# Patient Record
Sex: FEMALE | Race: WHITE
[De-identification: names, ages, dates, MRNs, and addresses within clinical notes are randomized per-mention and may not be internally consistent; named-entity substitution may affect disease eponyms.]

---

## 2017-03-07 ENCOUNTER — HOSPITAL ENCOUNTER (OUTPATIENT)
Dept: HOSPITAL 49 - FAS | Age: 62
Discharge: HOME | End: 2017-03-07
Payer: COMMERCIAL

## 2017-03-07 DIAGNOSIS — Z12.11: Primary | ICD-10-CM

## 2017-03-07 DIAGNOSIS — Z90.49: ICD-10-CM

## 2017-03-07 DIAGNOSIS — Z88.0: ICD-10-CM

## 2017-03-07 DIAGNOSIS — Z98.51: ICD-10-CM

## 2017-03-07 DIAGNOSIS — Z98.890: ICD-10-CM

## 2017-03-07 DIAGNOSIS — Z87.891: ICD-10-CM

## 2017-03-07 DIAGNOSIS — M19.90: ICD-10-CM

## 2017-03-07 DIAGNOSIS — Z86.010: ICD-10-CM

## 2017-03-07 DIAGNOSIS — Z79.899: ICD-10-CM

## 2017-03-07 DIAGNOSIS — K63.5: ICD-10-CM

## 2017-03-07 DIAGNOSIS — E78.00: ICD-10-CM

## 2017-03-07 LAB
BUN SERPL-MCNC: 10 MG/DL (ref 6–25)
BUN/CREAT RATIO (CALC): 14 (ref 12.1–20.1)
CHLORIDE: 101 MMOL/L (ref 98–107)
CO2 (BICARBONATE): 27 MMOL/L (ref 23–33)
CREATININE: 0.7 MG/DL (ref 0.5–1)
GLUCOSE SERPL-MCNC: 107 MG/DL (ref 70–105)
POTASSIUM: 3.8 MMOL/L (ref 3.5–5.1)

## 2018-02-22 ENCOUNTER — OFFICE VISIT (OUTPATIENT)
Dept: NEUROSURGERY | Facility: CLINIC | Age: 63
End: 2018-02-22

## 2018-02-22 ENCOUNTER — HOSPITAL ENCOUNTER (OUTPATIENT)
Dept: GENERAL RADIOLOGY | Facility: HOSPITAL | Age: 63
Discharge: HOME | End: 2018-02-22
Attending: NEUROLOGICAL SURGERY | Admitting: NEUROLOGICAL SURGERY

## 2018-02-22 VITALS
SYSTOLIC BLOOD PRESSURE: 138 MMHG | HEIGHT: 63 IN | BODY MASS INDEX: 33.13 KG/M2 | WEIGHT: 187 LBS | DIASTOLIC BLOOD PRESSURE: 90 MMHG

## 2018-02-22 DIAGNOSIS — G89.29 CHRONIC BILATERAL LOW BACK PAIN WITH BILATERAL SCIATICA: Primary | ICD-10-CM

## 2018-02-22 DIAGNOSIS — G89.29 CHRONIC BILATERAL LOW BACK PAIN WITH BILATERAL SCIATICA: ICD-10-CM

## 2018-02-22 DIAGNOSIS — M54.41 CHRONIC BILATERAL LOW BACK PAIN WITH BILATERAL SCIATICA: ICD-10-CM

## 2018-02-22 DIAGNOSIS — M54.42 CHRONIC BILATERAL LOW BACK PAIN WITH BILATERAL SCIATICA: ICD-10-CM

## 2018-02-22 DIAGNOSIS — M43.10 ACQUIRED SPONDYLOLISTHESIS: ICD-10-CM

## 2018-02-22 DIAGNOSIS — M54.41 CHRONIC BILATERAL LOW BACK PAIN WITH BILATERAL SCIATICA: Primary | ICD-10-CM

## 2018-02-22 DIAGNOSIS — M54.42 CHRONIC BILATERAL LOW BACK PAIN WITH BILATERAL SCIATICA: Primary | ICD-10-CM

## 2018-02-22 PROBLEM — M54.40 CHRONIC BILATERAL LOW BACK PAIN WITH SCIATICA: Status: ACTIVE | Noted: 2018-02-22

## 2018-02-22 PROCEDURE — 99243 OFF/OP CNSLTJ NEW/EST LOW 30: CPT | Performed by: NEUROLOGICAL SURGERY

## 2018-02-22 PROCEDURE — 72114 X-RAY EXAM L-S SPINE BENDING: CPT

## 2018-02-22 RX ORDER — ESTRADIOL 1 MG/1
1 TABLET ORAL NIGHTLY
COMMUNITY
Start: 2017-12-09 | End: 2022-01-11 | Stop reason: SDUPTHER

## 2018-02-22 RX ORDER — MELOXICAM 7.5 MG/1
7.5 TABLET ORAL DAILY
COMMUNITY
Start: 2018-02-01 | End: 2018-08-13 | Stop reason: HOSPADM

## 2018-02-22 RX ORDER — TOLTERODINE 4 MG/1
4 CAPSULE, EXTENDED RELEASE ORAL NIGHTLY
COMMUNITY
Start: 2018-01-22 | End: 2019-02-20 | Stop reason: ALTCHOICE

## 2018-02-22 RX ORDER — SIMVASTATIN 20 MG
20 TABLET ORAL NIGHTLY
COMMUNITY
Start: 2017-12-09 | End: 2021-09-28 | Stop reason: SDUPTHER

## 2018-02-22 RX ORDER — FUROSEMIDE 80 MG
40 TABLET ORAL DAILY
COMMUNITY
Start: 2018-01-31 | End: 2018-08-13 | Stop reason: HOSPADM

## 2018-02-22 RX ORDER — PANTOPRAZOLE SODIUM 40 MG/1
TABLET, DELAYED RELEASE ORAL
COMMUNITY
Start: 2018-02-16 | End: 2018-07-18

## 2018-02-22 RX ORDER — CYCLOBENZAPRINE HCL 10 MG
TABLET ORAL
COMMUNITY
Start: 2018-02-01 | End: 2018-03-29 | Stop reason: SDUPTHER

## 2018-02-22 NOTE — PROGRESS NOTES
Subjective   Patient ID: Carolina Ayers is a 63 y.o. female who is being seen for consultation today at the request of Teodoro Burks, * for low back pain. She had an MRI of the lumbar spine at [facility] on 2/16/18. She presents accompanied by her mother.    History of Present Illness Patient presnrts for evaluation of LBP and leg complaints for several years.  Legs started 6 months ago.  No loss of b/b.  She has chronic urinary urgency which started before her back started to bother her.  SHe noted no inciting event.  She does not have known osteoporosis.  Her pain today ias in her low back and is rated 7/10.  Walking and standing exacerbate.  Walking with a shopping cart does relieve her legs to some degree.  She denies GABBY's.  She denies PT.  CHiropractics have helped.  SHe is non smoker.  SHe has trialed ibuprofen but had some GI issues. This helped her until she had gastritis.  She feels the ibuprofen helped her quite a bit.  I operated on her back.      The following portions of the patient's history were reviewed and updated as appropriate: allergies, current medications, past family history, past medical history, past social history, past surgical history and problem list.    Review of Systems   Constitutional: Negative for chills and fever.   Respiratory: Negative for cough and shortness of breath.    Cardiovascular: Negative for chest pain, palpitations and leg swelling.   Gastrointestinal: Negative for abdominal pain and constipation.   Genitourinary: Negative for difficulty urinating and enuresis.   Musculoskeletal: Positive for arthralgias (BLE) and back pain. Negative for gait problem and neck pain.   Skin: Negative for rash.   Neurological: Positive for numbness (or tingling BLE). Negative for weakness.   Hematological: Does not bruise/bleed easily.   Psychiatric/Behavioral: Positive for sleep disturbance.       Objective   Physical Exam   Constitutional: She is oriented to person, place, and  time.   Neurological: She is oriented to person, place, and time. Gait normal.   Reflex Scores:       Tricep reflexes are 1+ on the right side and 1+ on the left side.       Bicep reflexes are 1+ on the right side and 1+ on the left side.       Brachioradialis reflexes are 2+ on the right side and 2+ on the left side.       Patellar reflexes are 2+ on the right side and 2+ on the left side.       Achilles reflexes are 1+ on the right side and 1+ on the left side.    Neurologic Exam     Mental Status   Oriented to person, place, and time.     Motor Exam   Muscle bulk: normal  Overall muscle tone: normal    Strength   Right deltoid: 5/5  Left deltoid: 5/5  Right biceps: 5/5  Left biceps: 5/5  Right triceps: 5/5  Left triceps: 5/5  Right wrist flexion: 5/5  Left wrist flexion: 5/5  Right wrist extension: 5/5  Left wrist extension: 5/5  Right interossei: 5/5  Left interossei: 5/5  Right iliopsoas: 5/5  Left iliopsoas: 5/5  Right quadriceps: 5/5  Left quadriceps: 5/5  Right hamstrin/5  Left hamstrin/5  Right anterior tibial: 5/5  Left anterior tibial: 5/5  Right gastroc: 5/5  Left gastroc: 5/5    Sensory Exam   Right arm light touch: normal  Left arm light touch: normal  Right leg light touch: normal  Left leg light touch: normal  Right arm pinprick: normal  Left arm pinprick: normal  Right leg pinprick: normal  Left leg pinprick: normal    Gait, Coordination, and Reflexes     Gait  Gait: normal    Reflexes   Right brachioradialis: 2+  Left brachioradialis: 2+  Right biceps: 1+  Left biceps: 1+  Right triceps: 1+  Left triceps: 1+  Right patellar: 2+  Left patellar: 2+  Right achilles: 1+  Left achilles: 1+  Right Medrano: absent  Left Medrano: absent  Right ankle clonus: absent  Left ankle clonus: absent     Thin back with palpable spinous prciess  No pain at trochanters or with internal or external hip rotation    Assessment/Plan   Independent Review of Radiographic Studies:  SHe has a grade 1 spondylolisthesis  at L45.  There is acquired stenosis at this level as a result.      Medical Decision Making:  She has a grade 1 slip at L45.  We will investigate her back with flexion and extension radiographs.  We will start some diclofenac gel give her recent GI issues.  We discussed PT or GABBY's should she fail.  If she requires surgery we will likely need to perform a fusion at L45.  SHe desires to have her dynamic x-rays performed today if possible.  I spent 30 minutes with her and her mother and answered all questions.        Carolina was seen today for back pain.    Diagnoses and all orders for this visit:    Chronic bilateral low back pain with bilateral sciatica  -     Cancel: XR Spine Lumbar Complete With Flex & Ext; Future  -     XR Spine Lumbar Complete With Flex & Ext; Future    Acquired spondylolisthesis  -     Cancel: XR Spine Lumbar Complete With Flex & Ext; Future  -     XR Spine Lumbar Complete With Flex & Ext; Future    Other orders  -     diclofenac (VOLTAREN) 1 % gel gel; Apply 4 g topically 3 (Three) Times a Day.      No Follow-up on file.

## 2018-03-29 ENCOUNTER — OFFICE VISIT (OUTPATIENT)
Dept: NEUROSURGERY | Facility: CLINIC | Age: 63
End: 2018-03-29

## 2018-03-29 VITALS
WEIGHT: 187 LBS | SYSTOLIC BLOOD PRESSURE: 130 MMHG | BODY MASS INDEX: 33.13 KG/M2 | HEIGHT: 63 IN | DIASTOLIC BLOOD PRESSURE: 70 MMHG

## 2018-03-29 DIAGNOSIS — G89.29 CHRONIC BILATERAL LOW BACK PAIN WITH BILATERAL SCIATICA: ICD-10-CM

## 2018-03-29 DIAGNOSIS — M43.10 ACQUIRED SPONDYLOLISTHESIS: Primary | ICD-10-CM

## 2018-03-29 DIAGNOSIS — M54.42 CHRONIC BILATERAL LOW BACK PAIN WITH BILATERAL SCIATICA: ICD-10-CM

## 2018-03-29 DIAGNOSIS — M54.41 CHRONIC BILATERAL LOW BACK PAIN WITH BILATERAL SCIATICA: ICD-10-CM

## 2018-03-29 PROCEDURE — 99213 OFFICE O/P EST LOW 20 MIN: CPT | Performed by: NEUROLOGICAL SURGERY

## 2018-03-29 RX ORDER — CYCLOBENZAPRINE HCL 10 MG
10 TABLET ORAL 2 TIMES DAILY PRN
Qty: 45 TABLET | Refills: 1 | Status: SHIPPED | OUTPATIENT
Start: 2018-03-29 | End: 2018-08-13 | Stop reason: HOSPADM

## 2018-03-29 RX ORDER — GABAPENTIN 300 MG/1
CAPSULE ORAL
Qty: 120 CAPSULE | Refills: 3 | Status: SHIPPED | OUTPATIENT
Start: 2018-03-29 | End: 2018-07-18

## 2018-03-29 RX ORDER — METOLAZONE 2.5 MG/1
2.5 TABLET ORAL DAILY
COMMUNITY
End: 2018-07-18

## 2018-03-29 NOTE — PROGRESS NOTES
Subjective   Patient ID: Carolina Ayers is a 63 y.o. female who is here today for follow-up for low back pain. He had lumbar xrays at [facility] on 2/22/18. She presents accompanied by her mother.    History of Present Illness  Patient had dynamic slip at L45.  She is not interested in GABBY's.  She continues with biateral sciatica and back pain.  No new complaints.  Diclofenac has marginal efficacy.      The following portions of the patient's history were reviewed and updated as appropriate: allergies, current medications, past family history, past medical history, past social history, past surgical history and problem list.    Review of Systems   Musculoskeletal: Positive for arthralgias (BLE) and back pain. Negative for gait problem.   Neurological: Positive for numbness (BLE). Negative for weakness.       Objective   Physical Exam   Constitutional: She is oriented to person, place, and time.   Neurological: She is oriented to person, place, and time. Gait normal.   Reflex Scores:       Patellar reflexes are 2+ on the right side and 2+ on the left side.       Achilles reflexes are 1+ on the right side and 1+ on the left side.    Neurologic Exam     Mental Status   Oriented to person, place, and time.     Sensory Exam   Right leg light touch: normal  Left leg light touch: normal  Right leg pinprick: normal  Left leg pinprick: normal    Gait, Coordination, and Reflexes     Gait  Gait: normal    Reflexes   Right patellar: 2+  Left patellar: 2+  Right achilles: 1+  Left achilles: 1+      Assessment/Plan   Independent Review of Radiographic Studies:  I reviewed her dynamic xrays and she has a dynamic slip at L$%    Medical Decision Making:  We will start some gabapentin and PT to see if we can make some headway into her discomfort.  We discussed red flags.      Carolina was seen today for back pain.    Diagnoses and all orders for this visit:    Acquired spondylolisthesis  -     Ambulatory Referral to Physical  Therapy    Chronic bilateral low back pain with bilateral sciatica  -     Ambulatory Referral to Physical Therapy    Other orders  -     gabapentin (NEURONTIN) 300 MG capsule; Take 1 qhs for 3-5 days, then bid for 3-5 days, then tid and stay on this dose  -     cyclobenzaprine (FLEXERIL) 10 MG tablet; Take 1 tablet by mouth 2 (Two) Times a Day As Needed for Muscle Spasms.      Return in about 6 weeks (around 5/10/2018).

## 2018-05-10 ENCOUNTER — OFFICE VISIT (OUTPATIENT)
Dept: NEUROSURGERY | Facility: CLINIC | Age: 63
End: 2018-05-10

## 2018-05-10 VITALS
SYSTOLIC BLOOD PRESSURE: 132 MMHG | HEIGHT: 63 IN | WEIGHT: 189 LBS | BODY MASS INDEX: 33.49 KG/M2 | DIASTOLIC BLOOD PRESSURE: 80 MMHG

## 2018-05-10 DIAGNOSIS — M54.41 CHRONIC BILATERAL LOW BACK PAIN WITH BILATERAL SCIATICA: ICD-10-CM

## 2018-05-10 DIAGNOSIS — M43.10 ACQUIRED SPONDYLOLISTHESIS: Primary | ICD-10-CM

## 2018-05-10 DIAGNOSIS — M54.42 CHRONIC BILATERAL LOW BACK PAIN WITH BILATERAL SCIATICA: ICD-10-CM

## 2018-05-10 DIAGNOSIS — G89.29 CHRONIC BILATERAL LOW BACK PAIN WITH BILATERAL SCIATICA: ICD-10-CM

## 2018-05-10 PROCEDURE — 99214 OFFICE O/P EST MOD 30 MIN: CPT | Performed by: NEUROLOGICAL SURGERY

## 2018-05-10 NOTE — PROGRESS NOTES
Subjective   Patient ID: Carolina Ayers is a 63 y.o. female who is here today for follow-up for low back pain. She presents accompanied by her mother.    History of Present Illness  Patient is 62 yo female with history of LBP and bilateral buttock pain.  She is poorly tolerant of the gabapentin at tid.  PT was ineffective.  She reports no new complaints.      The following portions of the patient's history were reviewed and updated as appropriate: allergies, current medications, past family history, past medical history, past social history, past surgical history and problem list.    Review of Systems   Musculoskeletal: Positive for arthralgias (BLE) and back pain.   Neurological: Negative for weakness and numbness.       Objective   Physical Exam  Neurologic Exam     Motor Exam     Strength   Right iliopsoas: 5/5  Left iliopsoas: 5/5  Right quadriceps: 5/5  Left quadriceps: 5/5  Right hamstrin/5  Left hamstrin/5  Right anterior tibial: 5/5  Left anterior tibial: 5/5  Right gastroc: 5/5  Left gastroc: 5/55/5 EHL      Sensory Exam   Right leg light touch: normal  Left leg light touch: normal  Right leg pinprick: normal  Left leg pinprick: normal     Gait, Coordination, and Reflexes      Gait  Gait: normal     Reflexes   Right patellar: 2+  Left patellar: 2+  Right achilles: 1+  Left achilles: 1+    Assessment/Plan   Independent Review of Radiographic Studies:  She has L45 slip.    Medical Decision Making:  We discussed GABBY's.  I suggested she get a single GABBY to see if we can mitigate her complaints.  If this fails she will require an L45 fusion.  I will reach out to [facility] in [place] w/r/t her bone density.  She is vacationing in  and wants to wait for surgery. We will try to arrange the GABBY before that.  [facility].      Carolina was seen today for back pain.    Diagnoses and all orders for this visit:    Acquired spondylolisthesis  -     Ambulatory Referral to Pain  Management    Chronic bilateral low back pain with bilateral sciatica  -     Ambulatory Referral to Pain Management      No Follow-up on file.

## 2018-06-18 ENCOUNTER — TELEPHONE (OUTPATIENT)
Dept: NEUROSURGERY | Facility: CLINIC | Age: 63
End: 2018-06-18

## 2018-06-18 ENCOUNTER — OFFICE VISIT (OUTPATIENT)
Dept: NEUROSURGERY | Facility: CLINIC | Age: 63
End: 2018-06-18

## 2018-06-18 VITALS
DIASTOLIC BLOOD PRESSURE: 64 MMHG | BODY MASS INDEX: 32.78 KG/M2 | WEIGHT: 185 LBS | HEIGHT: 63 IN | SYSTOLIC BLOOD PRESSURE: 122 MMHG

## 2018-06-18 DIAGNOSIS — G89.29 CHRONIC BILATERAL LOW BACK PAIN WITH BILATERAL SCIATICA: ICD-10-CM

## 2018-06-18 DIAGNOSIS — M54.41 CHRONIC BILATERAL LOW BACK PAIN WITH BILATERAL SCIATICA: ICD-10-CM

## 2018-06-18 DIAGNOSIS — M43.10 ACQUIRED SPONDYLOLISTHESIS: Primary | ICD-10-CM

## 2018-06-18 DIAGNOSIS — M54.42 CHRONIC BILATERAL LOW BACK PAIN WITH BILATERAL SCIATICA: ICD-10-CM

## 2018-06-18 PROCEDURE — 99213 OFFICE O/P EST LOW 20 MIN: CPT | Performed by: NEUROLOGICAL SURGERY

## 2018-06-18 RX ORDER — CLINDAMYCIN PHOSPHATE 900 MG/50ML
900 INJECTION INTRAVENOUS ONCE
Status: CANCELLED | OUTPATIENT
Start: 2018-08-07 | End: 2018-08-07

## 2018-06-18 NOTE — PROGRESS NOTES
Subjective   Patient ID: Carolina Ayers is a 63 y.o. female who is here today for follow-up for back pain. She presents accompanied by her mother.    History of Present Illness  Patient reports her vacation is over and the GABBY was ineffective.  She continues with bilateral leg pain and complaints.  We discussed that we do not have the bone density evaluation as yet. No loss of b/b.  Did not tolerate Gabapentin.  She failed PT.      The following portions of the patient's history were reviewed and updated as appropriate: allergies, current medications, past family history, past medical history, past social history, past surgical history and problem list.    Review of Systems   Musculoskeletal: Positive for arthralgias (BLE) and back pain.   Neurological: Positive for numbness ( BLE). Negative for weakness.       Objective   Physical Exam  Neurologic Exam     Strength   Right iliopsoas: 5/5  Left iliopsoas: 5/5  Right quadriceps: 5/5  Left quadriceps: 5/5  Right hamstrin/5  Left hamstrin/5  Right anterior tibial: 5/5  Left anterior tibial: 5/5  Right gastroc: 5/5  Left gastroc: 5/55/5 EHL      Sensory Exam   Right leg light touch: normal  Left leg light touch: normal  Right leg pinprick: normal  Left leg pinprick: normal    Reflexes   Right patellar: 2+  Left patellar: 2+  Right achilles: 1+  Left achilles: 1+       Assessment/Plan   Independent Review of Radiographic Studies:  Grade 1 slip at L45 with acquired stenosis.      Medical Decision Making:  Bothe legs are bothersome and the Left is > right in terms of symptoms.  She has failed all non operative measures including PT for several weeks, 1 GABBY, gabapentin and NSAIDS.  SHe has a grade 1 spondylolisthesis at L45.  I suggested an open decompression and fusion with possible interbody based on her bone density.  We discussed the procedure in great detail.  We discussed r/b/a.  We discussed pain with ambulation as the easiest thing to predict with 90%  reduction after some time.  Back painis hardest to predict with about 65% of people reporting reduction after months of recovery.  Tingling is hard to predict and may or may not improve. Common and uncommon risks explored and  risks include infection nerve damage, csf leaks,  weakness, numbness, b/b issues, need for other procedures, medical complications.  We discussed pulmonary embolism in detail.  I reviewed her imaging and the case with a model and she wishes to proceed.      Carolina was seen today for back pain.    Diagnoses and all orders for this visit:    Acquired spondylolisthesis  -     Case Request; Standing  -     CBC and Differential; Future  -     Comprehensive metabolic panel; Future  -     APTT; Future  -     TSH; Future  -     Protime-INR; Future  -     clindamycin (CLEOCIN) 900 mg in dextrose (D5W) 5 % 100 mL IVPB; Infuse 900 mg into a venous catheter 1 (One) Time.  -     Case Request    Chronic bilateral low back pain with bilateral sciatica  -     Case Request; Standing  -     CBC and Differential; Future  -     Comprehensive metabolic panel; Future  -     APTT; Future  -     TSH; Future  -     Protime-INR; Future  -     clindamycin (CLEOCIN) 900 mg in dextrose (D5W) 5 % 100 mL IVPB; Infuse 900 mg into a venous catheter 1 (One) Time.  -     Case Request    Other orders  -     Follow anesthesia standing orders.  -     Obtain informed consent  -     Follow Anesthesia Guidelines / Standing Orders; Standing  -     Verify NPO Status; Standing  -     SHANTANU hose- To be placed on patient in pre-op; Standing  -     SCD (sequential compression device)- to be placed on patient in Pre-op; Standing      No Follow-up on file.

## 2018-06-18 NOTE — TELEPHONE ENCOUNTER
Could you please enter an order for a POST OP lumbar xray AP/lat standing in brace for diagnosis of acquired spondylolisthesis. Please make sure to flor it post op in comments. Thanks!

## 2018-06-23 ENCOUNTER — RESULTS ENCOUNTER (OUTPATIENT)
Dept: NEUROSURGERY | Facility: CLINIC | Age: 63
End: 2018-06-23

## 2018-06-23 DIAGNOSIS — G89.29 CHRONIC BILATERAL LOW BACK PAIN WITH BILATERAL SCIATICA: ICD-10-CM

## 2018-06-23 DIAGNOSIS — M54.41 CHRONIC BILATERAL LOW BACK PAIN WITH BILATERAL SCIATICA: ICD-10-CM

## 2018-06-23 DIAGNOSIS — M54.42 CHRONIC BILATERAL LOW BACK PAIN WITH BILATERAL SCIATICA: ICD-10-CM

## 2018-06-23 DIAGNOSIS — M43.10 ACQUIRED SPONDYLOLISTHESIS: ICD-10-CM

## 2018-07-18 ENCOUNTER — OFFICE VISIT (OUTPATIENT)
Dept: NEUROSURGERY | Facility: CLINIC | Age: 63
End: 2018-07-18

## 2018-07-18 ENCOUNTER — TELEPHONE (OUTPATIENT)
Dept: NEUROSURGERY | Facility: CLINIC | Age: 63
End: 2018-07-18

## 2018-07-18 ENCOUNTER — APPOINTMENT (OUTPATIENT)
Dept: PREADMISSION TESTING | Facility: HOSPITAL | Age: 63
End: 2018-07-18

## 2018-07-18 VITALS
TEMPERATURE: 98.1 F | RESPIRATION RATE: 18 BRPM | HEART RATE: 77 BPM | OXYGEN SATURATION: 97 % | DIASTOLIC BLOOD PRESSURE: 85 MMHG | HEIGHT: 62 IN | SYSTOLIC BLOOD PRESSURE: 133 MMHG | WEIGHT: 187 LBS | BODY MASS INDEX: 34.41 KG/M2

## 2018-07-18 VITALS
RESPIRATION RATE: 16 BRPM | SYSTOLIC BLOOD PRESSURE: 142 MMHG | HEART RATE: 76 BPM | WEIGHT: 185 LBS | BODY MASS INDEX: 33.84 KG/M2 | DIASTOLIC BLOOD PRESSURE: 72 MMHG

## 2018-07-18 DIAGNOSIS — M54.41 CHRONIC BILATERAL LOW BACK PAIN WITH BILATERAL SCIATICA: Primary | ICD-10-CM

## 2018-07-18 DIAGNOSIS — M43.10 ACQUIRED SPONDYLOLISTHESIS: ICD-10-CM

## 2018-07-18 DIAGNOSIS — R94.31 ABNORMAL EKG: ICD-10-CM

## 2018-07-18 DIAGNOSIS — G89.29 CHRONIC BILATERAL LOW BACK PAIN WITH BILATERAL SCIATICA: Primary | ICD-10-CM

## 2018-07-18 DIAGNOSIS — E87.6 HYPOKALEMIA: ICD-10-CM

## 2018-07-18 DIAGNOSIS — M54.42 CHRONIC BILATERAL LOW BACK PAIN WITH BILATERAL SCIATICA: Primary | ICD-10-CM

## 2018-07-18 LAB
ALBUMIN SERPL-MCNC: 4.4 G/DL (ref 3.5–5.2)
ALBUMIN/GLOB SERPL: 1.3 G/DL
ALP SERPL-CCNC: 80 U/L (ref 39–117)
ALT SERPL W P-5'-P-CCNC: 18 U/L (ref 1–33)
ANION GAP SERPL CALCULATED.3IONS-SCNC: 14.3 MMOL/L
APTT PPP: 29.3 SECONDS (ref 22.7–35.4)
AST SERPL-CCNC: 22 U/L (ref 1–32)
BASOPHILS # BLD AUTO: 0.05 10*3/MM3 (ref 0–0.2)
BASOPHILS NFR BLD AUTO: 0.8 % (ref 0–1.5)
BILIRUB SERPL-MCNC: 0.4 MG/DL (ref 0.1–1.2)
BUN BLD-MCNC: 12 MG/DL (ref 8–23)
BUN/CREAT SERPL: 14 (ref 7–25)
CALCIUM SPEC-SCNC: 9.9 MG/DL (ref 8.6–10.5)
CHLORIDE SERPL-SCNC: 93 MMOL/L (ref 98–107)
CO2 SERPL-SCNC: 30.7 MMOL/L (ref 22–29)
CREAT BLD-MCNC: 0.86 MG/DL (ref 0.57–1)
DEPRECATED RDW RBC AUTO: 43.6 FL (ref 37–54)
EOSINOPHIL # BLD AUTO: 0.18 10*3/MM3 (ref 0–0.7)
EOSINOPHIL NFR BLD AUTO: 2.7 % (ref 0.3–6.2)
ERYTHROCYTE [DISTWIDTH] IN BLOOD BY AUTOMATED COUNT: 13.2 % (ref 11.7–13)
GFR SERPL CREATININE-BSD FRML MDRD: 67 ML/MIN/1.73
GLOBULIN UR ELPH-MCNC: 3.3 GM/DL
GLUCOSE BLD-MCNC: 102 MG/DL (ref 65–99)
HCT VFR BLD AUTO: 41.8 % (ref 35.6–45.5)
HGB BLD-MCNC: 14.3 G/DL (ref 11.9–15.5)
IMM GRANULOCYTES # BLD: 0.04 10*3/MM3 (ref 0–0.03)
IMM GRANULOCYTES NFR BLD: 0.6 % (ref 0–0.5)
INR PPP: 1.01 (ref 0.9–1.1)
LYMPHOCYTES # BLD AUTO: 1.55 10*3/MM3 (ref 0.9–4.8)
LYMPHOCYTES NFR BLD AUTO: 23.5 % (ref 19.6–45.3)
MCH RBC QN AUTO: 31.4 PG (ref 26.9–32)
MCHC RBC AUTO-ENTMCNC: 34.2 G/DL (ref 32.4–36.3)
MCV RBC AUTO: 91.7 FL (ref 80.5–98.2)
MONOCYTES # BLD AUTO: 0.55 10*3/MM3 (ref 0.2–1.2)
MONOCYTES NFR BLD AUTO: 8.3 % (ref 5–12)
NEUTROPHILS # BLD AUTO: 4.26 10*3/MM3 (ref 1.9–8.1)
NEUTROPHILS NFR BLD AUTO: 64.7 % (ref 42.7–76)
NRBC BLD MANUAL-RTO: 0 /100 WBC (ref 0–0)
PLATELET # BLD AUTO: 317 10*3/MM3 (ref 140–500)
PMV BLD AUTO: 10.2 FL (ref 6–12)
POTASSIUM BLD-SCNC: 2.9 MMOL/L (ref 3.5–5.2)
PROT SERPL-MCNC: 7.7 G/DL (ref 6–8.5)
PROTHROMBIN TIME: 13.1 SECONDS (ref 11.7–14.2)
RBC # BLD AUTO: 4.56 10*6/MM3 (ref 3.9–5.2)
SODIUM BLD-SCNC: 138 MMOL/L (ref 136–145)
TSH SERPL DL<=0.05 MIU/L-ACNC: 2.51 MIU/ML (ref 0.27–4.2)
WBC NRBC COR # BLD: 6.59 10*3/MM3 (ref 4.5–10.7)

## 2018-07-18 PROCEDURE — 93005 ELECTROCARDIOGRAM TRACING: CPT

## 2018-07-18 PROCEDURE — 85025 COMPLETE CBC W/AUTO DIFF WBC: CPT | Performed by: NEUROLOGICAL SURGERY

## 2018-07-18 PROCEDURE — 93010 ELECTROCARDIOGRAM REPORT: CPT | Performed by: INTERNAL MEDICINE

## 2018-07-18 PROCEDURE — 85610 PROTHROMBIN TIME: CPT | Performed by: NEUROLOGICAL SURGERY

## 2018-07-18 PROCEDURE — 36415 COLL VENOUS BLD VENIPUNCTURE: CPT | Performed by: NEUROLOGICAL SURGERY

## 2018-07-18 PROCEDURE — 84443 ASSAY THYROID STIM HORMONE: CPT | Performed by: NEUROLOGICAL SURGERY

## 2018-07-18 PROCEDURE — 80053 COMPREHEN METABOLIC PANEL: CPT | Performed by: NEUROLOGICAL SURGERY

## 2018-07-18 PROCEDURE — 99213 OFFICE O/P EST LOW 20 MIN: CPT | Performed by: NURSE PRACTITIONER

## 2018-07-18 PROCEDURE — 85730 THROMBOPLASTIN TIME PARTIAL: CPT | Performed by: NEUROLOGICAL SURGERY

## 2018-07-18 RX ORDER — POTASSIUM CHLORIDE 1500 MG/1
20 TABLET, FILM COATED, EXTENDED RELEASE ORAL DAILY
Qty: 30 TABLET | Refills: 0 | Status: SHIPPED | OUTPATIENT
Start: 2018-07-18 | End: 2018-09-14

## 2018-07-18 RX ORDER — POLYETHYLENE GLYCOL 3350 17 G/17G
17 POWDER, FOR SOLUTION ORAL AS NEEDED
COMMUNITY
End: 2019-08-20

## 2018-07-18 RX ORDER — ASPIRIN 81 MG/1
81 TABLET, CHEWABLE ORAL DAILY
Status: ON HOLD | COMMUNITY
End: 2018-08-10

## 2018-07-18 NOTE — PROGRESS NOTES
Subjective   Patient ID: Carolina Ayers is a 63 y.o. female is here today for follow-up of lumbar spondylosis prior to scheduled L4/5 decompression with possible fusion on 8-7-18.    History of Present Illness  Patient presents for up of back pain with bilateral radiculopathy and spondylolisthesis.  She continues to have bilateral leg pain left greater than right.  No progressive symptoms with regard to weakness.  She is wearing a brace when necessary for comfort.  No incontinence issues.  She is anxious to proceed with surgery.    The following portions of the patient's history were reviewed and updated as appropriate: allergies, current medications, past family history, past medical history, past social history, past surgical history and problem list.    Review of Systems   Constitutional: Negative for fever.   HENT: Negative for trouble swallowing.    Respiratory: Negative for cough and wheezing.    Gastrointestinal: Positive for constipation.   Musculoskeletal: Positive for back pain. Negative for gait problem.   Skin: Negative for rash.   Neurological: Positive for numbness (LLE and buttocks). Negative for weakness.   Psychiatric/Behavioral: Negative for sleep disturbance.       Objective      Results from last 7 days  Lab Units 07/18/18  0910   WBC 10*3/mm3 6.59   HEMOGLOBIN g/dL 14.3   HEMATOCRIT % 41.8   PLATELETS 10*3/mm3 317       Results from last 7 days  Lab Units 07/18/18  0909   SODIUM mmol/L 138   POTASSIUM mmol/L 2.9*   CHLORIDE mmol/L 93*   CO2 mmol/L 30.7*   BUN mg/dL 12   CREATININE mg/dL 0.86   CALCIUM mg/dL 9.9   BILIRUBIN mg/dL 0.4   ALK PHOS U/L 80   ALT (SGPT) U/L 18   AST (SGOT) U/L 22   GLUCOSE mg/dL 102*       Results from last 7 days  Lab Units 07/18/18  0909   INR  1.01     EKG- SR, borderline T abnormalities inferior leads    Physical Exam   Constitutional: She is oriented to person, place, and time. She appears well-developed and well-nourished.   HENT:   Body mass index is 33.84  kg/m².     Neck: Normal carotid pulses present. Carotid bruit is not present.   Cardiovascular: Normal rate, regular rhythm and normal heart sounds.    No murmur heard.  Pulmonary/Chest: Effort normal and breath sounds normal.   Musculoskeletal:   Wearing TLSO brace   Neurological: She is alert and oriented to person, place, and time. She has normal strength. Gait normal.   Reflex Scores:       Patellar reflexes are 2+ on the right side and 2+ on the left side.       Achilles reflexes are 2+ on the right side and 2+ on the left side.  Skin: Skin is warm and dry. No rash noted.   Psychiatric: She has a normal mood and affect. Her behavior is normal. Judgment normal.   Vitals reviewed.    Neurologic Exam     Mental Status   Oriented to person, place, and time.   Level of consciousness: alert  Knowledge: good.     Motor Exam   Muscle bulk: normal    Strength   Strength 5/5 throughout.     Gait, Coordination, and Reflexes     Gait  Gait: normal    Reflexes   Right patellar: 2+  Left patellar: 2+  Right achilles: 2+  Left achilles: 2+Able to heel and toe walk bilaterally       Assessment/Plan   Independent Review of Radiographic Studies:    No new imaging    DEXA scan from March 2018 shows normal bone density    Medical Decision Making:    Patient presents for operative visit prior to undergoing L4/5 decompression and fusion.  She denies any new problems since her last visit.  She completed preadmission testing today.  Her exam is as noted above with no neurologic red flags.  She has received her TLSO brace and is actually wearing it today.  She states that it does help her discomfort when her back begins to really hurt.  Her labs and EKG were reviewed.  Her potassium is very low at 2.9.  She has no ectopy on her EKG, but she has some borderline T-wave abnormalities on inferior leads.  She is on Lasix.  She states that she has not been on potassium supplementation but in the past as have low potassium prior to surgery.   I gave her a one month supply of potassium to begin today and encourage potassium rich foods.  I've asked her surgery scheduler contact her PCP with regard to her EKG findings as well as her potassium.  She may need cardiac clearance prior to surgery, but we will await the primary care review of EKG.  She is aware to stop her aspirin and mobile 1 week prior to surgery, but she has artery done so.  She will not take her Lasix on the morning of surgery.  Rbas of surgery discussed in detail the last office visit.  Answer questions with regard to postoperative expectations and restrictions today.    Plan: L4/5 fusion and return to office following.  Begin potassium supplementation.  PCP clearance for surgery regarding EKG findings.    Carolina was seen today for back pain and pre-op exam.    Diagnoses and all orders for this visit:    Chronic bilateral low back pain with bilateral sciatica    Acquired spondylolisthesis    Hypokalemia    Abnormal EKG    Other orders  -     potassium chloride ER (K-TAB) 20 MEQ tablet controlled-release ER tablet; Take 1 tablet by mouth Daily.      Return for Postop vist as scheduled.

## 2018-07-18 NOTE — TELEPHONE ENCOUNTER
----- Message from MARI Boyer sent at 7/18/2018  3:23 PM EDT -----  Regarding: bone density  Please contact patient with her know that her bone density was done in March 2018 and shows normal  Bone density.  No osteoporosis or osteopenia

## 2018-07-18 NOTE — TELEPHONE ENCOUNTER
I would contact her PCP to see if there are any specific recommendations prior to surgery with regard to potassium replacement. This is not something we manage. Thanks!

## 2018-07-18 NOTE — DISCHARGE INSTRUCTIONS
Take the following medications the morning of surgery with a small sip of water: NONE    ARRIVE AT 7AM    General Instructions:  • Do not eat or drink anything after midnight the night before surgery.  • Infants may have breast milk up to four hours before surgery.  • Infants drinking formula may drink formula up to six hours before surgery.   • Patients who avoid smoking, chewing tobacco and alcohol for 4 weeks prior to surgery have a reduced risk of post-operative complications.  Quit smoking as many days before surgery as you can.  • Do not smoke, use chewing tobacco or drink alcohol the day of surgery.   • If applicable bring your C-PAP/ BI-PAP machine.  • Bring any papers given to you in the doctor’s office.  • Wear clean comfortable clothes and socks.  • Do not wear contact lenses or make-up.  Bring a case for your glasses.   • Bring crutches or walker if applicable.  • Remove all piercings.  Leave jewelry and any other valuables at home.  • Hair extensions with metal clips must be removed prior to surgery.  • The Pre-Admission Testing nurse will instruct you to bring medications if unable to obtain an accurate list in Pre-Admission Testing.        If you were given a blood bank ID arm band remember to bring it with you the day of surgery.    Preventing a Surgical Site Infection:  • For 2 to 3 days before surgery, avoid shaving with a razor because the razor can irritate skin and make it easier to develop an infection.    • Any areas of open skin can increase the risk of a post-operative wound infection by allowing bacteria to enter and travel throughout the body.  Notify your surgeon if you have any skin wounds / rashes even if it is not near the expected surgical site.  The area will need assessed to determine if surgery should be delayed until it is healed.  • The night prior to surgery sleep in a clean bed with clean clothing.  Do not allow pets to sleep with you.  • Shower on the morning of surgery using  a fresh bar of anti-bacterial soap (such as Dial) and clean washcloth.  Dry with a clean towel and dress in clean clothing.  • Ask your surgeon if you will be receiving antibiotics prior to surgery.  • Make sure you, your family, and all healthcare providers clean their hands with soap and water or an alcohol based hand  before caring for you or your wound.    Day of surgery:  Upon arrival, a Pre-op nurse and Anesthesiologist will review your health history, obtain vital signs, and answer questions you may have.  The only belongings needed at this time will be your home medications and if applicable your C-PAP/BI-PAP machine.  If you are staying overnight your family can leave the rest of your belongings in the car and bring them to your room later.  A Pre-op nurse will start an IV and you may receive medication in preparation for surgery, including something to help you relax.  Your family will be able to see you in the Pre-op area.  While you are in surgery your family should notify the waiting room  if they leave the waiting room area and provide a contact phone number.    Please be aware that surgery does come with discomfort.  We want to make every effort to control your discomfort so please discuss any uncontrolled symptoms with your nurse.   Your doctor will most likely have prescribed pain medications.      If you are going home after surgery you will receive individualized written care instructions before being discharged.  A responsible adult must drive you to and from the hospital on the day of your surgery and stay with you for 24 hours.    If you are staying overnight following surgery, you will be transported to your hospital room following the recovery period.  [facility] has all private rooms.    You have received a list of surgical assistants for your reference.  If you have any questions please call Pre-Admission Testing at [phone].  Deductibles and co-payments  are collected on the day of service. Please be prepared to pay the required co-pay, deductible or deposit on the day of service as defined by your plan.

## 2018-07-31 ENCOUNTER — TELEPHONE (OUTPATIENT)
Dept: NEUROSURGERY | Facility: CLINIC | Age: 63
End: 2018-07-31

## 2018-07-31 ENCOUNTER — OFFICE VISIT (OUTPATIENT)
Dept: CARDIOLOGY | Facility: CLINIC | Age: 63
End: 2018-07-31

## 2018-07-31 ENCOUNTER — LAB (OUTPATIENT)
Dept: LAB | Facility: HOSPITAL | Age: 63
End: 2018-07-31

## 2018-07-31 VITALS
DIASTOLIC BLOOD PRESSURE: 80 MMHG | HEART RATE: 69 BPM | BODY MASS INDEX: 33.86 KG/M2 | HEIGHT: 62 IN | WEIGHT: 184 LBS | SYSTOLIC BLOOD PRESSURE: 122 MMHG

## 2018-07-31 DIAGNOSIS — E87.6 HYPOKALEMIA: ICD-10-CM

## 2018-07-31 DIAGNOSIS — R94.31 ABNORMAL EKG: ICD-10-CM

## 2018-07-31 DIAGNOSIS — R60.0 LOCALIZED EDEMA: ICD-10-CM

## 2018-07-31 DIAGNOSIS — I10 ESSENTIAL HYPERTENSION: ICD-10-CM

## 2018-07-31 DIAGNOSIS — Z01.810 PREOP CARDIOVASCULAR EXAM: Primary | ICD-10-CM

## 2018-07-31 LAB
ANION GAP SERPL CALCULATED.3IONS-SCNC: 15 MMOL/L
BILIRUB UR QL STRIP: NEGATIVE
BUN BLD-MCNC: 18 MG/DL (ref 8–23)
BUN/CREAT SERPL: 18.6 (ref 7–25)
CALCIUM SPEC-SCNC: 10.8 MG/DL (ref 8.6–10.5)
CHLORIDE SERPL-SCNC: 96 MMOL/L (ref 98–107)
CLARITY UR: CLEAR
CO2 SERPL-SCNC: 30 MMOL/L (ref 22–29)
COLOR UR: YELLOW
CREAT BLD-MCNC: 0.97 MG/DL (ref 0.57–1)
GFR SERPL CREATININE-BSD FRML MDRD: 58 ML/MIN/1.73
GLUCOSE BLD-MCNC: 114 MG/DL (ref 65–99)
GLUCOSE UR STRIP-MCNC: NEGATIVE MG/DL
HGB UR QL STRIP.AUTO: NEGATIVE
KETONES UR QL STRIP: NEGATIVE
LEUKOCYTE ESTERASE UR QL STRIP.AUTO: NEGATIVE
MAGNESIUM SERPL-MCNC: 2.1 MG/DL (ref 1.6–2.4)
NITRITE UR QL STRIP: NEGATIVE
PH UR STRIP.AUTO: 6.5 [PH] (ref 5–8)
POTASSIUM BLD-SCNC: 3.5 MMOL/L (ref 3.5–5.2)
PROT UR QL STRIP: NEGATIVE
SODIUM BLD-SCNC: 141 MMOL/L (ref 136–145)
SP GR UR STRIP: 1.01 (ref 1–1.03)
UROBILINOGEN UR QL STRIP: NORMAL

## 2018-07-31 PROCEDURE — 81003 URINALYSIS AUTO W/O SCOPE: CPT

## 2018-07-31 PROCEDURE — 80048 BASIC METABOLIC PNL TOTAL CA: CPT

## 2018-07-31 PROCEDURE — 99204 OFFICE O/P NEW MOD 45 MIN: CPT | Performed by: INTERNAL MEDICINE

## 2018-07-31 PROCEDURE — 36415 COLL VENOUS BLD VENIPUNCTURE: CPT

## 2018-07-31 PROCEDURE — 83735 ASSAY OF MAGNESIUM: CPT

## 2018-07-31 PROCEDURE — 93000 ELECTROCARDIOGRAM COMPLETE: CPT | Performed by: INTERNAL MEDICINE

## 2018-07-31 RX ORDER — METOLAZONE 2.5 MG/1
2.5 TABLET ORAL EVERY OTHER DAY
COMMUNITY
End: 2018-09-14

## 2018-07-31 NOTE — TELEPHONE ENCOUNTER
Patient is calling because she says her EKG was abnormal and they are doing an Echo on Friday. Pt saw Dr Nathan. Pt is scheduled for surgery on Tuesday and has questions about some of her medications. She wants to know about melatonin, over the counter acid pills   Carolina [phone]

## 2018-08-02 ENCOUNTER — TELEPHONE (OUTPATIENT)
Dept: CARDIOLOGY | Facility: CLINIC | Age: 63
End: 2018-08-02

## 2018-08-02 DIAGNOSIS — R60.0 LOCALIZED EDEMA: Primary | ICD-10-CM

## 2018-08-03 ENCOUNTER — HOSPITAL ENCOUNTER (OUTPATIENT)
Dept: CARDIOLOGY | Facility: HOSPITAL | Age: 63
Discharge: HOME | End: 2018-08-03
Attending: INTERNAL MEDICINE | Admitting: INTERNAL MEDICINE

## 2018-08-03 VITALS
BODY MASS INDEX: 33.49 KG/M2 | HEIGHT: 62 IN | HEART RATE: 81 BPM | OXYGEN SATURATION: 97 % | SYSTOLIC BLOOD PRESSURE: 130 MMHG | WEIGHT: 182 LBS | DIASTOLIC BLOOD PRESSURE: 70 MMHG

## 2018-08-03 DIAGNOSIS — R94.31 ABNORMAL EKG: ICD-10-CM

## 2018-08-03 DIAGNOSIS — I10 ESSENTIAL HYPERTENSION: ICD-10-CM

## 2018-08-03 DIAGNOSIS — Z01.810 PREOP CARDIOVASCULAR EXAM: ICD-10-CM

## 2018-08-03 DIAGNOSIS — R60.0 LOCALIZED EDEMA: ICD-10-CM

## 2018-08-03 LAB
ASCENDING AORTA: 2.9 CM
BH CV ECHO MEAS - ACS: 1.8 CM
BH CV ECHO MEAS - AO MAX PG (FULL): 3.6 MMHG
BH CV ECHO MEAS - AO MAX PG: 10.2 MMHG
BH CV ECHO MEAS - AO MEAN PG (FULL): 1.8 MMHG
BH CV ECHO MEAS - AO MEAN PG: 4.7 MMHG
BH CV ECHO MEAS - AO ROOT AREA (BSA CORRECTED): 1.8
BH CV ECHO MEAS - AO ROOT AREA: 8.1 CM^2
BH CV ECHO MEAS - AO ROOT DIAM: 3.2 CM
BH CV ECHO MEAS - AO V2 MAX: 160 CM/SEC
BH CV ECHO MEAS - AO V2 MEAN: 97.6 CM/SEC
BH CV ECHO MEAS - AO V2 VTI: 34 CM
BH CV ECHO MEAS - AVA(I,A): 2.1 CM^2
BH CV ECHO MEAS - AVA(I,D): 2.1 CM^2
BH CV ECHO MEAS - AVA(V,A): 2.2 CM^2
BH CV ECHO MEAS - AVA(V,D): 2.2 CM^2
BH CV ECHO MEAS - BSA(HAYCOCK): 1.9 M^2
BH CV ECHO MEAS - BSA: 1.8 M^2
BH CV ECHO MEAS - BZI_BMI: 33.3 KILOGRAMS/M^2
BH CV ECHO MEAS - BZI_METRIC_HEIGHT: 157.5 CM
BH CV ECHO MEAS - BZI_METRIC_WEIGHT: 82.6 KG
BH CV ECHO MEAS - CONTRAST EF (2CH): 60.9 ML/M^2
BH CV ECHO MEAS - CONTRAST EF 4CH: 61 ML/M^2
BH CV ECHO MEAS - EDV(MOD-SP2): 69 ML
BH CV ECHO MEAS - EDV(MOD-SP4): 59 ML
BH CV ECHO MEAS - EDV(TEICH): 78.2 ML
BH CV ECHO MEAS - EF(CUBED): 81.2 %
BH CV ECHO MEAS - EF(MOD-BP): 61 %
BH CV ECHO MEAS - EF(MOD-SP2): 60.9 %
BH CV ECHO MEAS - EF(MOD-SP4): 61 %
BH CV ECHO MEAS - EF(TEICH): 74.2 %
BH CV ECHO MEAS - ESV(MOD-SP2): 27 ML
BH CV ECHO MEAS - ESV(MOD-SP4): 23 ML
BH CV ECHO MEAS - ESV(TEICH): 20.2 ML
BH CV ECHO MEAS - FS: 42.7 %
BH CV ECHO MEAS - IVS/LVPW: 1.1
BH CV ECHO MEAS - IVSD: 1.1 CM
BH CV ECHO MEAS - LAT PEAK E' VEL: 10 CM/SEC
BH CV ECHO MEAS - LV DIASTOLIC VOL/BSA (35-75): 32.1 ML/M^2
BH CV ECHO MEAS - LV MASS(C)D: 144 GRAMS
BH CV ECHO MEAS - LV MASS(C)DI: 78.4 GRAMS/M^2
BH CV ECHO MEAS - LV MAX PG: 6.6 MMHG
BH CV ECHO MEAS - LV MEAN PG: 3 MMHG
BH CV ECHO MEAS - LV SYSTOLIC VOL/BSA (12-30): 12.5 ML/M^2
BH CV ECHO MEAS - LV V1 MAX: 128.5 CM/SEC
BH CV ECHO MEAS - LV V1 MEAN: 80 CM/SEC
BH CV ECHO MEAS - LV V1 VTI: 25.9 CM
BH CV ECHO MEAS - LVIDD: 4.2 CM
BH CV ECHO MEAS - LVIDS: 2.4 CM
BH CV ECHO MEAS - LVLD AP2: 7 CM
BH CV ECHO MEAS - LVLD AP4: 6.8 CM
BH CV ECHO MEAS - LVLS AP2: 6.1 CM
BH CV ECHO MEAS - LVLS AP4: 6.5 CM
BH CV ECHO MEAS - LVOT AREA (M): 2.8 CM^2
BH CV ECHO MEAS - LVOT AREA: 2.8 CM^2
BH CV ECHO MEAS - LVOT DIAM: 1.9 CM
BH CV ECHO MEAS - LVPWD: 0.98 CM
BH CV ECHO MEAS - MED PEAK E' VEL: 9 CM/SEC
BH CV ECHO MEAS - MR MAX PG: 12.1 MMHG
BH CV ECHO MEAS - MR MAX VEL: 173.7 CM/SEC
BH CV ECHO MEAS - MV A DUR: 0.12 SEC
BH CV ECHO MEAS - MV A MAX VEL: 121.2 CM/SEC
BH CV ECHO MEAS - MV DEC SLOPE: 497.7 CM/SEC^2
BH CV ECHO MEAS - MV DEC TIME: 0.19 SEC
BH CV ECHO MEAS - MV E MAX VEL: 90 CM/SEC
BH CV ECHO MEAS - MV E/A: 0.74
BH CV ECHO MEAS - MV MAX PG: 6.1 MMHG
BH CV ECHO MEAS - MV MEAN PG: 2.9 MMHG
BH CV ECHO MEAS - MV P1/2T MAX VEL: 90 CM/SEC
BH CV ECHO MEAS - MV P1/2T: 53 MSEC
BH CV ECHO MEAS - MV V2 MAX: 123.6 CM/SEC
BH CV ECHO MEAS - MV V2 MEAN: 76.8 CM/SEC
BH CV ECHO MEAS - MV V2 VTI: 27 CM
BH CV ECHO MEAS - MVA P1/2T LCG: 2.4 CM^2
BH CV ECHO MEAS - MVA(P1/2T): 4.2 CM^2
BH CV ECHO MEAS - MVA(VTI): 2.7 CM^2
BH CV ECHO MEAS - PA ACC TIME: 0.11 SEC
BH CV ECHO MEAS - PA MAX PG (FULL): 2.1 MMHG
BH CV ECHO MEAS - PA MAX PG: 6 MMHG
BH CV ECHO MEAS - PA PR(ACCEL): 29.9 MMHG
BH CV ECHO MEAS - PA V2 MAX: 122.2 CM/SEC
BH CV ECHO MEAS - PULM A REVS DUR: 0.12 SEC
BH CV ECHO MEAS - PULM A REVS VEL: 32.3 CM/SEC
BH CV ECHO MEAS - PULM DIAS VEL: 41.3 CM/SEC
BH CV ECHO MEAS - PULM S/D: 1.8
BH CV ECHO MEAS - PULM SYS VEL: 74.1 CM/SEC
BH CV ECHO MEAS - RAP SYSTOLE: 3 MMHG
BH CV ECHO MEAS - RV MAX PG: 3.8 MMHG
BH CV ECHO MEAS - RV MEAN PG: 1.6 MMHG
BH CV ECHO MEAS - RV V1 MAX: 97.9 CM/SEC
BH CV ECHO MEAS - RV V1 MEAN: 56.1 CM/SEC
BH CV ECHO MEAS - RV V1 VTI: 17.8 CM
BH CV ECHO MEAS - RVSP: 26 MMHG
BH CV ECHO MEAS - SI(AO): 150.3 ML/M^2
BH CV ECHO MEAS - SI(CUBED): 32.5 ML/M^2
BH CV ECHO MEAS - SI(LVOT): 39.4 ML/M^2
BH CV ECHO MEAS - SI(MOD-SP2): 22.9 ML/M^2
BH CV ECHO MEAS - SI(MOD-SP4): 19.6 ML/M^2
BH CV ECHO MEAS - SI(TEICH): 31.6 ML/M^2
BH CV ECHO MEAS - SUP REN AO DIAM: 1.6 CM
BH CV ECHO MEAS - SV(AO): 276.1 ML
BH CV ECHO MEAS - SV(CUBED): 59.8 ML
BH CV ECHO MEAS - SV(LVOT): 72.4 ML
BH CV ECHO MEAS - SV(MOD-SP2): 42 ML
BH CV ECHO MEAS - SV(MOD-SP4): 36 ML
BH CV ECHO MEAS - SV(TEICH): 58 ML
BH CV ECHO MEAS - TAPSE (>1.6): 2.8 CM2
BH CV ECHO MEAS - TR MAX VEL: 238.1 CM/SEC
BH CV ECHO MEASUREMENTS AVERAGE E/E' RATIO: 9.47
BH CV XLRA - RV BASE: 2.7 CM
BH CV XLRA - TDI S': 14 CM/SEC
LEFT ATRIUM VOLUME INDEX: 26 ML/M2
LV EF 2D ECHO EST: 61 %
MAXIMAL PREDICTED HEART RATE: 157 BPM
SINUS: 2.9 CM
STJ: 2.1 CM
STRESS TARGET HR: 133 BPM

## 2018-08-03 PROCEDURE — 93306 TTE W/DOPPLER COMPLETE: CPT | Performed by: INTERNAL MEDICINE

## 2018-08-03 PROCEDURE — 25010000002 PERFLUTREN (DEFINITY) 8.476 MG IN SODIUM CHLORIDE 0.9 % 10 ML INJECTION: Performed by: INTERNAL MEDICINE

## 2018-08-03 PROCEDURE — 93306 TTE W/DOPPLER COMPLETE: CPT

## 2018-08-03 RX ADMIN — PERFLUTREN 1.5 ML: 6.52 INJECTION, SUSPENSION INTRAVENOUS at 13:31

## 2018-08-06 ENCOUNTER — TELEPHONE (OUTPATIENT)
Dept: CARDIOLOGY | Facility: CLINIC | Age: 63
End: 2018-08-06

## 2018-08-06 PROBLEM — I10 ESSENTIAL HYPERTENSION: Status: ACTIVE | Noted: 2018-08-06

## 2018-08-06 PROBLEM — R60.0 LOCALIZED EDEMA: Status: ACTIVE | Noted: 2018-08-06

## 2018-08-06 PROBLEM — Z01.810 PREOP CARDIOVASCULAR EXAM: Status: ACTIVE | Noted: 2018-08-06

## 2018-08-06 NOTE — TELEPHONE ENCOUNTER
I called and informed Andrew (surgery scheduler at Dr. Vasqeus's office) and sent a copy of the surgery clearance note to scanning.  Lyubov

## 2018-08-06 NOTE — TELEPHONE ENCOUNTER
Calling to confirm if pt is cleared for sx tomorrow, 8/7/18.  Please advise.  [facility]

## 2018-08-07 ENCOUNTER — HOSPITAL ENCOUNTER (INPATIENT)
Facility: HOSPITAL | Age: 63
LOS: 6 days | Discharge: HOME | End: 2018-08-13
Attending: NEUROLOGICAL SURGERY | Admitting: NEUROLOGICAL SURGERY

## 2018-08-07 ENCOUNTER — APPOINTMENT (OUTPATIENT)
Dept: GENERAL RADIOLOGY | Facility: HOSPITAL | Age: 63
End: 2018-08-07

## 2018-08-07 ENCOUNTER — ANESTHESIA EVENT (OUTPATIENT)
Dept: PERIOP | Facility: HOSPITAL | Age: 63
End: 2018-08-07

## 2018-08-07 ENCOUNTER — ANESTHESIA (OUTPATIENT)
Dept: PERIOP | Facility: HOSPITAL | Age: 63
End: 2018-08-07

## 2018-08-07 DIAGNOSIS — M54.42 CHRONIC BILATERAL LOW BACK PAIN WITH BILATERAL SCIATICA: ICD-10-CM

## 2018-08-07 DIAGNOSIS — Z74.09 IMPAIRED FUNCTIONAL MOBILITY, BALANCE, GAIT, AND ENDURANCE: Primary | ICD-10-CM

## 2018-08-07 DIAGNOSIS — E87.6 HYPOKALEMIA: ICD-10-CM

## 2018-08-07 DIAGNOSIS — G89.29 CHRONIC BILATERAL LOW BACK PAIN WITH BILATERAL SCIATICA: ICD-10-CM

## 2018-08-07 DIAGNOSIS — I95.1 ORTHOSTATIC HYPOTENSION: ICD-10-CM

## 2018-08-07 DIAGNOSIS — M43.10 ACQUIRED SPONDYLOLISTHESIS: ICD-10-CM

## 2018-08-07 DIAGNOSIS — M54.41 CHRONIC BILATERAL LOW BACK PAIN WITH BILATERAL SCIATICA: ICD-10-CM

## 2018-08-07 PROCEDURE — C1713 ANCHOR/SCREW BN/BN,TIS/BN: HCPCS | Performed by: NEUROLOGICAL SURGERY

## 2018-08-07 PROCEDURE — 25010000002 PHENYLEPHRINE PER 1 ML: Performed by: NURSE ANESTHETIST, CERTIFIED REGISTERED

## 2018-08-07 PROCEDURE — 25010000002 MIDAZOLAM PER 1 MG: Performed by: ANESTHESIOLOGY

## 2018-08-07 PROCEDURE — 22630 ARTHRD PST TQ 1NTRSPC LUM: CPT | Performed by: SPECIALIST/TECHNOLOGIST, OTHER

## 2018-08-07 PROCEDURE — 72100 X-RAY EXAM L-S SPINE 2/3 VWS: CPT

## 2018-08-07 PROCEDURE — G0378 HOSPITAL OBSERVATION PER HR: HCPCS

## 2018-08-07 PROCEDURE — 25010000002 PROPOFOL 10 MG/ML EMULSION: Performed by: NURSE ANESTHETIST, CERTIFIED REGISTERED

## 2018-08-07 PROCEDURE — 22630 ARTHRD PST TQ 1NTRSPC LUM: CPT | Performed by: NEUROLOGICAL SURGERY

## 2018-08-07 PROCEDURE — 22840 INSERT SPINE FIXATION DEVICE: CPT | Performed by: NEUROLOGICAL SURGERY

## 2018-08-07 PROCEDURE — 76000 FLUOROSCOPY <1 HR PHYS/QHP: CPT

## 2018-08-07 PROCEDURE — 25010000002 DEXAMETHASONE PER 1 MG: Performed by: NURSE ANESTHETIST, CERTIFIED REGISTERED

## 2018-08-07 PROCEDURE — 22840 INSERT SPINE FIXATION DEVICE: CPT | Performed by: SPECIALIST/TECHNOLOGIST, OTHER

## 2018-08-07 PROCEDURE — 25010000002 FENTANYL CITRATE (PF) 100 MCG/2ML SOLUTION: Performed by: ANESTHESIOLOGY

## 2018-08-07 PROCEDURE — 00QT0ZZ REPAIR SPINAL MENINGES, OPEN APPROACH: ICD-10-PCS | Performed by: NEUROLOGICAL SURGERY

## 2018-08-07 PROCEDURE — 25010000002 SUCCINYLCHOLINE PER 20 MG: Performed by: NURSE ANESTHETIST, CERTIFIED REGISTERED

## 2018-08-07 PROCEDURE — 25010000002 HYDROMORPHONE PER 4 MG: Performed by: NURSE ANESTHETIST, CERTIFIED REGISTERED

## 2018-08-07 PROCEDURE — 25010000002 HEPARIN (PORCINE) PER 1000 UNITS: Performed by: NEUROLOGICAL SURGERY

## 2018-08-07 PROCEDURE — 0SG0071 FUSION OF LUMBAR VERTEBRAL JOINT WITH AUTOLOGOUS TISSUE SUBSTITUTE, POSTERIOR APPROACH, POSTERIOR COLUMN, OPEN APPROACH: ICD-10-PCS | Performed by: NEUROLOGICAL SURGERY

## 2018-08-07 PROCEDURE — 25010000002 VANCOMYCIN PER 500 MG: Performed by: NEUROLOGICAL SURGERY

## 2018-08-07 PROCEDURE — 0SB20ZZ EXCISION OF LUMBAR VERTEBRAL DISC, OPEN APPROACH: ICD-10-PCS | Performed by: NEUROLOGICAL SURGERY

## 2018-08-07 PROCEDURE — 25010000002 ONDANSETRON PER 1 MG: Performed by: NURSE ANESTHETIST, CERTIFIED REGISTERED

## 2018-08-07 PROCEDURE — 0SB00ZZ EXCISION OF LUMBAR VERTEBRAL JOINT, OPEN APPROACH: ICD-10-PCS | Performed by: NEUROLOGICAL SURGERY

## 2018-08-07 DEVICE — SCREW 54840045550 4.75 ATS MAS 5.5X50
Type: IMPLANTABLE DEVICE | Site: SPINE LUMBAR | Status: FUNCTIONAL
Brand: CD HORIZON® SPINAL SYSTEM

## 2018-08-07 DEVICE — GRFT BONE MAGNIFUSE PC 1.75X5CM: Type: IMPLANTABLE DEVICE | Site: SPINE LUMBAR | Status: FUNCTIONAL

## 2018-08-07 DEVICE — WAX BONE HEMO NAT 2.5G: Type: IMPLANTABLE DEVICE | Site: SPINE LUMBAR | Status: FUNCTIONAL

## 2018-08-07 DEVICE — SEALANT FIBRIN TISSEEL FZ 4ML: Type: IMPLANTABLE DEVICE | Status: FUNCTIONAL

## 2018-08-07 RX ORDER — POLYETHYLENE GLYCOL 3350 17 G/17G
17 POWDER, FOR SOLUTION ORAL DAILY
Status: DISCONTINUED | OUTPATIENT
Start: 2018-08-07 | End: 2018-08-13 | Stop reason: HOSPADM

## 2018-08-07 RX ORDER — ONDANSETRON 2 MG/ML
INJECTION INTRAMUSCULAR; INTRAVENOUS AS NEEDED
Status: DISCONTINUED | OUTPATIENT
Start: 2018-08-07 | End: 2018-08-07 | Stop reason: SURG

## 2018-08-07 RX ORDER — FENTANYL CITRATE 50 UG/ML
50 INJECTION, SOLUTION INTRAMUSCULAR; INTRAVENOUS
Status: DISCONTINUED | OUTPATIENT
Start: 2018-08-07 | End: 2018-08-07 | Stop reason: HOSPADM

## 2018-08-07 RX ORDER — DEXAMETHASONE SODIUM PHOSPHATE 10 MG/ML
INJECTION INTRAMUSCULAR; INTRAVENOUS AS NEEDED
Status: DISCONTINUED | OUTPATIENT
Start: 2018-08-07 | End: 2018-08-07 | Stop reason: SURG

## 2018-08-07 RX ORDER — ONDANSETRON 4 MG/1
4 TABLET, FILM COATED ORAL EVERY 6 HOURS PRN
Status: DISCONTINUED | OUTPATIENT
Start: 2018-08-07 | End: 2018-08-13 | Stop reason: HOSPADM

## 2018-08-07 RX ORDER — FAMOTIDINE 10 MG/ML
20 INJECTION, SOLUTION INTRAVENOUS ONCE
Status: COMPLETED | OUTPATIENT
Start: 2018-08-07 | End: 2018-08-07

## 2018-08-07 RX ORDER — MORPHINE SULFATE 2 MG/ML
2 INJECTION, SOLUTION INTRAMUSCULAR; INTRAVENOUS EVERY 4 HOURS PRN
Status: DISCONTINUED | OUTPATIENT
Start: 2018-08-07 | End: 2018-08-13 | Stop reason: HOSPADM

## 2018-08-07 RX ORDER — LABETALOL HYDROCHLORIDE 5 MG/ML
5 INJECTION, SOLUTION INTRAVENOUS
Status: DISCONTINUED | OUTPATIENT
Start: 2018-08-07 | End: 2018-08-07 | Stop reason: HOSPADM

## 2018-08-07 RX ORDER — SODIUM CHLORIDE, SODIUM LACTATE, POTASSIUM CHLORIDE, CALCIUM CHLORIDE 600; 310; 30; 20 MG/100ML; MG/100ML; MG/100ML; MG/100ML
INJECTION, SOLUTION INTRAVENOUS CONTINUOUS PRN
Status: DISCONTINUED | OUTPATIENT
Start: 2018-08-07 | End: 2018-08-07 | Stop reason: SURG

## 2018-08-07 RX ORDER — LIDOCAINE HYDROCHLORIDE 20 MG/ML
INJECTION, SOLUTION INFILTRATION; PERINEURAL AS NEEDED
Status: DISCONTINUED | OUTPATIENT
Start: 2018-08-07 | End: 2018-08-07 | Stop reason: SURG

## 2018-08-07 RX ORDER — SODIUM CHLORIDE, SODIUM LACTATE, POTASSIUM CHLORIDE, CALCIUM CHLORIDE 600; 310; 30; 20 MG/100ML; MG/100ML; MG/100ML; MG/100ML
9 INJECTION, SOLUTION INTRAVENOUS CONTINUOUS
Status: DISCONTINUED | OUTPATIENT
Start: 2018-08-07 | End: 2018-08-07

## 2018-08-07 RX ORDER — POTASSIUM CHLORIDE 1.5 G/1.77G
20 POWDER, FOR SOLUTION ORAL DAILY
Status: DISCONTINUED | OUTPATIENT
Start: 2018-08-07 | End: 2018-08-11

## 2018-08-07 RX ORDER — CLINDAMYCIN PHOSPHATE 900 MG/50ML
900 INJECTION INTRAVENOUS ONCE
Status: COMPLETED | OUTPATIENT
Start: 2018-08-07 | End: 2018-08-07

## 2018-08-07 RX ORDER — NALOXONE HCL 0.4 MG/ML
0.2 VIAL (ML) INJECTION AS NEEDED
Status: DISCONTINUED | OUTPATIENT
Start: 2018-08-07 | End: 2018-08-07 | Stop reason: HOSPADM

## 2018-08-07 RX ORDER — PROMETHAZINE HYDROCHLORIDE 25 MG/1
25 TABLET ORAL ONCE AS NEEDED
Status: DISCONTINUED | OUTPATIENT
Start: 2018-08-07 | End: 2018-08-07 | Stop reason: HOSPADM

## 2018-08-07 RX ORDER — OXYCODONE AND ACETAMINOPHEN 7.5; 325 MG/1; MG/1
1 TABLET ORAL ONCE AS NEEDED
Status: DISCONTINUED | OUTPATIENT
Start: 2018-08-07 | End: 2018-08-07 | Stop reason: HOSPADM

## 2018-08-07 RX ORDER — METOLAZONE 2.5 MG/1
2.5 TABLET ORAL EVERY OTHER DAY
Status: DISCONTINUED | OUTPATIENT
Start: 2018-08-07 | End: 2018-08-10

## 2018-08-07 RX ORDER — MIDAZOLAM HYDROCHLORIDE 1 MG/ML
1 INJECTION INTRAMUSCULAR; INTRAVENOUS
Status: DISCONTINUED | OUTPATIENT
Start: 2018-08-07 | End: 2018-08-07 | Stop reason: HOSPADM

## 2018-08-07 RX ORDER — PROMETHAZINE HYDROCHLORIDE 25 MG/1
12.5 TABLET ORAL ONCE AS NEEDED
Status: DISCONTINUED | OUTPATIENT
Start: 2018-08-07 | End: 2018-08-07 | Stop reason: HOSPADM

## 2018-08-07 RX ORDER — PROMETHAZINE HYDROCHLORIDE 25 MG/1
25 SUPPOSITORY RECTAL ONCE AS NEEDED
Status: DISCONTINUED | OUTPATIENT
Start: 2018-08-07 | End: 2018-08-07 | Stop reason: HOSPADM

## 2018-08-07 RX ORDER — ATORVASTATIN CALCIUM 10 MG/1
10 TABLET, FILM COATED ORAL DAILY
Status: DISCONTINUED | OUTPATIENT
Start: 2018-08-07 | End: 2018-08-13 | Stop reason: HOSPADM

## 2018-08-07 RX ORDER — CLINDAMYCIN PHOSPHATE 900 MG/50ML
900 INJECTION INTRAVENOUS EVERY 8 HOURS
Status: DISCONTINUED | OUTPATIENT
Start: 2018-08-07 | End: 2018-08-08

## 2018-08-07 RX ORDER — SODIUM CHLORIDE 0.9 % (FLUSH) 0.9 %
1-10 SYRINGE (ML) INJECTION AS NEEDED
Status: DISCONTINUED | OUTPATIENT
Start: 2018-08-07 | End: 2018-08-13 | Stop reason: HOSPADM

## 2018-08-07 RX ORDER — FUROSEMIDE 40 MG/1
40 TABLET ORAL DAILY
Status: DISCONTINUED | OUTPATIENT
Start: 2018-08-07 | End: 2018-08-10

## 2018-08-07 RX ORDER — CYCLOBENZAPRINE HCL 10 MG
10 TABLET ORAL 2 TIMES DAILY PRN
Status: DISCONTINUED | OUTPATIENT
Start: 2018-08-07 | End: 2018-08-08

## 2018-08-07 RX ORDER — HYDROCODONE BITARTRATE AND ACETAMINOPHEN 7.5; 325 MG/1; MG/1
1 TABLET ORAL ONCE AS NEEDED
Status: DISCONTINUED | OUTPATIENT
Start: 2018-08-07 | End: 2018-08-07 | Stop reason: HOSPADM

## 2018-08-07 RX ORDER — HYDROMORPHONE HCL 110MG/55ML
PATIENT CONTROLLED ANALGESIA SYRINGE INTRAVENOUS AS NEEDED
Status: DISCONTINUED | OUTPATIENT
Start: 2018-08-07 | End: 2018-08-07 | Stop reason: SURG

## 2018-08-07 RX ORDER — MAGNESIUM HYDROXIDE 1200 MG/15ML
LIQUID ORAL AS NEEDED
Status: DISCONTINUED | OUTPATIENT
Start: 2018-08-07 | End: 2018-08-07 | Stop reason: HOSPADM

## 2018-08-07 RX ORDER — NALOXONE HCL 0.4 MG/ML
0.4 VIAL (ML) INJECTION
Status: DISCONTINUED | OUTPATIENT
Start: 2018-08-07 | End: 2018-08-13 | Stop reason: HOSPADM

## 2018-08-07 RX ORDER — PROPOFOL 10 MG/ML
VIAL (ML) INTRAVENOUS AS NEEDED
Status: DISCONTINUED | OUTPATIENT
Start: 2018-08-07 | End: 2018-08-07 | Stop reason: SURG

## 2018-08-07 RX ORDER — SCOLOPAMINE TRANSDERMAL SYSTEM 1 MG/1
1 PATCH, EXTENDED RELEASE TRANSDERMAL
Status: DISCONTINUED | OUTPATIENT
Start: 2018-08-07 | End: 2018-08-09

## 2018-08-07 RX ORDER — BISACODYL 10 MG
10 SUPPOSITORY, RECTAL RECTAL DAILY PRN
Status: DISCONTINUED | OUTPATIENT
Start: 2018-08-07 | End: 2018-08-13 | Stop reason: HOSPADM

## 2018-08-07 RX ORDER — BISACODYL 5 MG/1
5 TABLET, DELAYED RELEASE ORAL DAILY PRN
Status: DISCONTINUED | OUTPATIENT
Start: 2018-08-07 | End: 2018-08-13 | Stop reason: HOSPADM

## 2018-08-07 RX ORDER — LIDOCAINE HYDROCHLORIDE 40 MG/ML
SOLUTION TOPICAL AS NEEDED
Status: DISCONTINUED | OUTPATIENT
Start: 2018-08-07 | End: 2018-08-07 | Stop reason: SURG

## 2018-08-07 RX ORDER — ONDANSETRON 2 MG/ML
4 INJECTION INTRAMUSCULAR; INTRAVENOUS EVERY 6 HOURS PRN
Status: DISCONTINUED | OUTPATIENT
Start: 2018-08-07 | End: 2018-08-13 | Stop reason: HOSPADM

## 2018-08-07 RX ORDER — MEPERIDINE HYDROCHLORIDE 25 MG/ML
12.5 INJECTION INTRAMUSCULAR; INTRAVENOUS; SUBCUTANEOUS
Status: DISCONTINUED | OUTPATIENT
Start: 2018-08-07 | End: 2018-08-07 | Stop reason: HOSPADM

## 2018-08-07 RX ORDER — DOCUSATE SODIUM 100 MG/1
100 CAPSULE, LIQUID FILLED ORAL 2 TIMES DAILY PRN
Status: DISCONTINUED | OUTPATIENT
Start: 2018-08-07 | End: 2018-08-09

## 2018-08-07 RX ORDER — ONDANSETRON 4 MG/1
4 TABLET, ORALLY DISINTEGRATING ORAL EVERY 6 HOURS PRN
Status: DISCONTINUED | OUTPATIENT
Start: 2018-08-07 | End: 2018-08-13 | Stop reason: HOSPADM

## 2018-08-07 RX ORDER — OXYBUTYNIN CHLORIDE 5 MG/1
5 TABLET, EXTENDED RELEASE ORAL DAILY
Status: DISCONTINUED | OUTPATIENT
Start: 2018-08-07 | End: 2018-08-13 | Stop reason: HOSPADM

## 2018-08-07 RX ORDER — PROMETHAZINE HYDROCHLORIDE 25 MG/ML
12.5 INJECTION, SOLUTION INTRAMUSCULAR; INTRAVENOUS ONCE AS NEEDED
Status: DISCONTINUED | OUTPATIENT
Start: 2018-08-07 | End: 2018-08-07 | Stop reason: HOSPADM

## 2018-08-07 RX ORDER — ACETAMINOPHEN 500 MG
1000 TABLET ORAL EVERY 6 HOURS PRN
COMMUNITY
End: 2019-02-20

## 2018-08-07 RX ORDER — ACETAMINOPHEN 500 MG
1000 TABLET ORAL EVERY 6 HOURS PRN
Status: DISCONTINUED | OUTPATIENT
Start: 2018-08-07 | End: 2018-08-13 | Stop reason: HOSPADM

## 2018-08-07 RX ORDER — LIDOCAINE HYDROCHLORIDE 10 MG/ML
0.5 INJECTION, SOLUTION EPIDURAL; INFILTRATION; INTRACAUDAL; PERINEURAL ONCE AS NEEDED
Status: DISCONTINUED | OUTPATIENT
Start: 2018-08-07 | End: 2018-08-07 | Stop reason: HOSPADM

## 2018-08-07 RX ORDER — ROCURONIUM BROMIDE 10 MG/ML
INJECTION, SOLUTION INTRAVENOUS AS NEEDED
Status: DISCONTINUED | OUTPATIENT
Start: 2018-08-07 | End: 2018-08-07 | Stop reason: SURG

## 2018-08-07 RX ORDER — CYCLOBENZAPRINE HCL 10 MG
10 TABLET ORAL 3 TIMES DAILY PRN
Status: DISCONTINUED | OUTPATIENT
Start: 2018-08-07 | End: 2018-08-13 | Stop reason: HOSPADM

## 2018-08-07 RX ORDER — SUCCINYLCHOLINE CHLORIDE 20 MG/ML
INJECTION INTRAMUSCULAR; INTRAVENOUS AS NEEDED
Status: DISCONTINUED | OUTPATIENT
Start: 2018-08-07 | End: 2018-08-07 | Stop reason: SURG

## 2018-08-07 RX ORDER — SODIUM CHLORIDE 0.9 % (FLUSH) 0.9 %
1-10 SYRINGE (ML) INJECTION AS NEEDED
Status: DISCONTINUED | OUTPATIENT
Start: 2018-08-07 | End: 2018-08-07 | Stop reason: HOSPADM

## 2018-08-07 RX ORDER — EPHEDRINE SULFATE 50 MG/ML
5 INJECTION, SOLUTION INTRAVENOUS ONCE AS NEEDED
Status: DISCONTINUED | OUTPATIENT
Start: 2018-08-07 | End: 2018-08-07 | Stop reason: HOSPADM

## 2018-08-07 RX ORDER — FLUMAZENIL 0.1 MG/ML
0.2 INJECTION INTRAVENOUS AS NEEDED
Status: DISCONTINUED | OUTPATIENT
Start: 2018-08-07 | End: 2018-08-07 | Stop reason: HOSPADM

## 2018-08-07 RX ORDER — DIPHENHYDRAMINE HYDROCHLORIDE 50 MG/ML
12.5 INJECTION INTRAMUSCULAR; INTRAVENOUS
Status: DISCONTINUED | OUTPATIENT
Start: 2018-08-07 | End: 2018-08-07 | Stop reason: HOSPADM

## 2018-08-07 RX ORDER — MIDAZOLAM HYDROCHLORIDE 1 MG/ML
2 INJECTION INTRAMUSCULAR; INTRAVENOUS
Status: DISCONTINUED | OUTPATIENT
Start: 2018-08-07 | End: 2018-08-07 | Stop reason: HOSPADM

## 2018-08-07 RX ORDER — HYDROMORPHONE HYDROCHLORIDE 1 MG/ML
0.5 INJECTION, SOLUTION INTRAMUSCULAR; INTRAVENOUS; SUBCUTANEOUS
Status: DISCONTINUED | OUTPATIENT
Start: 2018-08-07 | End: 2018-08-07 | Stop reason: HOSPADM

## 2018-08-07 RX ORDER — SENNA AND DOCUSATE SODIUM 50; 8.6 MG/1; MG/1
1 TABLET, FILM COATED ORAL NIGHTLY PRN
Status: DISCONTINUED | OUTPATIENT
Start: 2018-08-07 | End: 2018-08-13 | Stop reason: HOSPADM

## 2018-08-07 RX ORDER — OXYCODONE HYDROCHLORIDE AND ACETAMINOPHEN 5; 325 MG/1; MG/1
1 TABLET ORAL EVERY 4 HOURS PRN
Status: DISCONTINUED | OUTPATIENT
Start: 2018-08-07 | End: 2018-08-13 | Stop reason: HOSPADM

## 2018-08-07 RX ORDER — ONDANSETRON 2 MG/ML
4 INJECTION INTRAMUSCULAR; INTRAVENOUS ONCE AS NEEDED
Status: DISCONTINUED | OUTPATIENT
Start: 2018-08-07 | End: 2018-08-07 | Stop reason: HOSPADM

## 2018-08-07 RX ADMIN — LIDOCAINE HYDROCHLORIDE 80 MG: 20 INJECTION, SOLUTION INFILTRATION; PERINEURAL at 10:13

## 2018-08-07 RX ADMIN — ONDANSETRON 4 MG: 2 INJECTION INTRAMUSCULAR; INTRAVENOUS at 15:11

## 2018-08-07 RX ADMIN — PHENYLEPHRINE HYDROCHLORIDE 100 MCG: 10 INJECTION INTRAVENOUS at 11:52

## 2018-08-07 RX ADMIN — CLINDAMYCIN PHOSPHATE 900 MG: 900 INJECTION INTRAVENOUS at 10:10

## 2018-08-07 RX ADMIN — CLINDAMYCIN PHOSPHATE 900 MG: 900 INJECTION, SOLUTION INTRAVENOUS at 21:47

## 2018-08-07 RX ADMIN — SODIUM CHLORIDE, POTASSIUM CHLORIDE, SODIUM LACTATE AND CALCIUM CHLORIDE: 600; 310; 30; 20 INJECTION, SOLUTION INTRAVENOUS at 11:15

## 2018-08-07 RX ADMIN — FUROSEMIDE 40 MG: 40 TABLET ORAL at 21:49

## 2018-08-07 RX ADMIN — METOLAZONE 2.5 MG: 2.5 TABLET ORAL at 21:49

## 2018-08-07 RX ADMIN — PROPOFOL 150 MG: 10 INJECTION, EMULSION INTRAVENOUS at 10:13

## 2018-08-07 RX ADMIN — PHENYLEPHRINE HYDROCHLORIDE 100 MCG: 10 INJECTION INTRAVENOUS at 10:44

## 2018-08-07 RX ADMIN — FENTANYL CITRATE 50 MCG: 50 INJECTION INTRAMUSCULAR; INTRAVENOUS at 10:53

## 2018-08-07 RX ADMIN — HYDROMORPHONE HYDROCHLORIDE 0.25 MG: 1 INJECTION, SOLUTION INTRAMUSCULAR; INTRAVENOUS; SUBCUTANEOUS at 16:33

## 2018-08-07 RX ADMIN — OXYBUTYNIN CHLORIDE 5 MG: 5 TABLET, EXTENDED RELEASE ORAL at 21:49

## 2018-08-07 RX ADMIN — Medication 2 MG: at 10:10

## 2018-08-07 RX ADMIN — FENTANYL CITRATE 50 MCG: 50 INJECTION INTRAMUSCULAR; INTRAVENOUS at 10:55

## 2018-08-07 RX ADMIN — ATORVASTATIN CALCIUM 10 MG: 10 TABLET, FILM COATED ORAL at 21:49

## 2018-08-07 RX ADMIN — SODIUM CHLORIDE, POTASSIUM CHLORIDE, SODIUM LACTATE AND CALCIUM CHLORIDE 9 ML/HR: 600; 310; 30; 20 INJECTION, SOLUTION INTRAVENOUS at 08:57

## 2018-08-07 RX ADMIN — ROCURONIUM BROMIDE 10 MG: 10 INJECTION INTRAVENOUS at 10:13

## 2018-08-07 RX ADMIN — DEXAMETHASONE SODIUM PHOSPHATE 8 MG: 10 INJECTION INTRAMUSCULAR; INTRAVENOUS at 10:23

## 2018-08-07 RX ADMIN — SUCCINYLCHOLINE CHLORIDE 100 MG: 20 INJECTION, SOLUTION INTRAMUSCULAR; INTRAVENOUS; PARENTERAL at 10:13

## 2018-08-07 RX ADMIN — SCOPOLAMINE 1 PATCH: 1 PATCH, EXTENDED RELEASE TRANSDERMAL at 08:54

## 2018-08-07 RX ADMIN — LIDOCAINE HYDROCHLORIDE 1 EACH: 40 SOLUTION TOPICAL at 10:16

## 2018-08-07 RX ADMIN — HYDROMORPHONE HYDROCHLORIDE 0.5 MG: 2 INJECTION INTRAMUSCULAR; INTRAVENOUS; SUBCUTANEOUS at 15:11

## 2018-08-07 RX ADMIN — SODIUM CHLORIDE, POTASSIUM CHLORIDE, SODIUM LACTATE AND CALCIUM CHLORIDE: 600; 310; 30; 20 INJECTION, SOLUTION INTRAVENOUS at 12:31

## 2018-08-07 RX ADMIN — PROPOFOL 50 MG: 10 INJECTION, EMULSION INTRAVENOUS at 10:53

## 2018-08-07 RX ADMIN — SODIUM CHLORIDE, POTASSIUM CHLORIDE, SODIUM LACTATE AND CALCIUM CHLORIDE: 600; 310; 30; 20 INJECTION, SOLUTION INTRAVENOUS at 09:09

## 2018-08-07 RX ADMIN — MIDAZOLAM 1 MG: 1 INJECTION INTRAMUSCULAR; INTRAVENOUS at 08:54

## 2018-08-07 RX ADMIN — FAMOTIDINE 20 MG: 10 INJECTION, SOLUTION INTRAVENOUS at 08:54

## 2018-08-07 RX ADMIN — HYDROMORPHONE HYDROCHLORIDE 0.25 MG: 1 INJECTION, SOLUTION INTRAMUSCULAR; INTRAVENOUS; SUBCUTANEOUS at 16:44

## 2018-08-07 RX ADMIN — CLINDAMYCIN PHOSPHATE 900 MG: 900 INJECTION INTRAVENOUS at 14:45

## 2018-08-07 NOTE — ANESTHESIA POSTPROCEDURE EVALUATION
Patient: Carolina Ayers    Procedure Summary     Date:  08/07/18 Room / Location:  [facility] OR  / [facility] MAIN OR    Anesthesia Start:  1008 Anesthesia Stop:  1540    Procedure:  LUMBAR FUSION DECOMPRESSON WITH PEDICLE SCREWS Lumbar 4-5,  posterolateral fusion (Bilateral Spine Lumbar) Diagnosis:       Acquired spondylolisthesis      Chronic bilateral low back pain with bilateral sciatica      (Acquired spondylolisthesis [M43.10])      (Chronic bilateral low back pain with bilateral sciatica [M54.42, M54.41, G89.29])    Surgeon:  Jarrett Vasques IV, MD Provider:  Ina Moser MD    Anesthesia Type:  general ASA Status:  3          Anesthesia Type: general  Last vitals  BP   121/76 (08/07/18 1750)   Temp   36.9 °C (98.4 °F) (08/07/18 1750)   Pulse   83 (08/07/18 1750)   Resp   16 (08/07/18 1750)     SpO2   96 % (08/07/18 1750)     Post Anesthesia Care and Evaluation    Patient location during evaluation: PACU  Patient participation: complete - patient participated  Level of consciousness: awake  Pain score: 2  Pain management: adequate  Airway patency: patent  Anesthetic complications: No anesthetic complications  PONV Status: none  Cardiovascular status: acceptable  Respiratory status: acceptable  Hydration status: acceptable

## 2018-08-07 NOTE — NURSING NOTE
Preop symptoms    Pain in lower back. Left buttocks numbness that goes down to the left foot. Bilateral leg pain worse on the left side.

## 2018-08-07 NOTE — PERIOPERATIVE NURSING NOTE
Pt. Arrived to pacu at 1535, w/ ett and t-piece. Awoke at 1540, had strong neck muscles, head off bed for at least 10 seconds. Extubated w/o diff.. At 1545, pt. Went apneic, sats dropped to low 80s. Unable to place oral airway due to clenched jaw. Bagged w/ ambu for one minute. AA called to b/s. Dr. Weaver and Kenneth Dunaway, crna came . kenneth placed oral aiway. sats immed. Up to 100%

## 2018-08-07 NOTE — ANESTHESIA PREPROCEDURE EVALUATION
Anesthesia Evaluation     Patient summary reviewed and Nursing notes reviewed   history of anesthetic complications: PONV               Airway   Mallampati: II  Dental      Pulmonary    (+) a smoker Former,   Cardiovascular     ECG reviewed  Rhythm: regular  Rate: normal    (+) hypertension, valvular problems/murmurs TI, hyperlipidemia,       Neuro/Psych- negative ROS  GI/Hepatic/Renal/Endo - negative ROS     Musculoskeletal (-) negative ROS    Abdominal    Substance History - negative use     OB/GYN negative ob/gyn ROS         Other                        Anesthesia Plan    ASA 3     general   (Cleared by cardiology: Dr. Schaffer)  intravenous induction   Anesthetic plan and risks discussed with patient.

## 2018-08-07 NOTE — ANESTHESIA PROCEDURE NOTES
Airway  Urgency: elective    Date/Time: 8/7/2018 10:16 AM  Airway not difficult    General Information and Staff    Patient location during procedure: OR  Anesthesiologist: GUERO REID  CRNA: KEVIN MUIR    Indications and Patient Condition  Indications for airway management: airway protection    Preoxygenated: yes  MILS not maintained throughout  Mask difficulty assessment: 1 - vent by mask    Final Airway Details  Final airway type: endotracheal airway      Successful airway: ETT and reinforced tube  Cuffed: yes   Successful intubation technique: direct laryngoscopy  Facilitating devices/methods: anterior pressure/BURP and Bougie  Endotracheal tube insertion site: oral  Blade: Eyal  Blade size: #3  ETT size: 7.0 mm  Cormack-Lehane Classification: grade IIb - view of arytenoids or posterior of glottis only  Placement verified by: chest auscultation   Cuff volume (mL): 6  Measured from: lips  ETT to lips (cm): 20  Number of attempts at approach: 1    Additional Comments  Pre O2, SIAI

## 2018-08-07 NOTE — OP NOTE
LUMBAR DISCECTOMY POSTERIOR WITH FUSION INSTRUMENTATION  Procedure Note    Carolina Ayers  8/7/2018  [id number]    SURGEON  Jarrett Vasques IV, MD    ASSISTANT:  Annika Jordan CSA  INDICATION:  Back pain and bilateral leg pain    Pre-op Diagnosis:   Acquired spondylolisthesis [M43.10]  Chronic bilateral low back pain with bilateral sciatica [M54.42, M54.41, G89.29]    Post-op Diagnosis:     Post-Op Diagnosis Codes:     * Acquired spondylolisthesis [M43.10]     * Chronic bilateral low back pain with bilateral sciatica [M54.42, M54.41, G89.29]    PROCEDURE PERFORMED:  Procedure(s):  LUMBAR FUSION DECOMPRESSON WITH PEDICLE SCREWS Lumbar 4-5,  posterolateral fusion    Anesthesia: General    Staff:   Cell Saver : Andrés Adames  Circulator: Jenn Lopez RN; Davida Cha RN; Cihka Alcaraz RN  Radiology Technologist: Gaby Kathleen RRT; Marlyn Schaeffer  Scrub Person: Raz Prater Ellery; Sushma Ko RN; Harriett Tellez, RN  Vendor Representative: Lanre Figueroa Tony  Assistant: Aninka Jordan CSA    Estimated Blood Loss: 200ml    Specimens:                * No orders in the log *      Drains:   Urethral Catheter Silicone 16 Fr. (Active)       Findings: bilateral synovial cysts    Complications: incidental durotomy    Details: 63-year-old female with a history of bilateral leg pain and back pain noted to have L4 5 spondylolisthesis.  The patient failed a lengthy course of nonoperative measures.  Patient was apprised of risks benefits and alternatives and elected a possible lumbar interbody fusion or posterolateral fusion at L4 5.  Patient was taken to the operative suite intubated by anesthesia staff and had neuro monitoring needles placed in lower extremity.  Alas catheter was placed.  She was rotated into a Juni table in the prone position.  Her breasts were tucked and padded her extremities well-padded and there was no ocular pressure.  Her back was  cleansed with alcohol she was prepped and draped in usual sterile manner.  Lateral fluoroscopic unit was draped in the field sterilely.  Fluoroscopic guidance of the L4 5 interspace was obtained utilizing a spinal needle placed well off the midline.  Planned skin incision was marked.  Timeout was performed and planned skin incision was after local anesthetic.  Patient received preoperative and box.  Skin was incised with the scalpel dissection was carried through the dermis utilizing monopolar cautery.  Dissection was carried to the lumbosacral fascia and a subperiosteal dissection was carried down to the level of the L3 through 5 lamina.  Fluoroscopic confirmation of the appropriate levels was performed.  Superior portion the L5 lamina as well as the entirety of the L4 lamina were then removed utilizing a double-action Leksell.  The operative microscope strip brought in the field remaining portion the operative decompression was performed utilizing operative microscope.  Lamina at L4 5 was then tool an eggshell thickness and resected ligament flavum was extremely hypertrophied and a plane was ultimately developed and resected.  A shouldn't had bilateral synovial cysts at the L4 5 level with densely adherent ligamentum flavum at these levels bilaterally.  Inferior to this the neural foramina were bilaterally decompressed well attention was then turned to the synovial cyst on resection of the right synovial cyst and incidental durotomy occurred.  The dura was extremely adherent and thinned secondary to the cyst and this was unavoidable.  Additional bone was removed so that circumferential decompression of the thecal sac was noted.  6-0 Prolene suture was then utilized to close incidental neurotomy.  The arachnoid was largely intact but's a small amount of spinal fluid egress was noted.  Valsalva after the durotomy was closed was performed and no additional leakage of spinal fluid was noted.  Dissection was carried  laterally over the transverse processes to utilize as a guide point for the L4 and L5 pedicle screw placement.  Please note that a Dover ball probe passed easily into all neural foramina despite the small amount of residual synovial cyst noted.  5.5 x 50 mm screws were placed bilaterally at L4 and the following manner.  A drill was utilized to place a  hole and a Steffee probe was placed down the pedicle and a medialized fashion.  A spring probe was then utilized to palpate the pedicle walls and these were intact in all cardinal directions.  There was also an end point of the whole the pedicle.  Related screw was then placed bilaterally at L4 level V.5 x 50 Nickerson screw was selected.  The same procedure was performed at the L5 level but 6.5 screws were performed placed.  Stimulation was normal throughout placement of the screws.  35 millimeter rods were then selected.  Transverse processes were then drilled until punctate bleeding was noted.  Autograft was tucked into the lateral recesses adjacent to the transverse processes.  Humphreys magnafuse was also tucked into this region.  Wounds were copiously irrigated prior to placement of the graft.  Tisseel fibrin glue was sprayed over the durotomy to ensure good closure.  Locking screws were engaged on the rods after their placement and torque tightened.  The wound was inspected for hemostasis and once meticulous hemostasis was noted to be present closure commenced utilizing absorbable Vicryl stitch approximate the fascia and dermis and a running Monocryl suture was utilized approximate the skin edges Dermabond was places a final skin closure.  Needle sponge counts correct and as present the entire case dissection final skin closure.  The surgical first assistant greatly reduced operative time and increase patient's safety by providing micro-suctioning during closure of the durotomy as well as during the decompression.  She also assisted with closure as well as  placement of hardware.  Conclusion the case neuro monitoring was intact and normal and the patient was transferred the postanesthesia care unit for monitoring.      Jarrett Vasques IV, MD     Date: 8/7/2018  Time: 3:24 PM

## 2018-08-08 ENCOUNTER — APPOINTMENT (OUTPATIENT)
Dept: GENERAL RADIOLOGY | Facility: HOSPITAL | Age: 63
End: 2018-08-08

## 2018-08-08 PROCEDURE — 99024 POSTOP FOLLOW-UP VISIT: CPT | Performed by: NEUROLOGICAL SURGERY

## 2018-08-08 PROCEDURE — 97162 PT EVAL MOD COMPLEX 30 MIN: CPT

## 2018-08-08 PROCEDURE — 97110 THERAPEUTIC EXERCISES: CPT

## 2018-08-08 PROCEDURE — 25010000002 MORPHINE PER 10 MG: Performed by: NEUROLOGICAL SURGERY

## 2018-08-08 PROCEDURE — G0378 HOSPITAL OBSERVATION PER HR: HCPCS

## 2018-08-08 PROCEDURE — 72100 X-RAY EXAM L-S SPINE 2/3 VWS: CPT

## 2018-08-08 RX ADMIN — OXYCODONE HYDROCHLORIDE AND ACETAMINOPHEN 1 TABLET: 5; 325 TABLET ORAL at 20:59

## 2018-08-08 RX ADMIN — OXYCODONE HYDROCHLORIDE AND ACETAMINOPHEN 1 TABLET: 5; 325 TABLET ORAL at 02:00

## 2018-08-08 RX ADMIN — POLYETHYLENE GLYCOL 3350 17 G: 17 POWDER, FOR SOLUTION ORAL at 08:27

## 2018-08-08 RX ADMIN — ATORVASTATIN CALCIUM 10 MG: 10 TABLET, FILM COATED ORAL at 08:28

## 2018-08-08 RX ADMIN — OXYBUTYNIN CHLORIDE 5 MG: 5 TABLET, EXTENDED RELEASE ORAL at 08:28

## 2018-08-08 RX ADMIN — CLINDAMYCIN PHOSPHATE 900 MG: 900 INJECTION, SOLUTION INTRAVENOUS at 05:56

## 2018-08-08 RX ADMIN — CYCLOBENZAPRINE 10 MG: 10 TABLET, FILM COATED ORAL at 14:30

## 2018-08-08 RX ADMIN — OXYCODONE HYDROCHLORIDE AND ACETAMINOPHEN 1 TABLET: 5; 325 TABLET ORAL at 14:31

## 2018-08-08 RX ADMIN — MORPHINE SULFATE 2 MG: 2 INJECTION, SOLUTION INTRAMUSCULAR; INTRAVENOUS at 04:19

## 2018-08-08 RX ADMIN — OXYCODONE HYDROCHLORIDE AND ACETAMINOPHEN 1 TABLET: 5; 325 TABLET ORAL at 08:32

## 2018-08-08 RX ADMIN — POTASSIUM CHLORIDE 20 MEQ: 1.5 POWDER, FOR SOLUTION ORAL at 08:27

## 2018-08-08 NOTE — PLAN OF CARE
Problem: Patient Care Overview  Goal: Plan of Care Review  Outcome: Ongoing (interventions implemented as appropriate)   08/08/18 1618 08/08/18 1619   Coping/Psychosocial   Plan of Care Reviewed With patient --    OTHER   Outcome Summary --  Bed rest ordered discontinued. Pt worked with PT. C/o pain controlled by PO meds. VSS. will monitor   Plan of Care Review   Progress --  improving     Goal: Individualization and Mutuality  Outcome: Ongoing (interventions implemented as appropriate)    Goal: Discharge Needs Assessment  Outcome: Ongoing (interventions implemented as appropriate)    Goal: Interprofessional Rounds/Family Conf  Outcome: Ongoing (interventions implemented as appropriate)      Problem: Fall Risk (Adult)  Goal: Identify Related Risk Factors and Signs and Symptoms  Outcome: Ongoing (interventions implemented as appropriate)    Goal: Absence of Fall  Outcome: Ongoing (interventions implemented as appropriate)      Problem: Skin Injury Risk (Adult)  Goal: Identify Related Risk Factors and Signs and Symptoms  Outcome: Ongoing (interventions implemented as appropriate)    Goal: Skin Health and Integrity  Outcome: Ongoing (interventions implemented as appropriate)

## 2018-08-08 NOTE — PLAN OF CARE
Problem: Patient Care Overview  Goal: Plan of Care Review   08/08/18 0348   OTHER   Outcome Summary VSS. By 3:50am patient medicated x1 with Percocet 5/325. Tolerating clear liquids and saltines. Maintained flat in bed entire shift. Patient's KCl not given due to not delivered by pharmacy. Requested pharm schedule for AM and deliver. Oxygen 4 LPM NC to maintain sats when sleeping >=92%.

## 2018-08-08 NOTE — PLAN OF CARE
Problem: Patient Care Overview  Goal: Plan of Care Review   08/08/18 5692   Coping/Psychosocial   Plan of Care Reviewed With patient   OTHER   Outcome Summary Patient is a pleasant 63 y.o. female POD1 LUMBAR FUSION DECOMPRESSON WITH PEDICLE SCREWS Lumbar 4-5, posterolateral fusion with expected post op weakness and impaired functional mobility. Patient is independent with all ADLs at baseline and no prior use of AD. All mobility today required assist x 1. Ambulated 25' CGA with RW. May benefit from skilled PT services acutely to address deficits as able and improve level of independence prior to returning home.

## 2018-08-08 NOTE — PROGRESS NOTES
Discharge Planning Assessment  [facility]     Patient Name: Carolina Ayers  MRN: [id number]  Today's Date: 8/8/2018    Admit Date: 8/7/2018          Discharge Needs Assessment     Row Name 08/08/18 1410       Living Environment    Lives With spouse;child(ravi), adult    Current Living Arrangements home/apartment/condo    Primary Care Provided by self    Provides Primary Care For no one    Family Caregiver if Needed child(ravi), adult;spouse    Quality of Family Relationships supportive    Able to Return to Prior Arrangements yes       Resource/Environmental Concerns    Resource/Environmental Concerns none    Transportation Concerns car, none       Transition Planning    Patient/Family Anticipates Transition to home with family    Patient/Family Anticipated Services at Transition none    Transportation Anticipated family or friend will provide       Discharge Needs Assessment    Readmission Within the Last 30 Days no previous admission in last 30 days    Concerns to be Addressed no discharge needs identified    Equipment Currently Used at Home none    Equipment Needed After Discharge --   will follow for PT notes;  patient on bedrest    Offered/Gave Vendor List no            Discharge Plan     Row Name 08/08/18 1412       Plan    Plan Home with spouse and adult daughter    Patient/Family in Agreement with Plan yes    Plan Comments Met with patient, dgt Britney, and mother.  Patient gave permission to discuss with them present.  Patient plans to return home at discharge with assist of spouse who plans to be off 9 days to assist patient at home.  Will follow for any DME needs.  Verified facesheet info.        Destination     No service coordination in this encounter.      Durable Medical Equipment     No service coordination in this encounter.      Dialysis/Infusion     No service coordination in this encounter.      Home Medical Care     No service coordination in this encounter.      Social Care     No service  coordination in this encounter.                Demographic Summary     Row Name 08/08/18 1404       General Information    Admission Type observation    Arrived From operating room    Referral Source admission list    Reason for Consult discharge planning    Preferred Language English            Functional Status     Row Name 08/08/18 1405       Functional Status    Usual Activity Tolerance good    Current Activity Tolerance fair       Functional Status, IADL    Medications independent    Meal Preparation independent    Housekeeping independent    Laundry independent    Shopping independent       Mental Status    General Appearance WDL WDL       Mental Status Summary    Recent Changes in Mental Status/Cognitive Functioning no changes            Psychosocial    No documentation.           Abuse/Neglect    No documentation.           Legal    No documentation.           Substance Abuse    No documentation.           Patient Forms    No documentation.         Val Holman RN

## 2018-08-08 NOTE — PROGRESS NOTES
[facility] PROGRESS NOTE    PATIENT IDENTIFICATION:   Name:  Carolina Ayers      MRN:  [id number]     63 y.o.  female                   Active Problems:    Acquired spondylolisthesis    Chronic bilateral low back pain with bilateral sciatica        Subjective   CC: back pain as expected  Interval History: flat until 1500.  No headache.  Legs feeling good without numbness.     Objective     Vital signs in last 24 hours:  Temp:  [97.6 °F (36.4 °C)-98.6 °F (37 °C)] 98.6 °F (37 °C)  Heart Rate:  [71-91] 71  Resp:  [0-18] 16  BP: (107-144)/(52-81) 109/52      Intake/Output last 3 shifts:  I/O last 3 completed shifts:  In: 3150 [P.O.:400; I.V.:2750]  Out: 1085 [Urine:785; Blood:300]  Intake/Output this shift:  No intake/output data recorded.    LABS        Invalid input(s): CBC  Lab Results   Component Value Date    CALCIUM 10.8 (H) 07/31/2018       Physical Exam:  5/ distal str  Sen intact to lt    4 - Opens eyes on own  6 - Follows simple motor commands  5 - Alert and oriented        ASSESSMENT  Assessment/Plan     Up later today  Pt  D/c arelis later  Monitor wound given durotomy    t LOS: 0 days         Jarrett Vasques IV, MD

## 2018-08-08 NOTE — PLAN OF CARE
Problem: Patient Care Overview  Goal: Plan of Care Review  Outcome: Ongoing (interventions implemented as appropriate)      Problem: Fall Risk (Adult)  Goal: Identify Related Risk Factors and Signs and Symptoms  Outcome: Ongoing (interventions implemented as appropriate)    Goal: Absence of Fall  Outcome: Ongoing (interventions implemented as appropriate)      Problem: Skin Injury Risk (Adult)  Goal: Identify Related Risk Factors and Signs and Symptoms  Outcome: Ongoing (interventions implemented as appropriate)    Goal: Skin Health and Integrity  Outcome: Ongoing (interventions implemented as appropriate)

## 2018-08-08 NOTE — THERAPY EVALUATION
Acute Care - Physical Therapy Initial Evaluation  [facility]     Patient Name: Carolina Ayers  : 1955  MRN: [id number]  Today's Date: 2018   Onset of Illness/Injury or Date of Surgery: 18  Date of Referral to PT: 18  Referring Physician: Dr. Vasques      Admit Date: 2018    Visit Dx:     ICD-10-CM ICD-9-CM   1. Impaired functional mobility, balance, gait, and endurance Z74.09 V49.89   2. Acquired spondylolisthesis M43.10 738.4   3. Chronic bilateral low back pain with bilateral sciatica M54.42 724.2    M54.41 724.3    G89.29 338.29     Patient Active Problem List   Diagnosis   • Chronic bilateral low back pain with sciatica   • Acquired spondylolisthesis   • Chronic bilateral low back pain with bilateral sciatica   • Hypokalemia   • Abnormal EKG   • Preop cardiovascular exam   • Essential hypertension   • Localized edema     Past Medical History:   Diagnosis Date   • History of chest pain     SEEN DR SIM   • History of skin cancer    • Hyperlipidemia    • Lumbar herniated disc    • PONV (postoperative nausea and vomiting)      Past Surgical History:   Procedure Laterality Date   • BREAST LUMPECTOMY Left    • CARPAL TUNNEL RELEASE     • CHOLECYSTECTOMY     • EXTERNAL EAR SURGERY      cyst removed from ear   • HERNIA REPAIR     • HYSTERECTOMY      partial   • KNEE SURGERY     • LUMBAR DISCECTOMY FUSION INSTRUMENTATION Bilateral 2018    Procedure: LUMBAR FUSION DECOMPRESSON WITH PEDICLE SCREWS Lumbar 4-5,  posterolateral fusion;  Surgeon: Jarrett Vasques IV, MD;  Location: [facility];  Service: Neurosurgery   • SHOULDER SURGERY     • TONSILLECTOMY     • WRIST SURGERY Bilateral     Cyst removed        PT ASSESSMENT (last 12 hours)      Physical Therapy Evaluation     Row Name 18 1613          PT Evaluation Time/Intention    Subjective Information complains of;pain;fatigue   Bedrest order lifted at 1500  -MA     Document Type evaluation  -MA     Mode of Treatment  physical therapy  -MA     Patient Effort adequate  -MA     Symptoms Noted During/After Treatment increased pain;fatigue  -MA     Row Name 08/08/18 1613          General Information    Patient Profile Reviewed? yes  -MA     Onset of Illness/Injury or Date of Surgery 08/07/18  -MA     Referring Physician Dr. Vasques  -MA     Patient Observations alert;cooperative;agree to therapy  -MA     General Observations of Patient Supine in bed with HOB elevated, no acute distress noted at rest, SCD applied, O2 donned  -MA     Prior Level of Function independent:;all household mobility  -MA     Equipment Currently Used at Home none  -MA     Pertinent History of Current Functional Problem POD1 LUMBAR FUSION DECOMPRESSON WITH PEDICLE SCREWS Lumbar 4-5,  posterolateral fusion  -MA     Existing Precautions/Restrictions brace worn when out of bed;fall;spinal  -MA     Limitations/Impairments safety/cognitive  -MA     Risks Reviewed patient and family:  -MA     Benefits Reviewed patient and family:  -MA     Barriers to Rehab none identified  -MA     Row Name 08/08/18 1613          Cognitive Assessment/Intervention- PT/OT    Orientation Status (Cognition) oriented x 3  -MA     Follows Commands (Cognition) WFL  -MA     Safety Deficit (Cognitive) mild deficit  -MA     Personal Safety Interventions fall prevention program maintained;gait belt;nonskid shoes/slippers when out of bed  -MA     Row Name 08/08/18 1613          Safety Issues, Functional Mobility    Impairments Affecting Function (Mobility) pain;strength;endurance/activity tolerance  -MA     Row Name 08/08/18 1613          Bed Mobility Assessment/Treatment    Bed Mobility Assessment/Treatment supine-sit;sit-supine  -MA     Supine-Sit [place] (Bed Mobility) minimum assist (75% patient effort);verbal cues  -MA     Sit-Supine [place] (Bed Mobility) moderate assist (50% patient effort);verbal cues  -MA     Bed Mobility, Safety Issues decreased use of legs for  bridging/pushing;impaired trunk control for bed mobility  -MA     Assistive Device (Bed Mobility) bed rails;head of bed elevated  -MA     Comment (Bed Mobility) Cues for log roll technique  -MA     Row Name 08/08/18 1613          Transfer Assessment/Treatment    Transfer Assessment/Treatment sit-stand transfer;stand-sit transfer  -MA     Comment (Transfers) Hand placement cues  -MA     Sit-Stand [place] (Transfers) contact guard;verbal cues  -MA     Stand-Sit [place] (Transfers) contact guard;verbal cues  -MA     Row Name 08/08/18 1613          Sit-Stand Transfer    Assistive Device (Sit-Stand Transfers) walker, front-wheeled  -MA     Row Name 08/08/18 1613          Stand-Sit Transfer    Assistive Device (Stand-Sit Transfers) walker, front-wheeled  -MA     Row Name 08/08/18 1613          Gait/Stairs Assessment/Training    Gait/Stairs Assessment/Training gait/ambulation independence  -MA     [place] Level (Gait) contact guard;verbal cues  -MA     Assistive Device (Gait) walker, front-wheeled  -MA     Distance in Feet (Gait) 25  -MA     Pattern (Gait) step-to  -MA     Deviations/Abnormal Patterns (Gait) antalgic;base of support, narrow;jacquelyn decreased  -MA     Bilateral Gait Deviations forward flexed posture  -MA     Comment (Gait/Stairs) Limited 2' to pain and fatigue  -MA     Row Name 08/08/18 1613          General ROM    GENERAL ROM COMMENTS B LE WFL  -MA     Row Name 08/08/18 1613          General Assessment (Manual Muscle Testing)    Comment, General Manual Muscle Testing (MMT) Assessment B LEs grossly 4/5  -MA     Row Name 08/08/18 1613          Motor Assessment/Intervention    Additional Documentation Balance (Group)  -MA     Row Name 08/08/18 1613          Balance    Balance static sitting balance;static standing balance  -MA     Row Name 08/08/18 1613          Static Sitting Balance    Level of [place] (Unsupported Sitting, Static Balance) supervision  -MA     Sitting Position  (Unsupported Sitting, Static Balance) sitting on edge of bed  -MA     Level of [place] (Supported Sitting, Static Balance) supervision  -MA     Sitting Position (Supported Sitting, Static Balance) sitting on edge of bed  -MA     Row Name 08/08/18 1613          Static Standing Balance    Level of [place] (Supported Standing, Static Balance) contact guard assist  -MA     Time Able to Maintain Position (Supported Standing, Static Balance) 2 to 3 minutes  -MA     Assistive Device Utilized (Supported Standing, Static Balance) rolling walker  -MA     Row Name 08/08/18 1613          Sensory Assessment/Intervention    Sensory General Assessment no sensation deficits identified  -MA     Row Name 08/08/18 1613          Vision Assessment/Intervention    Visual Impairment/Limitations WFL  -MA     Row Name 08/08/18 1613          Pain Assessment    Additional Documentation Pain Scale: FACES Pre/Post-Treatment (Group)  -MA     Row Name 08/08/18 1613          Pain Scale: Numbers Pre/Post-Treatment    Pain Location - Side Bilateral  -MA     Pain Location - Orientation lower  -MA     Pain Location back  -MA     Pain Intervention(s) Repositioned;Ambulation/increased activity;Rest  -MA     Row Name 08/08/18 1613          Pain Scale: FACES Pre/Post-Treatment    Pain: FACES Scale, Pretreatment 6-->hurts even more  -MA     Pain: FACES Scale, Post-Treatment 6-->hurts even more  -MA     Row Name             Wound 08/07/18 1112 back incision    Wound - Properties Group Date first assessed: 08/07/18  - Time first assessed: 1112  - Location: back  - Type: incision  -AH    Row Name 08/08/18 1613          Plan of Care Review    Plan of Care Reviewed With patient  -MA     Row Name 08/08/18 1613          Physical Therapy Clinical Impression    Date of Referral to PT 08/08/18  -MA     PT Diagnosis (PT Clinical Impression) impaired functional mobility 2' to post op  -MA     Patient/Family Goals Statement (PT Clinical Impression)  Return home with family  -MA     Criteria for Skilled Interventions Met (PT Clinical Impression) yes;treatment indicated  -MA     Pathology/Pathophysiology Noted (Describe Specifically for Each System) musculoskeletal;neuromuscular  -MA     Impairments Found (describe specific impairments) aerobic capacity/endurance;ergonomics and body mechanics;gait, locomotion, and balance  -MA     Rehab Potential (PT Clinical Summary) good, to achieve stated therapy goals  -MA     Care Plan Review (PT) patient/other agree to care plan  -MA     Row Name 08/08/18 1613          Vital Signs    Pre SpO2 (%) 98  -MA     O2 Delivery Pre Treatment supplemental O2  -MA     Intra SpO2 (%) 89  -MA     O2 Delivery Intra Treatment room air  -MA     Post SpO2 (%) 95  -MA     O2 Delivery Post Treatment supplemental O2  -MA     Row Name 08/08/18 1613          Physical Therapy Goals    Bed Mobility Goal Selection (PT) bed mobility, PT goal 1  -MA     Transfer Goal Selection (PT) transfer, PT goal 1  -MA     Gait Training Goal Selection (PT) gait training, PT goal 1  -MA     Row Name 08/08/18 1613          Bed Mobility Goal 1 (PT)    Activity/Assistive Device (Bed Mobility Goal 1, PT) bed mobility activities, all  -MA     [place] Level/Cues Needed (Bed Mobility Goal 1, PT) supervision required  -MA     Time Frame (Bed Mobility Goal 1, PT) 1 week  -MA     Progress/Outcomes (Bed Mobility Goal 1, PT) goal ongoing  -MA     Row Name 08/08/18 1613          Transfer Goal 1 (PT)    Activity/Assistive Device (Transfer Goal 1, PT) transfers, all  -MA     [place] Level/Cues Needed (Transfer Goal 1, PT) supervision required  -MA     Time Frame (Transfer Goal 1, PT) 1 week  -MA     Progress/Outcome (Transfer Goal 1, PT) goal ongoing  -MA     Row Name 08/08/18 1613          Gait Training Goal 1 (PT)    Activity/Assistive Device (Gait Training Goal 1, PT) gait (walking locomotion)  -MA     [place] Level (Gait Training Goal 1, PT) supervision  required  -MA     Distance (Gait Goal 1, PT) 150  -MA     Time Frame (Gait Training Goal 1, PT) 1 week  -MA     Progress/Outcome (Gait Training Goal 1, PT) goal ongoing  -MA     Row Name 08/08/18 1613          Positioning and Restraints    Pre-Treatment Position in bed  -MA     Post Treatment Position bed  -MA     In Bed fowlers;with other staff   w transport  -MA       User Key  (r) = Recorded By, (t) = Taken By, (c) = Cosigned By    Initials Name Provider Type     Jenn Lopez, RN Registered Nurse    Shantel Gentile, PT Physical Therapist          Physical Therapy Education     Title: PT OT SLP Therapies (Active)     Topic: Physical Therapy (Active)     Point: Mobility training (Active)    Learning Progress Summary     Learner Status Readiness Method Response Comment Documented by    Patient Active Acceptance E NR  MA 08/08/18 1620          Point: Home exercise program (Active)    Learning Progress Summary     Learner Status Readiness Method Response Comment Documented by    Patient Active Acceptance E NR  MA 08/08/18 1620          Point: Body mechanics (Active)    Learning Progress Summary     Learner Status Readiness Method Response Comment Documented by    Patient Active Acceptance E NR  MA 08/08/18 1620          Point: Precautions (Active)    Learning Progress Summary     Learner Status Readiness Method Response Comment Documented by    Patient Active Acceptance E NR  MA 08/08/18 1620                      User Key     Initials Effective Dates Name Provider Type Discipline    MA 04/03/18 -  Shantel Blair, PT Physical Therapist PT                PT Recommendation and Plan  Anticipated Discharge Disposition (PT): home with assist  Planned Therapy Interventions (PT Eval): balance training, bed mobility training, gait training, home exercise program, patient/family education, postural re-education, stair training, strengthening, transfer training  Therapy Frequency (PT Clinical Impression):  daily  Outcome Summary/Treatment Plan (PT)  Anticipated Discharge Disposition (PT): home with assist  Plan of Care Reviewed With: patient  Outcome Summary: Patient is a pleasant 63 y.o. female POD1 LUMBAR FUSION DECOMPRESSON WITH PEDICLE SCREWS Lumbar 4-5,  posterolateral fusion with expected post op weakness and impaired functional mobility. Patient is independent with all ADLs at baseline and no prior use of AD. All mobility today required assist x 1. Ambulated 25' CGA with RW. May benefit from skilled PT services acutely to address deficits as able and improve level of independence prior to returning home.          Outcome Measures     Row Name 08/08/18 1600             How much help from another person do you currently need...    Turning from your back to your side while in flat bed without using bedrails? 3  -MA      Moving from lying on back to sitting on the side of a flat bed without bedrails? 3  -MA      Moving to and from a bed to a chair (including a wheelchair)? 3  -MA      Standing up from a chair using your arms (e.g., wheelchair, bedside chair)? 3  -MA      Climbing 3-5 steps with a railing? 2  -MA      To walk in hospital room? 2  -MA      AM-PAC 6 Clicks Score 16  -MA         Functional Assessment    Outcome Measure Options AM-PAC 6 Clicks Basic Mobility (PT)  -MA        User Key  (r) = Recorded By, (t) = Taken By, (c) = Cosigned By    Initials Name Provider Type    Shantel Gentile, PT Physical Therapist           Time Calculation:         PT Charges     Row Name 08/08/18 1613 08/08/18 1304          Time Calculation    Start Time 1600  -MA  --     Stop Time 1613  -MA  --     Time Calculation (min) 13 min  -MA  --     PT Received On 08/08/18  -MA  --     PT - Next Appointment 08/09/18  -MA 08/09/18  -MA     PT Goal Re-Cert Due Date 08/15/18  -MA  --        Time Calculation- PT    Total Timed Code Minutes- PT 10 minute(s)  -MA  --       User Key  (r) = Recorded By, (t) = Taken By, (c) =  Cosigned By    Initials Name Provider Type    Shantel Gentile, PT Physical Therapist        Therapy Suggested Charges     Code   Minutes Charges    None           Therapy Charges for Today     Code Description Service Date Service Provider Modifiers Qty    62327121319 HC PT EVAL MOD COMPLEXITY 2 8/8/2018 Shantel Blair, PT GP 1    45224471160 HC PT THER PROC EA 15 MIN 8/8/2018 Shantel Blair, PT GP 1          PT G-Codes  Outcome Measure Options: AM-PAC 6 Clicks Basic Mobility (PT)      Shantel Blair PT  8/8/2018

## 2018-08-09 ENCOUNTER — APPOINTMENT (OUTPATIENT)
Dept: GENERAL RADIOLOGY | Facility: HOSPITAL | Age: 63
End: 2018-08-09

## 2018-08-09 LAB
BASOPHILS # BLD AUTO: 0.01 10*3/MM3 (ref 0–0.2)
BASOPHILS NFR BLD AUTO: 0.1 % (ref 0–1.5)
DEPRECATED RDW RBC AUTO: 46.7 FL (ref 37–54)
EOSINOPHIL # BLD AUTO: 0.01 10*3/MM3 (ref 0–0.7)
EOSINOPHIL NFR BLD AUTO: 0.1 % (ref 0.3–6.2)
ERYTHROCYTE [DISTWIDTH] IN BLOOD BY AUTOMATED COUNT: 13.5 % (ref 11.7–13)
GLUCOSE BLDC GLUCOMTR-MCNC: 127 MG/DL (ref 70–130)
HCT VFR BLD AUTO: 38.3 % (ref 35.6–45.5)
HGB BLD-MCNC: 12.7 G/DL (ref 11.9–15.5)
IMM GRANULOCYTES # BLD: 0.03 10*3/MM3 (ref 0–0.03)
IMM GRANULOCYTES NFR BLD: 0.2 % (ref 0–0.5)
LYMPHOCYTES # BLD AUTO: 1.65 10*3/MM3 (ref 0.9–4.8)
LYMPHOCYTES NFR BLD AUTO: 12.3 % (ref 19.6–45.3)
MCH RBC QN AUTO: 31.4 PG (ref 26.9–32)
MCHC RBC AUTO-ENTMCNC: 33.2 G/DL (ref 32.4–36.3)
MCV RBC AUTO: 94.6 FL (ref 80.5–98.2)
MONOCYTES # BLD AUTO: 0.8 10*3/MM3 (ref 0.2–1.2)
MONOCYTES NFR BLD AUTO: 6 % (ref 5–12)
NEUTROPHILS # BLD AUTO: 10.95 10*3/MM3 (ref 1.9–8.1)
NEUTROPHILS NFR BLD AUTO: 81.5 % (ref 42.7–76)
PLATELET # BLD AUTO: 245 10*3/MM3 (ref 140–500)
PMV BLD AUTO: 10.1 FL (ref 6–12)
RBC # BLD AUTO: 4.05 10*6/MM3 (ref 3.9–5.2)
WBC NRBC COR # BLD: 13.42 10*3/MM3 (ref 4.5–10.7)

## 2018-08-09 PROCEDURE — G0378 HOSPITAL OBSERVATION PER HR: HCPCS

## 2018-08-09 PROCEDURE — 93010 ELECTROCARDIOGRAM REPORT: CPT | Performed by: INTERNAL MEDICINE

## 2018-08-09 PROCEDURE — 85025 COMPLETE CBC W/AUTO DIFF WBC: CPT | Performed by: NURSE PRACTITIONER

## 2018-08-09 PROCEDURE — 93005 ELECTROCARDIOGRAM TRACING: CPT | Performed by: NURSE PRACTITIONER

## 2018-08-09 PROCEDURE — 82962 GLUCOSE BLOOD TEST: CPT

## 2018-08-09 PROCEDURE — 72100 X-RAY EXAM L-S SPINE 2/3 VWS: CPT

## 2018-08-09 PROCEDURE — 99024 POSTOP FOLLOW-UP VISIT: CPT | Performed by: NEUROLOGICAL SURGERY

## 2018-08-09 PROCEDURE — 70150 X-RAY EXAM OF FACIAL BONES: CPT

## 2018-08-09 RX ORDER — SODIUM CHLORIDE, SODIUM LACTATE, POTASSIUM CHLORIDE, CALCIUM CHLORIDE 600; 310; 30; 20 MG/100ML; MG/100ML; MG/100ML; MG/100ML
100 INJECTION, SOLUTION INTRAVENOUS CONTINUOUS
Status: DISCONTINUED | OUTPATIENT
Start: 2018-08-09 | End: 2018-08-10

## 2018-08-09 RX ORDER — DOCUSATE SODIUM 100 MG/1
100 CAPSULE, LIQUID FILLED ORAL DAILY
Status: DISCONTINUED | OUTPATIENT
Start: 2018-08-09 | End: 2018-08-13 | Stop reason: HOSPADM

## 2018-08-09 RX ADMIN — OXYBUTYNIN CHLORIDE 5 MG: 5 TABLET, EXTENDED RELEASE ORAL at 08:17

## 2018-08-09 RX ADMIN — POLYETHYLENE GLYCOL 3350 17 G: 17 POWDER, FOR SOLUTION ORAL at 08:17

## 2018-08-09 RX ADMIN — OXYCODONE HYDROCHLORIDE AND ACETAMINOPHEN 1 TABLET: 5; 325 TABLET ORAL at 07:38

## 2018-08-09 RX ADMIN — ATORVASTATIN CALCIUM 10 MG: 10 TABLET, FILM COATED ORAL at 08:17

## 2018-08-09 RX ADMIN — POTASSIUM CHLORIDE 20 MEQ: 1.5 POWDER, FOR SOLUTION ORAL at 08:17

## 2018-08-09 RX ADMIN — ACETAMINOPHEN 1000 MG: 500 TABLET, FILM COATED ORAL at 12:44

## 2018-08-09 RX ADMIN — OXYCODONE HYDROCHLORIDE AND ACETAMINOPHEN 1 TABLET: 5; 325 TABLET ORAL at 23:07

## 2018-08-09 RX ADMIN — OXYCODONE HYDROCHLORIDE AND ACETAMINOPHEN 1 TABLET: 5; 325 TABLET ORAL at 18:04

## 2018-08-09 RX ADMIN — SODIUM CHLORIDE, POTASSIUM CHLORIDE, SODIUM LACTATE AND CALCIUM CHLORIDE 100 ML/HR: 600; 310; 30; 20 INJECTION, SOLUTION INTRAVENOUS at 17:59

## 2018-08-09 NOTE — SIGNIFICANT NOTE
08/09/18 1320   Rehab Treatment   Discipline physical therapy assistant   Reason Treatment Not Performed patient/family declined treatment, not feeling well  (pt fearful about getting OOB at the moment d/t recent fall today in bathroom where she injured her face on L side; having pain where injured, although states her back feels ok; wants to rest, and agrees to get up around room to and from bathroom w/ nsg)   Recommendation   PT - Next Appointment 08/10/18

## 2018-08-09 NOTE — SIGNIFICANT NOTE
Neurosurgery:  By nursing around noon today secondary to patient fall.  She was to the bathroom by staff and while they were changing her bed, they heard a fall.  The patient was found on the floor by the toilet.  She had left facial swelling but no complaints of headache or back pain.  He was diaphoretic per nursing staff. She was assisted to the chair.  Blood pressure was low with systolic of 109 per nursing staff. She was given ice and assisted to bed.    Currently, patient states that she recalls sitting on the toilet and then being on the floor and everyone being around her.  She denies any prodrome of dizziness, chest pain.  She currently denies any headache, change in postoperative back pain, leg pain numbness tingling or weakness.  Does complain of left facial pain.  She denies any discomfort with chewing.  No vision changes.  Utilizing icepack  And Tylenol with benefit.    Temp:  [98.1 °F (36.7 °C)-99.4 °F (37.4 °C)] 98.1 °F (36.7 °C)  Heart Rate:  [66-83] 72  Resp:  [16-18] 18  BP: ()/(51-69) 113/60    I/O last 3 completed shifts:  In: 850 [P.O.:800; I.V.:50]  Out: 3250 [Urine:3250]  No intake/output data recorded.      EKG- SR with borderline inferior Twave abn- no change from preop     AA and O x 3.  HEENT: left maxillary swelling and bruising to left temple; tender to touch  Eyes: no ophthalmoparesis    Neurologic Exam     Mental Status   Oriented to person, place, and time.   Level of consciousness: alert  Normal comprehension.     Cranial Nerves   Cranial nerves II through XII intact.     Motor Exam     Strength   Strength 5/5 throughout.     Sensory Exam   Right leg light touch: normal  Left leg light touch: normal    Gait, Coordination, and Reflexes     Gait  Gait: (not tested due to recent syncope)    Reflexes   Right [place]: absent  Left [place]: absent    Back: midline incision well approximated with no R/E/d    Problem List Items Addressed This Visit        Unprioritized    Acquired  spondylolisthesis    Relevant Medications    clindamycin (CLEOCIN) 900 mg in dextrose 5% 50 mL IVPB (premix) (Completed)    docusate sodium (COLACE) capsule 100 mg    Chronic bilateral low back pain with bilateral sciatica    Relevant Medications    clindamycin (CLEOCIN) 900 mg in dextrose 5% 50 mL IVPB (premix) (Completed)      Other Visit Diagnoses     Impaired functional mobility, balance, gait, and endurance    -  Primary        Syncope- Appears vasovagal  EKG OK  Check orthostatics    Check CBC    Facial injury- check xrays    Will discuss with Dr. Vasques.

## 2018-08-09 NOTE — PLAN OF CARE
Problem: Patient Care Overview  Goal: Plan of Care Review  Outcome: Ongoing (interventions implemented as appropriate)   08/09/18 0434   Coping/Psychosocial   Plan of Care Reviewed With patient   OTHER   Outcome Summary Vitals as charted, c/o pain that was controlled w/ PRN pain meds, will continue to monitor.    Plan of Care Review   Progress improving     Goal: Individualization and Mutuality  Outcome: Ongoing (interventions implemented as appropriate)      Problem: Fall Risk (Adult)  Goal: Identify Related Risk Factors and Signs and Symptoms  Outcome: Ongoing (interventions implemented as appropriate)    Goal: Absence of Fall  Outcome: Ongoing (interventions implemented as appropriate)      Problem: Skin Injury Risk (Adult)  Goal: Identify Related Risk Factors and Signs and Symptoms  Outcome: Ongoing (interventions implemented as appropriate)    Goal: Skin Health and Integrity  Outcome: Ongoing (interventions implemented as appropriate)

## 2018-08-09 NOTE — PROGRESS NOTES
[facility] PROGRESS NOTE    PATIENT IDENTIFICATION:   Name:  Carolina Ayers      MRN:  [id number]     63 y.o.  female                   Active Problems:    Acquired spondylolisthesis    Chronic bilateral low back pain with bilateral sciatica        Subjective   CC: LBP  Interval History: Up yesterday without headache.  No drainage on sheets.+ flatus.    Objective     Vital signs in last 24 hours:  Temp:  [98.3 °F (36.8 °C)-99.4 °F (37.4 °C)] 98.9 °F (37.2 °C)  Heart Rate:  [66-76] 76  Resp:  [16-18] 16  BP: ()/(51-66) 126/66      Intake/Output last 3 shifts:  I/O last 3 completed shifts:  In: 850 [P.O.:800; I.V.:50]  Out: 3250 [Urine:3250]  Intake/Output this shift:  No intake/output data recorded.    LABS        Invalid input(s): CBC  Lab Results   Component Value Date    CALCIUM 10.8 (H) 07/31/2018       Physical Exam:  5/5 str  sens intact to lt/pp  +SLR  4 - Opens eyes on own  6 - Follows simple motor commands  5 - Alert and oriented        ASSESSMENT  Assessment/Plan     Will mobilize today  Alas out today  check dressing later     LOS: 0 days         Jarrett Vasques IV, MD

## 2018-08-10 PROBLEM — I95.1 ORTHOSTATIC HYPOTENSION: Status: ACTIVE | Noted: 2018-08-10

## 2018-08-10 PROBLEM — Z74.09 IMPAIRED FUNCTIONAL MOBILITY, BALANCE, GAIT, AND ENDURANCE: Status: ACTIVE | Noted: 2018-08-10

## 2018-08-10 LAB
ALBUMIN SERPL-MCNC: 3.2 G/DL (ref 3.5–5.2)
ALBUMIN/GLOB SERPL: 1 G/DL
ALP SERPL-CCNC: 84 U/L (ref 39–117)
ALT SERPL W P-5'-P-CCNC: 26 U/L (ref 1–33)
ANION GAP SERPL CALCULATED.3IONS-SCNC: 12.8 MMOL/L
AST SERPL-CCNC: 43 U/L (ref 1–32)
BILIRUB SERPL-MCNC: 0.9 MG/DL (ref 0.1–1.2)
BUN BLD-MCNC: 12 MG/DL (ref 8–23)
BUN/CREAT SERPL: 15 (ref 7–25)
CALCIUM SPEC-SCNC: 8.9 MG/DL (ref 8.6–10.5)
CHLORIDE SERPL-SCNC: 88 MMOL/L (ref 98–107)
CO2 SERPL-SCNC: 34.2 MMOL/L (ref 22–29)
CORTIS SERPL-MCNC: 19.89 MCG/DL
CREAT BLD-MCNC: 0.8 MG/DL (ref 0.57–1)
GFR SERPL CREATININE-BSD FRML MDRD: 72 ML/MIN/1.73
GLOBULIN UR ELPH-MCNC: 3.3 GM/DL
GLUCOSE BLD-MCNC: 126 MG/DL (ref 65–99)
MAGNESIUM SERPL-MCNC: 2 MG/DL (ref 1.6–2.4)
POTASSIUM BLD-SCNC: 2.9 MMOL/L (ref 3.5–5.2)
PROT SERPL-MCNC: 6.5 G/DL (ref 6–8.5)
SODIUM BLD-SCNC: 135 MMOL/L (ref 136–145)

## 2018-08-10 PROCEDURE — 80053 COMPREHEN METABOLIC PANEL: CPT | Performed by: NEUROLOGICAL SURGERY

## 2018-08-10 PROCEDURE — 82533 TOTAL CORTISOL: CPT | Performed by: NEUROLOGICAL SURGERY

## 2018-08-10 PROCEDURE — 97110 THERAPEUTIC EXERCISES: CPT

## 2018-08-10 PROCEDURE — 83735 ASSAY OF MAGNESIUM: CPT | Performed by: NEUROLOGICAL SURGERY

## 2018-08-10 PROCEDURE — 99253 IP/OBS CNSLTJ NEW/EST LOW 45: CPT | Performed by: INTERNAL MEDICINE

## 2018-08-10 PROCEDURE — 99024 POSTOP FOLLOW-UP VISIT: CPT | Performed by: NEUROLOGICAL SURGERY

## 2018-08-10 PROCEDURE — 25810000003 SODIUM CHLORIDE 0.9 % WITH KCL 20 MEQ 20-0.9 MEQ/L-% SOLUTION: Performed by: NURSE PRACTITIONER

## 2018-08-10 RX ORDER — POTASSIUM CHLORIDE 1.5 G/1.77G
40 POWDER, FOR SOLUTION ORAL AS NEEDED
Status: DISCONTINUED | OUTPATIENT
Start: 2018-08-10 | End: 2018-08-13 | Stop reason: HOSPADM

## 2018-08-10 RX ORDER — POTASSIUM CHLORIDE 7.45 MG/ML
10 INJECTION INTRAVENOUS
Status: DISCONTINUED | OUTPATIENT
Start: 2018-08-10 | End: 2018-08-10

## 2018-08-10 RX ORDER — OXYCODONE HYDROCHLORIDE AND ACETAMINOPHEN 5; 325 MG/1; MG/1
1 TABLET ORAL EVERY 4 HOURS PRN
Qty: 50 TABLET | Refills: 0
Start: 2018-08-10 | End: 2018-08-13

## 2018-08-10 RX ORDER — SENNA AND DOCUSATE SODIUM 50; 8.6 MG/1; MG/1
1 TABLET, FILM COATED ORAL NIGHTLY PRN
Qty: 30 TABLET | Refills: 0 | Status: SHIPPED | OUTPATIENT
Start: 2018-08-10 | End: 2019-02-20

## 2018-08-10 RX ORDER — SODIUM CHLORIDE AND POTASSIUM CHLORIDE 150; 900 MG/100ML; MG/100ML
100 INJECTION, SOLUTION INTRAVENOUS CONTINUOUS
Status: DISCONTINUED | OUTPATIENT
Start: 2018-08-10 | End: 2018-08-13 | Stop reason: HOSPADM

## 2018-08-10 RX ORDER — CYCLOBENZAPRINE HCL 10 MG
10 TABLET ORAL 3 TIMES DAILY PRN
Qty: 30 TABLET | Refills: 0 | Status: SHIPPED | OUTPATIENT
Start: 2018-08-10 | End: 2018-09-14 | Stop reason: ALTCHOICE

## 2018-08-10 RX ORDER — PSEUDOEPHEDRINE HCL 30 MG
100 TABLET ORAL DAILY
COMMUNITY
Start: 2018-08-11 | End: 2018-09-14

## 2018-08-10 RX ORDER — PANTOPRAZOLE SODIUM 40 MG/1
40 TABLET, DELAYED RELEASE ORAL
Status: DISCONTINUED | OUTPATIENT
Start: 2018-08-11 | End: 2018-08-13 | Stop reason: HOSPADM

## 2018-08-10 RX ORDER — SODIUM CHLORIDE 9 MG/ML
75 INJECTION, SOLUTION INTRAVENOUS CONTINUOUS
Status: DISCONTINUED | OUTPATIENT
Start: 2018-08-10 | End: 2018-08-10

## 2018-08-10 RX ORDER — ASPIRIN 81 MG/1
81 TABLET, CHEWABLE ORAL DAILY
Start: 2018-08-10 | End: 2019-08-20

## 2018-08-10 RX ORDER — POTASSIUM CHLORIDE 750 MG/1
40 CAPSULE, EXTENDED RELEASE ORAL AS NEEDED
Status: DISCONTINUED | OUTPATIENT
Start: 2018-08-10 | End: 2018-08-13 | Stop reason: HOSPADM

## 2018-08-10 RX ORDER — POTASSIUM CHLORIDE 7.45 MG/ML
10 INJECTION INTRAVENOUS
Status: DISCONTINUED | OUTPATIENT
Start: 2018-08-10 | End: 2018-08-13 | Stop reason: HOSPADM

## 2018-08-10 RX ORDER — FAMOTIDINE 10 MG/ML
20 INJECTION, SOLUTION INTRAVENOUS ONCE
Status: COMPLETED | OUTPATIENT
Start: 2018-08-10 | End: 2018-08-10

## 2018-08-10 RX ADMIN — OXYCODONE HYDROCHLORIDE AND ACETAMINOPHEN 1 TABLET: 5; 325 TABLET ORAL at 19:54

## 2018-08-10 RX ADMIN — CYCLOBENZAPRINE 10 MG: 10 TABLET, FILM COATED ORAL at 19:54

## 2018-08-10 RX ADMIN — ATORVASTATIN CALCIUM 10 MG: 10 TABLET, FILM COATED ORAL at 10:25

## 2018-08-10 RX ADMIN — FAMOTIDINE 20 MG: 10 INJECTION INTRAVENOUS at 19:54

## 2018-08-10 RX ADMIN — OXYCODONE HYDROCHLORIDE AND ACETAMINOPHEN 1 TABLET: 5; 325 TABLET ORAL at 10:25

## 2018-08-10 RX ADMIN — DOCUSATE SODIUM 100 MG: 100 CAPSULE, LIQUID FILLED ORAL at 10:26

## 2018-08-10 RX ADMIN — OXYCODONE HYDROCHLORIDE AND ACETAMINOPHEN 1 TABLET: 5; 325 TABLET ORAL at 23:56

## 2018-08-10 RX ADMIN — POTASSIUM CHLORIDE AND SODIUM CHLORIDE 75 ML/HR: 900; 150 INJECTION, SOLUTION INTRAVENOUS at 13:29

## 2018-08-10 RX ADMIN — POTASSIUM CHLORIDE 40 MEQ: 750 CAPSULE, EXTENDED RELEASE ORAL at 23:56

## 2018-08-10 RX ADMIN — OXYBUTYNIN CHLORIDE 5 MG: 5 TABLET, EXTENDED RELEASE ORAL at 10:25

## 2018-08-10 RX ADMIN — POTASSIUM CHLORIDE 40 MEQ: 750 CAPSULE, EXTENDED RELEASE ORAL at 11:01

## 2018-08-10 RX ADMIN — POLYETHYLENE GLYCOL 3350 17 G: 17 POWDER, FOR SOLUTION ORAL at 10:25

## 2018-08-10 RX ADMIN — POTASSIUM CHLORIDE 40 MEQ: 750 CAPSULE, EXTENDED RELEASE ORAL at 16:06

## 2018-08-10 NOTE — PROGRESS NOTES
Continued Stay Note  [facility]     Patient Name: Carolina Ayers  MRN: [id number]  Today's Date: 8/10/2018    Admit Date: 8/7/2018          Discharge Plan     Row Name 08/10/18 1155       Plan    Plan [facility] referral pending- will require insurance precert.      Patient/Family in Agreement with Plan yes    Plan Comments Met w/ patient in room.  Introduced self and explained reason for visit.  Discussed rehab options.  Patient was agreeable to STR in [place].  Referral made to [facility] and will follow-up.                 Expected Discharge Date and Time     Expected Discharge Date Expected Discharge Time    Aug 13, 2018             Prashant Whitaker RN

## 2018-08-10 NOTE — DISCHARGE SUMMARY
Carolina Ayers  1955    Patient Care Team:  Teodoro Burks MD as PCP - General (Family Medicine)  Yasmany Day MD as Consulting Physician (Obstetrics and Gynecology)    Date of Admit: 8/7/2018    Date of Discharge:  Tentative 8/11/2018    Discharge Diagnosis:Active Problems:    Acquired spondylolisthesis    Chronic bilateral low back pain with bilateral sciatica    Orthostatic hypotension    Impaired functional mobility, balance, gait, and endurance      Procedures Performed  Procedure(s):  LUMBAR FUSION DECOMPRESSON WITH PEDICLE SCREWS Lumbar 4-5,  posterolateral fusion       Complications: none    Consultants:   Consults     Date and Time Order Name Status Description    8/9/2018 1723 Inpatient Cardiology Consult Completed           Condition on Discharge: stable    Discharge disposition: subacute rehab at [facility] or home depending on precert    Pertinent Test Results: radiology: X-Ray: Operative lumbar x-rays as well as repeat lumbar x-ray secondary to fall showed stable postoperative changes with no complicating features.  Patient also had facial x-rays secondary to fall.  These show no evidence of fracture    Brief HPI: Patient evaluated in office for complaints of back and bilateral leg pain left greater than right. Imaging revealed Grade 1 slip at L45 with acquired stenosis. RBAs of treatment were discussed including the above procedure. Patient consented to above procedure.    Hospital Course: Patient admitted for above procedure. The procedure itself was without complication. The patient was transferred to [facility] following recovery. She was doing well on POD 1 with resolution of leg pain, but she had a syncopal episode in bathroom on POD 2. Repeat lumbar xrays as well as facial xrays due to swelling showed no concerning findings. She has positive orthostatic hypotension. She has not taken her Lasix this admission. EKG and H/H stable. IVF started but her BP remained low on repeat  orthostatic checks morning of POD 3 although she was asymptomatic. She also received K+ supplement due to low level. Cardiology eval recommended continued fluid hydration, support stocking, and holding diuretics. She has been ambulatory with walker and PT. She has voided, tolerated diet, and pain is controlled with oral meds. Once her BP issues are resolved, she will be ready for DC. She is interested in subacute rehab if needed, but if stable, may go home. 3 in 1 commode and walker ordered.    Discharge Physical Exam:    Temp:  [98.3 °F (36.8 °C)-99.2 °F (37.3 °C)] 98.3 °F (36.8 °C)  Heart Rate:  [78-96] 82  Resp:  [16] 16  BP: ()/(40-75) 114/67    Current labs:  Lab Results (last 24 hours)     Procedure Component Value Units Date/Time    Comprehensive Metabolic Panel [[id number]]  (Abnormal) Collected:  08/10/18 0913    Specimen:  Blood Updated:  08/10/18 1011     Glucose 126 (H) mg/dL      BUN 12 mg/dL      Creatinine 0.80 mg/dL      Sodium 135 (L) mmol/L      Potassium 2.9 (L) mmol/L      Chloride 88 (L) mmol/L      CO2 34.2 (H) mmol/L      Calcium 8.9 mg/dL      Total Protein 6.5 g/dL      Albumin 3.20 (L) g/dL      ALT (SGPT) 26 U/L      AST (SGOT) 43 (H) U/L      Alkaline Phosphatase 84 U/L      Total Bilirubin 0.9 mg/dL      eGFR Non African Amer 72 mL/min/1.73      Globulin 3.3 gm/dL      A/G Ratio 1.0 g/dL      BUN/Creatinine Ratio 15.0     Anion Gap 12.8 mmol/L     Magnesium [[id number]]  (Normal) Collected:  08/10/18 0913    Specimen:  Blood Updated:  08/10/18 1006     Magnesium 2.0 mg/dL     Cortisol [[id number]]  (Normal) Collected:  08/10/18 0913    Specimen:  Blood Updated:  08/10/18 1014     Cortisol 19.89 mcg/dL     Narrative:       Cortisol Reference Ranges:    Cortisol 6AM - 10AM Range: 6.02-18.40 mcg/dl  Cortisol 4PM - 8PM Range: 2.68-10.50 mcg/dl  Critical AM/PM:    Less than 2 mcg/dl        Physical Exam 8/10/18 per Dr. Vasques:        4 - Opens eyes on own  6 - Follows simple motor  commands  5 - Alert and oriented  5/5 str  Sen intact to lt  Wound is flat without fluc      Discharge Medications  Munir has been reviewed and narcotic consent is on file in the patient's chart.     Your medication list      START taking these medications      Instructions Last Dose Given Next Dose Due   docusate sodium 100 MG capsule      Take 100 mg by mouth Daily.       oxyCODONE-acetaminophen 5-325 MG per tablet  Commonly known as:  PERCOCET      Take 1 tablet by mouth Every 4 (Four) Hours As Needed for Moderate Pain  for up to 7 days.       sennosides-docusate sodium 8.6-50 MG tablet  Commonly known as:  SENOKOT-S      Take 1 tablet by mouth At Night As Needed for Constipation.          CHANGE how you take these medications      Instructions Last Dose Given Next Dose Due   aspirin 81 MG chewable tablet  What changed:  additional instructions      Chew 1 tablet Daily. May resume on 8/14       cyclobenzaprine 10 MG tablet  Commonly known as:  FLEXERIL  What changed:  when to take this      Take 1 tablet by mouth 3 (Three) Times a Day As Needed for Muscle Spasms.          CONTINUE taking these medications      Instructions Last Dose Given Next Dose Due   acetaminophen 500 MG tablet  Commonly known as:  TYLENOL      Take 1,000 mg by mouth Every 6 (Six) Hours As Needed for Mild Pain .       estradiol 1 MG tablet  Commonly known as:  ESTRACE      Take 1 mg by mouth Every Night.       metOLazone 2.5 MG tablet  Commonly known as:  ZAROXOLYN      Take 2.5 mg by mouth Every Other Day.       polyethylene glycol packet  Commonly known as:  MIRALAX      Take 17 g by mouth Daily.       potassium chloride ER 20 MEQ tablet controlled-release ER tablet  Commonly known as:  K-TAB      Take 1 tablet by mouth Daily.       simvastatin 20 MG tablet  Commonly known as:  ZOCOR      Take 20 mg by mouth Every Night.       tolterodine LA 4 MG 24 hr capsule  Commonly known as:  DETROL LA      Take 4 mg by mouth Every Night.           STOP taking these medications    furosemide 80 MG tablet  Commonly known as:  LASIX        meloxicam 7.5 MG tablet  Commonly known as:  MOBIC              Where to Get Your Medications      These medications were sent to [facility] [address] - [phone]  - [phone]  [address], [place] [address]    Phone:  [phone]   · cyclobenzaprine 10 MG tablet  · sennosides-docusate sodium 8.6-50 MG tablet     You can get these medications from any pharmacy    You don't need a prescription for these medications  · docusate sodium 100 MG capsule     Information about where to get these medications is not yet available    Ask your nurse or doctor about these medications  · aspirin 81 MG chewable tablet  · oxyCODONE-acetaminophen 5-325 MG per tablet         Discharge Diet:   Diet Instructions     Diet:       Diet Texture / Consistency:  Regular    Advance as tolerated        Diet Order   Procedures   • Diet Regular       Activity at Discharge:   Activity Instructions     Discharge Activity       1) No driving until cleared by us and no longer taking narcotics.   2) Off work/school until cleared by us.  3) May shower but no submerging wound in tub, pool, etc.  4) Do not lift / push / pull more then 5 lbs.  5.) Wear brace when sitting, standing, walking  6.) No bending or twisting    Discharge Activity       Wear support hose for at least one week    Pelvic Rest             Call for: questions or concerns    Follow-up Appointments  Future Appointments  Date Time Provider Department Center   8/22/2018 11:30 AM Keara Almanzar APRN [facility] None   9/14/2018 3:00 PM Asuncion Grant DNP, MARI POOLE CD [facility] None   11/1/2018 11:00 AM Asuncion Grant DNP, APRN [facility] None     Follow-up Information     Teodoro Burks MD .    Specialty:  Family Medicine  Why:  for BP check after DC  Contact information:  [address] [facility] [address] [phone]                  Additional Instructions for the Follow-ups that You Need to Schedule     Discharge Follow-up with PCP    As directed      Currently Documented PCP:  Teodoro Burks MD  PCP Phone Number:  [phone]    Follow Up Details:  for BP check after DC         Notify Physician or Go To The ED For the Following Conditions    As directed      Including but not limited to fever >100.5, chills, wound concerns (redness, swelling, drainage), new symptoms of numbness, tingling, weakness; new or uncontrolled pain despite using prescribed medications    Order Comments:  Including but not limited to fever >100.5, chills, wound concerns (redness, swelling, drainage), new symptoms of numbness, tingling, weakness; new or uncontrolled pain despite using prescribed medications              Test Results Pending at Discharge     None    I discussed the discharge instructions with patient    MARI Wyatt  08/10/18  4:16 PM    40 min spent in reviewing records, discussion and examination of the patient and discussion with other members of the patient's medical team.

## 2018-08-10 NOTE — CONSULTS
[facility]  Consult Note                                                                              8/10/2018  Jarrett Vasques IV, MD    Patient Identification:  Carolina Ayers:   63 y.o.  female  1955     Date of Admission:8/7/2018    CC: Elective surgery    Consulted for: Orthostatic hypotension    History of Present Illness: Ms. Ayers is a 62 y/o with a history of hyperlipidemia, acquired spondylolisthesis, chronic bilateral low back pain with bilateral sciatica. In April 2016 she was seen by Dr. Vaughan for shortness of breath and edema. She had edema for a long time. She had a stress perfusion study at that time that was negative for ischemia. She had an echocardiogram that showed normal systolic function. There was no mention of diastolic function. There is no obvious valvular dysfunction except for minimal mitral regurgitation. There was a small posterior pericardial effusion. She was placed on daily dose of Lasix and metolazone on an every other day basis. She was cleared from a cardiac standpoint for surgery. She was admitted 8/7/18 for an elective lumbar fusion decompression with pedicle screws in lumbar 4-5, posterolateral fusion. Yesterday afternoon she had a syncopal episode was sitting on the toilet and remembers being on the floor with staff around her.     We have been consulted for orthostatic hypotension. Orthostatic blood pressures done this morning showed lying 127/66, sitting 134/69 and standing 68/44. An echo done for cardiac clearance for surgery showed an EF 61%, trace TR, normal RVSP. She denies any chest pain, pressure, tightness, palpitations or fluttering. She does report some lightheadedness with this admission. Her edema in both legs has been much improved since being in the hospital. Her lasix is currently being held due to low blood pressures.     Cardiac Testing:  Echo 8/3/18  · Left ventricular systolic function is normal. Estimated EF = 61%.  · Trace tricuspid  valve regurgitation is present.  · Calculated right ventricular systolic pressure from tricuspid regurgitation is 26 mmHg.    Past Medical History:  Past Medical History:   Diagnosis Date   • History of chest pain 2014    SEEN DR SIM   • History of skin cancer    • Hyperlipidemia    • Lumbar herniated disc    • PONV (postoperative nausea and vomiting)        Past Surgical History:  Past Surgical History:   Procedure Laterality Date   • BREAST LUMPECTOMY Left    • CARPAL TUNNEL RELEASE     • CHOLECYSTECTOMY     • EXTERNAL EAR SURGERY      cyst removed from ear   • HERNIA REPAIR     • HYSTERECTOMY      partial   • KNEE SURGERY     • LUMBAR DISCECTOMY FUSION INSTRUMENTATION Bilateral 8/7/2018    Procedure: LUMBAR FUSION DECOMPRESSON WITH PEDICLE SCREWS Lumbar 4-5,  posterolateral fusion;  Surgeon: Jarrett Vasques IV, MD;  Location: [facility];  Service: Neurosurgery   • SHOULDER SURGERY     • TONSILLECTOMY     • WRIST SURGERY Bilateral     Cyst removed       Allergies:  Allergies   Allergen Reactions   • Penicillins Hives       Home Meds:  Prescriptions Prior to Admission   Medication Sig Dispense Refill Last Dose   • acetaminophen (TYLENOL) 500 MG tablet Take 1,000 mg by mouth Every 6 (Six) Hours As Needed for Mild Pain .   8/6/2018 at 1000   • aspirin 81 MG chewable tablet Chew 81 mg Daily. HELD   7/17/2018   • cyclobenzaprine (FLEXERIL) 10 MG tablet Take 1 tablet by mouth 2 (Two) Times a Day As Needed for Muscle Spasms. 45 tablet 1 Past Month at Unknown time   • estradiol (ESTRACE) 1 MG tablet Take 1 mg by mouth Every Night.   8/6/2018 at 1900   • furosemide (LASIX) 80 MG tablet Take 40 mg by mouth Daily.   8/6/2018 at 0800   • meloxicam (MOBIC) 7.5 MG tablet Take 7.5 mg by mouth Daily. HELD   7/17/2018   • metOLazone (ZAROXOLYN) 2.5 MG tablet Take 2.5 mg by mouth Every Other Day.   8/5/2018   • polyethylene glycol (MIRALAX) packet Take 17 g by mouth Daily.   8/6/2018 at 0800   • potassium chloride ER  (K-TAB) 20 MEQ tablet controlled-release ER tablet Take 1 tablet by mouth Daily. 30 tablet 0 8/6/2018 at 0800   • simvastatin (ZOCOR) 20 MG tablet Take 20 mg by mouth Every Night.   8/6/2018 at 1900   • tolterodine LA (DETROL LA) 4 MG 24 hr capsule Take 4 mg by mouth Every Night.   8/6/2018 at 1900       Current Meds  Scheduled Meds:  atorvastatin 10 mg Oral Daily   docusate sodium 100 mg Oral Daily   furosemide 40 mg Oral Daily   metOLazone 2.5 mg Oral Every Other Day   oxybutynin XL 5 mg Oral Daily   polyethylene glycol 17 g Oral Daily   potassium chloride 20 mEq Oral Daily     Continuous Infusions:  lactated ringers 100 mL/hr Last Rate: Stopped (08/10/18 0320)     Social History:   Social History     Social History   • Marital status:      Spouse name: N/A   • Number of children: N/A   • Years of education: N/A     Occupational History   • Not on file.     Social History Main Topics   • Smoking status: Former Smoker     Packs/day: 1.00     Years: 30.00     Types: Cigarettes     Quit date: 2010   • Smokeless tobacco: Never Used   • Alcohol use Yes      Comment: rarely- 2-3 times a year   • Drug use: No   • Sexual activity: Defer     Other Topics Concern   • Not on file     Social History Narrative   • No narrative on file     Family History:  Family History   Problem Relation Age of Onset   • Malig Hyperthermia Neg Hx        REVIEW OF SYSTEMS:   CONSTITUTIONAL: No weight loss, fever, chills, weakness or fatigue.   HEENT: Eyes: No visual loss, blurred vision, double vision or yellow sclerae. Ears, Nose, Throat: No hearing loss, sneezing, congestion, runny nose or sore throat.   SKIN: No rash or itching.     RESPIRATORY: No shortness of breath, hemoptysis, cough or sputum.   GASTROINTESTINAL: No anorexia, nausea, vomiting or diarrhea. No abdominal pain, bright red blood per rectum or melena.  GENITOURINARY: No burning on urination, hematuria or increased frequency.  NEUROLOGICAL: No headache, paralysis,  "ataxia, numbness or tingling in the extremities. No change in bowel or bladder control.   MUSCULOSKELETAL: No muscle, back pain, joint pain or stiffness.   HEMATOLOGIC: No anemia, bleeding or bruising.   LYMPHATICS: No enlarged nodes. No history of splenectomy.   PSYCHIATRIC: No history of depression, anxiety, hallucinations.   ENDOCRINOLOGIC: No reports of sweating, cold or heat intolerance. No polyuria or polydipsia.     Physical Exam    BP (!) 68/44 (BP Location: Right arm, Patient Position: Standing)   Pulse 96   Temp 98.9 °F (37.2 °C) (Oral)   Resp 16   Ht 157.5 cm (62\")   Wt 82.5 kg (181 lb 12.8 oz)   SpO2 97%   BMI 33.25 kg/m²     General Appearance Well developed, cooperative and well nourished and no acute distress   Head Normocephalic, without abnormality, atraumatic   Ears Ears appear intact with no abnormalities noted   Throat No oral lesions, no thrush, oral mucosa moist   Neck No adenopathy, supple, trachea midline, no thyromegaly, no carotid bruit, no JVD   Back No skin lesions, erythema or scars, no tenderness to percussion or palptaion,range of motion is normal   Lungs Clear to auscultation,respirations regular, even and unlabored   Heart Regular rhythm and normal rate, normal S1 and S2, no murmur, no gallop, no rub, no click   Chest wall No abnormalities observed   Abdomen Normal bowel sounds, no masses, no hepatomegaly,    Extremities Moves all extremities well, no edema, no cyanosis, no redness   Pulses Pulses palpable and equal bilaterally. Normal radial, carotid, femoral, dorsalis pedis and posterior tibial pulses bilaterally. Normal abdominal aorta   Skin No bleeding, bruising or rash   Psyhiatric Alert and oriented x 3, normal mood and affect      )  Results from last 7 days  Lab Units 08/09/18  1448   WBC 10*3/mm3 13.42*   HEMOGLOBIN g/dL 12.7   HEMATOCRIT % 38.3   PLATELETS 10*3/mm3 245     EKG      Baseline      I personally viewed and interpreted the patient's EKG/Telemetry " data    Assessment and Plan  1.  Orthostatic hypotension likely due to volume depletion  and may have a component of autonomic dysfunction, all exacerbated by anesthesia.  Continue to hold diuretics and would give saline and add support stockings.  Would keep on telemetry.  Echocardiogram prior to admission was normal.  She does not have any anginal symptoms.  Would continue with IV fluid hydration.  She still appears volume depleted.  2.  Hypokalemia, as above  3.  Status post back surgery.    Josephine Nathan  8/10/2018  9:07 AM    60min spent in reviewing records, discussion and examination of the patient and discussion with other members of the patient's medical team.     Dictated utilizing Dragon dictation

## 2018-08-10 NOTE — THERAPY TREATMENT NOTE
Acute Care - Physical Therapy Treatment Note  [facility]     Patient Name: Carolina Ayers  : 1955  MRN: [id number]  Today's Date: 8/10/2018  Onset of Illness/Injury or Date of Surgery: 18  Date of Referral to PT: 18  Referring Physician: Dr. Vasques    Admit Date: 2018    Visit Dx:    ICD-10-CM ICD-9-CM   1. Impaired functional mobility, balance, gait, and endurance Z74.09 V49.89   2. Acquired spondylolisthesis M43.10 738.4   3. Chronic bilateral low back pain with bilateral sciatica M54.42 724.2    M54.41 724.3    G89.29 338.29   4. Orthostatic hypotension I95.1 458.0     Patient Active Problem List   Diagnosis   • Chronic bilateral low back pain with sciatica   • Acquired spondylolisthesis   • Chronic bilateral low back pain with bilateral sciatica   • Hypokalemia   • Abnormal EKG   • Preop cardiovascular exam   • Essential hypertension   • Localized edema       Therapy Treatment          Rehabilitation Treatment Summary     Row Name 08/10/18 0953             Treatment Time/Intention    Discipline physical therapy assistant  -SM      Document Type therapy note (daily note)  -SM      Subjective Information complains of;weakness;fatigue;pain  -SM      Mode of Treatment physical therapy  -SM      Existing Precautions/Restrictions brace worn when out of bed;fall;spinal  -SM      Recorded by [SM] Desiree Stafford PTA 08/10/18 1019      Row Name 08/10/18 0953             Vital Signs    Pre Systolic BP Rehab 125   sitting EOB  -SM      Pre Treatment Diastolic   -SM      Intra Systolic BP Rehab 102   standing  -SM      Intra Treatment Diastolic BP 64  -SM      Recorded by [SM] Desiree Stafford PTA 08/10/18 1019      Row Name 08/10/18 0953             Cognitive Assessment/Intervention- PT/OT    Orientation Status (Cognition) oriented x 3  -SM      Follows Commands (Cognition) WFL  -SM      Safety Deficit (Cognitive) mild deficit  -SM      Personal Safety Interventions fall prevention  program maintained;gait belt;nonskid shoes/slippers when out of bed  -      Recorded by [SM] Desiree Stafford, [facility] 08/10/18 1019      Row Name 08/10/18 0953             Safety Issues, Functional Mobility    Impairments Affecting Function (Mobility) endurance/activity tolerance;pain;strength  -      Recorded by [SM] Desiree Stafford, [facility] 08/10/18 1019      Row Name 08/10/18 0953             Bed Mobility Assessment/Treatment    Bed Mobility Assessment/Treatment rolling right;sidelying-sit;sit-sidelying  -SM      Rolling Right [place] (Bed Mobility) supervision  -SM      Sidelying-Sit [place] (Bed Mobility) contact guard  -      Sit-Sidelying [place] (Bed Mobility) contact guard  -      Assistive Device (Bed Mobility) bed rails;head of bed elevated  -      Comment (Bed Mobility) log roll  -      Recorded by [] Desiree Stafford, [facility] 08/10/18 1019      Row Name 08/10/18 0953             Transfer Assessment/Treatment    Transfer Assessment/Treatment sit-stand transfer;stand-sit transfer  -      Recorded by [] Desiree Stafford, [facility] 08/10/18 1019      Row Name 08/10/18 0953             Sit-Stand Transfer    Sit-Stand [place] (Transfers) contact guard;2 person assist  -      Assistive Device (Sit-Stand Transfers) walker, front-wheeled  -      Recorded by [SM] Desiree Stafford, [facility] 08/10/18 1019      Row Name 08/10/18 0953             Stand-Sit Transfer    Stand-Sit [place] (Transfers) contact guard  -      Assistive Device (Stand-Sit Transfers) walker, front-wheeled  -      Recorded by [] Desiree Stafford, [facility] 08/10/18 1019      Row Name 08/10/18 0953             Gait/Stairs Assessment/Training    Gait/Stairs Assessment/Training gait/ambulation independence;gait/ambulation assistive device;distance ambulated;gait pattern;gait deviations  -      [place] Level (Gait) contact guard;2 person assist  -      Assistive Device (Gait) walker,  front-wheeled  -SM      Distance in Feet (Gait) 25  -SM      Pattern (Gait) step-through  -SM      Deviations/Abnormal Patterns (Gait) base of support, narrow;jacquelyn decreased;stride length decreased  -SM      Bilateral Gait Deviations forward flexed posture  -SM      Comment (Gait/Stairs) limited d/t fatigue  -SM      Recorded by [] Desiree Stafford PTA 08/10/18 1019      Row Name 08/10/18 0953             Positioning and Restraints    Pre-Treatment Position in bed  -SM      Post Treatment Position bed  -SM      In Bed supine;call light within reach;encouraged to call for assist;exit alarm on  -SM      Recorded by [] Desiree Stafford PTA 08/10/18 1019      Row Name 08/10/18 0953             Pain Assessment    Additional Documentation Pain Scale: Numbers Pre/Post-Treatment (Group)  -SM      Recorded by [] Desiree Stafford PTA 08/10/18 1019      Row Name 08/10/18 0953             Pain Scale: Numbers Pre/Post-Treatment    Pain Scale: Numbers, Pretreatment 3/10  -SM      Pain Scale: Numbers, Post-Treatment 4/10  -SM      Pain Location - Side Bilateral  -SM      Pain Location - Orientation lower  -SM      Pain Location back  -SM      Pain Intervention(s) Repositioned;Ambulation/increased activity;Rest  -SM      Recorded by [] Desiree Stafford PTA 08/10/18 1019      Row Name                Wound 08/07/18 1112 back incision    Wound - Properties Group Date first assessed: 08/07/18 [AH] Time first assessed: 1112 [] Location: back [] Type: incision [] Recorded by:  [] Jenn Lopez RN 08/07/18 1112      User Key  (r) = Recorded By, (t) = Taken By, (c) = Cosigned By    Initials Name Effective Dates Discipline     Jenn Lopez RN 12/07/17 -  Nurse     Desiree Stafford PTA 03/07/18 -  PT          Wound 08/07/18 1112 back incision (Active)   Dressing Appearance open to air 8/10/2018  4:42 AM   Closure Liquid skin adhesive;Open to air 8/10/2018  4:42 AM   Base clean;dry 8/10/2018  4:42 AM    Drainage Amount none 8/10/2018  4:42 AM             Physical Therapy Education     Title: PT OT SLP Therapies (Done)     Topic: Physical Therapy (Done)     Point: Mobility training (Done)    Learning Progress Summary     Learner Status Readiness Method Response Comment Documented by    Patient Done Acceptance E,TB VU,NR   08/10/18 1019     Active Acceptance E NR  MA 08/08/18 1620          Point: Home exercise program (Done)    Learning Progress Summary     Learner Status Readiness Method Response Comment Documented by    Patient Done Acceptance E,TB VU,NR   08/10/18 1019     Active Acceptance E NR  MA 08/08/18 1620          Point: Body mechanics (Done)    Learning Progress Summary     Learner Status Readiness Method Response Comment Documented by    Patient Done Acceptance E,TB VU,NR   08/10/18 1019     Active Acceptance E NR  MA 08/08/18 1620          Point: Precautions (Done)    Learning Progress Summary     Learner Status Readiness Method Response Comment Documented by    Patient Done Acceptance E,TB VU,NR   08/10/18 1019     Active Acceptance E NR  MA 08/08/18 1620                      User Key     Initials Effective Dates Name Provider Type Discipline     03/07/18 -  Desiree Stafford, PTA Physical Therapy Assistant PT    MA 04/03/18 -  Shantel Blair PT Physical Therapist PT                    PT Recommendation and Plan     Plan of Care Reviewed With: patient  Progress: no change  Outcome Summary: Pt tolerated treatment fair this date. States she feels weak, and BP still dropping when standing. Ambulated around in room 25ft w/ Rw and CGA x2 for safety. PT will continue to address functional mobility deficits as tolerated.          Outcome Measures     Row Name 08/10/18 1000 08/08/18 1600          How much help from another person do you currently need...    Turning from your back to your side while in flat bed without using bedrails? 3  -SM 3  -MA     Moving from lying on back to sitting on  the side of a flat bed without bedrails? 3  -SM 3  -MA     Moving to and from a bed to a chair (including a wheelchair)? 3  -SM 3  -MA     Standing up from a chair using your arms (e.g., wheelchair, bedside chair)? 3  -SM 3  -MA     Climbing 3-5 steps with a railing? 2  -SM 2  -MA     To walk in hospital room? 2  -SM 2  -MA     AM-PAC 6 Clicks Score 16  -SM 16  -MA        Functional Assessment    Outcome Measure Options AM-PAC 6 Clicks Basic Mobility (PT)  -SM AM-PAC 6 Clicks Basic Mobility (PT)  -MA       User Key  (r) = Recorded By, (t) = Taken By, (c) = Cosigned By    Initials Name Provider Type    Desiree Gates PTA Physical Therapy Assistant    Shantel Gentile, PT Physical Therapist           Time Calculation:         PT Charges     Row Name 08/10/18 1022             Time Calculation    Start Time 0945  -      Stop Time 1003  -      Time Calculation (min) 18 min  -      PT Received On 08/10/18  -      PT - Next Appointment 08/11/18  -        User Key  (r) = Recorded By, (t) = Taken By, (c) = Cosigned By    Initials Name Provider Type    Desiree Gates PTA Physical Therapy Assistant        Therapy Suggested Charges     Code   Minutes Charges    None           Therapy Charges for Today     Code Description Service Date Service Provider Modifiers Qty    70397123545 HC PT THER PROC EA 15 MIN 8/10/2018 Desiree Stafford PTA GP 1    36307700654 HC PT THER SUPP EA 15 MIN 8/10/2018 Desiree Stafford PTA GP 1          PT G-Codes  Outcome Measure Options: AM-PAC 6 Clicks Basic Mobility (PT)    Desiree Stafford PTA  8/10/2018

## 2018-08-10 NOTE — PROGRESS NOTES
[facility] PROGRESS NOTE    PATIENT IDENTIFICATION:   Name:  Carolina Ayers      MRN:  [id number]     63 y.o.  female                   Active Problems:    Acquired spondylolisthesis    Chronic bilateral low back pain with bilateral sciatica        Subjective   CC: back pain, facial soreness  Interval History: events from yesterday and imaging reviewed.  Continues with intermittent hypotension.  No SOB. No HA.    Objective     Vital signs in last 24 hours:  Temp:  [98.1 °F (36.7 °C)-98.9 °F (37.2 °C)] 98.9 °F (37.2 °C)  Heart Rate:  [72-96] 96  Resp:  [16-18] 16  BP: ()/(40-73) 68/44      Intake/Output last 3 shifts:  I/O last 3 completed shifts:  In: 1835 [P.O.:900; I.V.:935]  Out: 600 [Urine:600]  Intake/Output this shift:  No intake/output data recorded.    LABS        Invalid input(s): CBC  Lab Results   Component Value Date    CALCIUM 10.8 (H) 07/31/2018     Results from last 7 days  Lab Units 08/09/18  1448   WBC 10*3/mm3 13.42*   HEMOGLOBIN g/dL 12.7   PLATELETS 10*3/mm3 245     Physical Exam:        4 - Opens eyes on own  6 - Follows simple motor commands  5 - Alert and oriented  5/5 str  Sen intact to lt  Wound is flat without fluc      ASSESSMENT  Assessment/Plan     Cards eval today for orthostatic hypotension  Labs OK  CMP pending  Urged PO  Cannot go home until hypotension resolved.     LOS: 0 days         Jarrett Vasques IV, MD

## 2018-08-10 NOTE — PLAN OF CARE
Problem: Patient Care Overview  Goal: Plan of Care Review  Outcome: Ongoing (interventions implemented as appropriate)   08/10/18 5544   OTHER   Outcome Summary pt had percocet x 1 today, VSS, no c/o dizziness when up. NS with 20 KCL started and K protocol started d/t potassium of 2.9. Dc when pt meets dc criteria and hypotensive episodes resolve.      Goal: Individualization and Mutuality  Outcome: Ongoing (interventions implemented as appropriate)    Goal: Discharge Needs Assessment  Outcome: Ongoing (interventions implemented as appropriate)    Goal: Interprofessional Rounds/Family Conf  Outcome: Ongoing (interventions implemented as appropriate)      Problem: Fall Risk (Adult)  Goal: Absence of Fall  Outcome: Ongoing (interventions implemented as appropriate)      Problem: Skin Injury Risk (Adult)  Goal: Skin Health and Integrity  Outcome: Ongoing (interventions implemented as appropriate)

## 2018-08-10 NOTE — PROGRESS NOTES
Continued Stay Note  [facility]     Patient Name: Carolina Ayers  MRN: [id number]  Today's Date: 8/10/2018    Admit Date: 8/7/2018          Discharge Plan     Row Name 08/10/18 1608       Plan    Plan Comments Confirmed with Sloan that precert was started.  CCP to follow-up monday.                    Expected Discharge Date and Time     Expected Discharge Date Expected Discharge Time    Aug 13, 2018             Prashant Whitaker RN

## 2018-08-10 NOTE — NURSING NOTE
Spoke with Dr. Nathan's RN, Fariba, re: not having any SHANTANU hose in hospital/hospital policy to not use SHANTANU hose anymore. Told her patient had SCD's ordered and was wearing.  No further orders at this time.

## 2018-08-10 NOTE — DISCHARGE PLACEMENT REQUEST
"Carolina Ayers  (63 y.o. Female)     Date of Birth Social Security Number Address Home Phone MRN    1955  [address] [facility] [address] [phone] [id number]    Rastafari Marital Status          Unknown        Admission Date Admission Type Admitting Provider Attending Provider Department, Room/Bed    8/7/18 Elective Jarrett Vasques IV, MD Cole, John Sherman IV, MD [address], P583/1    Discharge Date Discharge Disposition Discharge Destination                       Attending Provider:  Jarrett Vasques IV, MD    Allergies:  Penicillins    Isolation:  None   Infection:  None   Code Status:  CPR    Ht:  157.5 cm (62\")   Wt:  82.5 kg (181 lb 12.8 oz)    Admission Cmt:  None   Principal Problem:  None                Active Insurance as of 8/7/2018     Primary Coverage     Payor Plan Insurance Group Employer/Plan Group    ANTHEM BLUE CROSS [facility]THEM BLUE CR[facility]UE [facility] [id number]     Payor Plan Address Payor Plan Phone Number Effective From Effective To    [address] [phone] 1/1/2011     [facility] [id number]       Subscriber Name Subscriber Birth Date Member ID       ISMAEL AYERS 1/11/1953 [id number]                 Emergency Contacts      (Rel.) Home Phone Work Phone Mobile Phone    Britney Ayers (Daughter) -- -- [phone]    PhongIsmael rogers (Spouse) [phone] -- --              "

## 2018-08-10 NOTE — SIGNIFICANT NOTE
Neurosurgery update:  Awaiting cardiology evaluation.  Orthostatics still significant today.  Cortisol level normal.  Potassium low but magnesium normal.  Potassium supplementation given with repeat potassium level requested.  Patient will likely benefit from inpatient rehabilitation.  CCP to follow.  I have left a prescription for her Percocet written by Dr. Vasques on the chart in the event that she discharges over the weekend.

## 2018-08-10 NOTE — PLAN OF CARE
Problem: Patient Care Overview  Goal: Plan of Care Review  Outcome: Ongoing (interventions implemented as appropriate)   08/10/18 1020   Coping/Psychosocial   Plan of Care Reviewed With patient   OTHER   Outcome Summary Pt tolerated treatment fair this date. States she feels weak, and BP still dropping when standing. Ambulated around in room 25ft w/ Rw and CGA x2 for safety. PT will continue to address functional mobility deficits as tolerated.   Plan of Care Review   Progress no change

## 2018-08-11 LAB
ANION GAP SERPL CALCULATED.3IONS-SCNC: 8.9 MMOL/L
BUN BLD-MCNC: 13 MG/DL (ref 8–23)
BUN/CREAT SERPL: 17.3 (ref 7–25)
CALCIUM SPEC-SCNC: 8.6 MG/DL (ref 8.6–10.5)
CHLORIDE SERPL-SCNC: 98 MMOL/L (ref 98–107)
CO2 SERPL-SCNC: 30.1 MMOL/L (ref 22–29)
CREAT BLD-MCNC: 0.75 MG/DL (ref 0.57–1)
GFR SERPL CREATININE-BSD FRML MDRD: 78 ML/MIN/1.73
GLUCOSE BLD-MCNC: 109 MG/DL (ref 65–99)
POTASSIUM BLD-SCNC: 4.2 MMOL/L (ref 3.5–5.2)
SODIUM BLD-SCNC: 137 MMOL/L (ref 136–145)

## 2018-08-11 PROCEDURE — 99232 SBSQ HOSP IP/OBS MODERATE 35: CPT | Performed by: INTERNAL MEDICINE

## 2018-08-11 PROCEDURE — 97110 THERAPEUTIC EXERCISES: CPT

## 2018-08-11 PROCEDURE — 80048 BASIC METABOLIC PNL TOTAL CA: CPT | Performed by: INTERNAL MEDICINE

## 2018-08-11 RX ADMIN — DOCUSATE SODIUM 100 MG: 100 CAPSULE, LIQUID FILLED ORAL at 08:18

## 2018-08-11 RX ADMIN — CYCLOBENZAPRINE 10 MG: 10 TABLET, FILM COATED ORAL at 22:40

## 2018-08-11 RX ADMIN — OXYCODONE HYDROCHLORIDE AND ACETAMINOPHEN 1 TABLET: 5; 325 TABLET ORAL at 22:40

## 2018-08-11 RX ADMIN — PANTOPRAZOLE SODIUM 40 MG: 40 TABLET, DELAYED RELEASE ORAL at 06:19

## 2018-08-11 RX ADMIN — OXYCODONE HYDROCHLORIDE AND ACETAMINOPHEN 1 TABLET: 5; 325 TABLET ORAL at 09:28

## 2018-08-11 RX ADMIN — POLYETHYLENE GLYCOL 3350 17 G: 17 POWDER, FOR SOLUTION ORAL at 08:19

## 2018-08-11 RX ADMIN — OXYCODONE HYDROCHLORIDE AND ACETAMINOPHEN 1 TABLET: 5; 325 TABLET ORAL at 13:38

## 2018-08-11 RX ADMIN — OXYCODONE HYDROCHLORIDE AND ACETAMINOPHEN 1 TABLET: 5; 325 TABLET ORAL at 04:40

## 2018-08-11 RX ADMIN — POTASSIUM CHLORIDE 20 MEQ: 1.5 POWDER, FOR SOLUTION ORAL at 08:18

## 2018-08-11 RX ADMIN — OXYCODONE HYDROCHLORIDE AND ACETAMINOPHEN 1 TABLET: 5; 325 TABLET ORAL at 18:40

## 2018-08-11 RX ADMIN — OXYBUTYNIN CHLORIDE 5 MG: 5 TABLET, EXTENDED RELEASE ORAL at 08:18

## 2018-08-11 RX ADMIN — ATORVASTATIN CALCIUM 10 MG: 10 TABLET, FILM COATED ORAL at 08:19

## 2018-08-11 NOTE — PLAN OF CARE
Problem: Patient Care Overview  Goal: Plan of Care Review   08/11/18 0453   Coping/Psychosocial   Plan of Care Reviewed With patient   OTHER   Outcome Summary VSS, percocet and flexeril for pain control, recheck potassium this am, will continue to monitor.   Plan of Care Review   Progress improving       Problem: Pain, Acute (Adult)  Goal: Identify Related Risk Factors and Signs and Symptoms  Outcome: Ongoing (interventions implemented as appropriate)    Goal: Acceptable Pain Control/Comfort Level  Outcome: Ongoing (interventions implemented as appropriate)

## 2018-08-11 NOTE — PROGRESS NOTES
Daily Progress Note    Assessment/Plan     Active Problems:    Acquired spondylolisthesis    Chronic bilateral low back pain with bilateral sciatica    Orthostatic hypotension    Impaired functional mobility, balance, gait, and endurance       LOS: 1 day     Subjective     Interval History: has complaints  Incisional pain but better.  Preoperative leg pain has improved.  Stated that side with no significant dizziness.     Objective     Vital signs in last 24 hours:  Temp:  [98.1 °F (36.7 °C)-99 °F (37.2 °C)] 98.1 °F (36.7 °C)  Heart Rate:  [80-88] 80  Resp:  [16] 16  BP: (111-141)/(62-69) 128/69    Intake/Output last 3 shifts:  I/O last 3 completed shifts:  In: 1435 [P.O.:500; I.V.:935]  Out: 1350 [Urine:1350]  Intake/Output this shift:  I/O this shift:  In: 1000 [I.V.:1000]  Out: -     Physical Exam:   alert and oriented by 3   no focal motor or sensory deficits   dressing clean dry and intact      A/P  Post op hypotension  Cardiology following   Volume depletion appears improved and ambulating without issues  Continue to mobilize and plan for possible dc Monday

## 2018-08-11 NOTE — PLAN OF CARE
Problem: Patient Care Overview  Goal: Plan of Care Review  Outcome: Ongoing (interventions implemented as appropriate)   08/11/18 6092   Coping/Psychosocial   Plan of Care Reviewed With patient   OTHER   Outcome Summary Patient A+Ox4. Pain well controlled w/ PO pain meds. VSS. Orthostatics stable today. Spoke w/ Dr. Smith and Dr. Vasques would like to keep patient until Monday. Note requesting order for rolling walker, 3-in-1 and home health. Continue to monitor.    Plan of Care Review   Progress improving

## 2018-08-11 NOTE — THERAPY TREATMENT NOTE
Acute Care - Physical Therapy Treatment Note  [facility]     Patient Name: Carolina Ayers  : 1955  MRN: [id number]  Today's Date: 2018  Onset of Illness/Injury or Date of Surgery: 18  Date of Referral to PT: 18  Referring Physician: Dr. Vasques    Admit Date: 2018    Visit Dx:    ICD-10-CM ICD-9-CM   1. Impaired functional mobility, balance, gait, and endurance Z74.09 V49.89   2. Acquired spondylolisthesis M43.10 738.4   3. Chronic bilateral low back pain with bilateral sciatica M54.42 724.2    M54.41 724.3    G89.29 338.29   4. Orthostatic hypotension I95.1 458.0   5. Hypokalemia E87.6 276.8     Patient Active Problem List   Diagnosis   • Chronic bilateral low back pain with sciatica   • Acquired spondylolisthesis   • Chronic bilateral low back pain with bilateral sciatica   • Hypokalemia   • Abnormal EKG   • Preop cardiovascular exam   • Essential hypertension   • Localized edema   • Orthostatic hypotension   • Impaired functional mobility, balance, gait, and endurance       Therapy Treatment          Rehabilitation Treatment Summary     Row Name 18 1000             Treatment Time/Intention    Discipline physical therapy assistant  -      Document Type therapy note (daily note)  -      Subjective Information complains of;pain  -RH      Patient Effort good  -RH      Existing Precautions/Restrictions brace worn when out of bed  -RH      Recorded by [RH] Vasu Sanford, PTA 18 1047      Row Name 18 1000             Vital Signs    O2 Delivery Pre Treatment room air  -RH      Recorded by [RH] Vasu Sanford, PTA 18 1047      Row Name 18 1000             Cognitive Assessment/Intervention- PT/OT    Orientation Status (Cognition) oriented x 4  -RH      Recorded by [RH] Vasu Sanford, PTA 18 1047      Row Name 18 1000             Bed Mobility Assessment/Treatment    Supine-Sit [place] (Bed Mobility) --   in restroom with nursing   -RH      Sit-Supine [place] (Bed Mobility) supervision   deonstrated good technique  -RH      Recorded by [RH] Vasu Sanford, PTA 08/11/18 1047      Row Name 08/11/18 1000             Sit-Stand Transfer    Sit-Stand [place] (Transfers) supervision  -RH      Recorded by [RH] Vasu Sanford, PTA 08/11/18 1047      Row Name 08/11/18 1000             Stand-Sit Transfer    Stand-Sit [place] (Transfers) supervision  -RH      Recorded by [RH] Vasu Sanford, PTA 08/11/18 1047      Row Name 08/11/18 1000             Gait/Stairs Assessment/Training    [place] Level (Gait) contact guard  -RH      Assistive Device (Gait) walker, front-wheeled  -RH      Distance in Feet (Gait) 250  -RH      Recorded by [RH] Vasu Sanford, PTA 08/11/18 1047      Row Name 08/11/18 1000             Static Sitting Balance    Level of [place] (Unsupported Sitting, Static Balance) conditional independence  -RH      Recorded by [RH] Vasu Sanford, PTA 08/11/18 1047      Row Name 08/11/18 1000             Static Standing Balance    Level of [place] (Supported Standing, Static Balance) supervision  -RH      Time Able to Maintain Position (Supported Standing, Static Balance) 3 to 4 minutes  -RH      Recorded by [RH] Vasu Sanford, PTA 08/11/18 1047      Row Name 08/11/18 1000             Positioning and Restraints    Pre-Treatment Position bathroom  -RH      Post Treatment Position bed  -RH      In Bed supine;call light within reach  -RH      Recorded by [RH] Vasu Sanford, PTA 08/11/18 1047      Row Name 08/11/18 1000             Pain Scale: Numbers Pre/Post-Treatment    Pain Scale: Numbers, Pretreatment 5/10  -RH      Pain Location - Side --   lower back  -RH      Recorded by [RH] Vasu Sanford, PTA 08/11/18 1047      Row Name                Wound 08/07/18 1112 back incision    Wound - Properties Group Date first assessed: 08/07/18 [] Time first assessed: 1112 [] Location: back  [AH] Type: incision [AH] Recorded by:  [] Jenn Lopez RN 08/07/18 1112    Row Name 08/11/18 1000             Coping    Observed Emotional State accepting  -RH      Verbalized Emotional State acceptance  -RH      Recorded by [] Vasu Sanford, PTA 08/11/18 1047      Row Name 08/11/18 1000             Outcome Summary/Treatment Plan (PT)    Daily Summary of Progress (PT) progress toward functional goals is good  -RH      Recorded by [] Vasu Sanford, PTA 08/11/18 1047        User Key  (r) = Recorded By, (t) = Taken By, (c) = Cosigned By    Initials Name Effective Dates Discipline     Jenn Lopez RN 12/07/17 -  Nurse     Vasu Sanford, PTA 03/07/18 -  PT          Wound 08/07/18 1112 back incision (Active)   Dressing Appearance open to air 8/11/2018  8:19 AM   Closure Liquid skin adhesive 8/11/2018  8:19 AM   Base clean;dry 8/11/2018  8:19 AM   Drainage Amount none 8/11/2018  8:19 AM   Dressing Care, Wound open to air 8/11/2018  8:19 AM             Physical Therapy Education     Title: PT OT SLP Therapies (Done)     Topic: Physical Therapy (Done)     Point: Mobility training (Done)    Learning Progress Summary     Learner Status Readiness Method Response Comment Documented by    Patient Done Acceptance ENINGNR   08/10/18 1019     Active Acceptance E NR  MA 08/08/18 1620          Point: Home exercise program (Done)    Learning Progress Summary     Learner Status Readiness Method Response Comment Documented by    Patient Done Acceptance NING DE LA TORRENR   08/10/18 1019     Active Acceptance E NR  MA 08/08/18 1620          Point: Body mechanics (Done)    Learning Progress Summary     Learner Status Readiness Method Response Comment Documented by    Patient Done Acceptance NING DE LA TORRENR   08/10/18 1019     Active Acceptance E NR  MA 08/08/18 1620          Point: Precautions (Done)    Learning Progress Summary     Learner Status Readiness Method Response Comment Documented by    Patient Done Acceptance  E,TB VU,NR   08/10/18 1019     Active Acceptance E NR  MA 08/08/18 1620                      User Key     Initials Effective Dates Name Provider Type Discipline     03/07/18 -  Desiree Stafford PTA Physical Therapy Assistant PT    MA 04/03/18 -  Shantel Blair, PT Physical Therapist PT                    PT Recommendation and Plan     Outcome Summary/Treatment Plan (PT)  Daily Summary of Progress (PT): progress toward functional goals is good  Plan of Care Reviewed With: patient  Progress: no change          Outcome Measures     Row Name 08/11/18 1000 08/10/18 1000 08/08/18 1600       How much help from another person do you currently need...    Turning from your back to your side while in flat bed without using bedrails? 3  -RH 3  -SM 3  -MA    Moving from lying on back to sitting on the side of a flat bed without bedrails? 3  - 3  -SM 3  -MA    Moving to and from a bed to a chair (including a wheelchair)? 3  - 3  -SM 3  -MA    Standing up from a chair using your arms (e.g., wheelchair, bedside chair)? 3  -RH 3  -SM 3  -MA    Climbing 3-5 steps with a railing? 3  -RH 2  -SM 2  -MA    To walk in hospital room? 3  - 2  -SM 2  -MA    AM-PAC 6 Clicks Score 18  -RH 16  -SM 16  -MA       Functional Assessment    Outcome Measure Options  -- AM-PAC 6 Clicks Basic Mobility (PT)  - AM-PAC 6 Clicks Basic Mobility (PT)  -MA      User Key  (r) = Recorded By, (t) = Taken By, (c) = Cosigned By    Initials Name Provider Type    RH Vasu Sanford, TAYLOR Physical Therapy Assistant     Desiree Stafford PTA Physical Therapy Assistant    Shantel Gentile, PT Physical Therapist           Time Calculation:         PT Charges     Row Name 08/11/18 1048             Time Calculation    Start Time 1030  -RH      Stop Time 1050  -RH      Time Calculation (min) 20 min  -RH      PT Received On 08/11/18  -      PT - Next Appointment 08/12/18  -        User Key  (r) = Recorded By, (t) = Taken By, (c) =  Cosigned By    Initials Name Provider Type     Vasu Sanford, PTA Physical Therapy Assistant        Therapy Suggested Charges     Code   Minutes Charges    None           Therapy Charges for Today     Code Description Service Date Service Provider Modifiers Qty    27683188169 HC PT THER PROC EA 15 MIN 8/11/2018 Vasu Sanford PTA GP 1          PT G-Codes  Outcome Measure Options: AM-PAC 6 Clicks Basic Mobility (PT)    Vasu Sanford PTA  8/11/2018

## 2018-08-11 NOTE — PROGRESS NOTES
Hospital Follow Up    Chief Complaint: Follow up orthostatic hypotension, syncope    Interval History:  Feels better today.  Repeat orthostatics this morning were normal.     Objective:     Objective:  Temp:  [98.3 °F (36.8 °C)-99 °F (37.2 °C)] 98.7 °F (37.1 °C)  Heart Rate:  [82-88] 87  Resp:  [16] 16  BP: (111-141)/(62-68) 111/62     Intake/Output Summary (Last 24 hours) at 08/11/18 0943  Last data filed at 08/11/18 0518   Gross per 24 hour   Intake                0 ml   Output             1350 ml   Net            -1350 ml     Body mass index is 33.25 kg/m².  1    08/07/18  0716   Weight: 82.5 kg (181 lb 12.8 oz)     Weight change:       Physical Exam:   General : Alert, cooperative, in no acute distress.  Neuro: alert,cooperative and oriented  Lungs: CTAB. Normal respiratory effort and rate.  CV:: Regular rate and rhythm, normal S1 and S2, no murmurs, gallops or rubs.  ABD: Soft, nontender, non-distended. positive bowel sounds  Extr: No edema or cyanosis, moves all extremities    Lab Review:     Results from last 7 days  Lab Units 08/11/18  0437 08/10/18  0913   SODIUM mmol/L 137 135*   POTASSIUM mmol/L 4.2 2.9*   CHLORIDE mmol/L 98 88*   CO2 mmol/L 30.1* 34.2*   BUN mg/dL 13 12   CREATININE mg/dL 0.75 0.80   GLUCOSE mg/dL 109* 126*   CALCIUM mg/dL 8.6 8.9   AST (SGOT) U/L  --  43*   ALT (SGPT) U/L  --  26           Results from last 7 days  Lab Units 08/09/18  1448   WBC 10*3/mm3 13.42*   HEMOGLOBIN g/dL 12.7   HEMATOCRIT % 38.3   PLATELETS 10*3/mm3 245           Results from last 7 days  Lab Units 08/10/18  0913   MAGNESIUM mg/dL 2.0           Invalid input(s): LDLCALC          I reviewed the patient's new clinical results.  I personally viewed and interpreted the patient's EKG  Current Medications:   Scheduled Meds:  atorvastatin 10 mg Oral Daily   docusate sodium 100 mg Oral Daily   oxybutynin XL 5 mg Oral Daily   pantoprazole 40 mg Oral Q AM   polyethylene glycol 17 g Oral Daily   potassium chloride 20  mEq Oral Daily     Continuous Infusions:  sodium chloride 0.9 % with KCl 20 mEq 100 mL/hr Last Rate: 100 mL/hr (08/10/18 9583)       Allergies:  Allergies   Allergen Reactions   • Penicillins Hives       Assessment/Plan:     1. Orthostatic hypotension.  Due to volume depletion and possible a component of autonomic dysfunction exacerbated by anesthesia.  Started on IVF's.    2. Status post lumbar fusion for spondyloisthesis/bilateral sciatica  3. Hyperlipidemia  4. History of chronic lower extremity edema.  Pre-op echo with normal left ventricular systolic and diastolic function.  Diuretics on hold.     -  Discontinue IVF's since orthostatics have improved.    -  Continue to hold diuretics since no evidence of lower extremity edema at this time.  Discontinue potassium since off diuretics.   -  Have patient ambulate and if BP remains stable today she should be ok for discharge tomorrow.        Wendy Thurston MD  08/11/18  9:43 AM

## 2018-08-12 LAB
ANION GAP SERPL CALCULATED.3IONS-SCNC: 9.6 MMOL/L
BUN BLD-MCNC: 11 MG/DL (ref 8–23)
BUN/CREAT SERPL: 13.6 (ref 7–25)
CALCIUM SPEC-SCNC: 8.4 MG/DL (ref 8.6–10.5)
CHLORIDE SERPL-SCNC: 95 MMOL/L (ref 98–107)
CO2 SERPL-SCNC: 29.4 MMOL/L (ref 22–29)
CREAT BLD-MCNC: 0.81 MG/DL (ref 0.57–1)
GFR SERPL CREATININE-BSD FRML MDRD: 71 ML/MIN/1.73
GLUCOSE BLD-MCNC: 101 MG/DL (ref 65–99)
POTASSIUM BLD-SCNC: 4.2 MMOL/L (ref 3.5–5.2)
SODIUM BLD-SCNC: 134 MMOL/L (ref 136–145)

## 2018-08-12 PROCEDURE — 99232 SBSQ HOSP IP/OBS MODERATE 35: CPT | Performed by: INTERNAL MEDICINE

## 2018-08-12 PROCEDURE — 97110 THERAPEUTIC EXERCISES: CPT

## 2018-08-12 PROCEDURE — 80048 BASIC METABOLIC PNL TOTAL CA: CPT | Performed by: INTERNAL MEDICINE

## 2018-08-12 RX ADMIN — DOCUSATE SODIUM 100 MG: 100 CAPSULE, LIQUID FILLED ORAL at 10:55

## 2018-08-12 RX ADMIN — CYCLOBENZAPRINE 10 MG: 10 TABLET, FILM COATED ORAL at 22:23

## 2018-08-12 RX ADMIN — ATORVASTATIN CALCIUM 10 MG: 10 TABLET, FILM COATED ORAL at 10:56

## 2018-08-12 RX ADMIN — OXYCODONE HYDROCHLORIDE AND ACETAMINOPHEN 1 TABLET: 5; 325 TABLET ORAL at 10:56

## 2018-08-12 RX ADMIN — OXYCODONE HYDROCHLORIDE AND ACETAMINOPHEN 1 TABLET: 5; 325 TABLET ORAL at 22:23

## 2018-08-12 RX ADMIN — PANTOPRAZOLE SODIUM 40 MG: 40 TABLET, DELAYED RELEASE ORAL at 06:04

## 2018-08-12 RX ADMIN — OXYCODONE HYDROCHLORIDE AND ACETAMINOPHEN 1 TABLET: 5; 325 TABLET ORAL at 03:07

## 2018-08-12 RX ADMIN — OXYBUTYNIN CHLORIDE 5 MG: 5 TABLET, EXTENDED RELEASE ORAL at 10:56

## 2018-08-12 RX ADMIN — OXYCODONE HYDROCHLORIDE AND ACETAMINOPHEN 1 TABLET: 5; 325 TABLET ORAL at 16:35

## 2018-08-12 NOTE — PLAN OF CARE
Problem: Patient Care Overview  Goal: Plan of Care Review  Outcome: Ongoing (interventions implemented as appropriate)   08/12/18 1652   Coping/Psychosocial   Plan of Care Reviewed With patient   OTHER   Outcome Summary VSS, pain controlled with PO pain medication. ambulating well to bathroom. Will continue to monitor.    Plan of Care Review   Progress improving       Problem: Fall Risk (Adult)  Goal: Absence of Fall  Outcome: Ongoing (interventions implemented as appropriate)      Problem: Pain, Acute (Adult)  Goal: Identify Related Risk Factors and Signs and Symptoms  Outcome: Ongoing (interventions implemented as appropriate)    Goal: Acceptable Pain Control/Comfort Level  Outcome: Ongoing (interventions implemented as appropriate)

## 2018-08-12 NOTE — PROGRESS NOTES
Hospital Follow Up    Chief Complaint: Follow up orthostatic hypotension, syncope    Interval History:  No further syncope.  No lightheaded with activity yesterday.  BP stable.     Objective:     Objective:  Temp:  [98.1 °F (36.7 °C)-98.5 °F (36.9 °C)] 98.4 °F (36.9 °C)  Heart Rate:  [69-83] 69  Resp:  [16-18] 18  BP: (123-144)/(64-89) 141/71     Intake/Output Summary (Last 24 hours) at 08/12/18 0911  Last data filed at 08/12/18 0300   Gross per 24 hour   Intake             1480 ml   Output                0 ml   Net             1480 ml     Body mass index is 33.25 kg/m².  1    08/07/18  0716   Weight: 82.5 kg (181 lb 12.8 oz)     Weight change:       Physical Exam:   General : Alert, cooperative, in no acute distress.  Neuro: alert,cooperative and oriented  Lungs: CTAB. Normal respiratory effort and rate.  CV:: Regular rate and rhythm, normal S1 and S2, no murmurs, gallops or rubs.  ABD: Soft, nontender, non-distended. positive bowel sounds  Extr: No edema or cyanosis, moves all extremities    Lab Review:     Results from last 7 days  Lab Units 08/12/18  0609 08/11/18  0437 08/10/18  0913   SODIUM mmol/L 134* 137 135*   POTASSIUM mmol/L 4.2 4.2 2.9*   CHLORIDE mmol/L 95* 98 88*   CO2 mmol/L 29.4* 30.1* 34.2*   BUN mg/dL 11 13 12   CREATININE mg/dL 0.81 0.75 0.80   GLUCOSE mg/dL 101* 109* 126*   CALCIUM mg/dL 8.4* 8.6 8.9   AST (SGOT) U/L  --   --  43*   ALT (SGPT) U/L  --   --  26           Results from last 7 days  Lab Units 08/09/18  1448   WBC 10*3/mm3 13.42*   HEMOGLOBIN g/dL 12.7   HEMATOCRIT % 38.3   PLATELETS 10*3/mm3 245           Results from last 7 days  Lab Units 08/10/18  0913   MAGNESIUM mg/dL 2.0           Invalid input(s): LDLCALC          I reviewed the patient's new clinical results.  I personally viewed and interpreted the patient's EKG  Current Medications:   Scheduled Meds:  atorvastatin 10 mg Oral Daily   docusate sodium 100 mg Oral Daily   oxybutynin XL 5 mg Oral Daily   pantoprazole 40 mg  Oral Q AM   polyethylene glycol 17 g Oral Daily     Continuous Infusions:  sodium chloride 0.9 % with KCl 20 mEq 100 mL/hr Last Rate: Stopped (08/11/18 1017)       Allergies:  Allergies   Allergen Reactions   • Penicillins Hives       Assessment/Plan:     1. Orthostatic hypotension.  Due to volume depletion and possible a component of autonomic dysfunction exacerbated by anesthesia.  Started on IVF's.    2. Status post lumbar fusion for spondyloisthesis/bilateral sciatica  3. Hyperlipidemia  4. History of chronic lower extremity edema.  Pre-op echo with normal left ventricular systolic and diastolic function.  Diuretics on hold.       -  Keep off of diuretics as long as no evidence of lower extremity edema.   -  Ok for home tomorrow if BP remains stable and she remains asymptomatic today.    Wendy Thurston MD  08/12/18  9:11 AM

## 2018-08-13 VITALS
RESPIRATION RATE: 18 BRPM | HEART RATE: 80 BPM | WEIGHT: 181.8 LBS | OXYGEN SATURATION: 98 % | BODY MASS INDEX: 33.45 KG/M2 | SYSTOLIC BLOOD PRESSURE: 132 MMHG | DIASTOLIC BLOOD PRESSURE: 69 MMHG | TEMPERATURE: 98.4 F | HEIGHT: 62 IN

## 2018-08-13 PROCEDURE — 99231 SBSQ HOSP IP/OBS SF/LOW 25: CPT | Performed by: NURSE PRACTITIONER

## 2018-08-13 PROCEDURE — 99024 POSTOP FOLLOW-UP VISIT: CPT | Performed by: NEUROLOGICAL SURGERY

## 2018-08-13 PROCEDURE — 97165 OT EVAL LOW COMPLEX 30 MIN: CPT

## 2018-08-13 PROCEDURE — 97535 SELF CARE MNGMENT TRAINING: CPT

## 2018-08-13 RX ORDER — OXYCODONE HYDROCHLORIDE AND ACETAMINOPHEN 5; 325 MG/1; MG/1
1 TABLET ORAL EVERY 4 HOURS PRN
Qty: 50 TABLET | Refills: 0
Start: 2018-08-13 | End: 2018-08-20

## 2018-08-13 RX ADMIN — OXYCODONE HYDROCHLORIDE AND ACETAMINOPHEN 1 TABLET: 5; 325 TABLET ORAL at 10:42

## 2018-08-13 RX ADMIN — ATORVASTATIN CALCIUM 10 MG: 10 TABLET, FILM COATED ORAL at 10:42

## 2018-08-13 RX ADMIN — OXYBUTYNIN CHLORIDE 5 MG: 5 TABLET, EXTENDED RELEASE ORAL at 10:42

## 2018-08-13 RX ADMIN — PANTOPRAZOLE SODIUM 40 MG: 40 TABLET, DELAYED RELEASE ORAL at 06:41

## 2018-08-13 NOTE — PROGRESS NOTES
Continued Stay Note  [facility]     Patient Name: Carolina Ayers  MRN: [id number]  Today's Date: 8/13/2018    Admit Date: 8/7/2018          Discharge Plan     Row Name 08/13/18 1151       Plan    Plan Home with assist of family    Patient/Family in Agreement with Plan yes    Plan Comments Met with patient, spouse and dgt.  Patient plans to go home now.  Does not feel she needs skilled care.  Notified Viky with [facility].  Ordered 3;1 commode and rolling walker from [employer] to be delivered to room prior to discharge.              Discharge Codes    No documentation.       Expected Discharge Date and Time     Expected Discharge Date Expected Discharge Time    Aug 13, 2018             Val Holman RN

## 2018-08-13 NOTE — PAYOR COMM NOTE
"Carolina Ayers  (63 y.o. Female)     Date of Birth Social Security Number Address Home Phone MRN    1955  [address] [facility] [address] [phone] [id number]    [employer] Marital Status          Unknown        Admission Date Admission Type Admitting Provider Attending Provider Department, Room/Bed    8/7/18 Elective Jarrett Vasques IV, MD  [address], P583/1    Discharge Date Discharge Disposition Discharge Destination        8/13/2018 Home or Self Care              Attending Provider:  (none)   Allergies:  Penicillins    Isolation:  None   Infection:  None   Code Status:  CPR    Ht:  157.5 cm (62\")   Wt:  82.5 kg (181 lb 12.8 oz)    Admission Cmt:  None   Principal Problem:  None                Active Insurance as of 8/7/2018     Primary Coverage     Payor Plan Insurance Group Employer/Plan Group    [employer] PPO [id number]     Payor Plan Address Payor Plan Phone Number Effective From Effective To    [address] [phone] 1/1/2011     [facility] [address]       Subscriber Name Subscriber Birth Date Member ID       ISMAEL AYERS 1/11/1953 [id number]                 Emergency Contacts      (Rel.) Home Phone Work Phone Mobile Phone    PhongBritney rogers (Daughter) -- -- [phone]    BrittanyIsmael camarena (Spouse) [phone] -- --          "

## 2018-08-13 NOTE — PROGRESS NOTES
[facility] PROGRESS NOTE    PATIENT IDENTIFICATION:   Name:  Carolina Aeyrs      MRN:  [id number]     63 y.o.  female                   Active Problems:    Acquired spondylolisthesis    Chronic bilateral low back pain with bilateral sciatica    Orthostatic hypotension    Impaired functional mobility, balance, gait, and endurance        Subjective   CC: back soreness  Interval History:no more dizziness.  No headaches.  Legs feel good.  Back getting better.    Objective     Vital signs in last 24 hours:  Temp:  [97.8 °F (36.6 °C)-99 °F (37.2 °C)] 98.5 °F (36.9 °C)  Heart Rate:  [76-83] 83  Resp:  [16-18] 16  BP: (121-136)/(67-83) 126/67      Intake/Output last 3 shifts:  I/O last 3 completed shifts:  In: 720 [P.O.:720]  Out: -   Intake/Output this shift:  No intake/output data recorded.    LABS        Invalid input(s): CBC  Lab Results   Component Value Date    CALCIUM 8.4 (L) 08/12/2018     Results from last 7 days  Lab Units 08/12/18  0609 08/11/18  0437 08/10/18  0913  08/09/18  1448   MAGNESIUM mg/dL  --   --  2.0  --   --    SODIUM mmol/L 134* 137 135*  --   --    POTASSIUM mmol/L 4.2 4.2 2.9*  --   --    CHLORIDE mmol/L 95* 98 88*  --   --    CO2 mmol/L 29.4* 30.1* 34.2*  --   --    BUN mg/dL 11 13 12  --   --    CREATININE mg/dL 0.81 0.75 0.80  --   --    GLUCOSE mg/dL 101* 109* 126*  < >  --    CALCIUM mg/dL 8.4* 8.6 8.9  --   --    WBC 10*3/mm3  --   --   --   --  13.42*   HEMOGLOBIN g/dL  --   --   --   --  12.7   PLATELETS 10*3/mm3  --   --   --   --  245   ALT (SGPT) U/L  --   --  26  --   --    AST (SGOT) U/L  --   --  43*  --   --    < > = values in this interval not displayed.  Physical Exam:  5/5 str in LE bilat  Stable sens exam  4 - Opens eyes on own  6 - Follows simple motor commands  5 - Alert and oriented        ASSESSMENT  Assessment/Plan     Likely d/c today     LOS: 3 days     Will arrange for assistive devices    Jarrett Vasques IV, MD

## 2018-08-13 NOTE — PROGRESS NOTES
"    Patient Name: Carolina Ayers  :1955  63 y.o.      Patient Care Team:  Teodoro Burks MD as PCP - General (Family Medicine)  Yasmany Day MD as Consulting Physician (Obstetrics and Gynecology)    Chief Complaint: follow up orthostasis    Interval History: BP stable. She feels good. Plans to go home today.        Objective   Vital Signs  Temp:  [98.2 °F (36.8 °C)-99 °F (37.2 °C)] 98.4 °F (36.9 °C)  Heart Rate:  [76-83] 80  Resp:  [16-18] 18  BP: (126-136)/(67-83) 132/69  No intake or output data in the 24 hours ending 18 1523  Flowsheet Rows      First Filed Value   Admission Height  157.5 cm (62\") Documented at 2018   Admission Weight  82.5 kg (181 lb 12.8 oz) Documented at 2018 0716          Physical Exam:   General Appearance:    Alert, cooperative, in no acute distress   Lungs:     Clear to auscultation.  Normal respiratory effort and rate.      Heart:    Regular rhythm and normal rate, normal S1 and S2, no murmurs, gallops or rubs.     Chest Wall:    No abnormalities observed   Abdomen:     Soft, nontender, positive bowel sounds.     Extremities:   no cyanosis, clubbing or edema.  No marked joint deformities.  Adequate musculoskeletal strength. Left facial bruise.      Results Review:      Results from last 7 days  Lab Units 18  0609   SODIUM mmol/L 134*   POTASSIUM mmol/L 4.2   CHLORIDE mmol/L 95*   CO2 mmol/L 29.4*   BUN mg/dL 11   CREATININE mg/dL 0.81   GLUCOSE mg/dL 101*   CALCIUM mg/dL 8.4*           Results from last 7 days  Lab Units 18  1448   WBC 10*3/mm3 13.42*   HEMOGLOBIN g/dL 12.7   HEMATOCRIT % 38.3   PLATELETS 10*3/mm3 245           Results from last 7 days  Lab Units 08/10/18  0913   MAGNESIUM mg/dL 2.0                   Medication Review:     atorvastatin 10 mg Oral Daily   docusate sodium 100 mg Oral Daily   oxybutynin XL 5 mg Oral Daily   pantoprazole 40 mg Oral Q AM   polyethylene glycol 17 g Oral Daily          sodium chloride " 0.9 % with KCl 20 mEq 100 mL/hr Last Rate: Stopped (08/11/18 1017)       Assessment/Plan   1. Orthostatic hypotension.  Due to volume depletion and possible a component of autonomic dysfunction exacerbated by anesthesia. Better now.   2. Status post lumbar fusion for spondyloisthesis/bilateral sciatica  3. Hyperlipidemia  4. History of chronic lower extremity edema.  Pre-op echo with normal left ventricular systolic and diastolic function.  Diuretics on hold - continue to hold. She has a follow up appointment in our office in September which she will keep. I have instructed her to call with any swelling or shortness of breath.     Ok to discharge from a cardiac standpoint.           MARI Alvarado  [facility]  08/13/18  3:23 PM

## 2018-08-13 NOTE — PLAN OF CARE
Problem: Patient Care Overview  Goal: Plan of Care Review   08/13/18 1207   Coping/Psychosocial   Plan of Care Reviewed With patient   OTHER   Outcome Summary pt and family educated with back safety and AE to assist with adls.

## 2018-08-13 NOTE — THERAPY DISCHARGE NOTE
Acute Care - Occupational Therapy Initial Eval/Discharge  [facility]     Patient Name: Carolina Ayers  : 1955  MRN: [id number]  Today's Date: 2018  Onset of Illness/Injury or Date of Surgery: 18     Referring Physician: Dr. Vasques      Admit Date: 2018       ICD-10-CM ICD-9-CM   1. Impaired functional mobility, balance, gait, and endurance Z74.09 V49.89   2. Acquired spondylolisthesis M43.10 738.4   3. Chronic bilateral low back pain with bilateral sciatica M54.42 724.2    M54.41 724.3    G89.29 338.29   4. Orthostatic hypotension I95.1 458.0   5. Hypokalemia E87.6 276.8     Patient Active Problem List   Diagnosis   • Chronic bilateral low back pain with sciatica   • Acquired spondylolisthesis   • Chronic bilateral low back pain with bilateral sciatica   • Hypokalemia   • Abnormal EKG   • Preop cardiovascular exam   • Essential hypertension   • Localized edema   • Orthostatic hypotension   • Impaired functional mobility, balance, gait, and endurance     Past Medical History:   Diagnosis Date   • History of chest pain     SEEN DR SIM   • History of skin cancer    • Hyperlipidemia    • Lumbar herniated disc    • PONV (postoperative nausea and vomiting)      Past Surgical History:   Procedure Laterality Date   • BREAST LUMPECTOMY Left    • CARPAL TUNNEL RELEASE     • CHOLECYSTECTOMY     • EXTERNAL EAR SURGERY      cyst removed from ear   • HERNIA REPAIR     • HYSTERECTOMY      partial   • KNEE SURGERY     • LUMBAR DISCECTOMY FUSION INSTRUMENTATION Bilateral 2018    Procedure: LUMBAR FUSION DECOMPRESSON WITH PEDICLE SCREWS Lumbar 4-5,  posterolateral fusion;  Surgeon: Jarrett Vasques IV, MD;  Location: [facility] OR;  Service: Neurosurgery   • SHOULDER SURGERY     • TONSILLECTOMY     • WRIST SURGERY Bilateral     Cyst removed          OT ASSESSMENT FLOWSHEET (last 72 hours)      Occupational Therapy Evaluation     Row Name 18 1153                   OT Evaluation Time/Intention     Subjective Information no complaints  -SG        Document Type evaluation;discharge evaluation/summary  -SG        Mode of Treatment occupational therapy  -SG        Patient Effort excellent  -SG           General Information    Patient Profile Reviewed? yes  -SG        Patient Observations alert;cooperative;agree to therapy  -SG        General Observations of Patient supine in bed  -SG        Prior Level of Function independent:;ADL's  -SG        Existing Precautions/Restrictions fall;spinal;brace worn when out of bed  -SG           Cognitive Assessment/Intervention- PT/OT    Orientation Status (Cognition) oriented x 4  -SG        Follows Commands (Cognition) WFL  -SG           ADL Assessment/Intervention    BADL Assessment/Intervention lower body dressing  -SG           Lower Body Dressing Assessment/Training    Lower Body Dressing [place] Level doff;don  -        Assistive Devices (Lower Body Dressing) reacher  -        Lower Body Dressing Position supine  -        Comment (Lower Body Dressing) educated with back safety and adls.   -           BADL Safety/Performance    Skilled BADL Treatment/Intervention adaptive equipment training;BADL process/adaptation training  -           General ROM    GENERAL ROM COMMENTS WFL AROM  -SG           Sensory Assessment/Intervention    Sensory General Assessment no sensation deficits identified   BUE's  -SG           Positioning and Restraints    Pre-Treatment Position in bed  -SG        Post Treatment Position bed  -SG        In Bed call light within reach;encouraged to call for assist;exit alarm on  -SG           Wound 08/07/18 1112 back incision    Wound - [facility] Date first assessed: 08/07/18  - Time first assessed: 1112  - Location: back  - Type: incision  -       Clinical Impression (OT)    OT Diagnosis need for assist with personal care  -        Therapy Frequency (OT Eval) 5 times/wk  -        Care Plan Review (OT)  evaluation/treatment results reviewed  -SG           Patient Education Goal (OT)    Activity (Patient Education Goal, OT) pt and family to state good knowledge for back safety and adls  -SG        [place]/Cues/Accuracy (Memory Goal 2, OT) verbalizes understanding  -SG        Time Frame (Patient Education Goal, OT) 1 day  -SG        Progress/Outcome (Patient Education Goal, OT) goal met  -SG          User Key  (r) = Recorded By, (t) = Taken By, (c) = Cosigned By    Initials Name Effective Dates     Carolina Potter OTR 06/08/18 -     Jenn Bliss, RN 12/07/17 -           Occupational Therapy Education     Title: PT OT SLP Therapies (Done)     Topic: Occupational Therapy (Resolved)     Point: ADL training (Resolved)     Description: Instruct learner(s) on proper safety adaptation and remediation techniques during self care or transfers.   Instruct in proper use of assistive devices.   Learning Progress Summary     Learner Status Readiness Method Response Comment Documented by    Patient Done Acceptance E,TB,D VU Pt and family states good knowledge for back safety and adls. Pt states will have assist as needed.  08/13/18 1206          Point: Precautions (Resolved)     Description: Instruct learner(s) on prescribed precautions during self-care and functional transfers.   Learning Progress Summary     Learner Status Readiness Method Response Comment Documented by    Patient Done Acceptance E,TB,D VU Pt and family states good knowledge for back safety and adls. Pt states will have assist as needed.  08/13/18 1206                      User Key     Initials Effective Dates Name Provider Type Discipline     06/08/18 -  Carolina Potter OTR Occupational Therapist OT                OT Recommendation and Plan  Therapy Frequency (OT Eval): 5 times/wk  Plan of Care Review  Plan of Care Reviewed With: patient  Plan of Care Reviewed With: patient  Outcome Summary: pt and family educated with back safety and AE to  assist with adls.            OT Rehab Goals     Row Name 08/13/18 1153             Patient Education Goal (OT)    Activity (Patient Education Goal, OT) pt and family to state good knowledge for back safety and adls  -SG      [place]/Cues/Accuracy (Memory Goal 2, OT) verbalizes understanding  -SG      Time Frame (Patient Education Goal, OT) 1 day  -SG      Progress/Outcome (Patient Education Goal, OT) goal met  -SG        User Key  (r) = Recorded By, (t) = Taken By, (c) = Cosigned By    Initials Name Provider Type Discipline    SG Carolina Potter, OTR Occupational Therapist OT                Outcome Measures     Row Name 08/13/18 1210 08/12/18 1000 08/11/18 1000       How much help from another person do you currently need...    Turning from your back to your side while in flat bed without using bedrails?  -- 4  -RH 3  -RH    Moving from lying on back to sitting on the side of a flat bed without bedrails?  -- 4  -RH 3  -RH    Moving to and from a bed to a chair (including a wheelchair)?  -- 4  -RH 3  -RH    Standing up from a chair using your arms (e.g., wheelchair, bedside chair)?  -- 4  -RH 3  -RH    Climbing 3-5 steps with a railing?  -- 3  -RH 3  -RH    To walk in hospital room?  -- 4  -RH 3  -RH    AM-PAC 6 Clicks Score  -- 23  -RH 18  -RH       How much help from another is currently needed...    Putting on and taking off regular lower body clothing? 3  -SG  --  --    Bathing (including washing, rinsing, and drying) 3  -SG  --  --    Toileting (which includes using toilet bed pan or urinal) 3  -SG  --  --    Putting on and taking off regular upper body clothing 3  -SG  --  --    Taking care of personal grooming (such as brushing teeth) 3  -SG  --  --    Eating meals 4  -SG  --  --    Score 19  -SG  --  --       Functional Assessment    Outcome Measure Options AM-PAC 6 Clicks Daily Activity (OT)  -SG  --  --      User Key  (r) = Recorded By, (t) = Taken By, (c) = Cosigned By    Initials Name Provider Type     Carolina Mancilla OTR Occupational Therapist    RH Vasu Sanford, PTA Physical Therapy Assistant          Time Calculation:         Time Calculation- OT     Row Name 08/13/18 1211             Time Calculation- OT    OT Start Time 1107  -SG      OT Stop Time 1124  -SG      OT Time Calculation (min) 17 min  -      Total Timed Code Minutes- OT 9 minute(s)  -      OT Received On 08/13/18  -        User Key  (r) = Recorded By, (t) = Taken By, (c) = Cosigned By    Initials Name Provider Type    Carolina Mancilla OTR Occupational Therapist        Therapy Suggested Charges     Code   Minutes Charges    None           Therapy Charges for Today     Code Description Service Date Service Provider Modifiers Qty    12049457706 HC OT EVAL LOW COMPLEXITY 2 8/13/2018 Carolina Potter OTR GO 1    [id number]  OT SELF CARE/MGMT/TRAIN EA 15 MIN 8/13/2018 Carolina Potter OTR GO 1               OT Discharge Summary  Reason for Discharge: other (comment) (Pt and family denies need for further OT at this time)    CARLOZ Treviño  8/13/2018

## 2018-08-13 NOTE — DISCHARGE SUMMARY
Addendum to previously dictated discharge summary on August 10, 2018:    The patient has progressed over the weekend with physical therapy and has now decided that she would like to go home rather than to subacute rehabilitation.  She has been provided with a walker and elevated commode that she will use for her convenience once home.  She will be given a prescription for Percocet 5/325 mg tablets to be taken every 4 hours as needed #50, Flexeril, and Colace from our service.  All other medications to be continued per discharge med reconciliation.    Many questions were answered at length from the patient, her  and daughter at bedside.  Postoperative instructions and restrictions were discussed at length.  Upon discharge, they are to call our office at anytime with any questions or concerns.    From a neurosurgical standpoint, she is okay for discharge home today.  Vital signs and lab work remained stable.  Midline posterior lumbar incision site is healing well and intact with normal surrounding skin.  She continues to deny any leg pain.  I recommended wearing SHANTANU hose until follow-up in our office.

## 2018-08-14 ENCOUNTER — READMISSION MANAGEMENT (OUTPATIENT)
Dept: CALL CENTER | Facility: HOSPITAL | Age: 63
End: 2018-08-14

## 2018-08-14 NOTE — PROGRESS NOTES
Discharge Planning Assessment  [facility]     Patient Name: Carolina Ayers  MRN: [id number]  Today's Date: 8/14/2018    Admit Date: 8/7/2018          Discharge Needs Assessment    No documentation.             Discharge Plan     Row Name 08/14/18 1133       Plan    Final Discharge Disposition Code 01 - home or self-care    Final Note Home with spouse        Destination     Service Request Status Selected Specialties Address Phone Number Fax Number    [facility] Accepted N/A [address], [facility] [id number] [phone]        Prashant Whitaker RN 8/10/2018 1202    Called referral to [facility].  Bed confirmed and she will start precert.  Prashant Whitaker RN                   Durable Medical Equipment     No service coordination in this encounter.      Dialysis/Infusion     No service coordination in this encounter.      Home Medical Care     No service coordination in this encounter.      Social Care     No service coordination in this encounter.        Expected Discharge Date and Time     Expected Discharge Date Expected Discharge Time    Aug 13, 2018               Demographic Summary    No documentation.           Functional Status    No documentation.           Psychosocial    No documentation.           Abuse/Neglect    No documentation.           Legal    No documentation.           Substance Abuse    No documentation.           Patient Forms    No documentation.         Val Holman RN

## 2018-08-15 NOTE — OUTREACH NOTE
Prep Survey      Responses   Facility patient discharged from?  [facility]   Is patient eligible?  Yes   Discharge diagnosis  Acquired spondylolisthesisLUMBAR FUSION DECOMPRESSON WITH PEDICLE SCREWS Lumbar 4-5,  posterolateral fusion   Does the patient have one of the following disease processes/diagnoses(primary or secondary)?  General Surgery   Does the patient have Home health ordered?  No   Is there a DME ordered?  Yes   What DME was ordered?  [employer], rolling walker    Prep survey completed?  Yes          Yessi Crawley RN

## 2018-08-16 ENCOUNTER — READMISSION MANAGEMENT (OUTPATIENT)
Dept: CALL CENTER | Facility: HOSPITAL | Age: 63
End: 2018-08-16

## 2018-08-16 NOTE — OUTREACH NOTE
General Surgery Week 1 Survey      Responses   Facility patient discharged from?  [facility]   Does the patient have one of the following disease processes/diagnoses(primary or secondary)?  General Surgery   Is there a successful TCM telephone encounter documented?  No   Week 1 attempt successful?  Yes   Call start time  1319   Call end time  1325   Discharge diagnosis  Acquired spondylolisthesisLUMBAR FUSION DECOMPRESSON WITH PEDICLE SCREWS Lumbar 4-5,  posterolateral fusion   Is patient permission given to speak with other caregiver?  Yes   Person spoke with today (if not patient) and relationship  Pt , Ismael Tompkins reviewed with patient/caregiver?  Yes   Is the patient having any side effects they believe may be caused by any medication additions or changes?  No   Does the patient have all medications related to this admission filled (includes all antibiotics, pain medications, etc.)  Yes   Is the patient taking all medications as directed (includes completed medication regime)?  Yes   Medication comments  Dr. Burks added antibiotic for UTI.    Does the patient have a follow up appointment scheduled with their surgeon?  Yes   Has the patient kept scheduled appointments due by today?  N/A   What DME was ordered?  [employer], rolling walker    Has all DME been delivered?  Yes   Psychosocial issues?  No   Did the patient receive a copy of their discharge instructions?  Yes   Nursing interventions  Reviewed instructions with patient   What is the patient's perception of their health status since discharge?  Improving   Is the patient /caregiver able to teach back basic post-op care?  Drive as instructed by MD in discharge instructions, Take showers only when approved by MD-sponge bathe until then, No tub bath, swimming, or hot tub until instructed by MD, Keep incision areas clean,dry and protected, Lifting as instructed by MD in discharge instructions   Is the patient/caregiver able to teach back signs and  symptoms of incisional infection?  Increased redness, swelling or pain at the incisonal site, Increased drainage or bleeding, Pus or odor from incision, Fever   Is the patient/caregiver able to teach back steps to recovery at home?  Rest and rebuild strength, gradually increase activity, Set small, achievable goals for return to baseline health   Is the patient/caregiver able to teach back the hierarchy of who to call/visit for symptoms/problems? PCP, Specialist, Home health nurse, Urgent Care, ED, 911  Yes   Week 1 call completed?  Yes          Hernesto Brennan RN

## 2018-08-22 ENCOUNTER — OFFICE VISIT (OUTPATIENT)
Dept: NEUROSURGERY | Facility: CLINIC | Age: 63
End: 2018-08-22

## 2018-08-22 ENCOUNTER — HOSPITAL ENCOUNTER (OUTPATIENT)
Dept: GENERAL RADIOLOGY | Facility: HOSPITAL | Age: 63
Discharge: HOME | End: 2018-08-22
Admitting: NURSE PRACTITIONER

## 2018-08-22 VITALS
HEART RATE: 80 BPM | SYSTOLIC BLOOD PRESSURE: 132 MMHG | RESPIRATION RATE: 18 BRPM | BODY MASS INDEX: 33.49 KG/M2 | WEIGHT: 182 LBS | HEIGHT: 62 IN | DIASTOLIC BLOOD PRESSURE: 74 MMHG

## 2018-08-22 DIAGNOSIS — Z98.890 POST-OPERATIVE STATE: ICD-10-CM

## 2018-08-22 DIAGNOSIS — M43.10 ACQUIRED SPONDYLOLISTHESIS: ICD-10-CM

## 2018-08-22 DIAGNOSIS — M54.41 CHRONIC BILATERAL LOW BACK PAIN WITH BILATERAL SCIATICA: Primary | ICD-10-CM

## 2018-08-22 DIAGNOSIS — G89.29 CHRONIC BILATERAL LOW BACK PAIN WITH BILATERAL SCIATICA: Primary | ICD-10-CM

## 2018-08-22 DIAGNOSIS — M54.42 CHRONIC BILATERAL LOW BACK PAIN WITH BILATERAL SCIATICA: Primary | ICD-10-CM

## 2018-08-22 PROCEDURE — 72100 X-RAY EXAM L-S SPINE 2/3 VWS: CPT

## 2018-08-22 PROCEDURE — 99024 POSTOP FOLLOW-UP VISIT: CPT | Performed by: NURSE PRACTITIONER

## 2018-08-22 RX ORDER — CIPROFLOXACIN 500 MG/1
TABLET, FILM COATED ORAL
Refills: 0 | COMMUNITY
Start: 2018-08-14 | End: 2018-09-14

## 2018-08-22 RX ORDER — HYDROCODONE BITARTRATE AND ACETAMINOPHEN 7.5; 325 MG/1; MG/1
1 TABLET ORAL EVERY 6 HOURS PRN
Qty: 25 TABLET | Refills: 0 | Status: SHIPPED | OUTPATIENT
Start: 2018-08-22 | End: 2018-09-14

## 2018-08-22 NOTE — PROGRESS NOTES
" HPI:   Carolina Ayers is a 63 y.o. female for follow-up status post L4 5 decompression and fusion on August 7 with Dr. Vasques.  She has undergone postop lumbar x-rays.    Today, she reports complete resolution of leg pain.  She also denies any numbness, tingling or weakness in her legs.  She does have expected postoperative discomfort in the low back area surrounding the incision site.  The incision site is healing well.  She has been compliant wearing the LSO brace at all times as directed.  She continues to take the oxycodone 2-3 tablets daily as needed.  She is weaning off as tolerated.  She is also taking muscle relaxers at night only.    She is increasing her activity level as tolerated.  She is walking daily without difficulty.  She is using a walker for assistance but feels comfortable walking without it when in her home.  She denies any new problems.  She is very happy with her postop status.  She is not interested in physical therapy.     She presents accompanied by her spouse.       /74 (BP Location: Left arm, Patient Position: Sitting)   Pulse 80   Resp 18   Ht 157.5 cm (62\")   Wt 82.6 kg (182 lb)   BMI 33.29 kg/m²       Review of Systems   Constitutional: Negative for chills and fever.   Gastrointestinal: Negative for constipation.   Genitourinary: Negative for difficulty urinating and enuresis.   Musculoskeletal: Positive for back pain.        Occ'l RLE pain   Skin: Negative for wound (wound redness, swelling, or drainage).   Neurological: Negative for weakness, numbness and headaches.        Patient's blood pressure dropped when in the bathroom and has bruise on left cheek   Psychiatric/Behavioral: Negative for sleep disturbance.        /74 (BP Location: Left arm, Patient Position: Sitting)   Pulse 80   Resp 18   Ht 157.5 cm (62\")   Wt 82.6 kg (182 lb)   BMI 33.29 kg/m²     Physical Exam   Constitutional: She is oriented to person, place, and time. Vital signs are normal. She " appears well-developed and well-nourished. She is cooperative.   Very pleasant, well-appearing older female   HENT:   Head: Normocephalic and atraumatic.   Neck: Neck supple. No tracheal deviation present.   Pulmonary/Chest: Effort normal.   Abdominal: Soft.   Musculoskeletal: She exhibits no tenderness or deformity.   Wearing a well fitting LSO brace  Strength equal bilateral lower extremities  Deferred lumbar range of motion exam   Neurological: She is alert and oriented to person, place, and time. She has normal strength. She displays no tremor. No cranial nerve deficit or sensory deficit. She exhibits normal muscle tone. Coordination and gait normal. GCS eye subscore is 4. GCS verbal subscore is 5. GCS motor subscore is 6.   Gait is stable and upright, able to heel and toe walk with assistance     Skin: Skin is warm and dry.   Well-healing midline lumbar incision site, flat, well approximated and intact, nontender   Psychiatric: She has a normal mood and affect. Her behavior is normal.   Vitals reviewed.    Neurologic Exam     Mental Status   Oriented to person, place, and time.     Motor Exam     Strength   Strength 5/5 throughout.       Findings/Results:  Postop x-rays dated August 22 reveals stable postoperative findings with intact surgical hardware.  No new abnormalities noted.  No lucency of screws.      Assessment/Plan:  Carolina was seen today for post-op.    Diagnoses and all orders for this visit:    Chronic bilateral low back pain with bilateral sciatica    Acquired spondylolisthesis    Post-operative state    Other orders  -     HYDROcodone-acetaminophen (NORCO) 7.5-325 MG per tablet; Take 1 tablet by mouth Every 6 (Six) Hours As Needed for Moderate Pain .        Discussion/Summary  She returns to the office today for first postop visit status post L4 5 lumbar decompression and fusion with Dr. Vasques.  Exam as noted above, no red flags.  She is an extremely well and has very happy to report complete  resolution of leg pain postop.  She does have expected postoperative discomfort around the incision site.  The incision is healing well and remains flat and well approximated.  She has been wearing the LSO brace as instructed.  She is taking the oxycodone and I have given her a refill for hydrocodone and she only has a few tablets left.  She is to wean off the narcotics as tolerated.  She can supplement with Tylenol as needed.  She is to refrain from bending and twisting at the waist for 2 months postop.  She will continue wearing the brace until instructed otherwise by our office.  Postoperative x-rays are stable and reveal intact surgical hardware.  She is not to drive until off the narcotics for at least 24 hours.  No lifting more than 5-10 pounds while wearing the brace.  I encouraged her to walk daily.  She is okay to bend her knees.    Plan: Return to office in one month for clinical follow-up    Plan: Return in about 4 weeks (around 9/19/2018).         Patient Care Team    Patient Care Team:  Teodoro Burks MD as PCP - General (Family Medicine)  Yasmany Day MD as Consulting Physician (Obstetrics and Gynecology)    Keara Almanzar, APRN  8/22/2018    Dragon disclaimer:   Much of this encounter note is an electronic transcription/translation of spoken language to printed text. The electronic translation of spoken language may permit erroneous, or at times, nonsensical words or phrases to be inadvertently transcribed; Although I have reviewed the note for such errors, some may still exist.

## 2018-08-27 ENCOUNTER — READMISSION MANAGEMENT (OUTPATIENT)
Dept: CALL CENTER | Facility: HOSPITAL | Age: 63
End: 2018-08-27

## 2018-08-27 NOTE — OUTREACH NOTE
General Surgery Week 2 Survey      Responses   Facility patient discharged from?  [place]   Does the patient have one of the following disease processes/diagnoses(primary or secondary)?  General Surgery   Week 2 attempt successful?  Yes   Call start time  0938   Call end time  0945   Discharge diagnosis  Acquired spondylolisthesisLUMBAR FUSION DECOMPRESSON WITH PEDICLE SCREWS Lumbar 4-5,  posterolateral fusion   Meds reviewed with patient/caregiver?  Yes   Is the patient having any side effects they believe may be caused by any medication additions or changes?  No   Does the patient have all medications related to this admission filled (includes all antibiotics, pain medications, etc.)  N/A   Is the patient taking all medications as directed (includes completed medication regime)?  N/A   Does the patient have a follow up appointment scheduled with their surgeon?  Yes   Has the patient kept scheduled appointments due by today?  Yes   Has home health visited the patient within 72 hours of discharge?  N/A   What DME was ordered?  [employer] [employer], aishwarya walker    Comments  No longer needs walker. Still using brace per Dr order until next visit   What is the patient's perception of their health status since discharge?  Improving   Is the patient /caregiver able to teach back basic post-op care?  Keep incision areas clean,dry and protected   Is the patient/caregiver able to teach back signs and symptoms of incisional infection?  Increased redness, swelling or pain at the incisonal site   Week 2 call completed?  Yes          Alva Harry RN

## 2018-09-06 ENCOUNTER — READMISSION MANAGEMENT (OUTPATIENT)
Dept: CALL CENTER | Facility: HOSPITAL | Age: 63
End: 2018-09-06

## 2018-09-06 NOTE — OUTREACH NOTE
Medical Week 3 Survey      Responses   Facility patient discharged from?  [facility]   Does the patient have one of the following disease processes/diagnoses(primary or secondary)?  General Surgery   Call start time  0747   Call end time  0753   Discharge diagnosis  Acquired spondylolisthesisLUMBAR FUSION DECOMPRESSON WITH PEDICLE SCREWS Lumbar 4-5,  posterolateral fusion   Meds reviewed with patient/caregiver?  Yes   Is the patient having any side effects they believe may be caused by any medication additions or changes?  No   Prescription comments  No need for pain medication now   Is the patient taking all medications as directed (includes completed medication regime)?  N/A   Has the patient kept scheduled appointments due by today?  Yes   Has home health visited the patient within 72 hours of discharge?  N/A   Psychosocial issues?  No   Comments  Walking about 3 miles a day   Did the patient receive a copy of their discharge instructions?  Yes   What is the patient's perception of their health status since discharge?  Improving   Is the patient/caregiver able to teach back the hierarchy of who to call/visit for symptoms/problems? PCP, Specialist, Home health nurse, Urgent Care, ED, 911  Yes          Grisel Gaxiola RN

## 2018-09-14 ENCOUNTER — APPOINTMENT (OUTPATIENT)
Dept: LAB | Facility: HOSPITAL | Age: 63
End: 2018-09-14

## 2018-09-14 ENCOUNTER — OFFICE VISIT (OUTPATIENT)
Dept: CARDIOLOGY | Facility: CLINIC | Age: 63
End: 2018-09-14

## 2018-09-14 VITALS
BODY MASS INDEX: 34.6 KG/M2 | WEIGHT: 188 LBS | HEART RATE: 83 BPM | DIASTOLIC BLOOD PRESSURE: 96 MMHG | SYSTOLIC BLOOD PRESSURE: 160 MMHG | HEIGHT: 62 IN

## 2018-09-14 DIAGNOSIS — E87.6 HYPOKALEMIA: Primary | ICD-10-CM

## 2018-09-14 DIAGNOSIS — I10 ESSENTIAL HYPERTENSION: Primary | ICD-10-CM

## 2018-09-14 DIAGNOSIS — I95.1 ORTHOSTATIC HYPOTENSION: ICD-10-CM

## 2018-09-14 DIAGNOSIS — E87.6 HYPOKALEMIA: ICD-10-CM

## 2018-09-14 LAB
ANION GAP SERPL CALCULATED.3IONS-SCNC: 10.4 MMOL/L
BUN BLD-MCNC: 13 MG/DL (ref 8–23)
BUN/CREAT SERPL: 15.9 (ref 7–25)
CALCIUM SPEC-SCNC: 9.6 MG/DL (ref 8.6–10.5)
CHLORIDE SERPL-SCNC: 99 MMOL/L (ref 98–107)
CO2 SERPL-SCNC: 29.6 MMOL/L (ref 22–29)
CREAT BLD-MCNC: 0.82 MG/DL (ref 0.57–1)
GFR SERPL CREATININE-BSD FRML MDRD: 70 ML/MIN/1.73
GLUCOSE BLD-MCNC: 85 MG/DL (ref 65–99)
POTASSIUM BLD-SCNC: 4.2 MMOL/L (ref 3.5–5.2)
SODIUM BLD-SCNC: 139 MMOL/L (ref 136–145)

## 2018-09-14 PROCEDURE — 36415 COLL VENOUS BLD VENIPUNCTURE: CPT | Performed by: NURSE PRACTITIONER

## 2018-09-14 PROCEDURE — 80048 BASIC METABOLIC PNL TOTAL CA: CPT | Performed by: NURSE PRACTITIONER

## 2018-09-14 PROCEDURE — 99214 OFFICE O/P EST MOD 30 MIN: CPT | Performed by: NURSE PRACTITIONER

## 2018-09-14 PROCEDURE — 93000 ELECTROCARDIOGRAM COMPLETE: CPT | Performed by: NURSE PRACTITIONER

## 2018-09-14 NOTE — PROGRESS NOTES
Date of Office Visit: 2018  Encounter Provider: Asuncion Grant, JOSELO, APRN  Place of Service: [facility] CARDIOLOGY  Patient Name: Carolina Ayers  :1955      Subjective:     Chief Complaint:  Follow-up history of orthostatic hypotension, hyperlipidemia.    History of Present Illness:  Carolina Ayers is a 63 y.o. female patient of Dr. Nathan.  This is my first time seeing this patient in the office and I have reviewed her records.    Patient has a history of hyperlipidemia.  Patient was seen in office 18 by Dr. Nathan for surgery clearance, at the request of Dr. Vasques.  She was scheduled for back surgery .  She had a preoperative EKG that showed nonspecific anterolateral inferior ST-T wave changes.  Per last note, on further questioning patient reported that she was seen in 2016 by Dr. Vaughan for shortness of breath and edema.  Edema was noted to be gone for a long time.  She had records with her that showed similar EKG changes.  She had a stress perfusion study at that time that was negative for ischemia.  She had an echocardiogram that showed normal systolic function.  There is no mention of diastolic dysfunction.  There was no obvious valvular dysfunction except for minimal mitral regurgitation.  There was small posterior pericardial effusion.  Patient was placed on daily Lasix and metolazone on an every day basis to help with edema and she felt the edema had been fairly well controlled.  Patient had reported being moderately active around her home and taking care of 2 grandchildren.  She denied any chest pain, palpitations, syncope, or near-syncope.  She had a history of hypokalemia on lab work.    Cardiology was consulted 8/10/18 due to orthostatic hypotension. Patient's BP readings were 127/66 lying, 134/69 sitting, and 68/44 standing.  Patient denies chest pain, pressure, tightness, palpitations, or fluttering. She did report lightheadedness with admission.  Edema had improved.  Lasix held due to low BP. Diuretics were not continued at discharge.    Patient saw neurosurgery for follow-up 8/22/18.  She has another follow-up 9/26/18.     Patient presents to office today for 6 week follow-up appointment.  Patient has been doing well overall since hospital discharge.  She denies any chest pain, palpitations, racing heartbeat sensation, shortness of breath, lower extremity edema, dizziness, lightheadedness, syncope, near-syncope.  She has had some chronic fatigue since hospital discharge.  She continues to wear a back brace after having her back surgery done.  She has a blood pressure log with her showing blood pressures primarily running 120s-140s over 60s-80.        Past Medical History:   Diagnosis Date   • Chronic bilateral low back pain with bilateral sciatica    • History of chest pain 2014    SEEN DR SIM   • History of skin cancer    • Hyperlipidemia    • Hypokalemia    • Impaired functional mobility, balance, gait, and endurance    • Localized edema    • Lumbar herniated disc    • Orthostatic hypotension    • PONV (postoperative nausea and vomiting)      Past Surgical History:   Procedure Laterality Date   • BREAST LUMPECTOMY Left    • CARPAL TUNNEL RELEASE     • CHOLECYSTECTOMY     • EXTERNAL EAR SURGERY      cyst removed from ear   • HERNIA REPAIR     • HYSTERECTOMY      partial   • KNEE SURGERY     • LUMBAR DISCECTOMY FUSION INSTRUMENTATION Bilateral 8/7/2018    Procedure: LUMBAR FUSION DECOMPRESSON WITH PEDICLE SCREWS Lumbar 4-5,  posterolateral fusion;  Surgeon: Jarrett Vasques IV, MD;  Location: [facility];  Service: Neurosurgery   • SHOULDER SURGERY     • TONSILLECTOMY     • WRIST SURGERY Bilateral     Cyst removed     Outpatient Medications Prior to Visit   Medication Sig Dispense Refill   • acetaminophen (TYLENOL) 500 MG tablet Take 1,000 mg by mouth Every 6 (Six) Hours As Needed for Mild Pain .     • aspirin 81 MG chewable tablet Chew 1 tablet  Daily. May resume on 8/14     • estradiol (ESTRACE) 1 MG tablet Take 1 mg by mouth Every Night.     • polyethylene glycol (MIRALAX) packet Take 17 g by mouth As Needed.     • sennosides-docusate sodium (SENOKOT-S) 8.6-50 MG tablet Take 1 tablet by mouth At Night As Needed for Constipation. 30 tablet 0   • simvastatin (ZOCOR) 20 MG tablet Take 20 mg by mouth Every Night.     • tolterodine LA (DETROL LA) 4 MG 24 hr capsule Take 4 mg by mouth Every Night.     • ciprofloxacin (CIPRO) 500 MG tablet TAKE 1 TABLET BY MOUTH TWICE DAILY UNTIL ALL TAKEN  0   • cyclobenzaprine (FLEXERIL) 10 MG tablet Take 1 tablet by mouth 3 (Three) Times a Day As Needed for Muscle Spasms. 30 tablet 0   • docusate sodium 100 MG capsule Take 100 mg by mouth Daily.     • HYDROcodone-acetaminophen (NORCO) 7.5-325 MG per tablet Take 1 tablet by mouth Every 6 (Six) Hours As Needed for Moderate Pain . 25 tablet 0   • metOLazone (ZAROXOLYN) 2.5 MG tablet Take 2.5 mg by mouth Every Other Day.     • potassium chloride ER (K-TAB) 20 MEQ tablet controlled-release ER tablet Take 1 tablet by mouth Daily. 30 tablet 0     No facility-administered medications prior to visit.        Allergies as of 09/14/2018 - Reviewed 09/14/2018   Allergen Reaction Noted   • Penicillins Hives 02/22/2018     Social History     Social History   • Marital status:      Spouse name: N/A   • Number of children: N/A   • Years of education: N/A     Occupational History   • retired      Social History Main Topics   • Smoking status: Former Smoker     Packs/day: 1.00     Years: 30.00     Types: Cigarettes     Quit date: 2010   • Smokeless tobacco: Never Used   • Alcohol use Yes      Comment: rarely- 2-3 times a year   • Drug use: No   • Sexual activity: Defer     Other Topics Concern   • Not on file     Social History Narrative   • No narrative on file     Family History   Problem Relation Age of Onset   • Malig Hyperthermia Neg Hx        Review of Systems   Constitution:  "Negative for chills, fever, malaise/fatigue, night sweats, weight gain and weight loss.   HENT: Negative for ear pain, hearing loss, nosebleeds and sore throat.    Eyes: Positive for visual disturbance (\"Vision Loss\"). Negative for redness.   Respiratory: Negative for cough, hemoptysis, shortness of breath, snoring and wheezing.    Endocrine: Negative for cold intolerance and heat intolerance.   Skin: Negative for itching, rash and suspicious lesions.   Musculoskeletal: Negative for joint pain, joint swelling and myalgias.   Gastrointestinal: Negative for abdominal pain, diarrhea, hematemesis, melena, nausea and vomiting.   Genitourinary: Negative for dysuria, frequency and hematuria.   Neurological: Negative for dizziness, headaches, numbness, paresthesias and seizures.   Psychiatric/Behavioral: Negative for altered mental status and depression. The patient is not nervous/anxious.           Objective:     Vitals:    09/14/18 1459   BP: 160/96   BP Location: Right arm   Pulse: 83   Weight: 85.3 kg (188 lb)   Height: 157.5 cm (62\")     Body mass index is 34.39 kg/m².    PHYSICAL EXAM:  Physical Exam   Constitutional: She is oriented to person, place, and time. She appears well-developed and well-nourished. No distress.   HENT:   Head: Normocephalic and atraumatic.   Eyes: Pupils are equal, round, and reactive to light. No scleral icterus.   Neck: Neck supple. No JVD present. Carotid bruit is not present. No tracheal deviation present.   Cardiovascular: Normal rate, regular rhythm, normal heart sounds and intact distal pulses.  Exam reveals no gallop and no friction rub.    No murmur heard.  Pulses:       Radial pulses are 2+ on the right side, and 2+ on the left side.        Posterior tibial pulses are 2+ on the right side, and 2+ on the left side.   Pulmonary/Chest: Effort normal and breath sounds normal. No respiratory distress. She has no wheezes. She has no rales.   Abdominal: Soft. Bowel sounds are normal. She " exhibits no distension. There is no tenderness. There is no rebound and no guarding.   Musculoskeletal: She exhibits no edema, tenderness or deformity.   Neurological: She is alert and oriented to person, place, and time.   Skin: Skin is warm and dry. No rash noted. She is not diaphoretic. No erythema.   Psychiatric: She has a normal mood and affect. Her behavior is normal. Judgment normal.         ECG 12 Lead  Date/Time: 9/14/2018 7:14 PM  Performed by: JOSEFINA DELATORRE  Authorized by: JOSEFINA DELATORRE   Rhythm: sinus rhythm  Rate: normal  BPM: 83  Other findings comments: nonspecific st-t wave changes  Clinical impression: abnormal ECG            Assessment:       Diagnosis Plan   1. Essential hypertension     2. Orthostatic hypotension     3. Hypokalemia         Plan:     1. Hypertension: patient had post-op hypotension and diuretic were stopped.  Patient has BP log in office today showing BP readings staying 120-140/60-80.  Patient denies any lower extremity edema or SOA.  Patient to continue to monitor BP outside office and notify office of any high or low readings.   2. Hx orthostatic hypotension: patient denies any low BP readings since hospital discharge.  We will continue to hold diuretics at this time.  Patient to notify office right away if she develops any lower extremity edema, increased weight, SOA, or other problems/ concerns.   3. Hx hypokalemia: we will recheck a BMP.      Patient to keep follow-up appointment with Dr. Nathan as scheduled or return to clinic sooner if needed for any new or worsening symptoms or other problems/ concerns.          Your medication list          Accurate as of 9/14/18 11:59 PM. If you have any questions, ask your nurse or doctor.               CONTINUE taking these medications      Instructions Last Dose Given Next Dose Due   acetaminophen 500 MG tablet  Commonly known as:  TYLENOL      Take 1,000 mg by mouth Every 6 (Six) Hours As Needed for Mild Pain .       aspirin 81  MG chewable tablet      Chew 1 tablet Daily. May resume on 8/14       estradiol 1 MG tablet  Commonly known as:  ESTRACE      Take 1 mg by mouth Every Night.       polyethylene glycol packet  Commonly known as:  MIRALAX      Take 17 g by mouth As Needed.       sennosides-docusate sodium 8.6-50 MG tablet  Commonly known as:  SENOKOT-S      Take 1 tablet by mouth At Night As Needed for Constipation.       simvastatin 20 MG tablet  Commonly known as:  ZOCOR      Take 20 mg by mouth Every Night.       tolterodine LA 4 MG 24 hr capsule  Commonly known as:  DETROL LA      Take 4 mg by mouth Every Night.          STOP taking these medications    ciprofloxacin 500 MG tablet  Commonly known as:  CIPRO  Stopped by:  Asuncion Grant DNP, APRN        cyclobenzaprine 10 MG tablet  Commonly known as:  FLEXERIL  Stopped by:  Asuncion Grant DNP, APRN        docusate sodium 100 MG capsule  Stopped by:  Asuncion Grant DNP, APRN        HYDROcodone-acetaminophen 7.5-325 MG per tablet  Commonly known as:  NORCO  Stopped by:  Asuncion Grant DNP, APRN        metOLazone 2.5 MG tablet  Commonly known as:  ZAROXOLYN  Stopped by:  Asuncion Grant DNP, APRN        potassium chloride ER 20 MEQ tablet controlled-release ER tablet  Commonly known as:  K-TAB  Stopped by:  Asuncion Grant DNP, APRN           I did not stop or change the above medications.  Patient's medication list was updated to reflect medications she is currently taking, including medication changes made by other providers.          Asuncion Grant DNP, APRN  09/14/2018       Dictated utilizing Dragon dictation

## 2018-09-17 ENCOUNTER — TELEPHONE (OUTPATIENT)
Dept: CARDIOLOGY | Facility: CLINIC | Age: 63
End: 2018-09-17

## 2018-09-17 NOTE — TELEPHONE ENCOUNTER
9/17/18  Patient left vmsg - states her ankles began swelling Saturday and she wore the support hose after noticing it.  It went down over night.  She is asking if she should restart the water pills.  She did not weigh herself.  I also reviewed her lab results with her.  Her ph [phone]/josé

## 2018-09-17 NOTE — TELEPHONE ENCOUNTER
I spoke with patient - she is not shoa - she willl ck her weight  And wear the hose and call if any weight gain/soa/josé

## 2018-09-17 NOTE — TELEPHONE ENCOUNTER
As she had anymore swelling since that one incident?  That she have any associated shortness of breath?  If her swelling has resolved/ is controlled with compression stockings that she is not having any shortness of breath or other symptoms then we can hold off on restarting diuretic at this time.  However if she starts to get any shortness of breath then we probably need to restart her at least a low-dose.  Please do encouraged her to watch her weight daily and let us know if she sees a 3 pound increase in a day or 5 pound increase in a week.  She also needs to continue to watch her salt intake.      Dr. Nathan, anything to add?

## 2018-09-26 ENCOUNTER — OFFICE VISIT (OUTPATIENT)
Dept: NEUROSURGERY | Facility: CLINIC | Age: 63
End: 2018-09-26

## 2018-09-26 VITALS
DIASTOLIC BLOOD PRESSURE: 78 MMHG | BODY MASS INDEX: 34.41 KG/M2 | WEIGHT: 187 LBS | SYSTOLIC BLOOD PRESSURE: 146 MMHG | HEIGHT: 62 IN | HEART RATE: 78 BPM | RESPIRATION RATE: 18 BRPM

## 2018-09-26 DIAGNOSIS — Z98.890 POST-OPERATIVE STATE: Primary | ICD-10-CM

## 2018-09-26 PROCEDURE — 99024 POSTOP FOLLOW-UP VISIT: CPT | Performed by: NURSE PRACTITIONER

## 2018-09-26 NOTE — PROGRESS NOTES
Subjective   Patient ID: Carolina Ayers is a 63 y.o. female is here today for follow-up back pain s/p L4/5 decomp 8/7. Patient presents accompanied by her mother.    History of Present Illness  She presents for second postoperative visit status post L4/5 lumbar decompression and fusion on August 7, 2018.  Overall she is doing well.  She has minimal right leg pain.  Preoperative back pain has improved, but she has some intermittent sharp pain in the low back just right of midline.  She reports compliance with her brace.  She is walking regularly.  She still has some tenderness and swelling in the left cheek from when she had a syncopal episode in the hospital.  She struck her cheek on the floor.  She states it has improved a lot since it initially happened.  No pain with chewing.  No eye pain.    The following portions of the patient's history were reviewed and updated as appropriate: allergies, current medications and problem list.    Review of Systems   Genitourinary: Negative for difficulty urinating and enuresis.   Musculoskeletal: Negative for back pain.        Occ'l RLE pain   Neurological: Positive for numbness (right buttock). Negative for weakness.   Psychiatric/Behavioral: Negative for sleep disturbance.       Objective   Physical Exam   Constitutional: She appears well-developed and well-nourished.   Body mass index is 34.2 kg/m².     HENT:   Head: Normocephalic.   Soft tissue swelling and some mild tenderness in the left maxillary region.  There is some slight brown discoloration at this area as well-she is wearing makeup over this area which changes the looks some.   Pulmonary/Chest: Effort normal.   Musculoskeletal:        Lumbar back: She exhibits tenderness (right SI joint). She exhibits no pain (negative straight leg raise).   Wearing LSO brace   Neurological: She is alert. She has normal strength. Gait normal.   Skin: Skin is warm and dry.   Vitals reviewed.    Neurologic Exam     Mental Status    Level of consciousness: alert  Knowledge: good.   Normal comprehension.     Motor Exam   Muscle bulk: normal    Strength   Strength 5/5 throughout.     Gait, Coordination, and Reflexes     Gait  Gait: normal  Able to heel and toe walk bilaterally       Assessment/Plan   Independent Review of Radiographic Studies:    No new imaging    Medical Decision Making:    Patient overall doing well now 6 weeks from lumbar fusion.  Her presenting symptoms have significantly improved.  She does have some discomfort in the right SI joint which may be related to posture changes forced by the brace.  She continues to have some facial swelling after her syncopal episode in the hospital, but she states it is much improved.    Her exam is as noted above with no neurologic red flags.  She does still have a little bit of tenderness and swelling the left cheek area.  Hospital x-rays did not show any fracture.  She has some tenderness in the right SI joint.    I recommended heat and topical pain cream for her SI joint pain at this time.  She should not use NSAIDs secondary to effusion.  She can wean out of her brace over the next 7-10 days.  She can walk as much as possible.  She should avoid repetitive bending and twisting.  I have given her some stretches to do to help with SI joint discomfort.  We will monitor this.  We will plan to see her back in 6 weeks with lumbar x-rays including flexion-extension.  She should contact us if she has any increasing problems.  If her facial discomfort continues at the time of her next visit, we will consider referral to appropriate specialist.    Plan: Return to office 6 weeks with lumbar x-rays including flexion-extension.    Carolina was seen today for back pain.    Diagnoses and all orders for this visit:    Post-operative state      Return in about 6 weeks (around 11/7/2018) for with imaging, Follow-up with Dr. Vasques.

## 2018-11-05 ENCOUNTER — OFFICE VISIT (OUTPATIENT)
Dept: NEUROSURGERY | Facility: CLINIC | Age: 63
End: 2018-11-05

## 2018-11-05 ENCOUNTER — HOSPITAL ENCOUNTER (OUTPATIENT)
Dept: GENERAL RADIOLOGY | Facility: HOSPITAL | Age: 63
Discharge: HOME | End: 2018-11-05
Attending: NEUROLOGICAL SURGERY | Admitting: NEUROLOGICAL SURGERY

## 2018-11-05 VITALS
DIASTOLIC BLOOD PRESSURE: 78 MMHG | WEIGHT: 186 LBS | BODY MASS INDEX: 34.23 KG/M2 | HEIGHT: 62 IN | SYSTOLIC BLOOD PRESSURE: 122 MMHG

## 2018-11-05 DIAGNOSIS — Z98.890 POST-OPERATIVE STATE: Primary | ICD-10-CM

## 2018-11-05 DIAGNOSIS — Z98.890 POST-OPERATIVE STATE: ICD-10-CM

## 2018-11-05 DIAGNOSIS — M43.10 ACQUIRED SPONDYLOLISTHESIS: Primary | ICD-10-CM

## 2018-11-05 PROCEDURE — 72100 X-RAY EXAM L-S SPINE 2/3 VWS: CPT

## 2018-11-05 PROCEDURE — 99024 POSTOP FOLLOW-UP VISIT: CPT | Performed by: NEUROLOGICAL SURGERY

## 2018-11-05 RX ORDER — FUROSEMIDE 80 MG
TABLET ORAL
COMMUNITY
Start: 2018-10-24 | End: 2019-02-20

## 2018-11-05 NOTE — PROGRESS NOTES
"Subjective   Patient ID: Carolina Ayers is a 63 y.o. female who is here today for follow-up status post an L4/5 fusion/decompression on 18. She had lumbar xrays today at [facility]. She presents accompanied by her mother.    History of Present Illness  She reports feeling good.  She can stand up straight.  SHe is able to walk longer excepting being OOB.  No wound issues.  No fevers.  Back feels \"pretty good\".   She is off pain medications.      The following portions of the patient's history were reviewed and updated as appropriate: allergies, current medications, past family history, past medical history, past social history, past surgical history and problem list.    Review of Systems   Musculoskeletal: Positive for back pain (minimal). Negative for arthralgias.   Neurological: Negative for weakness and numbness.       Objective   Physical Exam  Neurologic Exam     Strength   Right iliopsoas: 5/5  Left iliopsoas: 5/5  Right quadriceps: 5/5  Left quadriceps: 5/5  Right hamstrin/5  Left hamstrin/5  Right anterior tibial: 5/5  Left anterior tibial: 5/5  Right gastroc: 5/5  Left gastroc: 5/55/5 EHL      Sensory Exam   Right leg light touch: normal  Left leg light touch: normal  Right leg pinprick: normal  Left leg pinprick: normal     Reflexes   Right patellar: 2+  Left patellar: 2+  Right achilles: 1+  Left achilles: 1+    Some pain at greater trochanter's  C/d/i without fluctuance    Assessment/Plan   Independent Review of Radiographic Studies:  Given differences in technique images are stable.    Medical Decision Making:  We will see her in 3 months to check on her.  She is pleased with her progress.  Overall she will continue to improve.      There are no diagnoses linked to this encounter.  No Follow-up on file.                 "

## 2019-01-31 ENCOUNTER — OFFICE VISIT CONVERTED (OUTPATIENT)
Dept: FAMILY MEDICINE CLINIC | Age: 64
End: 2019-01-31
Attending: FAMILY MEDICINE

## 2019-01-31 ENCOUNTER — HOSPITAL ENCOUNTER (OUTPATIENT)
Dept: OTHER | Facility: HOSPITAL | Age: 64
Discharge: HOME | End: 2019-01-31
Attending: FAMILY MEDICINE

## 2019-02-20 ENCOUNTER — HOSPITAL ENCOUNTER (OUTPATIENT)
Dept: OTHER | Facility: HOSPITAL | Age: 64
Discharge: HOME | End: 2019-02-20
Attending: FAMILY MEDICINE

## 2019-02-20 ENCOUNTER — OFFICE VISIT (OUTPATIENT)
Dept: NEUROSURGERY | Facility: CLINIC | Age: 64
End: 2019-02-20

## 2019-02-20 VITALS
DIASTOLIC BLOOD PRESSURE: 88 MMHG | HEART RATE: 80 BPM | SYSTOLIC BLOOD PRESSURE: 150 MMHG | BODY MASS INDEX: 35.7 KG/M2 | WEIGHT: 194 LBS | HEIGHT: 62 IN | RESPIRATION RATE: 18 BRPM

## 2019-02-20 DIAGNOSIS — M43.10 ACQUIRED SPONDYLOLISTHESIS: Primary | ICD-10-CM

## 2019-02-20 PROCEDURE — 99213 OFFICE O/P EST LOW 20 MIN: CPT | Performed by: NEUROLOGICAL SURGERY

## 2019-02-20 RX ORDER — OXYBUTYNIN CHLORIDE 5 MG/1
5 TABLET ORAL DAILY
COMMUNITY
Start: 2019-02-19 | End: 2021-09-28

## 2019-02-20 RX ORDER — ALBUTEROL SULFATE 90 UG/1
AEROSOL, METERED RESPIRATORY (INHALATION)
COMMUNITY
Start: 2019-01-31 | End: 2019-04-17

## 2019-02-20 RX ORDER — MELOXICAM 7.5 MG/1
7.5 TABLET ORAL DAILY
COMMUNITY
Start: 2019-02-19 | End: 2021-09-28

## 2019-02-20 RX ORDER — IBUPROFEN 200 MG
200 TABLET ORAL EVERY 6 HOURS PRN
COMMUNITY
End: 2020-05-08

## 2019-02-20 NOTE — PROGRESS NOTES
Subjective   Patient ID: Carolina Ayers is a 64 y.o. female is here today for follow-up back pain. Patient has had no new imaging and presents accompanied by her mother.    History of Present Illness 63 yo lady s/p lumbar fusion.  She is doing well.  Back pain is minimal.  SHe take ibuprofen.  She denies leg pain.        The following portions of the patient's history were reviewed and updated as appropriate: allergies, current medications, past family history, past medical history, past social history, past surgical history and problem list.    Review of Systems   Genitourinary: Negative for difficulty urinating and enuresis.   Musculoskeletal: Positive for back pain. Negative for arthralgias (denies leg pain).   Neurological: Negative for weakness and numbness.   Psychiatric/Behavioral: Negative for sleep disturbance.       Objective   Physical Exam  Neurologic Exam     C/d/i    Strength   Right iliopsoas: 5/5  Left iliopsoas: 5/5  Right quadriceps: 5/5  Left quadriceps: 5/5  Right hamstrin/5  Left hamstrin/5  Right anterior tibial: 5/5  Left anterior tibial: 5/5  Right gastroc: 5/5  Left gastroc: 5/55/5 EHL      Sensory Exam   Right leg light touch: normal  Left leg light touch: normal  Right leg pinprick: normal  Left leg pinprick: normal    Assessment/Plan   Independent Review of Radiographic Studies:  n/a    Medical Decision Making:  She is doing well overall.  Back pain is mild and tolerable with nsaids.  Leg pain is gone.  We will see her as needed.      There are no diagnoses linked to this encounter.  No Follow-up on file.

## 2019-04-15 ENCOUNTER — TELEPHONE (OUTPATIENT)
Dept: NEUROSURGERY | Facility: CLINIC | Age: 64
End: 2019-04-15

## 2019-04-15 DIAGNOSIS — Z98.890 POST-OPERATIVE STATE: Primary | ICD-10-CM

## 2019-04-15 NOTE — TELEPHONE ENCOUNTER
Patient was RTO PRN at last OV with [facility] 2/20/19 s/p L4-5 fusion 8/7/18.  Patient denies new b/b issues or numbness or weakness of legs. Admits occ'l back pain which is 8 out of 10 at its worst.  States that pain worsens with movement and is better when wearing brace, rest, ibuprofen, and ice.  Informed that I will send to APRN to review whether she needs new imaging/OV and that we will call her tomorrow.  If sudden loss of b/b or progressive numbness/weakness in legs go to ER. Patient voiced understanding.

## 2019-04-15 NOTE — TELEPHONE ENCOUNTER
Patient had surger y on 8/7/18 by Dr Vasques for lumbar fusion decompression with pedicle screws.  Patient was last seen on 2/20 by DR Vasques    Patient wants to know if Dr Vasques will order her an xray because when she went to get off the toilet 2 weeks ago she had a sharp pain in the area where she had surgery. She said it is still hurting and she is getting a burning sensation to the right of the surgery site.  She said the first few after it hit she was in a lot of pain. Then last Thursday it was real bad again.    Carolina [phone]

## 2019-04-16 NOTE — TELEPHONE ENCOUNTER
Patient on schedule with [facility] tomorrow 4/17/19 @ 9:30, xrays one hour prior. Patient and LB informed.

## 2019-04-17 ENCOUNTER — HOSPITAL ENCOUNTER (OUTPATIENT)
Dept: GENERAL RADIOLOGY | Facility: HOSPITAL | Age: 64
Discharge: HOME | End: 2019-04-17
Admitting: NURSE PRACTITIONER

## 2019-04-17 ENCOUNTER — OFFICE VISIT (OUTPATIENT)
Dept: NEUROSURGERY | Facility: CLINIC | Age: 64
End: 2019-04-17

## 2019-04-17 VITALS
RESPIRATION RATE: 20 BRPM | HEIGHT: 62 IN | BODY MASS INDEX: 35.88 KG/M2 | WEIGHT: 195 LBS | SYSTOLIC BLOOD PRESSURE: 160 MMHG | HEART RATE: 80 BPM | DIASTOLIC BLOOD PRESSURE: 86 MMHG

## 2019-04-17 DIAGNOSIS — M54.42 CHRONIC BILATERAL LOW BACK PAIN WITH BILATERAL SCIATICA: Primary | ICD-10-CM

## 2019-04-17 DIAGNOSIS — G89.29 CHRONIC BILATERAL LOW BACK PAIN WITH BILATERAL SCIATICA: Primary | ICD-10-CM

## 2019-04-17 DIAGNOSIS — Z98.890 POST-OPERATIVE STATE: ICD-10-CM

## 2019-04-17 DIAGNOSIS — M54.41 CHRONIC BILATERAL LOW BACK PAIN WITH BILATERAL SCIATICA: Primary | ICD-10-CM

## 2019-04-17 PROCEDURE — 99213 OFFICE O/P EST LOW 20 MIN: CPT | Performed by: NURSE PRACTITIONER

## 2019-04-17 PROCEDURE — 72100 X-RAY EXAM L-S SPINE 2/3 VWS: CPT

## 2019-04-17 RX ORDER — UREA 10 %
1 LOTION (ML) TOPICAL NIGHTLY PRN
COMMUNITY

## 2019-04-17 NOTE — PROGRESS NOTES
"Subjective   Patient ID: Carolina Ayers is a 64 y.o. female is here today for follow-up for back pain s/p fusion 8/7. Pt is accompanied by her mother.    History of Present Illness     She presents to the office today for follow-up with low back pain status post lumbar decompression and fusion with Dr. Vasques at L4-5 on August 7, 2018.  She has done very well postop.  She called the office a couple days ago and reported sharp pain in her low back after using the bathroom.  She reports that the pain is still present and is concerned that there was a hardware issue from her surgery.  She now reports a burning sensation to the right of the surgery site.  She has had new lumbar x-rays.     She reports the pain has been present since it started but it is improving with ibuprofen and heat.  She denies any new problems.  She continues to report complete resolution of leg pain.  She denies any balance or gait instability.  She is careful not to do excessive bending and twisting at her waist.    She presents with her mother.      /86 (BP Location: Left arm, Patient Position: Sitting, Cuff Size: Adult)   Pulse 80   Resp 20   Ht 157.5 cm (62\")   Wt 88.5 kg (195 lb)   BMI 35.67 kg/m²       The following portions of the patient's history were reviewed and updated as appropriate: allergies, current medications, past family history, past medical history, past social history, past surgical history and problem list.    Review of Systems   Gastrointestinal: Negative for constipation.   Genitourinary: Negative for difficulty urinating and enuresis.   Musculoskeletal: Positive for back pain and gait problem.   Neurological: Negative for weakness and numbness.        Burning in buttock/back    Psychiatric/Behavioral: Negative for sleep disturbance.       Objective   Physical Exam   Constitutional: She is oriented to person, place, and time. She appears well-developed and well-nourished. She is cooperative.  Non-toxic appearance. " She does not have a sickly appearance. She does not appear ill.   Very pleasant well-appearing older female   HENT:   Head: Normocephalic and atraumatic.   Neck: Neck supple. No tracheal deviation present.   Pulmonary/Chest: Effort normal.   Musculoskeletal: Normal range of motion. She exhibits tenderness (Minimal tenderness to firm palpation to the right of the incision site in the paraspinal musculature). She exhibits no deformity.   Moving all extremities well   Neurological: She is alert and oriented to person, place, and time. She has normal strength. She displays no tremor and normal reflexes. She exhibits normal muscle tone. Coordination and gait normal. GCS eye subscore is 4. GCS verbal subscore is 5. GCS motor subscore is 6.   Gait is stable and upright  Normal motor and sensory exam   Skin: Skin is warm and dry.   Well-healed midline lumbar incision site   Psychiatric: She has a normal mood and affect. Her behavior is normal. Thought content normal.   Vitals reviewed.    Neurologic Exam     Mental Status   Oriented to person, place, and time.     Motor Exam     Strength   Strength 5/5 throughout.       Assessment/Plan   Independent Review of Radiographic Studies:    Lumbar x-rays from [facility] dated April 17 reveal stable postop findings with no evidence of hardware malfunction or lucency of screws.  No new abnormalities identified.    Medical Decision Making:    She returns the office today for ongoing follow-up status post 1 level lumbar decompression and fusion last August.  Exam as noted above, no red flags.  The patient had sudden onset low back pain after getting off the toilet a couple weeks ago.  The low back pain has persisted, though has improved.  Lumbar x-ray shows stable postoperative findings with intact surgical hardware.  There is no evidence of any loosening of the screws or hardware.  Pain is in the musculature to the right of the incision site.  I suggested passive range  of motion exercises with the low back and I gave her some exercises to do.  I told her continue using heat and taking over-the-counter anti-inflammatories as needed.  I also encouraged daily walking.  At this time, she has no ongoing restrictions from our standpoint.  We will see her back on an as-needed basis.    Plan: Return to office as needed  Carolina was seen today for back pain s/p fusion 8/7.    Diagnoses and all orders for this visit:    Chronic bilateral low back pain with bilateral sciatica      Return if symptoms worsen or fail to improve.

## 2019-04-30 ENCOUNTER — TELEPHONE (OUTPATIENT)
Dept: CARDIOLOGY | Facility: CLINIC | Age: 64
End: 2019-04-30

## 2019-05-02 NOTE — TELEPHONE ENCOUNTER
This point to cancel proBNP.  I see no appointments have been changed and missed and canceled.  These see if she wishes to have follow-up.[email or web address]

## 2019-05-07 NOTE — TELEPHONE ENCOUNTER
I cancelled the order.  Can you please call patient and see if she is going to follow up with Dr. Nathan.  Lyubov

## 2019-05-12 PROCEDURE — 93000 ELECTROCARDIOGRAM COMPLETE: CPT | Performed by: INTERNAL MEDICINE

## 2019-05-13 ENCOUNTER — LAB (OUTPATIENT)
Dept: LAB | Facility: HOSPITAL | Age: 64
End: 2019-05-13

## 2019-05-13 ENCOUNTER — OFFICE VISIT (OUTPATIENT)
Dept: CARDIOLOGY | Facility: CLINIC | Age: 64
End: 2019-05-13

## 2019-05-13 VITALS
SYSTOLIC BLOOD PRESSURE: 152 MMHG | OXYGEN SATURATION: 97 % | BODY MASS INDEX: 35.88 KG/M2 | HEIGHT: 62 IN | HEART RATE: 63 BPM | DIASTOLIC BLOOD PRESSURE: 86 MMHG | WEIGHT: 195 LBS

## 2019-05-13 DIAGNOSIS — R60.0 LOCALIZED EDEMA: ICD-10-CM

## 2019-05-13 DIAGNOSIS — R60.0 LOCALIZED EDEMA: Primary | ICD-10-CM

## 2019-05-13 DIAGNOSIS — I10 ESSENTIAL HYPERTENSION: ICD-10-CM

## 2019-05-13 LAB
ANION GAP SERPL CALCULATED.3IONS-SCNC: 10.7 MMOL/L
BUN BLD-MCNC: 12 MG/DL (ref 8–23)
BUN/CREAT SERPL: 14 (ref 7–25)
CALCIUM SPEC-SCNC: 9.9 MG/DL (ref 8.6–10.5)
CHLORIDE SERPL-SCNC: 103 MMOL/L (ref 98–107)
CO2 SERPL-SCNC: 25.3 MMOL/L (ref 22–29)
CREAT BLD-MCNC: 0.86 MG/DL (ref 0.57–1)
GFR SERPL CREATININE-BSD FRML MDRD: 66 ML/MIN/1.73
GLUCOSE BLD-MCNC: 102 MG/DL (ref 65–99)
NT-PROBNP SERPL-MCNC: 106.4 PG/ML (ref 5–900)
POTASSIUM BLD-SCNC: 4.9 MMOL/L (ref 3.5–5.2)
SODIUM BLD-SCNC: 139 MMOL/L (ref 136–145)

## 2019-05-13 PROCEDURE — 83880 ASSAY OF NATRIURETIC PEPTIDE: CPT | Performed by: INTERNAL MEDICINE

## 2019-05-13 PROCEDURE — 99214 OFFICE O/P EST MOD 30 MIN: CPT | Performed by: INTERNAL MEDICINE

## 2019-05-13 PROCEDURE — 80048 BASIC METABOLIC PNL TOTAL CA: CPT

## 2019-05-13 PROCEDURE — 36415 COLL VENOUS BLD VENIPUNCTURE: CPT

## 2019-05-13 NOTE — PROGRESS NOTES
Date of Office Visit: 2019  Encounter Provider: Josephine Nathan MD  Place of Service: [facility] CARDIOLOGY  Patient Name: Carolina Ayers  :1955    Chief complaint  Hypertension, shortness of breath    History of Present Illness  Patient is a 64-year-old female with history of hyperlipidemia back pain and orthostatic hypotension.  She was seen in 2018 for preoperative evaluation.  Her EKG was abnormal at that time.  She had an echocardiogram that showed normal systolic function with no significant valvular dysfunction except for trivial mitral regurgitation and a very small pericardial effusion.  A stress perfusion study was negative for ischemia.  She had edema for which she was treated with diuretics.  She was seen in 2018 however with orthostatic hypotension and diuretics were discontinued in hospital.  She was seen in follow-up on September and was doing relatively well without dyspnea or edema.    Since last visit blood pressures been elevated still in the 130s to 140s with diastolics in the 70s to 80s.  She has had no chest pain palpitations syncope near syncope.  However over the past 1 month she has noted worsening edema.    Past Medical History:   Diagnosis Date   • Cataracts, bilateral    • Chronic bilateral low back pain with bilateral sciatica    • Essential hypertension    • H/O: pneumonia    • History of chest pain 2014    SEEN DR SIM   • History of skin cancer    • Hyperlipidemia    • Hypokalemia    • Impaired functional mobility, balance, gait, and endurance    • Localized edema    • Lumbar herniated disc    • Orthostatic hypotension    • PONV (postoperative nausea and vomiting)      Past Surgical History:   Procedure Laterality Date   • BREAST LUMPECTOMY Left    • CARPAL TUNNEL RELEASE     • CHOLECYSTECTOMY     • EXTERNAL EAR SURGERY      cyst removed from ear   • HERNIA REPAIR     • HYSTERECTOMY      partial   • KNEE SURGERY     • LUMBAR  DISCECTOMY FUSION INSTRUMENTATION Bilateral 2018    Procedure: LUMBAR FUSION DECOMPRESSON WITH PEDICLE SCREWS Lumbar 4-5,  posterolateral fusion;  Surgeon: Jarrett Vasques IV, MD;  Location: [facility];  Service: Neurosurgery   • SHOULDER SURGERY     • TONSILLECTOMY     • WRIST SURGERY Bilateral     Cyst removed     Outpatient Medications Prior to Visit   Medication Sig Dispense Refill   • aspirin 81 MG chewable tablet Chew 1 tablet Daily. May resume on      • estradiol (ESTRACE) 1 MG tablet Take 1 mg by mouth Every Night.     • Glucosamine-Chondroitin (OSTEO BI-FLEX REGULAR STRENGTH PO) Take  by mouth.     • ibuprofen (ADVIL,MOTRIN) 200 MG tablet Take 200 mg by mouth Every 6 (Six) Hours As Needed for Mild Pain .     • LUTEIN PO Take  by mouth.     • melatonin 1 MG tablet Take  by mouth.     • meloxicam (MOBIC) 7.5 MG tablet      • oxybutynin (DITROPAN) 5 MG tablet      • polyethylene glycol (MIRALAX) packet Take 17 g by mouth As Needed.     • simvastatin (ZOCOR) 20 MG tablet Take 20 mg by mouth Every Night.       No facility-administered medications prior to visit.        Allergies as of 2019 - Reviewed 2019   Allergen Reaction Noted   • Penicillins Hives 2018     Social History     Socioeconomic History   • Marital status:      Spouse name: Not on file   • Number of children: Not on file   • Years of education: Not on file   • Highest education level: Not on file   Occupational History   • Occupation: retired   Tobacco Use   • Smoking status: Former Smoker     Packs/day: 1.00     Years: 30.00     Pack years: 30.00     Types: Cigarettes     Last attempt to quit:      Years since quittin.3   • Smokeless tobacco: Never Used   Substance and Sexual Activity   • Alcohol use: Yes     Comment: rarely- 2-3 times a year   • Drug use: No     Comment: 2-3 q yr   • Sexual activity: Defer     Family History   Problem Relation Age of Onset   • Malig Hyperthermia Neg Hx      Review  "of Systems   Constitution: Positive for weight gain. Negative for fever, malaise/fatigue and weight loss.   HENT: Negative for ear pain, hearing loss, nosebleeds and sore throat.    Eyes: Negative for double vision, pain, vision loss in left eye and vision loss in right eye.   Cardiovascular: Positive for leg swelling.        See history of present illness.   Respiratory: Negative for cough, shortness of breath, sleep disturbances due to breathing, snoring and wheezing.    Endocrine: Negative for cold intolerance, heat intolerance and polyuria.   Skin: Negative for itching, poor wound healing and rash.   Musculoskeletal: Positive for joint pain and joint swelling. Negative for myalgias.   Gastrointestinal: Negative for abdominal pain, diarrhea, hematochezia, nausea and vomiting.   Genitourinary: Negative for hematuria and hesitancy.   Neurological: Negative for numbness, paresthesias and seizures.   Psychiatric/Behavioral: Negative for depression. The patient is not nervous/anxious.         Objective:     Vitals:    05/13/19 0832   BP: 152/86   BP Location: Right arm   Patient Position: Sitting   Pulse: 63   SpO2: 97%   Weight: 88.5 kg (195 lb)   Height: 157.5 cm (62\")     Body mass index is 35.67 kg/m².    Physical Exam   Constitutional: She is oriented to person, place, and time. She appears well-developed and well-nourished.   HENT:   Head: Normocephalic.   Nose: Nose normal.   Mouth/Throat: Oropharynx is clear and moist.   Eyes: Conjunctivae and EOM are normal. Pupils are equal, round, and reactive to light. Right eye exhibits no discharge. No scleral icterus.   Neck: Normal range of motion. Neck supple. No JVD present. No thyromegaly present.   Cardiovascular: Normal rate, regular rhythm, normal heart sounds and intact distal pulses. Exam reveals no gallop and no friction rub.   No murmur heard.  Pulses:       Carotid pulses are 2+ on the right side, and 2+ on the left side.       Radial pulses are 2+ on the " right side, and 2+ on the left side.        Femoral pulses are 2+ on the right side, and 2+ on the left side.       Popliteal pulses are 2+ on the right side, and 2+ on the left side.        Dorsalis pedis pulses are 2+ on the right side, and 2+ on the left side.        Posterior tibial pulses are 2+ on the right side, and 2+ on the left side.   2+ edema to mid tibia bilaterally   Pulmonary/Chest: Effort normal and breath sounds normal. No respiratory distress. She has no wheezes. She has no rales.   Abdominal: Soft. Bowel sounds are normal. She exhibits no distension. There is no hepatosplenomegaly. There is no tenderness. There is no rebound.   Musculoskeletal: Normal range of motion. She exhibits edema. She exhibits no tenderness.   Neurological: She is alert and oriented to person, place, and time.   Skin: Skin is warm and dry. No rash noted. No erythema.   Psychiatric: She has a normal mood and affect. Her behavior is normal. Judgment and thought content normal.   Vitals reviewed.    Lab Review:     ECG 12 Lead  Date/Time: 5/12/2019 9:07 PM  Performed by: Josephine Nathan MD  Authorized by: Josephine Nathan MD   Comparison: compared with previous ECG   Similar to previous ECG  Rhythm: sinus rhythm    Clinical impression: normal ECG          Assessment:       Diagnosis Plan   1. Localized edema  Basic Metabolic Panel    proBNP    ECG 12 Lead   2. Essential hypertension  Basic Metabolic Panel    proBNP     Plan:       1. Edema.  Likely multifactorial in nature denies any sleep apnea symptoms.  Is does have mild dependency and keeps her legs elevated however for 1 to 2 hours a day.  Does not wear support stockings and does drink a lot of water. Previously her care was complicated by hypokalemia.  Her urinalysis was unremarkable.  She has not had any new dietary indiscretions.  With this in mind we will have her keep her legs elevated and decrease her water consumption.  We will also have her see her primary care physician  Dr. Renteria to assess for adrenal insufficiency given concomitant hypokalemia.  Will check a BMP and a BNP depending on those results we will add Lasix and potassium   2. Hypokalemia  3. Hypertension  4. Chronic asa. No atherosclerosis noted and told her to stop chronic asa.          Your medication list           Accurate as of 5/13/19 11:59 PM. If you have any questions, ask your nurse or doctor.               CONTINUE taking these medications      Instructions Last Dose Given Next Dose Due   aspirin 81 MG chewable tablet      Chew 1 tablet Daily. May resume on 8/14       estradiol 1 MG tablet  Commonly known as:  ESTRACE      Take 1 mg by mouth Every Night.       ibuprofen 200 MG tablet  Commonly known as:  ADVIL,MOTRIN      Take 200 mg by mouth Every 6 (Six) Hours As Needed for Mild Pain .       LUTEIN PO      Take  by mouth.       melatonin 1 MG tablet      Take  by mouth.       meloxicam 7.5 MG tablet  Commonly known as:  MOBIC           OSTEO BI-FLEX REGULAR STRENGTH PO      Take  by mouth.       oxybutynin 5 MG tablet  Commonly known as:  DITROPAN           polyethylene glycol packet  Commonly known as:  MIRALAX      Take 17 g by mouth As Needed.       simvastatin 20 MG tablet  Commonly known as:  ZOCOR      Take 20 mg by mouth Every Night.              Patient is no longer taking -.  I corrected the med list to reflect this.  I did not stop these medications.    Dictated utilizing Dragon dictation

## 2019-05-21 ENCOUNTER — OFFICE VISIT CONVERTED (OUTPATIENT)
Dept: FAMILY MEDICINE CLINIC | Age: 64
End: 2019-05-21
Attending: FAMILY MEDICINE

## 2019-05-21 ENCOUNTER — HOSPITAL ENCOUNTER (OUTPATIENT)
Dept: OTHER | Facility: HOSPITAL | Age: 64
Discharge: HOME | End: 2019-05-21
Attending: FAMILY MEDICINE

## 2019-05-21 LAB
ALBUMIN SERPL-MCNC: 4.1 G/DL (ref 3.5–5)
ALBUMIN/GLOB SERPL: 1.3 {RATIO} (ref 1.4–2.6)
ALP SERPL-CCNC: 73 U/L (ref 43–160)
ALT SERPL-CCNC: 12 U/L (ref 10–40)
ANION GAP SERPL CALC-SCNC: 17 MMOL/L (ref 8–19)
AST SERPL-CCNC: 17 U/L (ref 15–50)
BILIRUB SERPL-MCNC: 0.25 MG/DL (ref 0.2–1.3)
BUN SERPL-MCNC: 13 MG/DL (ref 5–25)
BUN/CREAT SERPL: 12 {RATIO} (ref 6–20)
CALCIUM SERPL-MCNC: 9.2 MG/DL (ref 8.7–10.4)
CHLORIDE SERPL-SCNC: 101 MMOL/L (ref 99–111)
CONV CO2: 28 MMOL/L (ref 22–32)
CONV TOTAL PROTEIN: 7.2 G/DL (ref 6.3–8.2)
CREAT UR-MCNC: 1.05 MG/DL (ref 0.5–0.9)
GFR SERPLBLD BASED ON 1.73 SQ M-ARVRAT: 56 ML/MIN/{1.73_M2}
GLOBULIN UR ELPH-MCNC: 3.1 G/DL (ref 2–3.5)
GLUCOSE SERPL-MCNC: 86 MG/DL (ref 65–99)
MAGNESIUM SERPL-MCNC: 1.83 MG/DL (ref 1.6–2.3)
OSMOLALITY SERPL CALC.SUM OF ELEC: 293 MOSM/KG (ref 273–304)
POTASSIUM SERPL-SCNC: 4.1 MMOL/L (ref 3.5–5.3)
SODIUM SERPL-SCNC: 142 MMOL/L (ref 135–147)
T4 FREE SERPL-MCNC: 1.3 NG/DL (ref 0.9–1.8)
TSH SERPL-ACNC: 1.92 M[IU]/L (ref 0.27–4.2)

## 2019-05-23 ENCOUNTER — TELEPHONE (OUTPATIENT)
Dept: CARDIOLOGY | Facility: CLINIC | Age: 64
End: 2019-05-23

## 2019-05-24 NOTE — TELEPHONE ENCOUNTER
Sounds reasonable.  If edema persist despite this may need to consider a scan of her abdomen to look for abdominal process that can lead to edema.  This is usually addressed by primary care provider (Dr. Renteria). lázaro

## 2019-05-24 NOTE — TELEPHONE ENCOUNTER
Pt notified, and says her legs are still swollen despite all the efforts to remedy the situation. Dr. Renteria gave Rx's for 20mg furosemide and a potassium chloride pill, which she started yesterday. She also had labs drawn while she was there, and they all came back fine.    Bárbara Hutchinson  Triage RN

## 2019-05-24 NOTE — TELEPHONE ENCOUNTER
Noted test for fluid was normal.  Find out if her legs are still swollen despite leg elevation and support stockings and if she has talked to her PCP regarding further metabolic evaluation such as adrenal insufficiency.. tabithad

## 2019-06-12 ENCOUNTER — OFFICE VISIT CONVERTED (OUTPATIENT)
Dept: FAMILY MEDICINE CLINIC | Age: 64
End: 2019-06-12
Attending: NURSE PRACTITIONER

## 2019-08-20 ENCOUNTER — OFFICE VISIT (OUTPATIENT)
Dept: CARDIOLOGY | Facility: CLINIC | Age: 64
End: 2019-08-20

## 2019-08-20 VITALS
WEIGHT: 191.6 LBS | HEIGHT: 62 IN | SYSTOLIC BLOOD PRESSURE: 130 MMHG | DIASTOLIC BLOOD PRESSURE: 76 MMHG | HEART RATE: 64 BPM | BODY MASS INDEX: 35.26 KG/M2

## 2019-08-20 DIAGNOSIS — R60.0 BILATERAL LOWER EXTREMITY EDEMA: ICD-10-CM

## 2019-08-20 DIAGNOSIS — I10 ESSENTIAL HYPERTENSION: Primary | ICD-10-CM

## 2019-08-20 PROCEDURE — 99214 OFFICE O/P EST MOD 30 MIN: CPT | Performed by: NURSE PRACTITIONER

## 2019-08-20 PROCEDURE — 93000 ELECTROCARDIOGRAM COMPLETE: CPT | Performed by: NURSE PRACTITIONER

## 2019-08-20 NOTE — PROGRESS NOTES
Date of Office Visit: 2019  Encounter Provider: Asuncion Grant, JOSELO, APRN  Place of Service: [facility] CARDIOLOGY  Patient Name: Carolina Ayers  :1955        Subjective:     Chief Complaint:  Follow-up, hypertension.      History of Present Illness:  Carolina Ayers is a 64 y.o. female patient of Dr. Nathan.  I am seeing this patient in the office today and I reviewed her records.    Patient has a history of hypertension, hyperlipidemia, orthostatic hypotension, shortness of breath, lower extremity edema.    Patient was seen 2018 for preoperative evaluation.  EKG was abnormal at that time.  Echocardiogram showed normal systolic function with no significant valvular dysfunction except for trivial mitral regurgitation and very small pericardial effusion.  Stress perfusion study was negative for ischemia.  Edema was treated with diuretics.  She was seen 2018 however had orthostatic hypotension with diuretics and diuretics were discontinued in the hospital.  She was seen back in the office by Dr. Nathan 2019 at which point she was complaining of lower extremity edema which was felt to be multifactorial.  She was asked to keep legs elevated and follow-up with primary care for assessment of possible adrenal insufficiency.  proBNP was within normal limits and BMP was insignificant.      Patient presents to office today for follow-up appointment.  Patient reports she has been doing okay overall since last visit.  She continues to get some lower extremity swelling which is worse at the end of the day.  She has been avoiding salt but does not elevate lower extremities were use compression socks.  She has a hard time getting compression socks on due to history of back surgery.  She does live with her  and we discussed having him help her put compression socks on first thing in the morning and she can take them off before bed or elevating legs during the day,  preferably some of both.  She had a recent normal proBNP.  No significant swelling on exam.  She will follow-up with primary care.  She reports that she is on a water pill and potassium for primary care but she is not sure of the name.  She will call with this permission.  She has a blood pressure log with her in the office today showing that she is checking blood pressure multiple times a day and it tends to fluctuate quite a bit.  She is not sitting down for 5 to 10 minutes before checking and I advised her to try to do this and continue to keep a log.  She is walking a couple days a week for about 20 minutes at a time.  I advised that she try to walk 20 minutes daily if possible and then try to increase to maybe 30 minutes at a time gradually after she gradually increase his walking daily.  If blood pressure continues to get above 130/80 then we will need to start her on a low-dose blood pressure medication.  She has an appointment coming up with primary care in 3 weeks and will bring her blood pressure cuff to them to be checked for accuracy and bring her blood pressure log to them.  For some reason if they do not want to adjust blood pressure she will call us and we can address.  She denies any chest pain, shortness of breath, shortness of breath with exertion, palpitations, racing heartbeat sensation, dizziness, syncope, near syncope, falls, fatigue, or abnormal bleeding.        Past Medical History:   Diagnosis Date   • Cataracts, bilateral    • Chronic bilateral low back pain with bilateral sciatica    • Essential hypertension    • H/O: pneumonia    • History of chest pain 2014    SEEN DR SIM   • History of skin cancer    • Hyperlipidemia    • Hypokalemia    • Impaired functional mobility, balance, gait, and endurance    • Localized edema    • Lumbar herniated disc    • Orthostatic hypotension    • PONV (postoperative nausea and vomiting)      Past Surgical History:   Procedure Laterality Date   • BREAST  LUMPECTOMY Left    • CARPAL TUNNEL RELEASE     • CHOLECYSTECTOMY     • EXTERNAL EAR SURGERY      cyst removed from ear   • HERNIA REPAIR     • HYSTERECTOMY      partial   • KNEE SURGERY     • LUMBAR DISCECTOMY FUSION INSTRUMENTATION Bilateral 8/7/2018    Procedure: LUMBAR FUSION DECOMPRESSON WITH PEDICLE SCREWS Lumbar 4-5,  posterolateral fusion;  Surgeon: Jarrett Vasques IV, MD;  Location: [facility];  Service: Neurosurgery   • SHOULDER SURGERY     • TONSILLECTOMY     • WRIST SURGERY Bilateral     Cyst removed     Outpatient Medications Prior to Visit   Medication Sig Dispense Refill   • estradiol (ESTRACE) 1 MG tablet Take 1 mg by mouth Every Night.     • Glucosamine-Chondroitin (OSTEO BI-FLEX REGULAR STRENGTH PO) Take  by mouth every night at bedtime.     • ibuprofen (ADVIL,MOTRIN) 200 MG tablet Take 200 mg by mouth Every 6 (Six) Hours As Needed for Mild Pain .     • LUTEIN PO Take 20 mg by mouth Daily.     • melatonin 1 MG tablet Take 1 mg by mouth At Night As Needed.     • meloxicam (MOBIC) 7.5 MG tablet Take 7.5 mg by mouth Daily.     • oxybutynin (DITROPAN) 5 MG tablet Take 5 mg by mouth Daily.     • simvastatin (ZOCOR) 20 MG tablet Take 20 mg by mouth Every Night.     • aspirin 81 MG chewable tablet Chew 1 tablet Daily. May resume on 8/14     • polyethylene glycol (MIRALAX) packet Take 17 g by mouth As Needed.       No facility-administered medications prior to visit.        Allergies as of 08/20/2019 - Reviewed 08/20/2019   Allergen Reaction Noted   • Penicillins Hives 02/22/2018     Social History     Socioeconomic History   • Marital status:      Spouse name: Not on file   • Number of children: Not on file   • Years of education: Not on file   • Highest education level: Not on file   Occupational History   • Occupation: retired   Tobacco Use   • Smoking status: Former Smoker     Packs/day: 1.00     Years: 30.00     Pack years: 30.00     Types: Cigarettes     Last attempt to quit: 2010      "Years since quittin.6   • Smokeless tobacco: Never Used   Substance and Sexual Activity   • Alcohol use: Yes     Comment: rarely- 2-3 times a year   • Drug use: No     Comment: 2-3 q yr   • Sexual activity: Defer     Family History   Problem Relation Age of Onset   • Malig Hyperthermia Neg Hx          Review of Systems   Constitution: Negative for chills, fever, malaise/fatigue, weight gain and weight loss.   HENT: Negative for ear pain, hearing loss, nosebleeds and sore throat.    Eyes: Negative for blurred vision, double vision, redness, vision loss in left eye and vision loss in right eye.   Cardiovascular: Positive for leg swelling.        SEE HPI.    Respiratory: Negative for cough, shortness of breath, snoring and wheezing.    Endocrine: Negative for cold intolerance and heat intolerance.   Skin: Negative for itching, rash and suspicious lesions.   Musculoskeletal: Negative for joint pain, joint swelling and myalgias.   Gastrointestinal: Negative for abdominal pain, diarrhea, hematemesis, melena, nausea and vomiting.   Genitourinary: Negative for dysuria, frequency and hematuria.   Neurological: Negative for dizziness, headaches, numbness, paresthesias and seizures.   Psychiatric/Behavioral: Negative for altered mental status and depression. The patient is not nervous/anxious.           Objective:     Vitals:    19 1115   BP: 130/76   BP Location: Right arm   Pulse: 64   Weight: 86.9 kg (191 lb 9.6 oz)   Height: 157.5 cm (62\")     Body mass index is 35.04 kg/m².      PHYSICAL EXAM:  Physical Exam   Constitutional: She is oriented to person, place, and time. She appears well-developed and well-nourished. No distress.   HENT:   Head: Normocephalic and atraumatic.   Eyes: Pupils are equal, round, and reactive to light.   Neck: Neck supple. No JVD present. Carotid bruit is not present. No tracheal deviation present.   Cardiovascular: Normal rate, regular rhythm, normal heart sounds and intact distal " pulses. Exam reveals no gallop and no friction rub.   No murmur heard.  Pulses:       Radial pulses are 2+ on the right side, and 2+ on the left side.        Posterior tibial pulses are 2+ on the right side, and 2+ on the left side.   Pulmonary/Chest: Effort normal and breath sounds normal. No respiratory distress. She has no wheezes. She has no rales.   Abdominal: Soft. Bowel sounds are normal. She exhibits no distension. There is no tenderness.   Musculoskeletal: She exhibits no edema, tenderness or deformity.   Neurological: She is alert and oriented to person, place, and time.   Skin: Skin is warm and dry. No rash noted. She is not diaphoretic. No erythema.   Psychiatric: She has a normal mood and affect. Her behavior is normal. Judgment normal.           ECG 12 Lead  Date/Time: 8/20/2019 11:31 AM  Performed by: Asuncion Grant DNP, APRN  Authorized by: Asuncion Grant DNP, APRN   Comparison: compared with previous ECG from 5/13/2019  Rhythm: sinus rhythm  Rate: normal  BPM: 64  Comments: No significant changes from previous ECG.              Assessment:       Diagnosis Plan   1. Essential hypertension     2. Bilateral lower extremity edema           Plan:     1. Hypertension: Blood pressure log shows that blood pressure often running 120s to 130s though occasionally getting higher to 140s to 150s.  Patient is avoiding salt.  She walks maybe 20 minutes a couple of times a week.  She often checks her blood pressure after being active without sitting down to rest first.  Recommended that she increase her activity level and check blood pressure after resting approximately 10 minutes, keep a log, and call if blood pressure staying greater than 130/80.  She also has an upcoming appointment with primary care for which I advised that she bring her blood pressure cuff to have it checked for accuracy she has never had a check before blood pressure appears to fluctuate quite a bit at home.  Continue to avoid salt.   We will look at starting low-dose blood pressure medication if blood pressure remains elevated after the above interventions.  2. Lower extremity edema: With recent normal proBNP.  Patient to avoid salt, elevate lower extremities, use compression socks as needed, continue to follow with primary care.  3. Hyperlipidemia: Managed by outside provider.  On statin therapy.  4. History of hypokalemia    Patient to follow-up with Dr. Nathan in 6 months or sooner if needed for any new, recurrent, or worsening symptoms or other problems/concerns.    Greater than 13 minutes of 25-minute office visit spent face-to-face with patient discussing blood pressure goals and monitoring, avoiding salt, recommended activity levels, elevation of lower extremities, compression sock usage, needed follow-up with our office in outside offices, signs/symptoms that warrant sooner follow-up visit.           Your medication list           Accurate as of 8/20/19 12:20 PM. If you have any questions, ask your nurse or doctor.               CONTINUE taking these medications      Instructions Last Dose Given Next Dose Due   estradiol 1 MG tablet  Commonly known as:  ESTRACE      Take 1 mg by mouth Every Night.       ibuprofen 200 MG tablet  Commonly known as:  ADVIL,MOTRIN      Take 200 mg by mouth Every 6 (Six) Hours As Needed for Mild Pain .       LUTEIN PO      Take 20 mg by mouth Daily.       melatonin 1 MG tablet      Take 1 mg by mouth At Night As Needed.       meloxicam 7.5 MG tablet  Commonly known as:  MOBIC      Take 7.5 mg by mouth Daily.       OSTEO BI-FLEX REGULAR STRENGTH PO      Take  by mouth every night at bedtime.       oxybutynin 5 MG tablet  Commonly known as:  DITROPAN      Take 5 mg by mouth Daily.       simvastatin 20 MG tablet  Commonly known as:  ZOCOR      Take 20 mg by mouth Every Night.          STOP taking these medications    aspirin 81 MG chewable tablet  Stopped by:  Asuncion Grant, DNP, APRN        polyethylene glycol  packet  Commonly known as:  MIRALAX  Stopped by:  Asuncion Grant DNP, APRN               I did not stop the above medications.  Patient's medication list was updated to reflect medications they are currently taking including medication changes made by other providers.        Thanks,    Asuncion Grant DNP, APRN  08/20/2019         Dictated utilizing Dragon dictation

## 2019-09-21 NOTE — PLAN OF CARE
Problem: Patient Care Overview  Goal: Plan of Care Review  Outcome: Ongoing (interventions implemented as appropriate)   08/09/18 1551   Coping/Psychosocial   Plan of Care Reviewed With patient   OTHER   Outcome Summary Patient alert and verbal with needs. c/o back pain which is controlled with oral pain meds. Patient was up to bathroom with assistance and had a syncope episode and fell off the commode. Patient has swelling to left cheek with tenderness. Currently off unit in xray. refused pain medication but did take tylenol. Patient to continue to ambulate with staff and sit in chair.    Plan of Care Review   Progress no change       Problem: Fall Risk (Adult)  Goal: Identify Related Risk Factors and Signs and Symptoms  Outcome: Ongoing (interventions implemented as appropriate)   08/09/18 1551   Fall Risk (Adult)   Related Risk Factors (Fall Risk) history of falls;fear of falling;gait/mobility problems;fatigue/slow reaction;environment unfamiliar   Signs and Symptoms (Fall Risk) presence of risk factors     Goal: Absence of Fall  Outcome: Unable to achieve outcome(s) by discharge Date Met: 08/09/18      Problem: Skin Injury Risk (Adult)  Goal: Identify Related Risk Factors and Signs and Symptoms  Outcome: Ongoing (interventions implemented as appropriate)   08/09/18 1551   Skin Injury Risk (Adult)   Related Risk Factors (Skin Injury Risk) mobility impaired     Goal: Skin Health and Integrity  Outcome: Ongoing (interventions implemented as appropriate)         [Parents] : parents [[email or web address]] : 12 month well visit

## 2019-10-21 ENCOUNTER — TRANSCRIBE ORDERS (OUTPATIENT)
Dept: ADMINISTRATIVE | Facility: HOSPITAL | Age: 64
End: 2019-10-21

## 2019-10-21 DIAGNOSIS — J98.4 RESTRICTIVE LUNG DISEASE: Primary | ICD-10-CM

## 2019-11-05 ENCOUNTER — HOSPITAL ENCOUNTER (OUTPATIENT)
Dept: CT IMAGING | Facility: HOSPITAL | Age: 64
Discharge: HOME | End: 2019-11-05
Admitting: INTERNAL MEDICINE

## 2019-11-05 DIAGNOSIS — J98.4 RESTRICTIVE LUNG DISEASE: ICD-10-CM

## 2019-11-05 PROCEDURE — 71250 CT THORAX DX C-: CPT

## 2020-02-25 ENCOUNTER — OFFICE VISIT (OUTPATIENT)
Dept: CARDIOLOGY | Facility: CLINIC | Age: 65
End: 2020-02-25

## 2020-02-25 VITALS
BODY MASS INDEX: 36.4 KG/M2 | DIASTOLIC BLOOD PRESSURE: 88 MMHG | SYSTOLIC BLOOD PRESSURE: 160 MMHG | RESPIRATION RATE: 18 BRPM | HEIGHT: 62 IN | OXYGEN SATURATION: 100 % | HEART RATE: 79 BPM | WEIGHT: 197.8 LBS

## 2020-02-25 DIAGNOSIS — R07.2 PRECORDIAL PAIN: Primary | ICD-10-CM

## 2020-02-25 DIAGNOSIS — I95.1 ORTHOSTATIC HYPOTENSION: ICD-10-CM

## 2020-02-25 DIAGNOSIS — I10 ESSENTIAL HYPERTENSION: ICD-10-CM

## 2020-02-25 PROCEDURE — 99214 OFFICE O/P EST MOD 30 MIN: CPT | Performed by: INTERNAL MEDICINE

## 2020-02-25 PROCEDURE — 93000 ELECTROCARDIOGRAM COMPLETE: CPT | Performed by: INTERNAL MEDICINE

## 2020-02-25 NOTE — PROGRESS NOTES
Date of Office Visit: 2020  Encounter Provider: Josephine Nathan MD  Place of Service: [facility] CARDIOLOGY  Patient Name: Carolina Ayers  :1955    Chief complaint      History of Present Illness  Patient is a 65-year-old female with history of hyperlipidemia back pain and orthostatic hypotension.  She was seen in 2018 for preoperative evaluation.  Her EKG was abnormal at that time.  She had an echocardiogram that showed normal systolic function with no significant valvular dysfunction except for trivial mitral regurgitation and a very small pericardial effusion.  A stress perfusion study was negative for ischemia.  She had edema for which she was treated with diuretics.  She was seen in 2018 however with orthostatic hypotension and diuretics were discontinued in hospital.  Persistent edema in May 2019 a proBNP was checked was normal.  BMP was normal except for nonfasting glucose of 102.  Support stockings and leg elevation were recommended.  Lasix was to be utilized on a as needed basis.    Patient states that she has not been exercising but has been active.  She has had mild dependent edema she is using support stockings and leg elevation she denies any palpitations syncope near syncope.  She states that 2 weeks ago in the evening while watching TV as she got ready for bed she developed midsternal chest pain under the breasts to radiated to her back associated with shortness of breath.  This lasted for a period of time she cannot recall how long but the next morning it had resolved.  She did not take her blood pressure at that time.  Her blood pressure check sporadically has been in the 150s -160's though when checked after resting has been in the 130s.  She believes she is on a low-salt diet.        Past Medical History:   Diagnosis Date   • Cataracts, bilateral    • Chronic bilateral low back pain with bilateral sciatica    • Essential hypertension    • H/O:  pneumonia    • History of chest pain 2014    SEEN DR SIM   • History of skin cancer    • Hyperlipidemia    • Hypokalemia    • Impaired functional mobility, balance, gait, and endurance    • Localized edema    • Lumbar herniated disc    • Orthostatic hypotension    • PONV (postoperative nausea and vomiting)      Past Surgical History:   Procedure Laterality Date   • BREAST LUMPECTOMY Left    • CARPAL TUNNEL RELEASE     • CHOLECYSTECTOMY     • EXTERNAL EAR SURGERY      cyst removed from ear   • HERNIA REPAIR     • HYSTERECTOMY      partial   • KNEE SURGERY     • LUMBAR DISCECTOMY FUSION INSTRUMENTATION Bilateral 8/7/2018    Procedure: LUMBAR FUSION DECOMPRESSON WITH PEDICLE SCREWS Lumbar 4-5,  posterolateral fusion;  Surgeon: Jarrett Vasques IV, MD;  Location: [facility] OR;  Service: Neurosurgery   • SHOULDER SURGERY     • TONSILLECTOMY     • WRIST SURGERY Bilateral     Cyst removed     Outpatient Medications Prior to Visit   Medication Sig Dispense Refill   • estradiol (ESTRACE) 1 MG tablet Take 1 mg by mouth Every Night.     • ibuprofen (ADVIL,MOTRIN) 200 MG tablet Take 200 mg by mouth Every 6 (Six) Hours As Needed for Mild Pain .     • melatonin 1 MG tablet Take 1 mg by mouth At Night As Needed.     • meloxicam (MOBIC) 7.5 MG tablet Take 7.5 mg by mouth Daily.     • oxybutynin (DITROPAN) 5 MG tablet Take 5 mg by mouth Daily.     • simvastatin (ZOCOR) 20 MG tablet Take 20 mg by mouth Every Night.     • Glucosamine-Chondroitin (OSTEO BI-FLEX REGULAR STRENGTH PO) Take  by mouth every night at bedtime.     • LUTEIN PO Take 20 mg by mouth Daily.       No facility-administered medications prior to visit.        Allergies as of 02/25/2020 - Reviewed 02/25/2020   Allergen Reaction Noted   • Penicillins Hives 02/22/2018     Social History     Socioeconomic History   • Marital status:      Spouse name: Not on file   • Number of children: Not on file   • Years of education: Not on file   • Highest education  "level: Not on file   Occupational History   • Occupation: retired   Tobacco Use   • Smoking status: Former Smoker     Packs/day: 1.00     Years: 30.00     Pack years: 30.00     Types: Cigarettes     Last attempt to quit: 2010     Years since quitting: 10.1   • Smokeless tobacco: Never Used   Substance and Sexual Activity   • Alcohol use: Yes     Comment: rarely- 2-3 times a year   • Drug use: No     Comment: 2-3 q yr   • Sexual activity: Defer     Family History   Problem Relation Age of Onset   • Malig Hyperthermia Neg Hx      Review of Systems   Constitution: Negative for fever, malaise/fatigue, weight gain and weight loss.   HENT: Negative for ear pain, hearing loss, nosebleeds and sore throat.    Eyes: Negative for double vision, pain, vision loss in left eye and vision loss in right eye.   Cardiovascular: Positive for leg swelling.        See history of present illness.   Respiratory: Negative for cough, shortness of breath, sleep disturbances due to breathing, snoring and wheezing.    Endocrine: Negative for cold intolerance, heat intolerance and polyuria.   Skin: Negative for itching, poor wound healing and rash.   Musculoskeletal: Negative for joint pain, joint swelling and myalgias.   Gastrointestinal: Negative for abdominal pain, diarrhea, hematochezia, nausea and vomiting.   Genitourinary: Negative for hematuria and hesitancy.   Neurological: Negative for numbness, paresthesias and seizures.   Psychiatric/Behavioral: Negative for depression. The patient is not nervous/anxious.         Objective:     Vitals:    02/25/20 1125   BP: 160/88   BP Location: Left arm   Patient Position: Sitting   Cuff Size: Large Adult   Pulse: 79   Resp: 18   SpO2: 100%   Weight: 89.7 kg (197 lb 12.8 oz)   Height: 157.5 cm (62\")     Body mass index is 36.18 kg/m².    Physical Exam   Constitutional: She is oriented to person, place, and time. She appears well-developed and well-nourished.   HENT:   Head: Normocephalic.   Nose: " Nose normal.   Mouth/Throat: Oropharynx is clear and moist.   Eyes: Pupils are equal, round, and reactive to light. Conjunctivae and EOM are normal. Right eye exhibits no discharge. No scleral icterus.   Neck: Normal range of motion. Neck supple. No JVD present. No thyromegaly present.   Cardiovascular: Normal rate, regular rhythm, normal heart sounds and intact distal pulses. Exam reveals no gallop and no friction rub.   No murmur heard.  Pulses:       Carotid pulses are 2+ on the right side, and 2+ on the left side.       Radial pulses are 2+ on the right side, and 2+ on the left side.        Femoral pulses are 2+ on the right side, and 2+ on the left side.       Popliteal pulses are 2+ on the right side, and 2+ on the left side.        Dorsalis pedis pulses are 2+ on the right side, and 2+ on the left side.        Posterior tibial pulses are 2+ on the right side, and 2+ on the left side.   Pulmonary/Chest: Effort normal and breath sounds normal. No respiratory distress. She has no wheezes. She has no rales.   Abdominal: Soft. Bowel sounds are normal. She exhibits no distension. There is no hepatosplenomegaly. There is no tenderness. There is no rebound.   Musculoskeletal: Normal range of motion. She exhibits edema (Trace bilateral edema to mid tibia). She exhibits no tenderness.   Neurological: She is alert and oriented to person, place, and time.   Skin: Skin is warm and dry. No rash noted. No erythema.   Psychiatric: She has a normal mood and affect. Her behavior is normal. Judgment and thought content normal.   Vitals reviewed.    Lab Review:     ECG 12 Lead  Date/Time: 2/25/2020 10:23 PM  Performed by: Josephine Nathan MD  Authorized by: Josephine Nathan MD   Comparison: compared with previous ECG   Similar to previous ECG  Rhythm: sinus rhythm    Clinical impression: normal ECG          Assessment:       Diagnosis Plan   1. Precordial pain  Stress Test With Myocardial Perfusion One Day   2. Orthostatic hypotension      3. Essential hypertension       Plan:         1. Chest pain and dyspnea. Treat hypertension and check an exercise cardiolyte stress teset  2.  Edema.  Still present today.  Will treat hypertension and increase her exercise.  We will add as needed doses of diuretics along with potassium supplements if we are not adding a hypertensive.  If blood pressure remains elevated will add losartan 25 mg a day  3.  Hypertension.  As above.  4.  Hypokalemia  5.  Ongoing estrogen use.  Strongly recommended she consider switching this to a vaginal cream.       Your medication list           Accurate as of February 25, 2020 11:59 PM. If you have any questions, ask your nurse or doctor.               CONTINUE taking these medications      Instructions Last Dose Given Next Dose Due   estradiol 1 MG tablet  Commonly known as:  ESTRACE      Take 1 mg by mouth Every Night.       ibuprofen 200 MG tablet  Commonly known as:  ADVIL,MOTRIN      Take 200 mg by mouth Every 6 (Six) Hours As Needed for Mild Pain .       melatonin 1 MG tablet      Take 1 mg by mouth At Night As Needed.       meloxicam 7.5 MG tablet  Commonly known as:  MOBIC      Take 7.5 mg by mouth Daily.       oxybutynin 5 MG tablet  Commonly known as:  DITROPAN      Take 5 mg by mouth Daily.       simvastatin 20 MG tablet  Commonly known as:  ZOCOR      Take 20 mg by mouth Every Night.          STOP taking these medications    LUTEIN PO  Stopped by:  Josephine Nathan MD        OSTEO BI-FLEX REGULAR STRENGTH PO  Stopped by:  Josephine Nathan MD               Patient is no longer taking gluten, Osteo Bi-Flex.  I corrected the med list to reflect this.  I did not stop these medications.    Dictated utilizing Dragon dictation

## 2020-02-26 PROBLEM — R07.2 PRECORDIAL PAIN: Status: ACTIVE | Noted: 2020-02-26

## 2020-03-05 ENCOUNTER — HOSPITAL ENCOUNTER (OUTPATIENT)
Dept: CARDIOLOGY | Facility: HOSPITAL | Age: 65
Discharge: HOME | End: 2020-03-05
Admitting: INTERNAL MEDICINE

## 2020-03-05 VITALS — BODY MASS INDEX: 35.88 KG/M2 | HEIGHT: 62 IN | WEIGHT: 195 LBS

## 2020-03-05 DIAGNOSIS — R07.2 PRECORDIAL PAIN: ICD-10-CM

## 2020-03-05 LAB
BH CV NUCLEAR PRIOR STUDY: 3
BH CV STRESS BP STAGE 1: NORMAL
BH CV STRESS BP STAGE 2: NORMAL
BH CV STRESS BP STAGE 3: NORMAL
BH CV STRESS BP STAGE 4: NORMAL
BH CV STRESS DURATION MIN STAGE 1: 3
BH CV STRESS DURATION MIN STAGE 2: 2
BH CV STRESS DURATION SEC STAGE 1: 0
BH CV STRESS DURATION SEC STAGE 2: 0
BH CV STRESS GRADE STAGE 1: 10
BH CV STRESS GRADE STAGE 2: 12
BH CV STRESS HR STAGE 1: 135
BH CV STRESS HR STAGE 2: 145
BH CV STRESS HR STAGE 4: 94
BH CV STRESS METS STAGE 1: 5
BH CV STRESS METS STAGE 2: 7.5
BH CV STRESS PROTOCOL 1: NORMAL
BH CV STRESS RECOVERY BP: NORMAL MMHG
BH CV STRESS RECOVERY HR: 94 BPM
BH CV STRESS SPEED STAGE 1: 1.7
BH CV STRESS SPEED STAGE 2: 2.5
BH CV STRESS STAGE 1: 1
BH CV STRESS STAGE 2: 2
LV EF NUC BP: 70 %
MAXIMAL PREDICTED HEART RATE: 155 BPM
PERCENT MAX PREDICTED HR: 93.55 %
STRESS BASELINE BP: NORMAL MMHG
STRESS BASELINE HR: 71 BPM
STRESS PERCENT HR: 110 %
STRESS POST ESTIMATED WORKLOAD: 6 METS
STRESS POST EXERCISE DUR MIN: 5 MIN
STRESS POST EXERCISE DUR SEC: 0 SEC
STRESS POST PEAK BP: NORMAL MMHG
STRESS POST PEAK HR: 145 BPM
STRESS TARGET HR: 132 BPM

## 2020-03-05 PROCEDURE — 0 TECHNETIUM TETROFOSMIN KIT: Performed by: INTERNAL MEDICINE

## 2020-03-05 PROCEDURE — 78452 HT MUSCLE IMAGE SPECT MULT: CPT

## 2020-03-05 PROCEDURE — 93016 CV STRESS TEST SUPVJ ONLY: CPT | Performed by: INTERNAL MEDICINE

## 2020-03-05 PROCEDURE — 78452 HT MUSCLE IMAGE SPECT MULT: CPT | Performed by: INTERNAL MEDICINE

## 2020-03-05 PROCEDURE — A9502 TC99M TETROFOSMIN: HCPCS | Performed by: INTERNAL MEDICINE

## 2020-03-05 PROCEDURE — 93018 CV STRESS TEST I&R ONLY: CPT | Performed by: INTERNAL MEDICINE

## 2020-03-05 PROCEDURE — 93017 CV STRESS TEST TRACING ONLY: CPT

## 2020-03-05 RX ADMIN — TETROFOSMIN 1 DOSE: 1.38 INJECTION, POWDER, LYOPHILIZED, FOR SOLUTION INTRAVENOUS at 10:45

## 2020-03-05 RX ADMIN — TETROFOSMIN 1 DOSE: 1.38 INJECTION, POWDER, LYOPHILIZED, FOR SOLUTION INTRAVENOUS at 11:49

## 2020-03-06 ENCOUNTER — TELEPHONE (OUTPATIENT)
Dept: CARDIOLOGY | Facility: CLINIC | Age: 65
End: 2020-03-06

## 2020-03-06 DIAGNOSIS — Z51.81 THERAPEUTIC DRUG MONITORING: ICD-10-CM

## 2020-03-06 DIAGNOSIS — I10 ESSENTIAL HYPERTENSION: Primary | ICD-10-CM

## 2020-03-06 RX ORDER — LISINOPRIL 10 MG/1
10 TABLET ORAL DAILY
Qty: 30 TABLET | Refills: 0 | Status: SHIPPED | OUTPATIENT
Start: 2020-03-06 | End: 2020-03-25 | Stop reason: SDUPTHER

## 2020-03-06 RX ORDER — LISINOPRIL 10 MG/1
10 TABLET ORAL DAILY
Qty: 30 TABLET | Refills: 0 | Status: SHIPPED | OUTPATIENT
Start: 2020-03-06 | End: 2020-03-06 | Stop reason: SDUPTHER

## 2020-03-06 NOTE — TELEPHONE ENCOUNTER
Please call patient and let her know that stress test did not show any signs of tightly blocked arteries.  Blood pressure did become quite elevated with peak stress.  Blood pressure readings on log below appear too high.    Given history of lower extremity edema let us avoid amlodipine for now.    Please have her start lisinopril 10 mg daily and check blood pressure at least 1 to 2 hours after morning medications and after resting calmly 10 to 15 minutes, keep a log, and call with updated readings in 1 to 2 weeks.    Repeat BMP at outpatient lab in 2 weeks.  Call sooner for systolic blood pressure readings less than 100 though I do not expect this to happen.  Avoid salt.  Recommend getting into a light exercise routine and slowly increasing as tolerated.  Notify office of any new, recurrent, or worsening symptoms prior to next visit.  Follow-up with APRN in 6 months or sooner for issues or concerns.  Follow-up with Dr. Doll given history of restrictive lung disease.    *PLEASE SEE IF THERE IS A LOCAL PHARMACY WE CAN CALL THE LISINOPRIL 10MG DAILY TO UNTIL BP IS STABLE AND WE SEND TO MAIL ORDER.

## 2020-03-06 NOTE — TELEPHONE ENCOUNTER
Spoke with pt.  Verbalized understanding.  No questions at this time.  Advised her results are avail on MYCHART if needed.  Rx sent to LOCAL pharmacy.  Pt want labs completed at her PCP (Dr Renteria).  Order faxed.  Asked pt to call she completes labs.  Reminder set.

## 2020-03-23 ENCOUNTER — HOSPITAL ENCOUNTER (OUTPATIENT)
Dept: OTHER | Facility: HOSPITAL | Age: 65
Discharge: HOME | End: 2020-03-23
Attending: NURSE PRACTITIONER

## 2020-03-23 LAB
ANION GAP SERPL CALC-SCNC: 15 MMOL/L (ref 8–19)
BUN SERPL-MCNC: 9 MG/DL (ref 5–25)
BUN/CREAT SERPL: 10 {RATIO} (ref 6–20)
CALCIUM SERPL-MCNC: 9.2 MG/DL (ref 8.7–10.4)
CHLORIDE SERPL-SCNC: 103 MMOL/L (ref 99–111)
CONV CO2: 27 MMOL/L (ref 22–32)
CREAT UR-MCNC: 0.9 MG/DL (ref 0.5–0.9)
GFR SERPLBLD BASED ON 1.73 SQ M-ARVRAT: >60 ML/MIN/{1.73_M2}
GLUCOSE SERPL-MCNC: 106 MG/DL (ref 65–99)
OSMOLALITY SERPL CALC.SUM OF ELEC: 291 MOSM/KG (ref 273–304)
POTASSIUM SERPL-SCNC: 3.9 MMOL/L (ref 3.5–5.3)
SODIUM SERPL-SCNC: 141 MMOL/L (ref 135–147)

## 2020-03-23 NOTE — TELEPHONE ENCOUNTER
Pt called and had her labs completed.  Advised her I will watch for these and call when available.

## 2020-03-24 NOTE — TELEPHONE ENCOUNTER
Blood pressure remains elevated.  Increase lisinopril to 20 mg a day.  With COVID restrictions, check BMP in 4 weeksmkd

## 2020-03-25 RX ORDER — LISINOPRIL 20 MG/1
20 TABLET ORAL DAILY
Qty: 90 TABLET | Refills: 1 | Status: SHIPPED | OUTPATIENT
Start: 2020-03-25 | End: 2020-05-08 | Stop reason: SDUPTHER

## 2020-03-25 NOTE — TELEPHONE ENCOUNTER
Spoke with pt.  She is hesitant about COVID in 4 weeks.  Advised her I will set a remind and we can figure out where she wants to go for lab then.  Ok with pt.

## 2020-04-27 ENCOUNTER — HOSPITAL ENCOUNTER (OUTPATIENT)
Dept: OTHER | Facility: HOSPITAL | Age: 65
Discharge: HOME | End: 2020-04-27

## 2020-04-27 LAB
ANION GAP SERPL CALC-SCNC: 19 MMOL/L (ref 8–19)
BUN SERPL-MCNC: 12 MG/DL (ref 5–25)
BUN/CREAT SERPL: 11 {RATIO} (ref 6–20)
CALCIUM SERPL-MCNC: 9.6 MG/DL (ref 8.7–10.4)
CHLORIDE SERPL-SCNC: 101 MMOL/L (ref 99–111)
CONV CO2: 26 MMOL/L (ref 22–32)
CREAT UR-MCNC: 1.08 MG/DL (ref 0.5–0.9)
GFR SERPLBLD BASED ON 1.73 SQ M-ARVRAT: 54 ML/MIN/{1.73_M2}
GLUCOSE SERPL-MCNC: 92 MG/DL (ref 65–99)
OSMOLALITY SERPL CALC.SUM OF ELEC: 291 MOSM/KG (ref 273–304)
POTASSIUM SERPL-SCNC: 4.7 MMOL/L (ref 3.5–5.3)
SODIUM SERPL-SCNC: 141 MMOL/L (ref 135–147)

## 2020-04-30 ENCOUNTER — TELEPHONE (OUTPATIENT)
Dept: CARDIOLOGY | Facility: CLINIC | Age: 65
End: 2020-04-30

## 2020-04-30 DIAGNOSIS — I10 ESSENTIAL HYPERTENSION: Primary | ICD-10-CM

## 2020-04-30 DIAGNOSIS — Z51.81 THERAPEUTIC DRUG MONITORING: ICD-10-CM

## 2020-05-08 ENCOUNTER — LAB (OUTPATIENT)
Dept: LAB | Facility: HOSPITAL | Age: 65
End: 2020-05-08

## 2020-05-08 ENCOUNTER — TELEPHONE (OUTPATIENT)
Dept: CARDIOLOGY | Facility: CLINIC | Age: 65
End: 2020-05-08

## 2020-05-08 ENCOUNTER — OFFICE VISIT (OUTPATIENT)
Dept: CARDIOLOGY | Facility: CLINIC | Age: 65
End: 2020-05-08

## 2020-05-08 VITALS
HEIGHT: 62 IN | SYSTOLIC BLOOD PRESSURE: 138 MMHG | RESPIRATION RATE: 18 BRPM | DIASTOLIC BLOOD PRESSURE: 76 MMHG | BODY MASS INDEX: 37.21 KG/M2 | WEIGHT: 202.2 LBS | HEART RATE: 76 BPM

## 2020-05-08 DIAGNOSIS — Z51.81 THERAPEUTIC DRUG MONITORING: ICD-10-CM

## 2020-05-08 DIAGNOSIS — I10 ESSENTIAL HYPERTENSION: ICD-10-CM

## 2020-05-08 DIAGNOSIS — R60.0 LOCALIZED EDEMA: ICD-10-CM

## 2020-05-08 DIAGNOSIS — I10 ESSENTIAL HYPERTENSION: Primary | ICD-10-CM

## 2020-05-08 LAB
ANION GAP SERPL CALCULATED.3IONS-SCNC: 11.3 MMOL/L (ref 5–15)
BUN BLD-MCNC: 21 MG/DL (ref 8–23)
BUN/CREAT SERPL: 20.2 (ref 7–25)
CALCIUM SPEC-SCNC: 9.5 MG/DL (ref 8.6–10.5)
CHLORIDE SERPL-SCNC: 99 MMOL/L (ref 98–107)
CO2 SERPL-SCNC: 25.7 MMOL/L (ref 22–29)
CREAT BLD-MCNC: 1.04 MG/DL (ref 0.57–1)
GFR SERPL CREATININE-BSD FRML MDRD: 53 ML/MIN/1.73
GLUCOSE BLD-MCNC: 95 MG/DL (ref 65–99)
POTASSIUM BLD-SCNC: 5.1 MMOL/L (ref 3.5–5.2)
SODIUM BLD-SCNC: 136 MMOL/L (ref 136–145)

## 2020-05-08 PROCEDURE — 93000 ELECTROCARDIOGRAM COMPLETE: CPT | Performed by: INTERNAL MEDICINE

## 2020-05-08 PROCEDURE — 80048 BASIC METABOLIC PNL TOTAL CA: CPT

## 2020-05-08 PROCEDURE — 99213 OFFICE O/P EST LOW 20 MIN: CPT | Performed by: INTERNAL MEDICINE

## 2020-05-08 PROCEDURE — 36415 COLL VENOUS BLD VENIPUNCTURE: CPT

## 2020-05-08 RX ORDER — LISINOPRIL 20 MG/1
20 TABLET ORAL DAILY
Qty: 90 TABLET | Refills: 3 | Status: SHIPPED | OUTPATIENT
Start: 2020-05-08 | End: 2021-09-28

## 2020-05-08 NOTE — PROGRESS NOTES
Date of Office Visit: 2020  Encounter Provider: Josephine Nathan MD  Place of Service: [facility] CARDIOLOGY  Patient Name: Carolina Ayers  :1955    Chief complaint  Hypertension, edema    History of Present Illness  Patient is a 65-year-old female with history of hyperlipidemia back pain and orthostatic hypotension.  She was seen in 2018 for preoperative evaluation.  Her EKG was abnormal at that time.  She had an echocardiogram that showed normal systolic function with no significant valvular dysfunction except for trivial mitral regurgitation and a very small pericardial effusion.  A stress perfusion study was negative for ischemia.  She had edema for which she was treated with diuretics.  She was seen in 2018 however with orthostatic hypotension and diuretics were discontinued in hospital.  Persistent edema in May 2019 a proBNP was checked was normal.  BMP was normal except for nonfasting glucose of 102.  Support stockings and leg elevation were recommended.  Lasix was to be utilized on a as needed basis.  In 2020 with complains of chest pain a treadmill stress perfusion study was negative for ischemia with GI artifact present.    Patient has been active denies any chest pain, palpitations, syncope near syncope.  She continues to have dependent edema.  She has been using her stockings sporadically has just gone back to using them more recently.  Still is on meloxicam every other day.  She states she does not take ibuprofen whatsoever.  She is trying to pursue a low-salt diet.  Blood pressures been as it is today or slightly lower.    Past Medical History:   Diagnosis Date   • Cataracts, bilateral    • Chronic bilateral low back pain with bilateral sciatica    • Essential hypertension    • H/O: pneumonia    • History of chest pain     SEEN DR SIM   • History of skin cancer    • Hyperlipidemia    • Hypokalemia    • Impaired functional mobility,  balance, gait, and endurance    • Localized edema    • Lumbar herniated disc    • Orthostatic hypotension    • PONV (postoperative nausea and vomiting)      Past Surgical History:   Procedure Laterality Date   • BREAST LUMPECTOMY Left    • CARPAL TUNNEL RELEASE     • CHOLECYSTECTOMY     • EXTERNAL EAR SURGERY      cyst removed from ear   • HERNIA REPAIR     • HYSTERECTOMY      partial   • KNEE SURGERY     • LUMBAR DISCECTOMY FUSION INSTRUMENTATION Bilateral 8/7/2018    Procedure: LUMBAR FUSION DECOMPRESSON WITH PEDICLE SCREWS Lumbar 4-5,  posterolateral fusion;  Surgeon: Jarrett Vasques IV, MD;  Location: [facility];  Service: Neurosurgery   • SHOULDER SURGERY     • TONSILLECTOMY     • WRIST SURGERY Bilateral     Cyst removed     Outpatient Medications Prior to Visit   Medication Sig Dispense Refill   • estradiol (ESTRACE) 1 MG tablet Take 1 mg by mouth Every Night.     • melatonin 1 MG tablet Take 1 mg by mouth At Night As Needed.     • meloxicam (MOBIC) 7.5 MG tablet Take 7.5 mg by mouth Daily. EVERY OTHER DAY     • oxybutynin (DITROPAN) 5 MG tablet Take 5 mg by mouth Daily.     • simvastatin (ZOCOR) 20 MG tablet Take 20 mg by mouth Every Night.     • lisinopril (PRINIVIL,ZESTRIL) 20 MG tablet Take 1 tablet by mouth Daily. 90 tablet 1   • ibuprofen (ADVIL,MOTRIN) 200 MG tablet Take 200 mg by mouth Every 6 (Six) Hours As Needed for Mild Pain .       No facility-administered medications prior to visit.        Allergies as of 05/08/2020 - Reviewed 05/08/2020   Allergen Reaction Noted   • Penicillins Hives 02/22/2018     Social History     Socioeconomic History   • Marital status:      Spouse name: Not on file   • Number of children: Not on file   • Years of education: Not on file   • Highest education level: Not on file   Occupational History   • Occupation: retired   Tobacco Use   • Smoking status: Former Smoker     Packs/day: 1.00     Years: 30.00     Pack years: 30.00     Types: Cigarettes     Last  "attempt to quit: 2010     Years since quitting: 10.3   • Smokeless tobacco: Never Used   • Tobacco comment: NO CAFFEINE USE   Substance and Sexual Activity   • Alcohol use: Yes     Comment: rarely- 2-3 times a year   • Drug use: No     Comment: 2-3 q yr   • Sexual activity: Defer     Family History   Problem Relation Age of Onset   • Malig Hyperthermia Neg Hx      Review of Systems   Constitution: Negative for fever, malaise/fatigue, weight gain and weight loss.   HENT: Negative for ear pain, hearing loss, nosebleeds and sore throat.    Eyes: Negative for double vision, pain, vision loss in left eye and vision loss in right eye.   Cardiovascular:        See history of present illness.   Respiratory: Negative for cough, shortness of breath, sleep disturbances due to breathing, snoring and wheezing.    Endocrine: Negative for cold intolerance, heat intolerance and polyuria.   Skin: Negative for itching, poor wound healing and rash.   Musculoskeletal: Negative for joint pain, joint swelling and myalgias.   Gastrointestinal: Negative for abdominal pain, diarrhea, hematochezia, nausea and vomiting.   Genitourinary: Negative for hematuria and hesitancy.   Neurological: Negative for numbness, paresthesias and seizures.   Psychiatric/Behavioral: Negative for depression. The patient is not nervous/anxious.         Objective:     Vitals:    05/08/20 1128   BP: 138/76   BP Location: Left arm   Patient Position: Sitting   Pulse: 76   Resp: 18   Weight: 91.7 kg (202 lb 3.2 oz)   Height: 157.5 cm (62\")     Body mass index is 36.98 kg/m².    Physical Exam   Constitutional: She is oriented to person, place, and time. She appears well-developed and well-nourished.   Obese   HENT:   Head: Normocephalic.   Nose: Nose normal.   Mouth/Throat: Oropharynx is clear and moist.   Eyes: Pupils are equal, round, and reactive to light. Conjunctivae and EOM are normal. Right eye exhibits no discharge. No scleral icterus.   Neck: Normal range of " motion. Neck supple. No JVD present. No thyromegaly present.   Cardiovascular: Normal rate, regular rhythm, normal heart sounds and intact distal pulses. Exam reveals no gallop and no friction rub.   No murmur heard.  Pulses:       Carotid pulses are 2+ on the right side, and 2+ on the left side.       Radial pulses are 2+ on the right side, and 2+ on the left side.        Femoral pulses are 2+ on the right side, and 2+ on the left side.       Popliteal pulses are 2+ on the right side, and 2+ on the left side.        Dorsalis pedis pulses are 2+ on the right side, and 2+ on the left side.        Posterior tibial pulses are 2+ on the right side, and 2+ on the left side.   Pulmonary/Chest: Effort normal and breath sounds normal. No respiratory distress. She has no wheezes. She has no rales.   Abdominal: Soft. Bowel sounds are normal. She exhibits no distension. There is no hepatosplenomegaly. There is no tenderness. There is no rebound.   Musculoskeletal: Normal range of motion. She exhibits edema. She exhibits no tenderness.   Trace ankle edema bilaterally   Neurological: She is alert and oriented to person, place, and time.   Skin: Skin is warm and dry. No rash noted. No erythema.   Psychiatric: She has a normal mood and affect. Her behavior is normal. Judgment and thought content normal.   Vitals reviewed.    Lab Review:     ECG 12 Lead  Date/Time: 5/8/2020 9:28 PM  Performed by: Josephine Nathan MD  Authorized by: Josephine Nathan MD   Comparison: compared with previous ECG   Similar to previous ECG  Rhythm: sinus rhythm    Clinical impression: normal ECG          Assessment:       Diagnosis Plan   1. Essential hypertension     2. Localized edema       Plan:       1.  Edema.  She will try to elevate her legs further wear support stockings more consistently and decrease the use of meloxicam.  If edema persist despite this can add low-dose diuretics as long as renal labs and electrolytes are stable.  She just had a BMP this  morning.  2.  Hypertension.  Lower at home I suspect will will continue to follow the summer months that she remains active.  3.  Hyperlipidemia  4.  History of orthostatic hypotension  3.  Chest pain.  Resolved with negative recent stress test       Your medication list           Accurate as of May 8, 2020 11:59 PM. If you have any questions, ask your nurse or doctor.               CONTINUE taking these medications      Instructions Last Dose Given Next Dose Due   estradiol 1 MG tablet  Commonly known as:  ESTRACE      Take 1 mg by mouth Every Night.       lisinopril 20 MG tablet  Commonly known as:  PRINIVIL,ZESTRIL      Take 1 tablet by mouth Daily.       melatonin 1 MG tablet      Take 1 mg by mouth At Night As Needed.       meloxicam 7.5 MG tablet  Commonly known as:  MOBIC      Take 7.5 mg by mouth Daily. EVERY OTHER DAY       oxybutynin 5 MG tablet  Commonly known as:  DITROPAN      Take 5 mg by mouth Daily.       simvastatin 20 MG tablet  Commonly known as:  ZOCOR      Take 20 mg by mouth Every Night.          STOP taking these medications    ibuprofen 200 MG tablet  Commonly known as:  ADVIL,MOTRIN  Stopped by:  Josephine Nathan MD              Where to Get Your Medications      These medications were sent to [facility] [address] [place] [address] - [address] - [phone]  - [phone] FX  [address] [facility] [address]    Phone:  [phone]   · lisinopril 20 MG tablet       Dictated utilizing Dragon dictation

## 2020-05-08 NOTE — TELEPHONE ENCOUNTER
Pt advised. Pt verbalized all understanding. Pt also asked that I fax ATTILA to PCP. I advised her I would.  We both verbalized all understanding.    RUFINO Sam

## 2020-05-08 NOTE — TELEPHONE ENCOUNTER
Please let patient know kidney test slightly abnormal and see PCP regarding this.  Increase water intake slightly.  Avoid nonsteroidals as I had discussed with her today at the office visit.  Hair

## 2020-05-12 ENCOUNTER — PREP FOR SURGERY (OUTPATIENT)
Dept: OTHER | Facility: HOSPITAL | Age: 65
End: 2020-05-12

## 2020-05-14 ENCOUNTER — PREP FOR SURGERY (OUTPATIENT)
Dept: OTHER | Facility: HOSPITAL | Age: 65
End: 2020-05-14

## 2020-05-15 ENCOUNTER — OFFICE VISIT CONVERTED (OUTPATIENT)
Dept: FAMILY MEDICINE CLINIC | Age: 65
End: 2020-05-15
Attending: FAMILY MEDICINE

## 2020-05-19 ENCOUNTER — HOSPITAL ENCOUNTER (OUTPATIENT)
Dept: OTHER | Facility: HOSPITAL | Age: 65
Discharge: HOME | End: 2020-05-19
Attending: FAMILY MEDICINE

## 2020-05-21 ENCOUNTER — HOSPITAL ENCOUNTER (OUTPATIENT)
Dept: OTHER | Facility: HOSPITAL | Age: 65
Discharge: HOME | End: 2020-05-21
Attending: FAMILY MEDICINE

## 2020-05-21 LAB
APPEARANCE UR: CLEAR
BACTERIA UR QL AUTO: NORMAL
BILIRUB UR QL: NEGATIVE
CASTS URNS QL MICRO: NORMAL /[LPF]
COLOR UR: YELLOW
CONV LEUKOCYTE ESTERASE: NEGATIVE
CONV UROBILINOGEN IN URINE BY AUTOMATED TEST STRIP: 0.2 {EHRLICHU}/DL (ref 0.1–1)
CREAT 24H UR-MCNC: 40.7 MG/DL
CREAT CL 24H UR+SERPL-VRATE: 76 ML/MIN (ref 61–166)
CREAT UR-MCNC: 0.86 MG/DL (ref 0.5–0.9)
EPI CELLS #/AREA URNS HPF: NORMAL /[HPF]
GLUCOSE 24H UR-MCNC: NEGATIVE MG/DL
HGB UR QL STRIP: NEGATIVE
KETONES UR QL STRIP: NEGATIVE MG/DL
MUCOUS THREADS URNS QL MICRO: NORMAL
NITRITE UR-MCNC: NEGATIVE MG/ML
PH UR STRIP.AUTO: 7 [PH] (ref 5–8)
PROT 24H UR-MRATE: 99 MG/(24.H) (ref 42–225)
PROT 24H UR-MRATE: <4 MG/DL (ref 0–12)
PROT UR-MCNC: NEGATIVE MG/DL
RBC # BLD AUTO: NORMAL /[HPF]
SP GR UR STRIP: 1.01 (ref 1–1.03)
SPECIMEN SOURCE: NORMAL
SPECIMEN VOL 24H UR: 2475 ML
SPECIMEN VOL 24H UR: 2475 ML
UNIDENT CRYS URNS QL MICRO: NORMAL /[HPF]
WBC #/AREA URNS HPF: NORMAL /[HPF]

## 2020-07-29 ENCOUNTER — HOSPITAL ENCOUNTER (OUTPATIENT)
Dept: OTHER | Facility: HOSPITAL | Age: 65
Discharge: HOME | End: 2020-07-29
Attending: FAMILY MEDICINE

## 2020-07-29 ENCOUNTER — OFFICE VISIT CONVERTED (OUTPATIENT)
Dept: FAMILY MEDICINE CLINIC | Age: 65
End: 2020-07-29
Attending: FAMILY MEDICINE

## 2020-07-30 ENCOUNTER — HOSPITAL ENCOUNTER (OUTPATIENT)
Dept: OTHER | Facility: HOSPITAL | Age: 65
Discharge: HOME | End: 2020-07-30
Attending: FAMILY MEDICINE

## 2020-07-31 LAB — SARS-COV-2 RNA SPEC QL NAA+PROBE: NOT DETECTED

## 2020-08-24 ENCOUNTER — HOSPITAL ENCOUNTER (OUTPATIENT)
Dept: OTHER | Facility: HOSPITAL | Age: 65
Discharge: HOME | End: 2020-08-24
Attending: FAMILY MEDICINE

## 2020-08-24 ENCOUNTER — OFFICE VISIT CONVERTED (OUTPATIENT)
Dept: FAMILY MEDICINE CLINIC | Age: 65
End: 2020-08-24
Attending: FAMILY MEDICINE

## 2020-08-24 LAB
ALBUMIN SERPL-MCNC: 4.7 G/DL (ref 3.5–5)
ALBUMIN/GLOB SERPL: 1.7 {RATIO} (ref 1.4–2.6)
ALP SERPL-CCNC: 58 U/L (ref 43–160)
ALT SERPL-CCNC: 20 U/L (ref 10–40)
ANION GAP SERPL CALC-SCNC: 20 MMOL/L (ref 8–19)
AST SERPL-CCNC: 28 U/L (ref 15–50)
BILIRUB SERPL-MCNC: 0.4 MG/DL (ref 0.2–1.3)
BUN SERPL-MCNC: 12 MG/DL (ref 5–25)
BUN/CREAT SERPL: 13 {RATIO} (ref 6–20)
CALCIUM SERPL-MCNC: 10.9 MG/DL (ref 8.7–10.4)
CHLORIDE SERPL-SCNC: 95 MMOL/L (ref 99–111)
CHOLEST SERPL-MCNC: 157 MG/DL (ref 107–200)
CHOLEST/HDLC SERPL: 3 {RATIO} (ref 3–6)
CONV CO2: 25 MMOL/L (ref 22–32)
CONV TOTAL PROTEIN: 7.5 G/DL (ref 6.3–8.2)
CREAT UR-MCNC: 0.92 MG/DL (ref 0.5–0.9)
ERYTHROCYTE [DISTWIDTH] IN BLOOD BY AUTOMATED COUNT: 12.1 % (ref 11.5–14.5)
GFR SERPLBLD BASED ON 1.73 SQ M-ARVRAT: >60 ML/MIN/{1.73_M2}
GLOBULIN UR ELPH-MCNC: 2.8 G/DL (ref 2–3.5)
GLUCOSE SERPL-MCNC: 101 MG/DL (ref 65–99)
HBA1C MFR BLD: 13.6 G/DL (ref 12–16)
HCT VFR BLD AUTO: 40.1 % (ref 37–47)
HDLC SERPL-MCNC: 53 MG/DL (ref 40–60)
LDLC SERPL CALC-MCNC: 79 MG/DL (ref 70–100)
MAGNESIUM SERPL-MCNC: 1.75 MG/DL (ref 1.6–2.3)
MCH RBC QN AUTO: 32.2 PG (ref 27–31)
MCHC RBC AUTO-ENTMCNC: 33.9 G/DL (ref 33–37)
MCV RBC AUTO: 94.8 FL (ref 81–99)
OSMOLALITY SERPL CALC.SUM OF ELEC: 280 MOSM/KG (ref 273–304)
PLATELET # BLD AUTO: 292 10*3/UL (ref 130–400)
PMV BLD AUTO: 9 FL (ref 7.4–10.4)
POTASSIUM SERPL-SCNC: 4.8 MMOL/L (ref 3.5–5.3)
RBC # BLD AUTO: 4.23 10*6/UL (ref 4.2–5.4)
SODIUM SERPL-SCNC: 135 MMOL/L (ref 135–147)
TRIGL SERPL-MCNC: 125 MG/DL (ref 40–150)
TSH SERPL-ACNC: 1.24 M[IU]/L (ref 0.27–4.2)
VLDLC SERPL-MCNC: 25 MG/DL (ref 5–37)
WBC # BLD AUTO: 5.13 10*3/UL (ref 4.8–10.8)

## 2020-08-25 LAB
C DIFF TOX B STL QL CT TISS CULT: NEGATIVE
CONV 027 TOXIN: NEGATIVE

## 2020-08-27 LAB — BACTERIA SPEC AEROBE CULT: NORMAL

## 2020-09-09 ENCOUNTER — OFFICE VISIT CONVERTED (OUTPATIENT)
Dept: OTOLARYNGOLOGY | Facility: CLINIC | Age: 65
End: 2020-09-09
Attending: OTOLARYNGOLOGY

## 2020-10-05 ENCOUNTER — HOSPITAL ENCOUNTER (OUTPATIENT)
Dept: ULTRASOUND IMAGING | Facility: HOSPITAL | Age: 65
Discharge: HOME | End: 2020-10-05
Attending: OTOLARYNGOLOGY

## 2020-10-15 ENCOUNTER — CONVERSION ENCOUNTER (OUTPATIENT)
Dept: OTOLARYNGOLOGY | Facility: CLINIC | Age: 65
End: 2020-10-15

## 2020-11-19 ENCOUNTER — HOSPITAL ENCOUNTER (OUTPATIENT)
Dept: OTHER | Facility: HOSPITAL | Age: 65
Discharge: HOME | End: 2020-11-19
Attending: FAMILY MEDICINE

## 2020-11-19 LAB
ANION GAP SERPL CALC-SCNC: 16 MMOL/L (ref 8–19)
BUN SERPL-MCNC: 10 MG/DL (ref 5–25)
BUN/CREAT SERPL: 12 {RATIO} (ref 6–20)
CALCIUM SERPL-MCNC: 10 MG/DL (ref 8.7–10.4)
CHLORIDE SERPL-SCNC: 96 MMOL/L (ref 99–111)
CONV CO2: 27 MMOL/L (ref 22–32)
CREAT UR-MCNC: 0.82 MG/DL (ref 0.5–0.9)
GFR SERPLBLD BASED ON 1.73 SQ M-ARVRAT: >60 ML/MIN/{1.73_M2}
GLUCOSE SERPL-MCNC: 97 MG/DL (ref 65–99)
OSMOLALITY SERPL CALC.SUM OF ELEC: 279 MOSM/KG (ref 273–304)
POTASSIUM SERPL-SCNC: 4.3 MMOL/L (ref 3.5–5.3)
SODIUM SERPL-SCNC: 135 MMOL/L (ref 135–147)

## 2021-04-13 ENCOUNTER — OFFICE VISIT CONVERTED (OUTPATIENT)
Dept: FAMILY MEDICINE CLINIC | Age: 66
End: 2021-04-13
Attending: FAMILY MEDICINE

## 2021-04-13 ENCOUNTER — HOSPITAL ENCOUNTER (OUTPATIENT)
Dept: OTHER | Facility: HOSPITAL | Age: 66
Discharge: HOME | End: 2021-04-13
Attending: FAMILY MEDICINE

## 2021-04-13 LAB
ALBUMIN SERPL-MCNC: 4.2 G/DL (ref 3.5–5)
ALBUMIN/GLOB SERPL: 1.4 {RATIO} (ref 1.4–2.6)
ALP SERPL-CCNC: 65 U/L (ref 43–160)
ALT SERPL-CCNC: 31 U/L (ref 10–40)
AMYLASE SERPL-CCNC: 50 U/L (ref 30–110)
ANION GAP SERPL CALC-SCNC: 17 MMOL/L (ref 8–19)
AST SERPL-CCNC: 32 U/L (ref 15–50)
BILIRUB SERPL-MCNC: 0.38 MG/DL (ref 0.2–1.3)
BUN SERPL-MCNC: 13 MG/DL (ref 5–25)
BUN/CREAT SERPL: 13 {RATIO} (ref 6–20)
CALCIUM SERPL-MCNC: 10 MG/DL (ref 8.7–10.4)
CHLORIDE SERPL-SCNC: 99 MMOL/L (ref 99–111)
CK SERPL-CCNC: 47 U/L (ref 35–230)
CONV CO2: 24 MMOL/L (ref 22–32)
CONV TOTAL PROTEIN: 7.3 G/DL (ref 6.3–8.2)
CREAT UR-MCNC: 0.98 MG/DL (ref 0.5–0.9)
ERYTHROCYTE [DISTWIDTH] IN BLOOD BY AUTOMATED COUNT: 12.9 % (ref 11.5–14.5)
GFR SERPLBLD BASED ON 1.73 SQ M-ARVRAT: >60 ML/MIN/{1.73_M2}
GLOBULIN UR ELPH-MCNC: 3.1 G/DL (ref 2–3.5)
GLUCOSE SERPL-MCNC: 86 MG/DL (ref 65–99)
HBA1C MFR BLD: 13 G/DL (ref 12–16)
HCT VFR BLD AUTO: 39.7 % (ref 37–47)
LIPASE SERPL-CCNC: 29 U/L (ref 5–51)
MCH RBC QN AUTO: 31.4 PG (ref 27–31)
MCHC RBC AUTO-ENTMCNC: 32.7 G/DL (ref 33–37)
MCV RBC AUTO: 95.9 FL (ref 81–99)
OSMOLALITY SERPL CALC.SUM OF ELEC: 281 MOSM/KG (ref 273–304)
PLATELET # BLD AUTO: 251 10*3/UL (ref 130–400)
PMV BLD AUTO: 9.1 FL (ref 7.4–10.4)
POTASSIUM SERPL-SCNC: 4.1 MMOL/L (ref 3.5–5.3)
RBC # BLD AUTO: 4.14 10*6/UL (ref 4.2–5.4)
SODIUM SERPL-SCNC: 136 MMOL/L (ref 135–147)
WBC # BLD AUTO: 5.06 10*3/UL (ref 4.8–10.8)

## 2021-05-10 NOTE — H&P
History and Physical      Patient Name: Carolina Ayers   Patient ID: [id number]   Sex: Female   YOB: 1955    Primary Care Provider: Momo Renteria MD   Referring Provider: Momo Renteria MD    Visit Date: September 9, 2020    Provider: Truong Dean MD   Location: [facility]   Location Address: [address] [place] [address]  [address] [place] [address]  [id number]   Location Phone: [phone]          Chief Complaint     1.  Thyroid nodules       History Of Present Illness  Carolina Ayers is a 65 year old /White female who presents to the office today as a consult from Momo Renteria MD.      She presents the clinic today for evaluation of recently discovered thyroid nodules on an ultrasound ordered for work-up of palpable left-sided nodule.  She notes that she was recently having significant issues with cough shortly after being started on lisinopril.  This was found to be related to medication side effect and the cough issues have now resolved since the medication has been discontinued.  An ultrasound was obtained at the end of July and it does demonstrate bilateral thyroid nodules.  The thyroid gland is normal in size, but has 4 nodules in the right side and 3 nodules on the left side.  1 of the right-sided nodules measures 1 cm, and appears to be hypoechoic, T RADS 4.  2 of the left-sided nodules measuring 1.2 and 1.5 cm are ulcerated to be T RADS 4 nodules.  Based on the appearance of the nodules, biopsy versus continued follow-up was recommended. The patient has had no history of thyroid cancer in the family, and denies any radiation exposure.  She has had no symptoms.       Past Medical History  HTN (hypertension); Hyperlipidemia; Thyroid nodule         Past Surgical History  Back; Cholecystectomy; Rotator Cuff repair         Medication List  Allergy Relief D12 5-120 mg oral tablet extended release 12 hr; calcium-magnesium-zinc 333-133-5 mg oral  "tablet; estradiol 1 mg oral tablet; Flonase Allergy Relief 50 mcg/actuation nasal spray,suspension; hydrochlorothiazide 25 mg oral tablet; losartan 50 mg oral tablet; simvastatin 20 mg oral tablet         Allergy List  lisinopril; PENICILLINS         Family Medical History  Family history of coronary artery disease         Social History  Tobacco (Former)         Review of Systems  · Constitutional  o Denies  o : fever, night sweats, weight loss  · Eyes  o Denies  o : discharge from eye, impaired vision  · HENT  o Admits  o : *See HPI  · Cardiovascular  o Denies  o : chest pain, irregular heart beats  · Respiratory  o Denies  o : shortness of breath, wheezing, coughing up blood  · Gastrointestinal  o Denies  o : heartburn, reflux, vomiting blood  · Genitourinary  o Denies  o : frequency of urine  · Integument  o Denies  o : rash, skin dryness  · Neurologic  o Denies  o : seizures, loss of balance, loss of consciousness, dizziness  · Endocrine  o Denies  o : cold intolerance, heat intolerance  · Heme-Lymph  o Denies  o : easy bleeding, anemia      Vitals  Date Time BP Position Site L\R Cuff Size HR RR TEMP (F) WT  HT  BMI kg/m2 BSA m2 O2 Sat        09/09/2020 10:50 AM       16 97.3 190lbs 2oz 5'  3\" 33.68 1.96           Physical Examination  · Constitutional  o Appearance  o : well developed, well-nourished, alert and in no acute distress, voice clear and strong  · Head and Face  o Head  o :   § Inspection  § : no deformities or lesions  o Face  o :   § Inspection  § : No facial lesions; House-Brackmann I/VI bilaterally  § Palpation  § : No TMJ crepitus nor  muscle tenderness bilaterally  · Eyes  o Vision  o :   § Visual Fields  § : Extraocular movements are intact. No spontaneous or gaze-induced nystagmus.  o Conjunctivae  o : clear  o Sclerae  o : clear  o Pupils and Irises  o : pupils equal, round, and reactive to light.   · Ears, Nose, Mouth and Throat  o Ears  o :   § External Ears  § : appearance " within normal limits, no lesions present  § Otoscopic Examination  § : tympanic membrane appearance within normal limits bilaterally without perforations, well-aerated middle ears  § Hearing  § : intact to conversational voice both ears  o Nose  o :   § External Nose  § : appearance normal  § Intranasal Exam  § : mucosa within normal limits, vestibules normal, no intranasal lesions present, septum midline, sinuses non tender to percussion  o Oral Cavity  o :   § Oral Mucosa  § : oral mucosa normal without pallor or cyanosis  § Lips  § : lip appearance normal  § Teeth  § : normal dentition for age  § Gums  § : gums pink, non-swollen, no bleeding present  § Tongue  § : tongue appearance normal; normal mobility  § Palate  § : hard palate normal, soft palate appearance normal with symmetric mobility  o Throat  o :   § Oropharynx  § : no inflammation or lesions present, tonsils within normal limits  § Hypopharynx  § : appearance within normal limits, superior epiglottis within normal limits  § Larynx  § : appearance within normal limits, vocal cords within normal limits, no lesions present  · Neck  o Inspection/Palpation  o : normal appearance, no masses or tenderness, trachea midline; thyroid size normal, nontender, nodule soft, small  · Respiratory  o Respiratory Effort  o : breathing unlabored  o Inspection of Chest  o : normal appearance, no retractions  · Cardiovascular  o Heart  o : regular rate and rhythm  · Lymphatic  o Neck  o : no lymphadenopathy present  o Supraclavicular Nodes  o : no lymphadenopathy present  o Preauricular Nodes  o : no lymphadenopathy present  · Skin and Subcutaneous Tissue  o General Inspection  o : Regarding face and neck - there are no rashes present, no lesions present, and no areas of discoloration  · Neurologic  o Cranial Nerves  o : cranial nerves II-XII are grossly intact bilaterally  o Gait and Station  o : normal gait, able to stand without diffculty  · Psychiatric  o Judgement  and Insight  o : judgment and insight intact  o Mood and Affect  o : mood normal, affect appropriate          Assessment  · Chronic cough     786.2/R05  · Thyroid nodule     241.0/E04.1    Problems Reconciled  Plan  · Orders  o FNA Thyroid w/ guidance (10954) - 241.0/E04.1 - 09/22/2020  · Medications  o Medications have been Reconciled  o Transition of Care or Provider Policy  · Instructions  o She presents the clinic today for evaluation of recently discovered thyroid nodules on an ultrasound ordered for work-up of palpable left-sided nodule. She notes that she was recently having significant issues with cough shortly after being started on lisinopril. This was found to be related to medication side effect and the cough issues have now resolved since the medication has been discontinued. An ultrasound was obtained at the end of July and it does demonstrate bilateral thyroid nodules. The thyroid gland is normal in size, but has 4 nodules in the right side and 3 nodules on the left side. 1 of the right-sided nodules measures 1 cm, and appears to be hypoechoic, T RADS 4. 2 of the left-sided nodules measuring 1.2 and 1.5 cm are ulcerated to be T RADS 4 nodules. Based on the appearance of the nodules, biopsy versus continued follow-up was recommended. The patient has had no history of thyroid cancer in the family, and denies any radiation exposure. She has had no symptoms. On palpation, she does have a small soft nontender thyroid nodules. I discussed options of repeat ultrasound versus biopsy, and at this point she would like to think about it before proceeding. I will have her contact me and I will plan on seeing her after either the ultrasound or biopsies are performed for further follow-up and management of this. The patient called back, and did request to have biopsy ordered, which I have done. I will see her back afterwards to discuss the results and further management for this.  o Electronically Identified  Patient Education Materials Provided Electronically  · Correspondence  o ENT Letter to Referring MD (Momo Renteria MD) - 09/09/2020            Electronically Signed by: Truong Dean MD -Author on September 22, 2020 12:13:06 PM

## 2021-05-14 VITALS — WEIGHT: 190.12 LBS | RESPIRATION RATE: 16 BRPM | TEMPERATURE: 97.3 F | HEIGHT: 63 IN | BODY MASS INDEX: 33.69 KG/M2

## 2021-05-14 VITALS — BODY MASS INDEX: 34.11 KG/M2 | HEIGHT: 63 IN | TEMPERATURE: 97.2 F | WEIGHT: 192.5 LBS

## 2021-05-18 NOTE — PROGRESS NOTES
Carolina Ayers  1955     Office/Outpatient Visit    Visit Date: Mon, Aug 24, 2020 10:21 am    Provider: Momo Renteria MD (Assistant: Jenn Caba RN)    Location: [facility]        Electronically signed by Momo Renteria MD on  08/26/2020 05:41:09 PM                             Subjective:        CC: cough, blood pressure, cholesterol    HPI:       Carolina is in today for follow up on cough.  Please see her last visit with Dr. Doyle in this regard.  She did stop lisinopril and is currently taking losartan.  She tolerates this okay.  However, she does continue to have cough off and on.  She is not aware of having allergy symptoms.  She has had some associated hoarseness as well.  She is having some ongoing issue with diarrhea as well.          Essential (primary) hypertension details; her current cardiac medication regimen includes an angiotensin receptor blocker ( Cozaar ).  She is tolerating the medication well without side effects.  Compliance with treatment has been good; she takes her medication as directed and follows up as directed.            In regard to the mixed hyperlipidemia, current treatment includes Zocor.  Compliance with treatment has been good; she takes her medication as directed.  She denies experiencing any hypercholesterolemia related symptoms.      ROS:     CONSTITUTIONAL:  Negative for chills and fever.      CARDIOVASCULAR:  Negative for chest pain and palpitations.      GASTROINTESTINAL:  Negative for abdominal pain, nausea and vomiting.      INTEGUMENTARY/BREAST:  Negative for atypical mole(s) and rash.          Past Medical History / Family History / Social History:         Last Reviewed on 8/24/2020 10:54 AM by Momo Renteria    Past Medical History:                 PAST MEDICAL HISTORY         Hyperlipidemia    Hypertension     Urinary Incontinence             ADVANCE DIRECTIVES: Living will - Her daughter, Deisy, would make decisions if  needed.          PREVENTIVE HEALTH MAINTENANCE             BONE DENSITY: was last done 2018 with normal results     COLORECTAL CANCER SCREENING: Up to date (colonoscopy q10y; sigmoidoscopy q5y; Cologuard q3y) was last done 2017, Results are in chart; colonoscopy with normal results; hyperplastic polyp     MAMMOGRAM: Done within last 2 years and results in are chart was last done 2018 with normal results         Surgical History:         Cholecystectomy    Hysterectomy Other Surgeries    R Rotator Cuff;    Low Back;         Family History:         Positive for Coronary Artery Disease ( father ).           Social History:     Occupation: Homemaker     Marital Status:      Children: 2 children         Tobacco/Alcohol/Supplements:     Last Reviewed on 2020 10:54 AM by Momo Renteria    Tobacco: She has a past history of cigarette smoking; quit date:  .          Alcohol: Frequency:    seldom;         Substance Abuse History:     Last Reviewed on 2020 10:54 AM by Momo Renteria        Mental Health History:     Last Reviewed on 2020 10:54 AM by Momo Renteria        Communicable Diseases (eg STDs):     Last Reviewed on 2020 10:54 AM by Momo Renteria        Current Problems:     Last Reviewed on 2020 10:54 AM by Momo Renteria    Other chest pain    Essential (primary) hypertension    Abnormal results of kidney function studies    Cough    Localized swelling, mass and lump, neck    Mixed hyperlipidemia        Immunizations:     Fluzone (3 + years dose) 10/7/2011    Fluzone (3 + years dose) 10/1/2018    Fluzone Quadrivalent (3+ years) 2019    Zostavax (Zoster live) 2016        Allergies:     Last Reviewed on 2020 10:54 AM by Momo Renteria    Penicillins:          Current Medications:     Last Reviewed on 2020 10:54 AM by Momo Renteria    Simvastatin 20 mg oral tablet [1 tab q day DNS]     Estradiol 1 mg oral tablet [qd]    OTC allergy meds 10mg po qd     OTC stool softner 50 mg 2 qd     Osteo Biflex 2 qd     Ventolin HFA 90mcg/1actuation Oral Inhaler [Inhale 2 puff(s) by mouth 4 times a day as needed]    Meloxicam 7.5 mg oral tablet [1 PO QOD]    losartan 50 mg oral tablet [take 1 tablet (50 mg) by oral route once a day ]    Flonase Allergy Relief 50 mcg/actuation Intranasal Spray, Suspension [inhale 1 spray (50 mcg) in each nostril by intranasal route once daily]    hydroCHLOROthiazide 50 mg oral tablet [take 1 tablet (50 mg) once daily]    oxybutynin chloride 5 mg oral tablet        Objective:        Vitals:         Current: 8/24/2020 10:24:25 AM    Ht:  5 ft, 4 in;  Wt: 191 lbs;  BMI: 32.8T: 98 F (temporal);  BP: 131/51 mm Hg (right arm, sitting);  P: 67 bpm (right arm (BP Cuff), sitting);  sCr: 0.86 mg/dL;  GFR: 71.94        Exams:     PHYSICAL EXAM:     GENERAL: vital signs recorded - well developed, well nourished;  no apparent distress;     EYES: extraocular movements intact; conjunctiva and cornea are normal; PERRL;     E/N/T:  normal EACs, TMs, nasal/oral mucosa, teeth, gingiva, and oropharynx;     NECK: range of motion is normal; thyroid is non-palpable;     RESPIRATORY: normal respiratory rate and pattern with no distress; normal breath sounds with no rales, rhonchi, wheezes or rubs;     CARDIOVASCULAR: normal rate; rhythm is regular;  no systolic murmur; no edema;     GASTROINTESTINAL: nontender; normal bowel sounds; no organomegaly;     LYMPHATIC: no enlargement of cervical or facial nodes; no supraclavicular nodes;     BREAST/INTEGUMENT: SKIN: no significant rashes or lesions; no suspicious moles;     NEUROLOGIC: mental status: alert and oriented x 3; cranial nerves II-XII grossly intact;     PSYCHIATRIC: appropriate affect and demeanor; normal psychomotor function;         Assessment:         R05   Cough       I10   Essential (primary) hypertension       E78.2   Mixed hyperlipidemia        R19.7   Diarrhea, unspecified           ORDERS:         Meds Prescribed:       [New Rx] Singulair 10 mg oral tablet [Take 1 tablet(s) by mouth each evening], #30 (thirty) tablets, Refills: 0 (zero)         Radiology/Test Orders:       [id number]  Radiologic exam chest 2 views  (Send-Out)            3017F  Colorectal CA screen results documented and reviewed (PV)  (In-House)              Lab Orders:       70002  HTNLP - [facility] CMP AND LIPID: [id number], 56092  (Send-Out)            65001  TSH - [facility] TSH  (Send-Out)            81606  BDCB2 - [facility] CBC w/o diff  (Send-Out)            96319  MG - [facility] Magnesium, Serum  (Send-Out)            20250  CDTEX - [facility] Clostridium Difficile Toxins A & B; dna probe  (Send-Out)            54181  STLC - [facility] Stool Culture  (Send-Out)            36088  LACFE - [facility] White Blood Cells (WBC), stool  (Send-Out)              Other Orders:         Depression screen negative  (In-House)            1100F  Pt screen for fall risk; document 2+ falls in the past yr or any fall w/injury in past year (KADEN)  (In-House)            1123F  Advance Care Planning discussed and doc; advance care plan or surrogate decision maker doc. in MR  (Send-Out)              Screening mammogram results documented  (Send-Out)                      Plan:         Cough    LABORATORY:  Labs ordered to be performed today include CBC W/O DIFF.      RADIOLOGY:  I have ordered a chest x-ray (PA and lateral) to be done today.      RECOMMENDATIONS given include: Today, we have reviewed Carolina's care.  We will evaluate her further as noted below as a precaution.  For now, I'm going to add Singulair to see if that is helpful in case some of this is allergy related.  We will also get some stool testing as a precaution.  Consider Colestipol..  MIPS PHQ-9 Depression Screening: Completed form scanned and in chart; Total Score 1; Negative Depression Screen           Prescriptions:       [New Rx] Singulair 10 mg oral tablet [Take 1  tablet(s) by mouth each evening], #30 (thirty) tablets, Refills: 0 (zero)           Orders:       [id number]  Radiologic exam chest 2 views  (Send-Out)            33500  BDCB2 - [facility] CBC w/o diff  (Send-Out)              Depression screen negative  (In-House)            1100F  Pt screen for fall risk; document 2+ falls in the past yr or any fall w/injury in past year (KADEN)  (In-House)            3017F  Colorectal CA screen results documented and reviewed (PV)  (In-House)            1123F  Advance Care Planning discussed and doc; advance care plan or surrogate decision maker doc. in MR  (Send-Out)              Screening mammogram results documented  (Send-Out)              Essential (primary) hypertensionAs above.    LABORATORY:  Labs ordered to be performed today include Magnesium level.            Orders:       [id number]  MG - [facility] Magnesium, Serum  (Send-Out)              Mixed hyperlipidemia    LABORATORY:  Labs ordered to be performed today include HTN/Lipid Panel: CMP, Lipid and TSH.            Orders:       [id number]  HTNLP - [facility] CMP AND LIPID: [id number], [id number]  (Send-Out)            39444  TSH - [facility] TSH  (Send-Out)              Diarrhea, unspecified    LABORATORY:  Labs ordered to be performed today include Diarrhea testing C diff Stool culture WBC Stool.            Orders:       [id number]  CDTEX - [facility] Clostridium Difficile Toxins A & B; dna probe  (Send-Out)            26325  STLC - [facility] Stool Culture  (Send-Out)            09356  LACFE - [facility] White Blood Cells (WBC), stool  (Send-Out)                  Charge Capture:         Primary Diagnosis:     R05  Cough           Orders:      01781  Office/outpatient visit; established patient, level 4  (In-House)              Depression screen negative  (In-House)            1100F  Pt screen for fall risk; document 2+ falls in the past yr or any fall w/injury in past year (KADEN)  (In-House)            3017F  Colorectal CA screen results documented and reviewed (PV)  (In-House)               I10  Essential (primary) hypertension     E78.2  Mixed hyperlipidemia     R19.7  Diarrhea, unspecified

## 2021-05-18 NOTE — PROGRESS NOTES
Carolina Ayers  1955     Office/Outpatient Visit    Visit Date: Tue, Apr 13, 2021 02:43 pm    Provider: Momo Renteria MD (Assistant: Jenn Caba RN)    Location: [facility]        Electronically signed by Momo Renteria MD on  04/14/2021 09:38:56 AM                             Subjective:        CC: blood pressure    HPI:           Ms. Ayers presents with essential (primary) hypertension.  Her current cardiac medication regimen includes a diuretic ( Hydrochlorothiazide (HCTZ) ) and an angiotensin receptor blocker ( Cozaar ).  She is tolerating the medication well without side effects.  Compliance with treatment has been good; she takes her medication as directed and follows up as directed.            Additionally, she presents with history of mixed hyperlipidemia.  current treatment includes Zocor.  Compliance with treatment has been good; she takes her medication as directed.  She denies experiencing any hypercholesterolemia related symptoms.            Carolina is having some neck pain.  She says that the neck crunches periodically with movement, and she will have some pain.  She denies radiating pain to the arms.          She does have nausea as well that bother her.  She can have this in waves for a few days and then it will resolve.  She will have this every 2 months or so.    ROS:     CONSTITUTIONAL:  Negative for chills and fever.      CARDIOVASCULAR:  Negative for chest pain and palpitations.      RESPIRATORY:  Negative for recent cough and dyspnea.      GASTROINTESTINAL:  Negative for abdominal pain, nausea and vomiting.      INTEGUMENTARY/BREAST:  Negative for atypical mole(s) and rash.          Past Medical History / Family History / Social History:         Last Reviewed on 4/13/2021 03:11 PM by Momo Renteria    Past Medical History:                 PAST MEDICAL HISTORY         Hyperlipidemia    Hypertension     Urinary Incontinence             ADVANCE  DIRECTIVES: Living will - Her daughter, Deisy, would make decisions if needed.          PREVENTIVE HEALTH MAINTENANCE             BONE DENSITY: was last done 2018 with normal results     COLORECTAL CANCER SCREENING: Up to date (colonoscopy q10y; sigmoidoscopy q5y; Cologuard q3y) was last done 2017, Results are in chart; colonoscopy with normal results; hyperplastic polyp     MAMMOGRAM: Done within last 2 years and results in are chart was last done 2020 with normal results         Surgical History:         Cholecystectomy    Hysterectomy Other Surgeries    R Rotator Cuff;    Low Back;         Family History:         Positive for Coronary Artery Disease ( father ).           Social History:     Occupation: Homemaker     Marital Status:      Children: 2 children         Tobacco/Alcohol/Supplements:     Last Reviewed on 2021 03:11 PM by Momo Renteria    Tobacco: She has a past history of cigarette smoking; quit date:  .          Alcohol: Frequency:    seldom;         Substance Abuse History:     Last Reviewed on 2021 03:11 PM by Momo Renteria        Mental Health History:     Last Reviewed on 2021 03:11 PM by Momo Renteria        Communicable Diseases (eg STDs):     Last Reviewed on 2021 03:11 PM by Momo Renteria        Current Problems:     Last Reviewed on 2021 03:11 PM by Momo Renteria    Other chest pain    Essential (primary) hypertension    Abnormal results of kidney function studies    Localized swelling, mass and lump, neck    Cough    Diarrhea, unspecified    Mixed hyperlipidemia        Immunizations:     Fluzone (3 + years dose) 10/7/2011    Fluzone (3 + years dose) 10/1/2018    Fluzone Quadrivalent (3+ years) 2019    Zostavax (Zoster live) 2016    Influenza, high dose seasonal 10/1/2020        Allergies:     Last Reviewed on 2021 03:11 PM by Momo Renteria    Penicillins:           Current Medications:     Last Reviewed on 4/13/2021 03:11 PM by Momo Renteria    Simvastatin 20 mg oral tablet [1 tab q day DNS]    Estradiol 1 mg oral tablet [qd]    OTC allergy meds 10mg po qd     OTC stool softner 50 mg 2 qd     Osteo Biflex 2 qd     Ventolin HFA 90mcg/1actuation Oral Inhaler [Inhale 2 puff(s) by mouth 4 times a day as needed]    losartan 50 mg oral tablet [Take 1 tablet by mouth once daily]    Flonase Allergy Relief 50 mcg/actuation Intranasal Spray, Suspension [inhale 1 spray (50 mcg) in each nostril by intranasal route once daily]    hydroCHLOROthiazide 25 mg oral tablet [Take 1 tablet by mouth once daily]        Objective:        Vitals:         Current: 4/13/2021 2:47:50 PM    Ht:  5 ft, 4 in;  Wt: 192.4 lbs;  BMI: 33.0T: 97.8 F (temporal);  BP: 159/64 mm Hg (right arm, sitting);  P: 73 bpm (right arm (BP Cuff), sitting);  sCr: 0.82 mg/dL;  GFR: 74.71        Repeat:     3:20:1 PM  BP:   145/72mm Hg (right arm, sitting, p-71)     Exams:     PHYSICAL EXAM:     GENERAL: vital signs recorded - well developed, well nourished;  no apparent distress;     E/N/T: EARS:  normal external auditory canals and tympanic membranes;  grossly normal hearing;     NECK: range of motion is normal; thyroid is non-palpable;     RESPIRATORY: normal respiratory rate and pattern with no distress; normal breath sounds with no rales, rhonchi, wheezes or rubs;     CARDIOVASCULAR: normal rate; rhythm is regular;  no systolic murmur; no edema;     GASTROINTESTINAL: nontender; normal bowel sounds; no organomegaly;     LYMPHATIC: no enlargement of cervical or facial nodes; no supraclavicular nodes;     BREAST/INTEGUMENT: SKIN: no significant rashes or lesions; no suspicious moles;     NEUROLOGIC: mental status: alert and oriented x 3; cranial nerves II-XII grossly intact;     PSYCHIATRIC: appropriate affect and demeanor; normal psychomotor function;         Assessment:         I10   Essential (primary) hypertension        E78.2   Mixed hyperlipidemia       M54.2   Cervicalgia       R11.0   Nausea           ORDERS:         Meds Prescribed:       [Refilled] simvastatin 20 mg oral tablet [take 1 tablet (20 mg) by oral route once daily in the evening], #90 (ninety) tablets, Refills: 3 (three)       [Refilled] hydroCHLOROthiazide 25 mg oral tablet [take 1 tablet (25 mg) by oral route once daily], #90 (ninety) tablets, Refills: 3 (three)       [Refilled] losartan 50 mg oral tablet [take 1 tablet (50 mg) by oral route once daily], #90 (ninety) tablets, Refills: 3 (three)         Radiology/Test Orders:       [id number]  Radiologic examination, spine, cervical; minimum of four views  (Send-Out)              Lab Orders:       [id number]  COMP - [facility] Comp. Metabolic Panel  (Send-Out)            99312  AMYS - H Amylase, Serum  (Send-Out)            77406  HPUBT - [facility] H.pylori Breath test  (Send-Out)            87836  LIP - [facility] Lipase, Serum  (Send-Out)            98463  BDCB2 - H CBC w/o diff  (Send-Out)            58975  CK - [facility]- CK total  (Send-Out)                      Plan:         Essential (primary) hypertension        RECOMMENDATIONS given include: Today, we will refill medications as noted.  Blood pressure to be rechecked.  Labs as noted.  We will see what the testing shows and be back in touch with her..            Prescriptions:       [Refilled] simvastatin 20 mg oral tablet [take 1 tablet (20 mg) by oral route once daily in the evening], #90 (ninety) tablets, Refills: 3 (three)       [Refilled] hydroCHLOROthiazide 25 mg oral tablet [take 1 tablet (25 mg) by oral route once daily], #90 (ninety) tablets, Refills: 3 (three)       [Refilled] losartan 50 mg oral tablet [take 1 tablet (50 mg) by oral route once daily], #90 (ninety) tablets, Refills: 3 (three)         Mixed hyperlipidemia    LABORATORY:  Labs ordered to be performed today include CK, total and Comprehensive metabolic panel.            Orders:       [id number]  COMP - [facility] Comp.  Metabolic Panel  (Send-Out)            91474  CK - [facility]- CK total  (Send-Out)              Cervicalgia        RADIOLOGY:  I have ordered a C-Spine x-ray series to be done today.            Orders:       [id number]  Radiologic examination, spine, cervical; minimum of four views  (Send-Out)              Nausea    LABORATORY:  Labs ordered to be performed today include amylase, CBC W/O DIFF, H. pylori breath test, and Lipase.            Orders:       [id number]  AMYS - [facility] Amylase, Serum  (Send-Out)            55300  HPUBT - [facility] H.pylori Breath test  (Send-Out)            73560  LIP - [facility] Lipase, Serum  (Send-Out)            97077  BDCB2 - [facility] CBC w/o diff  (Send-Out)                  Charge Capture:         Primary Diagnosis:     I10  Essential (primary) hypertension           Orders:      [id number]  Office/outpatient visit; established patient, level 4  (In-House)              E78.2  Mixed hyperlipidemia     M54.2  Cervicalgia     R11.0  Nausea

## 2021-05-18 NOTE — PROGRESS NOTES
Carolina Ayers 1955     Office/Outpatient Visit    Visit Date:  01:24 pm    Provider: Momo Renteria MD (Assistant: Jenn Caba RN)    Location: [facility]        Electronically signed by Momo Renteria MD on  2019 07:36:46 AM                             SUBJECTIVE:        CC: 'I have a pain'         HPI:     Carolina is in today for evaluation of R sided back and chest pain.  This developed two days ago.  She is not aware of any injury that might explain it.  She says the pain is in the area of the R shoulder blade and then radiates to the R lateral rib area.  She says that the pain is constant.  She has had some cough with this and was previously treated for upper respiratory infection.  She was treated in December for cough with antibiotic.  She got Keflex and cough medication in January.  She is concerned because this has recurred.  Her cough is not productive.  She has not had fever or chills with this.  She is not sure what to make of it.     ROS:     CONSTITUTIONAL:  Negative for chills and fever.      E/N/T:  Negative for diminished hearing and nasal congestion.      CARDIOVASCULAR:  Negative for chest pain and palpitations.      GASTROINTESTINAL:  Negative for abdominal pain, nausea and vomiting.      INTEGUMENTARY/BREAST:  Negative for atypical mole(s) and rash.          PMH/FMH/SH:     Last Reviewed on 2019 01:36 PM by Momo Renteria    Past Medical History:         Hyperlipidemia    Hypertension     Urinary Incontinence         Surgical History:         Cholecystectomy    Hysterectomy Other Surgeries    R Rotator Cuff;    Low Back;         Family History:         Positive for Coronary Artery Disease ( father ).           Social History:     Occupation: Homemaker     Marital Status:      Children: 2 children         Tobacco/Alcohol/Supplements:     Last Reviewed on 2019 01:36 PM by Momo Renteria    Tobacco: She has a  past history of cigarette smoking; quit date:  9-2010.          Alcohol: Frequency:    seldom;         Substance Abuse History:     Last Reviewed on 1/31/2019 01:36 PM by Momo Renteria        Mental Health History:     Last Reviewed on 1/31/2019 01:36 PM by Momo Renteria        Communicable Diseases (eg STDs):     Last Reviewed on 1/31/2019 01:36 PM by Momo Renteria            Current Problems:     Last Reviewed on 1/31/2019 01:36 PM by Momo Renteria    High cholesterol     Essential hypertension, benign         Immunizations:     Fluzone (3 + years dose) 10/7/2011     Fluzone (3 + years dose) 10/1/2018     Zostavax (Zoster live) 9/20/2016         Allergies:     Last Reviewed on 1/31/2019 01:36 PM by Momo Renteria    Penicillins:        Current Medications:     Last Reviewed on 1/31/2019 01:36 PM by Momo Renteria    Estradiol 1mg Tablet qd     Simvastatin 20mg Tablet 1 tab q day DNS     Vitamin B12     Calcium 600 Tablet 1 qam     Osteo Biflex 2 qd     OTC allergy meds 10mg po qd     OTC stool softner 50 mg 2 qd     Vitamin D3 1,000IU Tablet Take 1 tablet(s) by mouth daily     Vitamin E 1,000IU Capsules Take 1 capsule(s) by mouth daily     Oxybutynin Chloride 5mg Tablet 1 tab daily         OBJECTIVE:        Vitals:         Current: 1/31/2019 1:31:24 PM    Ht:  5 ft, 4 in;  Wt: 192 lbs;  BMI: 33.0    T: 98.4 F (oral);  BP: 161/66 mm Hg (left arm, sitting);  P: 80 bpm (left arm (BP Cuff), sitting);  sCr: 0.96 mg/dL;  GFR: 65.42    O2 Sat: 98 % (room air)        Exams:     PHYSICAL EXAM:     GENERAL: vital signs recorded - well developed, well nourished;  no apparent distress;     EYES: extraocular movements intact; conjunctiva and cornea are normal; PERRLA;     E/N/T:  normal EACs, TMs, nasal/oral mucosa, teeth, gingiva, and oropharynx;     NECK: range of motion is normal; thyroid is non-palpable;     RESPIRATORY: normal respiratory rate and pattern with no  distress; normal breath sounds with no rales, rhonchi, wheezes or rubs;     CARDIOVASCULAR: normal rate; rhythm is regular;  no systolic murmur; no edema;     GASTROINTESTINAL: nontender; normal bowel sounds; no organomegaly;     BREAST/INTEGUMENT: SKIN: no significant rashes or lesions; no suspicious moles;     MUSCULOSKELETAL: there is tenderness over the R posterior back near the shoulder blade - some tenderness over the R lateral rib cage;         ASSESSMENT           786.59   R07.89  Atypical chest pain              DDx:         ORDERS:         Meds Prescribed:       Levaquin (Levofloxacin ) 500mg Tablet Take 1 tablet(s) by mouth daily for 10 days  #10 (Ten) tablet(s) Refills: 0       Ventolin HFA (Albuterol) 90mcg/1actuation Oral Inhaler Inhale 2 puff(s) by mouth 4 times a day as needed  #1 (One) inhaler(s) Refills: 1         Radiology/Test Orders:       [id number]  Radiologic exam chest 2 views  (Send-Out)         3014F  Screening mammography results documented and reviewed (PV)1  (In-House)         3017F  Colorectal CA screen results documented and reviewed (PV)  (In-House)           Other Orders:       97079  Noninvasive ear or pulse oximetry for oxygen saturation; single determination  (In-House)           Calculated BMI above the upper parameter and a follow-up plan was documented in the medical record  (In-House)                   PLAN:          Atypical chest pain         RADIOLOGY:  I have ordered a chest x-ray (PA and lateral) to be done today.      TESTS/PROCEDURES:  Will proceed with Pulse Oximetry (O2 SAT) to be performed/scheduled now.      RECOMMENDATIONS given include: Her X-ray has a somewhat unusual appearance.  I'm concerned about an atypical pneumonia given the ongoing cough and congestion.  We will cover her with Levaquin as noted.  Rare risk of aneurysm is reviewed, but she seems at low risk for that.  We will contact her next week and see how she is feeling.  If she is starting to  improve, then I would complete that course and then consider repeat X-ray..  MIPS     MAMMOGRAM: Done within last 2 years and results in are chart     COLORECTAL CANCER SCREENING: Results are in chart     BMI Elevated - Follow-Up Plan: She was provided education on weight loss strategies           Prescriptions:       Levaquin (Levofloxacin ) 500mg Tablet Take 1 tablet(s) by mouth daily for 10 days  #10 (Ten) tablet(s) Refills: 0       Ventolin HFA (Albuterol) 90mcg/1actuation Oral Inhaler Inhale 2 puff(s) by mouth 4 times a day as needed  #1 (One) inhaler(s) Refills: 1           Orders:       [id number]  Radiologic exam chest 2 views  (Send-Out)         60562  Noninvasive ear or pulse oximetry for oxygen saturation; single determination  (In-House)         3014F  Screening mammography results documented and reviewed (PV)1  (In-House)           Calculated BMI above the upper parameter and a follow-up plan was documented in the medical record  (In-House)         3017F  Colorectal CA screen results documented and reviewed (PV)  (In-House)             Patient Education Handouts:       Chest Pain              CHARGE CAPTURE           **Please note: ICD descriptions below are intended for billing purposes only and may not represent clinical diagnoses**        Primary Diagnosis:         786.59 Atypical chest pain            R07.89    Other chest pain              Orders:          64287   Office/outpatient visit; established patient, level 4  (In-House)             86557   Noninvasive ear or pulse oximetry for oxygen saturation; single determination  (In-House)             3014F   Screening mammography results documented and reviewed (PV)1  (In-House)                Calculated BMI above the upper parameter and a follow-up plan was documented in the medical record  (In-House)             3017F   Colorectal CA screen results documented and reviewed (PV)  (In-House)

## 2021-05-18 NOTE — PROGRESS NOTES
Carolina Ayers 1955     Office/Outpatient Visit    Visit Date: Tue, May 21, 2019 12:59 pm    Provider: Momo Renteria MD (Assistant: Jenn Caba RN)    Location: [facility]        Electronically signed by Momo Renteria MD on  05/21/2019 10:20:32 PM                             SUBJECTIVE:        CC: edema         HPI:     Carolina is in today for follow up on edema.  She has been struggling with increased edema for the last 2 months or so.  She has seen pulmonology and cardiology.  They did some evaluation, but neither recommended additional medication.  She says that her legs do go down at night.  They swell early in the morning.  She was also told to prop the legs up at least an hour each day.  She has previously been on Lasix for edema.  It looks like she may have had her kidney function checked last week, but she did not have other blood work done.     ROS:     CONSTITUTIONAL:  Negative for chills and fever.      CARDIOVASCULAR:  Negative for chest pain and palpitations.      RESPIRATORY:  Negative for recent cough and dyspnea.      GASTROINTESTINAL:  Negative for abdominal pain, nausea and vomiting.      INTEGUMENTARY/BREAST:  Negative for atypical mole(s) and rash.          PMH/FM/SH:     Last Reviewed on 5/21/2019 01:27 PM by Momo Renteria    Past Medical History:             PAST MEDICAL HISTORY         Hyperlipidemia    Hypertension     Urinary Incontinence             PAST MEDICAL HISTORY             PREVENTIVE HEALTH MAINTENANCE             BONE DENSITY: was last done 03/2018 with normal results     COLORECTAL CANCER SCREENING: Up to date (colonoscopy q10y; sigmoidoscopy q5y; Cologuard q3y) was last done 03/2017, Results are in chart; colonoscopy with normal results; hyperplastic polyp     MAMMOGRAM: Done within last 2 years and results in are chart was last done 03/2018 with normal results         Surgical History:         Cholecystectomy    Hysterectomy Other Surgeries     R Rotator Cuff;    Low Back;         Family History:         Positive for Coronary Artery Disease ( father ).           Social History:     Occupation: Homemaker     Marital Status:      Children: 2 children         Tobacco/Alcohol/Supplements:     Last Reviewed on 2019 01:27 PM by Momo Renteria    Tobacco: She has a past history of cigarette smoking; quit date:  .          Alcohol: Frequency:    seldom;         Substance Abuse History:     Last Reviewed on 2019 01:27 PM by Momo Renteria        Mental Health History:     Last Reviewed on 2019 01:27 PM by Momo Renteria        Communicable Diseases (eg STDs):     Last Reviewed on 2019 01:27 PM by Momo Renteria            Current Problems:     Last Reviewed on 2019 01:27 PM by Momo Renteria    High cholesterol     Essential hypertension, benign     Pedal edema     Atypical chest pain         Immunizations:     Fluzone (3 + years dose) 10/7/2011     Fluzone (3 + years dose) 10/1/2018     Zostavax (Zoster live) 2016         Allergies:     Last Reviewed on 2019 01:27 PM by Momo Renteria    Penicillins:        Current Medications:     Last Reviewed on 2019 01:27 PM by Momo Renteria    Ventolin HFA 90mcg/1actuation Oral Inhaler Inhale 2 puff(s) by mouth 4 times a day as needed     Estradiol 1mg Tablet qd     Simvastatin 20mg Tablet 1 tab q day DNS     Oxybutynin Chloride 5mg Tablet 1 tab daily     Vitamin B12     Calcium 600 Tablet 1 qam     Osteo Biflex 2 qd     OTC allergy meds 10mg po qd     OTC stool softner 50 mg 2 qd     Vitamin D3 1,000IU Tablet Take 1 tablet(s) by mouth daily     Vitamin E 1,000IU Capsules Take 1 capsule(s) by mouth daily     Meloxicam 7.5mg Tablet 1 PO QOD         OBJECTIVE:        Vitals:         Current: 2019 1:03:56 PM    Ht:  5 ft, 4 in;  Wt: 198.8 lbs;  BMI: 34.1    T: 97.8 F (oral);  BP: 142/53 mm Hg (right arm,  sitting);  P: 74 bpm (right arm (BP Cuff), sitting);  sCr: 0.96 mg/dL;  GFR: 66.40        Repeat:     1:36:43 PM     BP:   136/53mm Hg (right arm, sitting)         Exams:     PHYSICAL EXAM:     GENERAL: vital signs recorded - well developed,  moderately obese;  no apparent distress;     EYES: extraocular movements intact; conjunctiva and cornea are normal; PERRLA;     E/N/T:  normal EACs, TMs, nasal/oral mucosa, teeth, gingiva, and oropharynx;     NECK: range of motion is normal; thyroid is non-palpable;     RESPIRATORY: normal respiratory rate and pattern with no distress; normal breath sounds with no rales, rhonchi, wheezes or rubs;     CARDIOVASCULAR: normal rate; rhythm is regular;  no systolic murmur; 1+ pedal edema;     GASTROINTESTINAL: nontender; normal bowel sounds; no organomegaly;     LYMPHATIC: no enlargement of cervical or facial nodes; no supraclavicular nodes;     BREAST/INTEGUMENT: SKIN: no significant rashes or lesions; no suspicious moles;         ASSESSMENT           782.3   R60.0  Pedal edema              DDx:         ORDERS:         Meds Prescribed:       Furosemide 20mg Tablets Take 1 tablet(s) by mouth daily  #30 (Thirty) tablet(s) Refills: 2       Potassium Chloride 10mEq Tablets, Extended Release Take 1 tablet(s) by mouth daily  #30 (Thirty) tablet(s) Refills: 2         Radiology/Test Orders:       3014F  Screening mammography results documented and reviewed (PV)1  (In-House)         3017F  Colorectal CA screen results documented and reviewed (PV)  (In-House)           Lab Orders:       [id number]  COMP - [facility] Comp. Metabolic Panel  (Send-Out)         75946  MG - [facility] Magnesium, Serum  (Send-Out)         97837  THYII - H Thyroid panel with TSH (61901, 64739)  (Send-Out)           Other Orders:         Calculated BMI above the upper parameter and a follow-up plan was documented in the medical record  (In-House)                   PLAN:          Pedal edema     LABORATORY:  Labs ordered to be  performed today include Comprehensive metabolic panel, Magnesium level, and Thyroid Panel.      RECOMMENDATIONS given include: Today, we have reviewed her care.  I'm going to recommend she really work on diet and gradual weight loss.  We will see how things play out for her.  Because of the edema and some mild elevation of blood pressure, we will add a regular dose of furosemide.  No other change for now.  We will contact her after the labs come back..  MIPS     MAMMOGRAM: Done within last 2 years and results in are chart     COLORECTAL CANCER SCREENING: Results are in chart     BMI Elevated - Follow-Up Plan: She was provided education on weight loss strategies           Prescriptions:       Furosemide 20mg Tablets Take 1 tablet(s) by mouth daily  #30 (Thirty) tablet(s) Refills: 2       Potassium Chloride 10mEq Tablets, Extended Release Take 1 tablet(s) by mouth daily  #30 (Thirty) tablet(s) Refills: 2           Orders:       [id number]  COMP - [facility] Comp. Metabolic Panel  (Send-Out)         55788  MG - [facility] Magnesium, Serum  (Send-Out)         49036  THYII - [facility] Thyroid panel with TSH (71649, 32735)  (Send-Out)         3014F  Screening mammography results documented and reviewed (PV)1  (In-House)         3017F  Colorectal CA screen results documented and reviewed (PV)  (In-House)           Calculated BMI above the upper parameter and a follow-up plan was documented in the medical record  (In-House)             Patient Education Handouts:       [facility] Medication Compliance              CHARGE CAPTURE           **Please note: ICD descriptions below are intended for billing purposes only and may not represent clinical diagnoses**        Primary Diagnosis:         782.3 Pedal edema            R60.0    Localized edema              Orders:          [id number]   Office/outpatient visit; established patient, level 4  (In-House)             3014F   Screening mammography results documented and reviewed (PV)1  (In-House)              3017F   Colorectal CA screen results documented and reviewed (PV)  (In-House)                Calculated BMI above the upper parameter and a follow-up plan was documented in the medical record  (In-House)

## 2021-05-18 NOTE — PROGRESS NOTES
Carolina Ayers  1955     Office/Outpatient Visit    Visit Date: Wed, Jul 29, 2020 03:29 pm    Provider: Franki Doyle MD (Assistant: Jenn Caba RN)    Location: [facility]        Electronically signed by Franki Doyle MD on  07/29/2020 06:35:04 PM                             Subjective:        CC: Ms. Ayers is a 65 year old White female.  dry cough;         HPI:       Pt has a dry cough for 2 months or longer. Initially tongue felt funny. She started lisinopril 3 months ago. Cough is nagging and better with cough medicine. She has a feeling like something is on the left side of her throat for about 2-3 weeks, she notices this when she swallows.      BP today is 150/66 with a HR of 63. She is on lisinopril 20 mg qd, She started this 3 months ago.       Pt has swelling in left mid-neck area that she notices when swallowing. This appeared a couple days ago and was also present a couple years ago,     ROS:     CONSTITUTIONAL:  Negative for fatigue and fever.      EYES:  Negative for blurred vision.      E/N/T:  Positive for sore throat.   Negative for diminished hearing or nasal congestion.      CARDIOVASCULAR:  Negative for chest pain and palpitations.      RESPIRATORY:  Positive for persistent cough ( typically dry ).   Negative for dyspnea.      GASTROINTESTINAL:  Positive for dysphagia.   Negative for abdominal pain, constipation, diarrhea, nausea or vomiting.      MUSCULOSKELETAL:  Negative for arthralgias and myalgias.      NEUROLOGICAL:  Negative for weakness.      PSYCHIATRIC:  Negative for anxiety, depression and sleep disturbance.          Past Medical History / Family History / Social History:         Last Reviewed on 7/29/2020 06:33 PM by Franki Doyle    Past Medical History:             PAST MEDICAL HISTORY         Hyperlipidemia    Hypertension     Urinary Incontinence             PAST MEDICAL HISTORY             PREVENTIVE HEALTH MAINTENANCE             BONE  DENSITY: was last done 2018 with normal results     COLORECTAL CANCER SCREENING: Up to date (colonoscopy q10y; sigmoidoscopy q5y; Cologuard q3y) was last done 2017, Results are in chart; colonoscopy with normal results; hyperplastic polyp     MAMMOGRAM: Done within last 2 years and results in are chart was last done 2018 with normal results         Surgical History:         Cholecystectomy    Hysterectomy Other Surgeries    R Rotator Cuff;    Low Back;         Family History:         Positive for Coronary Artery Disease ( father ).           Social History:     Occupation: Homemaker     Marital Status:      Children: 2 children         Tobacco/Alcohol/Supplements:     Last Reviewed on 2020 06:33 PM by Franki Doyle    Tobacco: She has a past history of cigarette smoking; quit date:  .          Alcohol: Frequency:    seldom;         Substance Abuse History:     Last Reviewed on 2020 06:33 PM by Franki Doyle        Mental Health History:     Last Reviewed on 2020 06:33 PM by Franki Doyle        Communicable Diseases (eg STDs):     Last Reviewed on 2020 06:33 PM by Franki Doyle        Current Problems:     Last Reviewed on 2020 06:33 PM by Franki Doyle    Other chest pain    Essential (primary) hypertension    Abnormal results of kidney function studies    Cough    Localized swelling, mass and lump, neck        Immunizations:     Fluzone (3 + years dose) 10/7/2011    Fluzone (3 + years dose) 10/1/2018    Fluzone Quadrivalent (3+ years) 2019    Zostavax (Zoster live) 2016        Allergies:     Last Reviewed on 2020 06:33 PM by Franki Doyle    Penicillins:          Current Medications:     Last Reviewed on 2020 06:33 PM by Franki Doyle    Simvastatin 20 mg oral tablet [1 tab q day DNS]    Estradiol 1 mg oral tablet [qd]    OTC allergy meds 10mg po qd     OTC stool softner 50 mg 2 qd     Osteo Biflex 2 qd     Ventolin HFA  90mcg/1actuation Oral Inhaler [Inhale 2 puff(s) by mouth 4 times a day as needed]    Meloxicam 7.5 mg oral tablet [1 PO QOD]    LISINOPRIL 20MG     TAB [TAKE 1 TABLET BY MOUTH ONCE DAILY]        Objective:        Vitals:         Current: 7/29/2020 3:35:11 PM    Ht:  5 ft, 4 in;  Wt: 195 lbs;  BMI: 33.5T: 97.3 F (temporal);  BP: 150/66 mm Hg (right arm, sitting);  P: 63 bpm (right arm (BP Cuff), sitting);  sCr: 0.86 mg/dL;  GFR: 72.58        Exams:     PHYSICAL EXAM:     GENERAL: vital signs recorded - well developed, obese;  well groomed;  no apparent distress;     EYES: extraocular movements intact; conjunctiva and cornea are normal; PERRL;     NECK: subtle swelling onf left mid-anterior neck;     RESPIRATORY: normal respiratory rate and pattern with no distress;     CARDIOVASCULAR: normal rate; rhythm is regular;     GASTROINTESTINAL: nontender;     NEUROLOGIC: mental status: alert and oriented x 3; GROSSLY INTACT     PSYCHIATRIC: appropriate affect and demeanor; normal psychomotor function;         Assessment:         R05   Cough       I10   Essential (primary) hypertension       R22.1   Localized swelling, mass and lump, neck           ORDERS:         Meds Prescribed:       [Refilled] losartan 50 mg oral tablet [take 1 tablet (50 mg) by oral route once a day ], #90 (ninety) tablets, Refills: 0 (zero)       [New Rx] Flonase Allergy Relief 50 mcg/actuation Intranasal Spray, Suspension [inhale 1 spray (50 mcg) in each nostril by intranasal route once daily], #16 (sixteen) milliliters, Refills: 1 (one)         Radiology/Test Orders:       [id number]  COVID 19 Testing [facility]  (Send-Out)            13246  US, soft tissues of head & neck (eg, thyroid, parathyroid, parotid), real time w/image documentation  (Send-Out)                      Plan:         CoughThis ay be an ACE cough so she is switched from lisinopril 20 mg qd to losartan 50 mg qd. Neck US is ordered for swelling in neck that has been present prior to onset of  cough. Flonase is added to zyrtec for allergies. If cough persists, consider CXR.           Orders:       [id number]  COVID 19 Testing [facility]  (Send-Out)              Essential (primary) hypertensionAs above, lisinopril is switched to losartan for likely ACE cough.           Prescriptions:       [Refilled] losartan 50 mg oral tablet [take 1 tablet (50 mg) by oral route once a day ], #90 (ninety) tablets, Refills: 0 (zero)         Localized swelling, mass and lump, neckUS ordered to assess swelling in left mid-neck.         RADIOLOGY:  I have ordered Neck Ultrasound to be done today.            Prescriptions:       [New Rx] Flonase Allergy Relief 50 mcg/actuation Intranasal Spray, Suspension [inhale 1 spray (50 mcg) in each nostril by intranasal route once daily], #16 (sixteen) milliliters, Refills: 1 (one)           Orders:       [id number]  US, soft tissues of head & neck (eg, thyroid, parathyroid, parotid), real time w/image documentation  (Send-Out)                  Charge Capture:         Primary Diagnosis:     R05  Cough           Orders:      17175  Office/outpatient visit; established patient, level 4  (In-House)              I10  Essential (primary) hypertension     R22.1  Localized swelling, mass and lump, neck

## 2021-05-18 NOTE — PROGRESS NOTES
Carolina Ayers 1955     Office/Outpatient Visit    Visit Date: Wed, Jun 12, 2019 11:41 am    Provider: Clara Vasques N.P. (Assistant: Emma Moser MA)    Location: [facility]        Electronically signed by Clara Vasques N.P. on  06/12/2019 12:10:09 PM                             SUBJECTIVE:        CC:     Ms. Ayers is a 64 year old White female.  presents today due to complaints of cough, sneezing, chest congestion X 1 week (not taking Vit b12, D, E, Calcium)         HPI:         Upper respiratory illness noted.  These have been present for the past one week.  The symptoms include chest congestion, dry cough, sneezing, hoarseness and sore throat.  She has already tried to relieve the symptoms with antihistamines ( Zyrtec ).      ROS:     CONSTITUTIONAL:  Negative for chills and fever.      EYES:  Negative for eye drainage and eye pain.      E/N/T:  Positive for hoarseness, sinus pressure and sore throat.   Negative for ear pain.      CARDIOVASCULAR:  Negative for chest pain and palpitations.      RESPIRATORY:  Positive for recent cough.   Negative for dyspnea or frequent wheezing.          PMH/FMH/SH:     Last Reviewed on 5/21/2019 01:27 PM by Momo Renteria    Past Medical History:             PAST MEDICAL HISTORY         Hyperlipidemia    Hypertension     Urinary Incontinence             PAST MEDICAL HISTORY             PREVENTIVE HEALTH MAINTENANCE             BONE DENSITY: was last done 03/2018 with normal results     COLORECTAL CANCER SCREENING: Up to date (colonoscopy q10y; sigmoidoscopy q5y; Cologuard q3y) was last done 03/2017, Results are in chart; colonoscopy with normal results; hyperplastic polyp     MAMMOGRAM: Done within last 2 years and results in are chart was last done 03/2018 with normal results         Surgical History:         Cholecystectomy    Hysterectomy Other Surgeries    R Rotator Cuff;    Low Back;         Family History:         Positive for Coronary  Artery Disease ( father ).           Social History:     Occupation: Homemaker     Marital Status:      Children: 2 children         Tobacco/Alcohol/Supplements:     Last Reviewed on 2019 01:27 PM by Momo Renteria    Tobacco: She has a past history of cigarette smoking; quit date:  .          Alcohol: Frequency:    seldom;         Substance Abuse History:     Last Reviewed on 2019 01:27 PM by Momo Renteria        Mental Health History:     Last Reviewed on 2019 01:27 PM by Momo Renteria        Communicable Diseases (eg STDs):     Last Reviewed on 2019 01:27 PM by Momo Renteria            Current Problems:     Last Reviewed on 2019 01:27 PM by Momo Renteria    High cholesterol     Essential hypertension, benign     Pedal edema     Atypical chest pain         Immunizations:     Fluzone (3 + years dose) 10/7/2011     Fluzone (3 + years dose) 10/1/2018     Zostavax (Zoster live) 2016         Allergies:     Last Reviewed on 2019 01:27 PM by Momo Renteria    Penicillins:        Current Medications:     Last Reviewed on 2019 01:27 PM by Momo Renteria    Furosemide 20mg Tablets Take 1 tablet(s) by mouth daily     Potassium Chloride 10mEq Tablets, Extended Release Take 1 tablet(s) by mouth daily     Ventolin HFA 90mcg/1actuation Oral Inhaler Inhale 2 puff(s) by mouth 4 times a day as needed     Estradiol 1mg Tablet qd     Simvastatin 20mg Tablet 1 tab q day DNS     Meloxicam 7.5mg Tablet 1 PO QOD     Oxybutynin Chloride 5mg Tablet 1 tab daily     Vitamin B12     Calcium 600 Tablet 1 qam     Osteo Biflex 2 qd     OTC allergy meds 10mg po qd     OTC stool softner 50 mg 2 qd     Vitamin D3 1,000IU Tablet Take 1 tablet(s) by mouth daily     Vitamin E 1,000IU Capsules Take 1 capsule(s) by mouth daily         OBJECTIVE:        Vitals:         Current: 2019 11:46:45 AM    Ht:  5 ft, 4 in;  Wt: 193.4 lbs;   BMI: 33.2    T: 98.1 F (oral);  BP: 146/83 mm Hg (right arm, sitting);  P: 78 bpm (right arm (BP Cuff), sitting);  sCr: 1.05 mg/dL;  GFR: 60.00        Repeat:     11:47:25 AM     BP:   139/69mm Hg (right arm, sitting)         Exams:     PHYSICAL EXAM:     GENERAL: well developed, well nourished;  no apparent distress;     EYES: PERRL, EOMI     E/N/T: EARS: external auditory canal normal;  both TMs are dull;  NOSE: normal turbinates; bilateral maxillary sinus tenderness present; OROPHARYNX: oral mucosa is normal; posterior pharynx shows no exudate and post nasal drip;     NECK: range of motion is normal; trachea is midline;     RESPIRATORY: normal respiratory rate and pattern with no distress; normal breath sounds with no rales, rhonchi, wheezes or rubs;     CARDIOVASCULAR: normal rate; rhythm is regular;     NEUROLOGIC: mental status: alert and oriented x 3; GROSSLY INTACT     PSYCHIATRIC: appropriate affect and demeanor;         ASSESSMENT           461.8   J01.90  Acute sinusitis, other              DDx:         ORDERS:         Meds Prescribed:       Refill of: Ventolin HFA (Albuterol) 90mcg/1actuation Oral Inhaler Inhale 2 puff(s) by mouth 4 times a day as needed  #1 (One) inhaler(s) Refills: 0       Tessalon Perles (Benzonatate) 100mg Capsules One PO Q 8 hours PRN cough  #30 (Thirty) capsule(s) Refills: 0       Doxycycline Monohydrate 100mg Capsules Take 1 capsule(s) by mouth bid x10 days  #20 (Twenty) capsule(s) Refills: 0                 PLAN:          Acute sinusitis, other         RECOMMENDATIONS given include: Push Fluids, Rest, Follow up if no improvement or worsening symptoms like high fevers, vomiting, weakness, or increasing shortness of air.    .      FOLLOW-UP: Chronic visit follow up           Prescriptions: Advised that cough medication may cause drowsiness.       Refill of: Ventolin HFA (Albuterol) 90mcg/1actuation Oral Inhaler Inhale 2 puff(s) by mouth 4 times a day as needed  #1 (One) inhaler(s)  Refills: 0       Tessalon Perles (Benzonatate) 100mg Capsules One PO Q 8 hours PRN cough  #30 (Thirty) capsule(s) Refills: 0       Doxycycline Monohydrate 100mg Capsules Take 1 capsule(s) by mouth bid x10 days  #20 (Twenty) capsule(s) Refills: 0             CHARGE CAPTURE           **Please note: ICD descriptions below are intended for billing purposes only and may not represent clinical diagnoses**        Primary Diagnosis:         461.8 Acute sinusitis, other            J01.90    Acute sinusitis, unspecified              Orders:          21419   Office/outpatient visit; established patient, level 3  (In-House)

## 2021-05-18 NOTE — PROGRESS NOTES
Caridad Ayersan RODRIGUEZ  1955     Office/Outpatient Visit    Visit Date: Fri, May 15, 2020 03:56 pm    Provider: Momo Renteria MD (Assistant: Jenn Caba RN)    Location: [facility]        Electronically signed by Momo Renteria MD on  05/18/2020 05:56:21 AM                             Subjective:        CC: elevated creatinine        TELEMEDICINE VISIT:    - Carolina consented to this telemedicine visit.    - Persons present during the telemedicine consultation include:  Carolina - patient, Dr. Rneteria    - This visit is being conducted over FaceTime with audio and video.    HPI:       Carolina is being seen today for follow up on an elevated creatinine level.  She has a baseline creatinine of around 0.85 or so.  At any rate, she has had a couple of recent blood tests that showed the creatinine to be 1.08 and then 1.04.  Dr. Nathan from cardiology has Carolina taking lisinopril and expressed concerns about her kidney function.  Carolina denies family history of renal disease that is significant.  She denies any personal history of kidney problems either.  She has normal urine function at this time.          Additionally, she presents with history of essential (primary) hypertension.  her current cardiac medication regimen includes an ACE inhibitor ( Zestril ).  She is tolerating the medication well without side effects.  Compliance with treatment has been good; she takes her medication as directed and follows up as directed.      ROS:     CONSTITUTIONAL:  Negative for chills and fever.      CARDIOVASCULAR:  Negative for chest pain and palpitations.      RESPIRATORY:  Negative for recent cough and dyspnea.      GASTROINTESTINAL:  Negative for abdominal pain, nausea and vomiting.      INTEGUMENTARY/BREAST:  Negative for atypical mole(s) and rash.          Past Medical History / Family History / Social History:         Last Reviewed on 5/15/2020 04:10 PM by Momo Renteria    Past Medical History:              PAST MEDICAL HISTORY         Hyperlipidemia    Hypertension     Urinary Incontinence             PAST MEDICAL HISTORY             PREVENTIVE HEALTH MAINTENANCE             BONE DENSITY: was last done 2018 with normal results     COLORECTAL CANCER SCREENING: Up to date (colonoscopy q10y; sigmoidoscopy q5y; Cologuard q3y) was last done 2017, Results are in chart; colonoscopy with normal results; hyperplastic polyp     MAMMOGRAM: Done within last 2 years and results in are chart was last done 2018 with normal results         Surgical History:         Cholecystectomy    Hysterectomy Other Surgeries    R Rotator Cuff;    Low Back;         Family History:         Positive for Coronary Artery Disease ( father ).           Social History:     Occupation: Homemaker     Marital Status:      Children: 2 children         Tobacco/Alcohol/Supplements:     Last Reviewed on 5/15/2020 04:10 PM by Momo Renteria    Tobacco: She has a past history of cigarette smoking; quit date:  .          Alcohol: Frequency:    seldom;         Substance Abuse History:     Last Reviewed on 5/15/2020 04:10 PM by Momo Renteria        Mental Health History:     Last Reviewed on 5/15/2020 04:10 PM by Momo Renteria        Communicable Diseases (eg STDs):     Last Reviewed on 5/15/2020 04:10 PM by Momo Renteria        Current Problems:     Last Reviewed on 5/15/2020 04:10 PM by Momo Renteria    Essential hypertension, benign    High cholesterol    Other chest pain    Atypical chest pain        Immunizations:     Fluzone (3 + years dose) 10/7/2011    Fluzone (3 + years dose) 10/1/2018    Fluzone Quadrivalent (3+ years) 2019    Zostavax (Zoster live) 2016        Allergies:     Last Reviewed on 5/15/2020 04:10 PM by Momo Renteria    Penicillins:          Current Medications:     Last Reviewed on 5/15/2020 04:10 PM by Momo Renteria    Simvastatin  20mg Tablet [1 tab q day DNS]    Estradiol 1mg Tablet [qd]    OTC allergy meds 10mg po qd    OTC stool softner 50 mg 2 qd    Osteo Biflex 2 qd    Potassium Chloride 10mEq Tablets, Extended Release [Take 1 tablet(s) by mouth daily]    Furosemide 20mg Tablets [Take 1 tablet(s) by mouth daily]    Ventolin HFA 90mcg/1actuation Oral Inhaler [Inhale 2 puff(s) by mouth 4 times a day as needed]    Oxybutynin Chloride 5mg Tablet [1 tab daily]    Meloxicam 7.5mg Tablet [1 PO QOD]    LISINOPRIL 20MG     TAB [TAKE 1 TABLET BY MOUTH ONCE DAILY]        Objective:        Exams:     PHYSICAL EXAM:     GENERAL: well developed, obese;  no apparent distress;     EYES: extraocular movements intact; conjunctiva and cornea are normal; PERRL;     RESPIRATORY: normal respiratory rate and pattern with no distress;     NEUROLOGIC: mental status: alert and oriented x 3; cranial nerves II-XII grossly intact;     PSYCHIATRIC: appropriate affect and demeanor; normal psychomotor function;         Assessment:         R94.4   Abnormal results of kidney function studies       I10   Essential (primary) hypertension           ORDERS:         Radiology/Test Orders:       [id number]  Ultrasound, retroperitoneal (eg, renal, aorta, nodes), real time with image documentation; complete  (Send-Out)            3017F  Colorectal CA screen results documented and reviewed (PV)  (In-House)              Lab Orders:       [id number]  [facility], automated, with micro  (Send-Out)            [id number]  [facility] Creatinine Clearance  (Send-Out)    THE TEST SHOULD BE 24 HOUR URINE FOR TOTAL PROTEIN AND CREATININE CLEARANCE - THANKS        [id number]  [facility] Protein Total Qn, 24 hr urine  (Send-Out)    THE TEST SHOULD BE 24 HOUR URINE FOR TOTAL PROTEIN AND CREATININE CLEARANCE - THANKS                  Plan:         Abnormal results of kidney function studies    LABORATORY:  Labs ordered to be performed today include urinalysis with micro, Urine Creatinine Clearance 24  hour, and 24 hour protein urine.      RADIOLOGY:  I have ordered Renal Ultrasound Retroperitoneal to be done today.      RECOMMENDATIONS given include: Today, we have reviewed her care.  I'm going to recommend some renal testing as noted below.  However, my suspicion is that the mild elevation of her creatinine is a reflection of the impact of lisinopril.  We will await her results..  [facility] Colorectal Cancer Screening is up to date and the results are in the chart           Orders:       [id number]  BDUAM - [facility] Urinalysis, automated, with micro  (Send-Out)            [id number]  CRT - [facility] Creatinine Clearance  (Send-Out)    THE TEST SHOULD BE 24 HOUR URINE FOR TOTAL PROTEIN AND CREATININE CLEARANCE - THANKS        [id number]  PROTU - [facility] Protein Total Qn, 24 hr urine  (Send-Out)    THE TEST SHOULD BE 24 HOUR URINE FOR TOTAL PROTEIN AND CREATININE CLEARANCE - THANKS        [id number]  Ultrasound, retroperitoneal (eg, renal, aorta, nodes), real time with image documentation; complete  (Send-Out)            3017F  Colorectal CA screen results documented and reviewed (PV)  (In-House)              Essential (primary) hypertensionAs above.            Charge Capture:         Primary Diagnosis:     R94.4  Abnormal results of kidney function studies           Orders:      [id number]  Office/outpatient visit; established patient, level 4  (In-House)            3017F  Colorectal CA screen results documented and reviewed (PV)  (In-House)              I10  Essential (primary) hypertension

## 2021-07-01 VITALS
BODY MASS INDEX: 32.78 KG/M2 | SYSTOLIC BLOOD PRESSURE: 161 MMHG | DIASTOLIC BLOOD PRESSURE: 66 MMHG | WEIGHT: 192 LBS | TEMPERATURE: 98.4 F | OXYGEN SATURATION: 98 % | HEIGHT: 64 IN | HEART RATE: 80 BPM

## 2021-07-01 VITALS
TEMPERATURE: 98.1 F | SYSTOLIC BLOOD PRESSURE: 139 MMHG | HEART RATE: 78 BPM | BODY MASS INDEX: 33.02 KG/M2 | WEIGHT: 193.4 LBS | DIASTOLIC BLOOD PRESSURE: 69 MMHG | HEIGHT: 64 IN

## 2021-07-01 VITALS
HEART RATE: 74 BPM | HEIGHT: 64 IN | SYSTOLIC BLOOD PRESSURE: 136 MMHG | WEIGHT: 198.8 LBS | DIASTOLIC BLOOD PRESSURE: 53 MMHG | BODY MASS INDEX: 33.94 KG/M2 | TEMPERATURE: 97.8 F

## 2021-07-02 VITALS
SYSTOLIC BLOOD PRESSURE: 145 MMHG | DIASTOLIC BLOOD PRESSURE: 72 MMHG | BODY MASS INDEX: 32.85 KG/M2 | HEIGHT: 64 IN | TEMPERATURE: 97.8 F | HEART RATE: 73 BPM | WEIGHT: 192.4 LBS

## 2021-07-02 VITALS
HEART RATE: 63 BPM | HEIGHT: 64 IN | WEIGHT: 195 LBS | DIASTOLIC BLOOD PRESSURE: 66 MMHG | TEMPERATURE: 97.3 F | BODY MASS INDEX: 33.29 KG/M2 | SYSTOLIC BLOOD PRESSURE: 150 MMHG

## 2021-07-02 VITALS
HEART RATE: 67 BPM | TEMPERATURE: 98 F | SYSTOLIC BLOOD PRESSURE: 131 MMHG | BODY MASS INDEX: 32.61 KG/M2 | HEIGHT: 64 IN | DIASTOLIC BLOOD PRESSURE: 51 MMHG | WEIGHT: 191 LBS

## 2021-09-28 ENCOUNTER — LAB (OUTPATIENT)
Dept: LAB | Facility: HOSPITAL | Age: 66
End: 2021-09-28

## 2021-09-28 ENCOUNTER — OFFICE VISIT (OUTPATIENT)
Dept: FAMILY MEDICINE CLINIC | Age: 66
End: 2021-09-28

## 2021-09-28 VITALS
DIASTOLIC BLOOD PRESSURE: 61 MMHG | WEIGHT: 188.8 LBS | HEIGHT: 64 IN | SYSTOLIC BLOOD PRESSURE: 146 MMHG | HEART RATE: 71 BPM | TEMPERATURE: 98.2 F | BODY MASS INDEX: 32.23 KG/M2

## 2021-09-28 DIAGNOSIS — I10 ESSENTIAL HYPERTENSION: ICD-10-CM

## 2021-09-28 DIAGNOSIS — E78.2 MIXED HYPERLIPIDEMIA: ICD-10-CM

## 2021-09-28 DIAGNOSIS — E04.2 MULTIPLE THYROID NODULES: ICD-10-CM

## 2021-09-28 DIAGNOSIS — F17.210 NICOTINE DEPENDENCE, CIGARETTES, UNCOMPLICATED: ICD-10-CM

## 2021-09-28 DIAGNOSIS — R42 VERTIGO: ICD-10-CM

## 2021-09-28 DIAGNOSIS — Z11.59 NEED FOR HEPATITIS C SCREENING TEST: ICD-10-CM

## 2021-09-28 DIAGNOSIS — Z00.00 PHYSICAL EXAM: Primary | ICD-10-CM

## 2021-09-28 LAB
ALBUMIN SERPL-MCNC: 4.5 G/DL (ref 3.5–5.2)
ALBUMIN/GLOB SERPL: 1.6 G/DL
ALP SERPL-CCNC: 58 U/L (ref 39–117)
ALT SERPL W P-5'-P-CCNC: 18 U/L (ref 1–33)
ANION GAP SERPL CALCULATED.3IONS-SCNC: 9.4 MMOL/L (ref 5–15)
AST SERPL-CCNC: 26 U/L (ref 1–32)
BILIRUB SERPL-MCNC: 0.3 MG/DL (ref 0–1.2)
BUN SERPL-MCNC: 15 MG/DL (ref 8–23)
BUN/CREAT SERPL: 18.8 (ref 7–25)
CALCIUM SPEC-SCNC: 9.9 MG/DL (ref 8.6–10.5)
CHLORIDE SERPL-SCNC: 100 MMOL/L (ref 98–107)
CHOLEST SERPL-MCNC: 141 MG/DL (ref 0–200)
CO2 SERPL-SCNC: 27.6 MMOL/L (ref 22–29)
CREAT SERPL-MCNC: 0.8 MG/DL (ref 0.57–1)
DEPRECATED RDW RBC AUTO: 42.8 FL (ref 37–54)
ERYTHROCYTE [DISTWIDTH] IN BLOOD BY AUTOMATED COUNT: 12.2 % (ref 12.3–15.4)
GFR SERPL CREATININE-BSD FRML MDRD: 72 ML/MIN/1.73
GLOBULIN UR ELPH-MCNC: 2.9 GM/DL
GLUCOSE SERPL-MCNC: 76 MG/DL (ref 65–99)
HCT VFR BLD AUTO: 39.2 % (ref 34–46.6)
HDLC SERPL-MCNC: 50 MG/DL (ref 40–60)
HGB BLD-MCNC: 13.3 G/DL (ref 12–15.9)
LDLC SERPL CALC-MCNC: 69 MG/DL (ref 0–100)
LDLC/HDLC SERPL: 1.32 {RATIO}
MCH RBC QN AUTO: 32.4 PG (ref 26.6–33)
MCHC RBC AUTO-ENTMCNC: 33.9 G/DL (ref 31.5–35.7)
MCV RBC AUTO: 95.4 FL (ref 79–97)
PLATELET # BLD AUTO: 290 10*3/MM3 (ref 140–450)
PMV BLD AUTO: 10.5 FL (ref 6–12)
POTASSIUM SERPL-SCNC: 4.3 MMOL/L (ref 3.5–5.2)
PROT SERPL-MCNC: 7.4 G/DL (ref 6–8.5)
RBC # BLD AUTO: 4.11 10*6/MM3 (ref 3.77–5.28)
SODIUM SERPL-SCNC: 137 MMOL/L (ref 136–145)
TRIGL SERPL-MCNC: 125 MG/DL (ref 0–150)
VLDLC SERPL-MCNC: 22 MG/DL (ref 5–40)
WBC # BLD AUTO: 6.31 10*3/MM3 (ref 3.4–10.8)

## 2021-09-28 PROCEDURE — 84443 ASSAY THYROID STIM HORMONE: CPT

## 2021-09-28 PROCEDURE — G0438 PPPS, INITIAL VISIT: HCPCS | Performed by: FAMILY MEDICINE

## 2021-09-28 PROCEDURE — 36415 COLL VENOUS BLD VENIPUNCTURE: CPT

## 2021-09-28 PROCEDURE — 80061 LIPID PANEL: CPT

## 2021-09-28 PROCEDURE — 99214 OFFICE O/P EST MOD 30 MIN: CPT | Performed by: FAMILY MEDICINE

## 2021-09-28 PROCEDURE — 85027 COMPLETE CBC AUTOMATED: CPT

## 2021-09-28 PROCEDURE — 86803 HEPATITIS C AB TEST: CPT

## 2021-09-28 PROCEDURE — 80053 COMPREHEN METABOLIC PANEL: CPT

## 2021-09-28 RX ORDER — HYDROCHLOROTHIAZIDE 25 MG/1
25 TABLET ORAL DAILY
COMMUNITY
Start: 2021-07-09 | End: 2021-09-28 | Stop reason: SDUPTHER

## 2021-09-28 RX ORDER — ALBUTEROL SULFATE 90 UG/1
AEROSOL, METERED RESPIRATORY (INHALATION)
COMMUNITY
End: 2021-09-28

## 2021-09-28 RX ORDER — LOSARTAN POTASSIUM 50 MG/1
50 TABLET ORAL DAILY
Qty: 90 TABLET | Refills: 3 | Status: SHIPPED | OUTPATIENT
Start: 2021-09-28 | End: 2022-01-11 | Stop reason: SDUPTHER

## 2021-09-28 RX ORDER — FLUTICASONE PROPIONATE 50 MCG
SPRAY, SUSPENSION (ML) NASAL
COMMUNITY
End: 2021-09-28

## 2021-09-28 RX ORDER — LOSARTAN POTASSIUM 50 MG/1
50 TABLET ORAL DAILY
COMMUNITY
Start: 2021-07-09 | End: 2021-09-28 | Stop reason: SDUPTHER

## 2021-09-28 RX ORDER — HYDROCHLOROTHIAZIDE 25 MG/1
25 TABLET ORAL DAILY
Qty: 90 TABLET | Refills: 3 | Status: SHIPPED | OUTPATIENT
Start: 2021-09-28 | End: 2022-01-11 | Stop reason: SDUPTHER

## 2021-09-28 RX ORDER — SIMVASTATIN 20 MG
20 TABLET ORAL NIGHTLY
Qty: 90 TABLET | Refills: 3 | Status: SHIPPED | OUTPATIENT
Start: 2021-09-28 | End: 2022-01-11 | Stop reason: SDUPTHER

## 2021-09-28 NOTE — PROGRESS NOTES
The ABCs of the Annual Wellness Visit  Subsequent Medicare Wellness Visit    Chief Complaint:  Medicare wellness exam, blood pressure, cholesterol    Subjective    History of Present Illness:  Carolina Ayers is a 66 y.o. female who presents for a Subsequent Medicare Wellness Visit.    The following portions of the patient's history were reviewed and   updated as appropriate: allergies, current medications, past family history, past medical history, past social history, past surgical history and problem list.     Compared to one year ago, the patient feels her physical health is the same.    Compared to one year ago, the patient feels her mental health is worse.  Her  has prostate cancer.  Her mother's health is an issue, and Carolina's back does restrict her somewhat.    Recent Hospitalizations:  She was not admitted to the hospital during the last year.       Current Medical Providers:  Patient Care Team:  Momo Renteria MD as PCP - General (Family Medicine)  Yasmany Day MD as Consulting Physician (Obstetrics and Gynecology)    Outpatient Medications Prior to Visit   Medication Sig Dispense Refill   • estradiol (ESTRACE) 1 MG tablet Take 1 mg by mouth Every Night.     • melatonin 1 MG tablet Take 1 mg by mouth At Night As Needed.     • hydroCHLOROthiazide (HYDRODIURIL) 25 MG tablet Take 25 mg by mouth Daily.     • losartan (COZAAR) 50 MG tablet Take 50 mg by mouth Daily.     • simvastatin (ZOCOR) 20 MG tablet Take 20 mg by mouth Every Night.     • albuterol sulfate HFA (Ventolin HFA) 108 (90 Base) MCG/ACT inhaler Ventolin HFA 90 mcg/actuation inhalation HFA aerosol inhaler inhale 1 puff (90 mcg) by inhalation route every 6 hours as needed   Suspended     • fluticasone (Flonase) 50 MCG/ACT nasal spray Flonase Allergy Relief 50 mcg/actuation nasal spray,suspension spray 1 spray (50 mcg) in each nostril by intranasal route once daily   Active     • lisinopril (PRINIVIL,ZESTRIL) 20 MG tablet  Take 1 tablet by mouth Daily. 90 tablet 3   • loratadine-pseudoephedrine (CLARITIN-D 12-hour) 5-120 MG per 12 hr tablet Allergy Relief D12 5-120 mg oral tablet extended release 12 hr take 1 tablet by oral route 2 times per day   Active     • meloxicam (MOBIC) 7.5 MG tablet Take 7.5 mg by mouth Daily. EVERY OTHER DAY     • oxybutynin (DITROPAN) 5 MG tablet Take 5 mg by mouth Daily.       No facility-administered medications prior to visit.       No opioid medication identified on active medication list. I have reviewed chart for other potential  high risk medication/s and harmful drug interactions in the elderly.          Aspirin is not on active medication list.  Aspirin use is not indicated based on review of current medical condition/s. Risk of harm outweighs potential benefits.  .    Patient Active Problem List   Diagnosis   • Chronic bilateral low back pain with sciatica   • Acquired spondylolisthesis   • Chronic bilateral low back pain with bilateral sciatica   • Hypokalemia   • Abnormal EKG   • Preop cardiovascular exam   • Essential hypertension   • Localized edema   • Orthostatic hypotension   • Impaired functional mobility, balance, gait, and endurance   • Post-operative state   • Bilateral lower extremity edema   • Precordial pain   • Mixed hyperlipidemia   • Multiple thyroid nodules     Advance Care Planning   Advance Directive is not on file.  ACP discussion was held with the patient during this visit. Patient has an advance directive (not in EMR), copy requested.    Carolina has noted some vertigo that is been an off-and-on problem.  This is seem to increase over the last 6 months.  She says that she feels like she is on the edge of vertigo frequently.  Her  is currently going through prostate cancer treatments, and she is concerned that this might impact her ability to take him for treatment.  She denies any significant episode in the recent past that kept her from activities.    She does have a  "history of hypertension and elevated cholesterol and remains on treatment for these issues.      Review of Systems:  Review of Systems   Constitutional: Negative for chills and fever.   Respiratory: Negative for cough and shortness of breath.    Cardiovascular: Negative for chest pain and palpitations.   Gastrointestinal: Negative for abdominal pain, nausea and vomiting.         Objective       Vitals:    09/28/21 1544   BP: 144/83   BP Location: Right arm   Patient Position: Sitting   Pulse: 73   Temp: 98.2 °F (36.8 °C)   TempSrc: Oral   Weight: 85.6 kg (188 lb 12.8 oz)   Height: 162.6 cm (64.02\")     BMI Readings from Last 1 Encounters:   09/28/21 32.39 kg/m²   BMI is above normal parameters. Recommendations include: exercise counseling and nutrition counseling    Does the patient have evidence of cognitive impairment? No    Physical Exam  Vitals and nursing note reviewed.   Constitutional:       General: She is not in acute distress.     Appearance: She is obese. She is not ill-appearing.   HENT:      Right Ear: Tympanic membrane and ear canal normal.      Left Ear: Tympanic membrane and ear canal normal.      Ears:      Comments: Hearing is normal bilaterally.     Mouth/Throat:      Mouth: Mucous membranes are moist.      Comments: Pharynx appears normal  Eyes:      Extraocular Movements: Extraocular movements intact.      Pupils: Pupils are equal, round, and reactive to light.      Comments: Bilateral vision is 20/25 with correction.   Neck:      Thyroid: No thyromegaly.   Cardiovascular:      Rate and Rhythm: Normal rate and regular rhythm.      Heart sounds: No murmur heard.     Pulmonary:      Effort: Pulmonary effort is normal.      Breath sounds: Normal breath sounds.   Abdominal:      General: There is no distension.      Palpations: Abdomen is soft. There is no mass.      Tenderness: There is no abdominal tenderness.   Musculoskeletal:      Cervical back: Normal range of motion.   Skin:     Findings: No " lesion or rash.   Neurological:      General: No focal deficit present.      Mental Status: She is oriented to person, place, and time. Mental status is at baseline.      Cranial Nerves: No cranial nerve deficit.      Sensory: No sensory deficit.      Motor: No weakness.   Psychiatric:         Mood and Affect: Mood normal.         Behavior: Behavior normal.       The following data was reviewed by Momo Renteria MD on 2021.  Lab Results   Component Value Date    WBC 5.06 2021    HGB 13.00 2021    HCT 39.7 2021    MCV 95.9 2021    .00 2021     Lab Results   Component Value Date    GLUCOSE 95 2020    BUN 13 2021    CREATININE 0.98 (H) 2021    EGFRIFNONA 53 (L) 2020    BCR 13 2021    K 4.1 2021    CO2 24 2021    CALCIUM 10.0 2021    ALBUMIN 4.2 2021    LABIL2 1.4 2021    AST 32 2021    ALT 31 2021     Lab Results   Component Value Date    CHLPL 157 2020    TRIG 125 2020    HDL 53 2020    LDL 79 2020     Lab Results   Component Value Date    TSH 1.240 2020     No results found for: HGBA1C         HEALTH RISK ASSESSMENT    Smoking Status:  Social History     Tobacco Use   Smoking Status Former Smoker   • Packs/day: 1.00   • Years: 30.00   • Pack years: 30.00   • Types: Cigarettes   • Quit date:    • Years since quittin.7   Smokeless Tobacco Never Used   Tobacco Comment    NO CAFFEINE USE     Alcohol Consumption:  Social History     Substance and Sexual Activity   Alcohol Use Yes    Comment: rarely- 2-3 times a year     Fall Risk Screen:    TAVOADI Fall Risk Assessment was completed, and patient is at HIGH risk for falls. Assessment completed on:2021    Depression Screening:  PHQ-2/PHQ-9 Depression Screening 2021   Little interest or pleasure in doing things 0   Feeling down, depressed, or hopeless 0   Total Score 0       Health Habits and Functional and  Cognitive Screening:  Functional & Cognitive Status 9/28/2021   Do you have difficulty preparing food and eating? No   Do you have difficulty bathing yourself, getting dressed or grooming yourself? No   Do you have difficulty using the toilet? No   Do you have difficulty moving around from place to place? No   Do you have trouble with steps or getting out of a bed or a chair? No   Current Diet Well Balanced Diet   Dental Exam Up to date   Eye Exam Up to date   Exercise (times per week) 2 times per week   Current Exercises Include Walking   Do you need help using the phone?  No   Are you deaf or do you have serious difficulty hearing?  No   Do you need help with transportation? No   Do you need help shopping? No   Do you need help preparing meals?  No   Do you need help with housework?  No   Do you need help with laundry? No   Do you need help taking your medications? No   Do you need help managing money? No   Do you ever drive or ride in a car without wearing a seat belt? No   Have you felt unusual stress, anger or loneliness in the last month? No   Who do you live with? Spouse   If you need help, do you have trouble finding someone available to you? No   Have you been bothered in the last four weeks by sexual problems? No   Do you have difficulty concentrating, remembering or making decisions? No       Age-appropriate Screening Schedule:  Refer to the list below for future screening recommendations based on patient's age, sex and/or medical conditions. Orders for these recommended tests are listed in the plan section. The patient has been provided with a written plan.    Health Maintenance   Topic Date Due   • ZOSTER VACCINE (1 of 2) Never done   • DXA SCAN  03/05/2017   • PAP SMEAR  Never done   • TDAP/TD VACCINES (2 - Td or Tdap) 10/29/2020   • LIPID PANEL  08/24/2021   • INFLUENZA VACCINE  10/01/2021   • MAMMOGRAM  05/18/2023                       Assessment and Plan:       CMS Preventative Services Quick  Reference  Risk Factors Identified During Encounter  Cardiovascular Disease  Chronic Pain   Fall Risk-High or Moderate  Inactivity/Sedentary  Obesity/Overweight      The above risks/problems have been discussed with the patient.  Follow up actions/plans if indicated are seen below in the Assessment/Plan Section.  Pertinent information has been shared with the patient in the After Visit Summary.    Today, we have reviewed her care in detail.  Please see above and below for specific screenings and recommendations regarding her wellness exam.  She is up-to-date on most cancer screenings, but she is due for a low-dose CT for lung cancer screening.  We will arrange this.  She has had this ongoing vertigo, or at least feels on the edge of vertigo.  We discussed this, but for now we will hold off on MRI.  We will move ahead with some blood work as noted below.  I have also reviewed her usual problems and sent refills on her medications today.  We will see what her testing shows and then move forward from there.  No other near-term changes anticipated.    Diagnoses and all orders for this visit:    1. Physical exam (Primary)    2. Nicotine dependence, cigarettes, uncomplicated  -      CT Chest Low Dose Cancer Screening WO; Future    3. Vertigo  -     CBC (No Diff); Future    4. Essential hypertension  -     Comprehensive Metabolic Panel; Future  -     hydroCHLOROthiazide (HYDRODIURIL) 25 MG tablet; Take 1 tablet by mouth Daily.  Dispense: 90 tablet; Refill: 3  -     losartan (COZAAR) 50 MG tablet; Take 1 tablet by mouth Daily.  Dispense: 90 tablet; Refill: 3    5. Mixed hyperlipidemia  -     Comprehensive Metabolic Panel; Future  -     Lipid Panel; Future  -     simvastatin (ZOCOR) 20 MG tablet; Take 1 tablet by mouth Every Night.  Dispense: 90 tablet; Refill: 3    6. Multiple thyroid nodules  -     US Thyroid; Future  -     TSH; Future    7. Need for hepatitis C screening test  -     Hepatitis C Antibody;  Future        Follow Up:   Return in about 1 year (around 9/28/2022).     An After Visit Summary and PPPS were given to the patient.

## 2021-09-29 LAB
HCV AB SER DONR QL: NORMAL
TSH SERPL DL<=0.05 MIU/L-ACNC: 1.92 UIU/ML (ref 0.27–4.2)

## 2021-10-05 ENCOUNTER — HOSPITAL ENCOUNTER (OUTPATIENT)
Dept: ULTRASOUND IMAGING | Facility: HOSPITAL | Age: 66
Discharge: HOME | End: 2021-10-05

## 2021-10-05 ENCOUNTER — HOSPITAL ENCOUNTER (OUTPATIENT)
Dept: CT IMAGING | Facility: HOSPITAL | Age: 66
Discharge: HOME | End: 2021-10-05

## 2021-10-05 DIAGNOSIS — F17.210 NICOTINE DEPENDENCE, CIGARETTES, UNCOMPLICATED: ICD-10-CM

## 2021-10-05 DIAGNOSIS — E04.2 MULTIPLE THYROID NODULES: ICD-10-CM

## 2021-10-05 PROCEDURE — 76536 US EXAM OF HEAD AND NECK: CPT

## 2021-10-05 PROCEDURE — 71271 CT THORAX LUNG CANCER SCR C-: CPT

## 2021-11-15 ENCOUNTER — CLINICAL SUPPORT (OUTPATIENT)
Dept: FAMILY MEDICINE CLINIC | Age: 66
End: 2021-11-15

## 2021-11-15 VITALS — DIASTOLIC BLOOD PRESSURE: 81 MMHG | HEART RATE: 62 BPM | SYSTOLIC BLOOD PRESSURE: 158 MMHG

## 2021-11-17 NOTE — PROGRESS NOTES
Please let Carolina know that I have reviewed her recent blood pressure check.  I would advise she bring her cuff in for comparison to ours.  I am leaning toward additional treatment.  Let me know if she has other concerns.  Thanks.

## 2021-11-19 ENCOUNTER — CLINICAL SUPPORT (OUTPATIENT)
Dept: FAMILY MEDICINE CLINIC | Age: 66
End: 2021-11-19

## 2021-11-19 VITALS — DIASTOLIC BLOOD PRESSURE: 81 MMHG | HEART RATE: 71 BPM | SYSTOLIC BLOOD PRESSURE: 148 MMHG

## 2021-11-20 NOTE — PROGRESS NOTES
Please clarify of both blood pressure readings are from our reading.  Is one of them from her home machine?  Thanks.

## 2021-11-30 ENCOUNTER — OFFICE VISIT (OUTPATIENT)
Dept: FAMILY MEDICINE CLINIC | Age: 66
End: 2021-11-30

## 2021-11-30 VITALS
HEART RATE: 66 BPM | WEIGHT: 188.8 LBS | SYSTOLIC BLOOD PRESSURE: 167 MMHG | BODY MASS INDEX: 32.39 KG/M2 | DIASTOLIC BLOOD PRESSURE: 76 MMHG

## 2021-11-30 DIAGNOSIS — I10 ESSENTIAL HYPERTENSION: Primary | ICD-10-CM

## 2021-11-30 DIAGNOSIS — M54.50 ACUTE BILATERAL LOW BACK PAIN WITHOUT SCIATICA: ICD-10-CM

## 2021-11-30 PROBLEM — M54.41 CHRONIC BILATERAL LOW BACK PAIN WITH BILATERAL SCIATICA: Status: RESOLVED | Noted: 2018-06-18 | Resolved: 2021-11-30

## 2021-11-30 PROBLEM — G89.29 CHRONIC BILATERAL LOW BACK PAIN WITH BILATERAL SCIATICA: Status: RESOLVED | Noted: 2018-06-18 | Resolved: 2021-11-30

## 2021-11-30 PROBLEM — M54.42 CHRONIC BILATERAL LOW BACK PAIN WITH BILATERAL SCIATICA: Status: RESOLVED | Noted: 2018-06-18 | Resolved: 2021-11-30

## 2021-11-30 PROCEDURE — 99213 OFFICE O/P EST LOW 20 MIN: CPT | Performed by: NURSE PRACTITIONER

## 2021-11-30 RX ORDER — METHOCARBAMOL 500 MG/1
500 TABLET, FILM COATED ORAL 3 TIMES DAILY PRN
COMMUNITY
Start: 2021-11-27 | End: 2021-11-30 | Stop reason: ALTCHOICE

## 2021-11-30 RX ORDER — CYCLOBENZAPRINE HCL 10 MG
10 TABLET ORAL 3 TIMES DAILY PRN
Qty: 60 TABLET | Refills: 1 | Status: SHIPPED | OUTPATIENT
Start: 2021-11-30 | End: 2022-11-01

## 2021-11-30 RX ORDER — PREDNISONE 20 MG/1
2 TABLET ORAL
COMMUNITY
Start: 2021-11-27 | End: 2022-06-28

## 2021-11-30 NOTE — PROGRESS NOTES
Carolina Ayers presents to [facility].    Chief Complaint:  Back Pain         History of Present Illness:  Joint pain:   Mid low back pain   Symptoms started: within the last week, 5 days ago   associated symptoms: constant pain, aching, burning type pain, worse with standing, started last Friday when she was shaving her leg, twisted down to shave and the pain started, went to an [facility] and they gave her Prednisone and robaxin, she prefers flexeril,  she is better today   Treatment tried: gradually improving /OTC pain relievers, tylenol, muscle relaxer and prednisone helping   Dg testing : previous lumbar x-ray 4-2019:   Pmh: previous laminectomy L4 2018    PAST MEDICAL HISTORY changs since last saw Dr Renteria:  None         Hyperlipidemia    Hypertension     Urinary Incontinence         Surgical History:         Cholecystectomy    Hysterectomy Other Surgeries    R Rotator Cuff;    Low Back 2018              Review of Systems:  Review of Systems   Constitutional: Negative for fatigue and fever.   Respiratory: Negative for cough and shortness of breath.    Cardiovascular: Negative for chest pain, palpitations and leg swelling.   Gastrointestinal:        No changes in bowel habits    Genitourinary:        No change in bladder habits   Neurological: Negative for numbness.          Vital Signs:   /76 (BP Location: Right arm, Patient Position: Sitting)   Pulse 66   Wt 85.6 kg (188 lb 12.8 oz)   BMI 32.39 kg/m²       Physical Exam:  Physical Exam  Vitals reviewed.   Constitutional:       General: She is not in acute distress.     Appearance: Normal appearance.   Neck:      Vascular: No carotid bruit.   Cardiovascular:      Rate and Rhythm: Normal rate and regular rhythm.      Heart sounds: Normal heart sounds. No murmur heard.      Pulmonary:      Effort: Pulmonary effort is normal. No respiratory distress.      Breath sounds: Normal breath sounds.   Musculoskeletal:      Right lower  leg: No edema.      Left lower leg: No edema.      Comments: Pain with flexion and extension, SLR negative, equal strength and reflexes in bilateral lower ext, pain to palpation of lower lumbar para spinous muscles    Neurological:      Mental Status: She is alert.   Psychiatric:         Mood and Affect: Mood normal.         Behavior: Behavior normal.         Result Review      The following data was reviewed by: MARI Loving on 11/30/2021:    Results for orders placed or performed in visit on 09/28/21   CBC (No Diff)    Specimen: Blood   Result Value Ref Range    WBC 6.31 3.40 - 10.80 10*3/mm3    RBC 4.11 3.77 - 5.28 10*6/mm3    Hemoglobin 13.3 12.0 - 15.9 g/dL    Hematocrit 39.2 34.0 - 46.6 %    MCV 95.4 79.0 - 97.0 fL    MCH 32.4 26.6 - 33.0 pg    MCHC 33.9 31.5 - 35.7 g/dL    RDW 12.2 (L) 12.3 - 15.4 %    RDW-SD 42.8 37.0 - 54.0 fl    MPV 10.5 6.0 - 12.0 fL    Platelets 290 140 - 450 10*3/mm3   Comprehensive Metabolic Panel    Specimen: Blood   Result Value Ref Range    Glucose 76 65 - 99 mg/dL    BUN 15 8 - 23 mg/dL    Creatinine 0.80 0.57 - 1.00 mg/dL    Sodium 137 136 - 145 mmol/L    Potassium 4.3 3.5 - 5.2 mmol/L    Chloride 100 98 - 107 mmol/L    CO2 27.6 22.0 - 29.0 mmol/L    Calcium 9.9 8.6 - 10.5 mg/dL    Total Protein 7.4 6.0 - 8.5 g/dL    Albumin 4.50 3.50 - 5.20 g/dL    ALT (SGPT) 18 1 - 33 U/L    AST (SGOT) 26 1 - 32 U/L    Alkaline Phosphatase 58 39 - 117 U/L    Total Bilirubin 0.3 0.0 - 1.2 mg/dL    eGFR Non African Amer 72 >60 mL/min/1.73    Globulin 2.9 gm/dL    A/G Ratio 1.6 g/dL    BUN/Creatinine Ratio 18.8 7.0 - 25.0    Anion Gap 9.4 5.0 - 15.0 mmol/L   Lipid Panel    Specimen: Blood   Result Value Ref Range    Total Cholesterol 141 0 - 200 mg/dL    Triglycerides 125 0 - 150 mg/dL    HDL Cholesterol 50 40 - 60 mg/dL    LDL Cholesterol  69 0 - 100 mg/dL    VLDL Cholesterol 22 5 - 40 mg/dL    LDL/HDL Ratio 1.32    TSH    Specimen: Blood   Result Value Ref Range    TSH 1.920 0.270 -  4.200 uIU/mL   Hepatitis C Antibody    Specimen: Blood   Result Value Ref Range    Hepatitis C Ab Non-Reactive Non-Reactive               Assessment and Plan:          Diagnoses and all orders for this visit:    1. Essential hypertension (Primary)  Assessment & Plan:  Rechecking bp, reviewed note, she is on losartan and HCTZ/ continue rx and monitoring her BP at home   bp at home 147/82  Forwarded note to her PCP, Dr Renteria      2. Acute bilateral low back pain without sciatica  Assessment & Plan:  To complete the prednisone, continue OTC tylenol and switch to flexeril, discussed PT, if not improving, follow up     Orders:  -     cyclobenzaprine (FLEXERIL) 10 MG tablet; Take 1 tablet by mouth 3 (Three) Times a Day As Needed for Muscle Spasms.  Dispense: 60 tablet; Refill: 1        Follow Up   Return if symptoms worsen or fail to improve.  Patient was given instructions and counseling regarding her condition or for health maintenance advice. Please see specific information pulled into the AVS if appropriate.

## 2021-11-30 NOTE — ASSESSMENT & PLAN NOTE
Rechecking bp, reviewed note, she is on losartan and HCTZ/ continue rx and monitoring her BP at home   bp at home 147/82  Forwarded note to her PCP, Dr Renteria

## 2021-11-30 NOTE — ASSESSMENT & PLAN NOTE
To complete the prednisone, continue OTC tylenol and switch to flexeril, discussed PT, if not improving, follow up

## 2021-12-16 ENCOUNTER — TELEPHONE (OUTPATIENT)
Dept: FAMILY MEDICINE CLINIC | Age: 66
End: 2021-12-16

## 2021-12-16 NOTE — TELEPHONE ENCOUNTER
Noted. I would recommend she continue current medications. I do not have other new guidance. These pressure sound reasonable. Thanks.

## 2021-12-16 NOTE — TELEPHONE ENCOUNTER
----- Message from Makenzie Arroyo LPN sent at 12/1/2021 12:00 PM EST -----  TICKLE to call patient again in 2 weeks and see how her blood pressures are running.

## 2022-01-11 DIAGNOSIS — I10 ESSENTIAL HYPERTENSION: ICD-10-CM

## 2022-01-11 DIAGNOSIS — E78.2 MIXED HYPERLIPIDEMIA: ICD-10-CM

## 2022-01-11 NOTE — TELEPHONE ENCOUNTER
Caller: BrittanynicolCarolina    Relationship: Self    Best call back number: [phone]    Requested Prescriptions:   Requested Prescriptions     Pending Prescriptions Disp Refills   • hydroCHLOROthiazide (HYDRODIURIL) 25 MG tablet 90 tablet 3     Sig: Take 1 tablet by mouth Daily.   • losartan (COZAAR) 50 MG tablet 90 tablet 3     Sig: Take 1 tablet by mouth Daily.   • simvastatin (ZOCOR) 20 MG tablet 90 tablet 3     Sig: Take 1 tablet by mouth Every Night.   • estradiol (ESTRACE) 1 MG tablet       Sig: Take 1 tablet by mouth Every Night.        Pharmacy where request should be sent: [facility] [address] [place] [address], [place] - [address]  - [phone]  - [phone] FX       Does the patient have less than a 3 day supply:  [] Yes  [x] No    Panchito WHALEN Rep   01/11/22 08:55 EST

## 2022-01-12 RX ORDER — LOSARTAN POTASSIUM 50 MG/1
50 TABLET ORAL DAILY
Qty: 90 TABLET | Refills: 1 | Status: SHIPPED | OUTPATIENT
Start: 2022-01-12 | End: 2022-06-28 | Stop reason: SDUPTHER

## 2022-01-12 RX ORDER — SIMVASTATIN 20 MG
20 TABLET ORAL NIGHTLY
Qty: 90 TABLET | Refills: 1 | Status: SHIPPED | OUTPATIENT
Start: 2022-01-12 | End: 2022-06-28 | Stop reason: SDUPTHER

## 2022-01-12 RX ORDER — ESTRADIOL 1 MG/1
1 TABLET ORAL NIGHTLY
Qty: 90 TABLET | Refills: 1 | Status: SHIPPED | OUTPATIENT
Start: 2022-01-12 | End: 2022-06-28 | Stop reason: SDUPTHER

## 2022-01-12 RX ORDER — HYDROCHLOROTHIAZIDE 25 MG/1
25 TABLET ORAL DAILY
Qty: 90 TABLET | Refills: 1 | Status: SHIPPED | OUTPATIENT
Start: 2022-01-12 | End: 2022-06-29

## 2022-06-28 ENCOUNTER — LAB (OUTPATIENT)
Dept: LAB | Facility: HOSPITAL | Age: 67
End: 2022-06-28

## 2022-06-28 ENCOUNTER — OFFICE VISIT (OUTPATIENT)
Dept: FAMILY MEDICINE CLINIC | Age: 67
End: 2022-06-28

## 2022-06-28 VITALS
WEIGHT: 191.8 LBS | HEART RATE: 65 BPM | BODY MASS INDEX: 32.74 KG/M2 | HEIGHT: 64 IN | TEMPERATURE: 97.9 F | SYSTOLIC BLOOD PRESSURE: 155 MMHG | DIASTOLIC BLOOD PRESSURE: 67 MMHG

## 2022-06-28 DIAGNOSIS — R06.00 DYSPNEA, UNSPECIFIED TYPE: ICD-10-CM

## 2022-06-28 DIAGNOSIS — R53.83 FATIGUE, UNSPECIFIED TYPE: ICD-10-CM

## 2022-06-28 DIAGNOSIS — H93.13 TINNITUS OF BOTH EARS: Primary | ICD-10-CM

## 2022-06-28 DIAGNOSIS — I10 ESSENTIAL HYPERTENSION: ICD-10-CM

## 2022-06-28 DIAGNOSIS — R60.0 LOCALIZED EDEMA: ICD-10-CM

## 2022-06-28 DIAGNOSIS — E78.2 MIXED HYPERLIPIDEMIA: ICD-10-CM

## 2022-06-28 LAB
ALBUMIN SERPL-MCNC: 4.3 G/DL (ref 3.5–5.2)
ALBUMIN/GLOB SERPL: 1.6 G/DL
ALP SERPL-CCNC: 64 U/L (ref 39–117)
ALT SERPL W P-5'-P-CCNC: 19 U/L (ref 1–33)
ANION GAP SERPL CALCULATED.3IONS-SCNC: 12.2 MMOL/L (ref 5–15)
AST SERPL-CCNC: 22 U/L (ref 1–32)
BILIRUB SERPL-MCNC: 0.3 MG/DL (ref 0–1.2)
BUN SERPL-MCNC: 20 MG/DL (ref 8–23)
BUN/CREAT SERPL: 21.1 (ref 7–25)
CALCIUM SPEC-SCNC: 10.8 MG/DL (ref 8.6–10.5)
CHLORIDE SERPL-SCNC: 98 MMOL/L (ref 98–107)
CHOLEST SERPL-MCNC: 152 MG/DL (ref 0–200)
CO2 SERPL-SCNC: 27.8 MMOL/L (ref 22–29)
CREAT SERPL-MCNC: 0.95 MG/DL (ref 0.57–1)
DEPRECATED RDW RBC AUTO: 44.9 FL (ref 37–54)
EGFRCR SERPLBLD CKD-EPI 2021: 65.8 ML/MIN/1.73
ERYTHROCYTE [DISTWIDTH] IN BLOOD BY AUTOMATED COUNT: 12.7 % (ref 12.3–15.4)
GLOBULIN UR ELPH-MCNC: 2.7 GM/DL
GLUCOSE SERPL-MCNC: 88 MG/DL (ref 65–99)
HCT VFR BLD AUTO: 38.1 % (ref 34–46.6)
HDLC SERPL-MCNC: 59 MG/DL (ref 40–60)
HGB BLD-MCNC: 12.4 G/DL (ref 12–15.9)
LDLC SERPL CALC-MCNC: 68 MG/DL (ref 0–100)
LDLC/HDLC SERPL: 1.08 {RATIO}
MCH RBC QN AUTO: 31.2 PG (ref 26.6–33)
MCHC RBC AUTO-ENTMCNC: 32.5 G/DL (ref 31.5–35.7)
MCV RBC AUTO: 95.7 FL (ref 79–97)
NT-PROBNP SERPL-MCNC: 193 PG/ML (ref 0–900)
PLATELET # BLD AUTO: 246 10*3/MM3 (ref 140–450)
PMV BLD AUTO: 9.5 FL (ref 6–12)
POTASSIUM SERPL-SCNC: 4.4 MMOL/L (ref 3.5–5.2)
PROT SERPL-MCNC: 7 G/DL (ref 6–8.5)
RBC # BLD AUTO: 3.98 10*6/MM3 (ref 3.77–5.28)
SODIUM SERPL-SCNC: 138 MMOL/L (ref 136–145)
T4 FREE SERPL-MCNC: 1.14 NG/DL (ref 0.93–1.7)
TRIGL SERPL-MCNC: 146 MG/DL (ref 0–150)
TSH SERPL DL<=0.05 MIU/L-ACNC: 2.82 UIU/ML (ref 0.27–4.2)
VLDLC SERPL-MCNC: 25 MG/DL (ref 5–40)
WBC NRBC COR # BLD: 6.05 10*3/MM3 (ref 3.4–10.8)

## 2022-06-28 PROCEDURE — 36415 COLL VENOUS BLD VENIPUNCTURE: CPT

## 2022-06-28 PROCEDURE — 84439 ASSAY OF FREE THYROXINE: CPT

## 2022-06-28 PROCEDURE — 80061 LIPID PANEL: CPT

## 2022-06-28 PROCEDURE — 85027 COMPLETE CBC AUTOMATED: CPT

## 2022-06-28 PROCEDURE — 83880 ASSAY OF NATRIURETIC PEPTIDE: CPT

## 2022-06-28 PROCEDURE — 80053 COMPREHEN METABOLIC PANEL: CPT

## 2022-06-28 PROCEDURE — 84443 ASSAY THYROID STIM HORMONE: CPT

## 2022-06-28 PROCEDURE — 99214 OFFICE O/P EST MOD 30 MIN: CPT | Performed by: FAMILY MEDICINE

## 2022-06-28 RX ORDER — LOSARTAN POTASSIUM 50 MG/1
50 TABLET ORAL DAILY
Qty: 90 TABLET | Refills: 1 | Status: SHIPPED | OUTPATIENT
Start: 2022-06-28 | End: 2022-11-01 | Stop reason: SDUPTHER

## 2022-06-28 RX ORDER — SIMVASTATIN 20 MG
20 TABLET ORAL NIGHTLY
Qty: 90 TABLET | Refills: 1 | Status: SHIPPED | OUTPATIENT
Start: 2022-06-28 | End: 2022-08-22

## 2022-06-28 RX ORDER — SIMVASTATIN 20 MG
20 TABLET ORAL NIGHTLY
Qty: 90 TABLET | Refills: 1 | Status: SHIPPED | OUTPATIENT
Start: 2022-06-28 | End: 2022-06-28

## 2022-06-28 RX ORDER — MELOXICAM 15 MG/1
1 TABLET ORAL DAILY
COMMUNITY
Start: 2022-06-16

## 2022-06-28 RX ORDER — ESTRADIOL 1 MG/1
1 TABLET ORAL NIGHTLY
Qty: 90 TABLET | Refills: 1 | Status: SHIPPED | OUTPATIENT
Start: 2022-06-28 | End: 2022-06-28

## 2022-06-28 RX ORDER — LOSARTAN POTASSIUM 50 MG/1
50 TABLET ORAL DAILY
Qty: 90 TABLET | Refills: 1 | Status: SHIPPED | OUTPATIENT
Start: 2022-06-28 | End: 2022-06-28

## 2022-06-28 RX ORDER — ESTRADIOL 1 MG/1
1 TABLET ORAL NIGHTLY
Qty: 90 TABLET | Refills: 1 | Status: SHIPPED | OUTPATIENT
Start: 2022-06-28 | End: 2022-11-01 | Stop reason: SDUPTHER

## 2022-06-28 NOTE — PROGRESS NOTES
Carolina Ayers presents to [facility].    Chief Complaint:  'my ears'    Subjective       History of Present Illness:  Carolina is in today for evaluation of a constant noise in her ears.  She says it reminds her of bugs that she remembers hearing at night.  This has been happening for the last 3 months maybe.  She denies any obvious cause for this.  She thinks her hearing may have declined somewhat as well.  She does not have a significant vertigo associated with this.  The noise is constant.  She did notice that it seemed louder 1 day recently.  She was sitting out on the front porch, and her  did not hear the noises that she did.    Carolina also has hypertension for which she remains on losartan and hydrochlorothiazide.  She also has noted that she has gained weight recently.  She does not know why.  She thinks it could be in part fluid retention.      Review of Systems:  Review of Systems   Constitutional: Negative for chills and fever.   Respiratory: Positive for shortness of breath (I see a difference). Negative for cough.    Cardiovascular: Negative for chest pain and palpitations.   Gastrointestinal: Negative for abdominal pain, nausea and vomiting.        Objective   Medical History:  Past Medical History:   • Arthritis   • Cancer (HCC)    SKIN CANCER   • Cataracts, bilateral   • Chronic bilateral low back pain with bilateral sciatica   • Essential hypertension   • H/O: pneumonia   • History of chest pain    SEEN DR SIM   • History of skin cancer   • Hyperlipidemia   • Hypokalemia   • Impaired functional mobility, balance, gait, and endurance   • Localized edema   • Lumbar herniated disc   • Orthostatic hypotension   • PONV (postoperative nausea and vomiting)     Past Surgical History:   • BREAST LUMPECTOMY   • CARPAL TUNNEL RELEASE   • CHOLECYSTECTOMY   • COLONOSCOPY   • EXTERNAL EAR SURGERY    cyst removed from ear   • HERNIA REPAIR   • HYSTERECTOMY    partial   • KNEE  SURGERY   • LUMBAR DISCECTOMY FUSION INSTRUMENTATION    Procedure: LUMBAR FUSION DECOMPRESSON WITH PEDICLE SCREWS Lumbar 4-5,  posterolateral fusion;  Surgeon: Jarrett Vasques IV, MD;  Location: [facility];  Service: Neurosurgery   • SHOULDER SURGERY   • SUBTOTAL HYSTERECTOMY   • TONSILLECTOMY   • US GUIDED FINE NEEDLE ASPIRATION   • WRIST SURGERY    Cyst removed      Family History   Problem Relation Age of Onset   • Macular degeneration Mother    • Coronary artery disease Father    • Malig Hyperthermia Neg Hx      Social History     Tobacco Use   • Smoking status: Former Smoker     Packs/day: 1.00     Years: 30.00     Pack years: 30.00     Types: Cigarettes     Quit date:      Years since quittin.4   • Smokeless tobacco: Never Used   • Tobacco comment: NO CAFFEINE USE   Substance Use Topics   • Alcohol use: Yes     Comment: rarely- 2-3 times a year       Health Maintenance Due   Topic Date Due   • ZOSTER VACCINE (1 of 2) Never done   • DXA SCAN  2017   • PAP SMEAR  Never done   • TDAP/TD VACCINES (2 - Td or Tdap) 10/29/2020        Immunization History   Administered Date(s) Administered   • COVID-19 (MODERNA) 1st, 2nd, 3rd Dose Only 2021, 2021, 10/26/2021   • COVID-19 (MODERNA) BOOSTER 2022   • Fluzone High-Dose 65+yrs 10/12/2021   • Hepatitis A 2018   • Influenza, Unspecified 2019   • Pneumococcal Conjugate 13-Valent (PCV13) 10/21/2020   • Pneumococcal Polysaccharide (PPSV23) 2009, 10/26/2021   • Tdap 10/29/2010       Allergies   Allergen Reactions   • Penicillins Hives   • Lisinopril Cough        Medications:  Current Outpatient Medications on File Prior to Visit   Medication Sig   • cyclobenzaprine (FLEXERIL) 10 MG tablet Take 1 tablet by mouth 3 (Three) Times a Day As Needed for Muscle Spasms.   • hydroCHLOROthiazide (HYDRODIURIL) 25 MG tablet Take 1 tablet by mouth Daily.   • melatonin 1 MG tablet Take 1 mg by mouth At Night As Needed.   • meloxicam  "(MOBIC) 15 MG tablet Take 1 tablet by mouth Daily.   • [DISCONTINUED] estradiol (ESTRACE) 1 MG tablet Take 1 tablet by mouth Every Night.   • [DISCONTINUED] losartan (COZAAR) 50 MG tablet Take 1 tablet by mouth Daily.   • [DISCONTINUED] simvastatin (ZOCOR) 20 MG tablet Take 1 tablet by mouth Every Night.   • [DISCONTINUED] predniSONE (DELTASONE) 20 MG tablet Take 2 tablets by mouth.     No current facility-administered medications on file prior to visit.       Vital Signs:   /65 (BP Location: Right arm, Patient Position: Sitting)   Pulse 65   Temp 97.9 °F (36.6 °C) (Oral)   Ht 162.6 cm (64.02\")   Wt 87 kg (191 lb 12.8 oz)   BMI 32.91 kg/m²       Physical Exam:  Physical Exam  Vitals and nursing note reviewed.   Constitutional:       General: She is not in acute distress.     Appearance: She is not ill-appearing.   HENT:      Right Ear: Tympanic membrane and ear canal normal.      Left Ear: Tympanic membrane and ear canal normal.      Mouth/Throat:      Mouth: Mucous membranes are moist.      Comments: Pharynx appears normal  Eyes:      Extraocular Movements: Extraocular movements intact.      Pupils: Pupils are equal, round, and reactive to light.   Neck:      Thyroid: No thyromegaly.   Cardiovascular:      Rate and Rhythm: Normal rate and regular rhythm.      Heart sounds: No murmur heard.  Pulmonary:      Effort: Pulmonary effort is normal.      Breath sounds: Normal breath sounds.   Abdominal:      General: There is no distension.      Palpations: Abdomen is soft. There is no mass.      Tenderness: There is no abdominal tenderness.   Musculoskeletal:      Cervical back: Normal range of motion.   Skin:     Findings: No lesion or rash.   Neurological:      General: No focal deficit present.      Mental Status: She is oriented to person, place, and time.      Cranial Nerves: No cranial nerve deficit.   Psychiatric:         Mood and Affect: Mood normal.         Result Review      The following data was " reviewed by Momo Renteria MD on 06/28/2022.  Lab Results   Component Value Date    WBC 6.31 09/28/2021    HGB 13.3 09/28/2021    HCT 39.2 09/28/2021    MCV 95.4 09/28/2021     09/28/2021     Lab Results   Component Value Date    GLUCOSE 76 09/28/2021    BUN 15 09/28/2021    CREATININE 0.80 09/28/2021     09/28/2021    K 4.3 09/28/2021     09/28/2021    CO2 27.6 09/28/2021    CALCIUM 9.9 09/28/2021    PROTEINTOT 7.4 09/28/2021    ALBUMIN 4.50 09/28/2021    ALT 18 09/28/2021    AST 26 09/28/2021    ALKPHOS 58 09/28/2021    BILITOT 0.3 09/28/2021    EGFRIFNONA 72 09/28/2021    GLOB 2.9 09/28/2021    AGRATIO 1.6 09/28/2021    BCR 18.8 09/28/2021    ANIONGAP 9.4 09/28/2021      Lab Results   Component Value Date    CHOL 141 09/28/2021    CHLPL 157 08/24/2020    TRIG 125 09/28/2021    HDL 50 09/28/2021    LDL 69 09/28/2021     Lab Results   Component Value Date    TSH 1.920 09/28/2021     No results found for: HGBA1C         Assessment and Plan:   Today, we have reviewed her care.  There is really not any obvious finding on her exam that would explain the tinnitus that she has had.  We will refer her to ENT for evaluation at this time.  It does not seem to be relenting.  We discussed that many times tinnitus as we age will not resolve.  Her blood pressure is moderately elevated today.  I am also concerned by the weight gain she has had.  We will move ahead with laboratory testing as noted below and then consider how to move forward.  I might change her over to furosemide instead of hydrochlorothiazide.  Consider an echocardiogram as a precaution.  Again, we will see what the testing shows and then be back in touch with her.       Diagnoses and all orders for this visit:    1. Tinnitus of both ears (Primary)  -     Ambulatory Referral to ENT (Otolaryngology)    2. Essential hypertension  -     Comprehensive Metabolic Panel; Future  -     Discontinue: losartan (COZAAR) 50 MG tablet; Take 1 tablet  by mouth Daily.  Dispense: 90 tablet; Refill: 1  -     losartan (COZAAR) 50 MG tablet; Take 1 tablet by mouth Daily.  Dispense: 90 tablet; Refill: 1    3. Localized edema  -     Comprehensive Metabolic Panel; Future    4. Fatigue, unspecified type  -     CBC (No Diff); Future  -     TSH+Free T4; Future    5. Dyspnea, unspecified type  -     BNP; Future    6. Mixed hyperlipidemia  -     Lipid Panel; Future  -     Discontinue: simvastatin (ZOCOR) 20 MG tablet; Take 1 tablet by mouth Every Night.  Dispense: 90 tablet; Refill: 1  -     simvastatin (ZOCOR) 20 MG tablet; Take 1 tablet by mouth Every Night.  Dispense: 90 tablet; Refill: 1    Other orders  -     Discontinue: estradiol (ESTRACE) 1 MG tablet; Take 1 tablet by mouth Every Night.  Dispense: 90 tablet; Refill: 1  -     estradiol (ESTRACE) 1 MG tablet; Take 1 tablet by mouth Every Night.  Dispense: 90 tablet; Refill: 1          Follow Up   Return in about 18 weeks (around 11/1/2022) for Recheck, as scheduled.  Patient was given instructions and counseling regarding her condition or for health maintenance advice. Please see specific information pulled into the AVS if appropriate.

## 2022-06-29 DIAGNOSIS — R60.0 LOCALIZED EDEMA: ICD-10-CM

## 2022-06-29 DIAGNOSIS — I10 ESSENTIAL HYPERTENSION: ICD-10-CM

## 2022-06-29 RX ORDER — FUROSEMIDE 40 MG/1
40 TABLET ORAL DAILY
Qty: 90 TABLET | Refills: 1 | Status: SHIPPED | OUTPATIENT
Start: 2022-06-29 | End: 2022-11-01 | Stop reason: SDUPTHER

## 2022-06-29 RX ORDER — FUROSEMIDE 40 MG/1
40 TABLET ORAL DAILY
Qty: 90 TABLET | Refills: 1 | Status: SHIPPED | OUTPATIENT
Start: 2022-06-29 | End: 2022-06-29 | Stop reason: SDUPTHER

## 2022-06-29 RX ORDER — POTASSIUM CHLORIDE 750 MG/1
10 TABLET, EXTENDED RELEASE ORAL 2 TIMES DAILY
Qty: 90 TABLET | Refills: 1 | Status: SHIPPED | OUTPATIENT
Start: 2022-06-29 | End: 2022-10-19 | Stop reason: SDUPTHER

## 2022-06-29 RX ORDER — POTASSIUM CHLORIDE 750 MG/1
10 TABLET, EXTENDED RELEASE ORAL 2 TIMES DAILY
Qty: 90 TABLET | Refills: 1 | Status: SHIPPED | OUTPATIENT
Start: 2022-06-29 | End: 2022-06-29 | Stop reason: SDUPTHER

## 2022-08-02 ENCOUNTER — TELEPHONE (OUTPATIENT)
Dept: FAMILY MEDICINE CLINIC | Age: 67
End: 2022-08-02

## 2022-08-02 DIAGNOSIS — I10 ESSENTIAL HYPERTENSION: Primary | ICD-10-CM

## 2022-08-02 NOTE — TELEPHONE ENCOUNTER
----- Message from Makenzie Arroyo LPN sent at 6/29/2022  8:38 AM EDT -----  TICKLE for BMP and intact PTH in 4 weeks

## 2022-08-03 ENCOUNTER — LAB (OUTPATIENT)
Dept: LAB | Facility: HOSPITAL | Age: 67
End: 2022-08-03

## 2022-08-03 DIAGNOSIS — I10 ESSENTIAL HYPERTENSION: ICD-10-CM

## 2022-08-03 LAB
ANION GAP SERPL CALCULATED.3IONS-SCNC: 9.8 MMOL/L (ref 5–15)
BUN SERPL-MCNC: 22 MG/DL (ref 8–23)
BUN/CREAT SERPL: 19.3 (ref 7–25)
CALCIUM SPEC-SCNC: 9.7 MG/DL (ref 8.6–10.5)
CHLORIDE SERPL-SCNC: 103 MMOL/L (ref 98–107)
CO2 SERPL-SCNC: 29.2 MMOL/L (ref 22–29)
CREAT SERPL-MCNC: 1.14 MG/DL (ref 0.57–1)
EGFRCR SERPLBLD CKD-EPI 2021: 52.9 ML/MIN/1.73
GLUCOSE SERPL-MCNC: 91 MG/DL (ref 65–99)
POTASSIUM SERPL-SCNC: 4.7 MMOL/L (ref 3.5–5.2)
PTH-INTACT SERPL-MCNC: 44.1 PG/ML (ref 15–65)
SODIUM SERPL-SCNC: 142 MMOL/L (ref 136–145)

## 2022-08-03 PROCEDURE — 83970 ASSAY OF PARATHORMONE: CPT

## 2022-08-03 PROCEDURE — 36415 COLL VENOUS BLD VENIPUNCTURE: CPT

## 2022-08-03 PROCEDURE — 80048 BASIC METABOLIC PNL TOTAL CA: CPT

## 2022-08-10 ENCOUNTER — OFFICE VISIT (OUTPATIENT)
Dept: OTOLARYNGOLOGY | Facility: CLINIC | Age: 67
End: 2022-08-10

## 2022-08-10 ENCOUNTER — CLINICAL SUPPORT (OUTPATIENT)
Dept: FAMILY MEDICINE CLINIC | Age: 67
End: 2022-08-10

## 2022-08-10 VITALS — BODY MASS INDEX: 32.61 KG/M2 | WEIGHT: 191 LBS | TEMPERATURE: 98 F | HEIGHT: 64 IN

## 2022-08-10 VITALS — DIASTOLIC BLOOD PRESSURE: 70 MMHG | HEART RATE: 72 BPM | SYSTOLIC BLOOD PRESSURE: 131 MMHG

## 2022-08-10 DIAGNOSIS — H93.13 TINNITUS OF BOTH EARS: Primary | ICD-10-CM

## 2022-08-10 DIAGNOSIS — E04.1 THYROID NODULE: ICD-10-CM

## 2022-08-10 DIAGNOSIS — H90.3 SENSORINEURAL HEARING LOSS (SNHL) OF BOTH EARS: ICD-10-CM

## 2022-08-10 PROCEDURE — 99214 OFFICE O/P EST MOD 30 MIN: CPT | Performed by: OTOLARYNGOLOGY

## 2022-08-10 NOTE — PROGRESS NOTES
Vitals:    08/10/22 1439 08/10/22 1448   BP: 152/70 131/70   Pulse: 75 72     Good afternoon, Carolina.  Your blood pressure was mildly elevated on initial check, but it was in a good range on repeat testing.  For now, please continue your current medications.  We will review things further at your follow-up visit in November.  Feel free to stop by the office again in the next month or so if you would like for the allergy room staff to recheck the pressure.  Let me know if you have other concerns.    Momo Renteria MD  [facility]  [Sent as [employer] message]

## 2022-08-20 DIAGNOSIS — E78.2 MIXED HYPERLIPIDEMIA: ICD-10-CM

## 2022-08-22 RX ORDER — SIMVASTATIN 20 MG
TABLET ORAL
Qty: 90 TABLET | Refills: 0 | Status: SHIPPED | OUTPATIENT
Start: 2022-08-22 | End: 2022-11-01 | Stop reason: SDUPTHER

## 2022-08-23 DIAGNOSIS — E78.2 MIXED HYPERLIPIDEMIA: ICD-10-CM

## 2022-08-23 RX ORDER — SIMVASTATIN 20 MG
TABLET ORAL
Qty: 90 TABLET | Refills: 0 | OUTPATIENT
Start: 2022-08-23

## 2022-08-29 PROBLEM — H93.13 TINNITUS OF BOTH EARS: Status: ACTIVE | Noted: 2022-08-29

## 2022-08-29 PROBLEM — E04.1 THYROID NODULE: Status: ACTIVE | Noted: 2022-08-29

## 2022-08-29 PROBLEM — H90.3 SENSORINEURAL HEARING LOSS (SNHL) OF BOTH EARS: Status: ACTIVE | Noted: 2022-08-29

## 2022-08-30 ENCOUNTER — TELEPHONE (OUTPATIENT)
Dept: FAMILY MEDICINE CLINIC | Age: 67
End: 2022-08-30

## 2022-08-30 DIAGNOSIS — U07.1 COVID-19: Primary | ICD-10-CM

## 2022-08-30 NOTE — TELEPHONE ENCOUNTER
Caller: Britney Ayers    Relationship to patient: Emergency Contact    Best call back number: [phone]    Date of exposure: POSSIBLY ON 08/24/2022    Date of positive COVID19 test: 08/28/2022 AT URGENT CARE     Date if possible COVID19 exposure: POSSIBLY ON 08/24/2022     COVID19 symptoms: COUGH, SORE THROAT, SNEEZING, CHILLS, FEVER     Date of initial quarantine: Sunday, AUGUST 28, 2022    Additional information or concerns: DAUGHTER STATES THAT SOMEONE HAD ADVISED HER THAT HER MOTHER SHOULD PROBABLY BE TAKING AN ASPRIN SINCE SHE HAS COVID DUE TO SOME PATIENTS HAVE BEEN KNOWN TO HAVE STROKES    What is the patients preferred pharmacy:   [facility]   [address]  PHONE NUMBER [phone]        [place] IS ON THE  [address]

## 2022-08-30 NOTE — PROGRESS NOTES
Patient Name: Carolina Ayers   Visit Date: 08/10/2022   Patient ID: [id number]  Provider: Truong Dean MD    Sex: female  Location: [facility]   YOB: 1955  Location Address: [address] [place] [address], [address],?[place]?[id number]    Primary Care Provider Momo Renteria MD  Location Phone: [phone]    Referring Provider: Momo Renteria, *        Chief Complaint  Tinnitus (OPNC- TINNITUS OF BOTH EARS)    Subjective    History of Present Illness  Carolina Ayers is a 67 y.o. female who presents to [facility] today as a consult from Momo Renteria, *.    She presents to the clinic today for evaluation of of her ears and hearing, as well as issues with ringing in both ears.  She describes the ringing is high-pitched and non-pulsatile.  She does note some difficulty hearing in day-to-day situations, notes that there has been some gradual progression.  Should not had a hearing test in quite some time.  Denies any major family history of hearing loss at an early age, and has not had any significant issues with recurrent ear disease lately.    I previously saw her in 2020 for evaluation of thyroid nodules, she denies any symptoms or issues.  I reviewed her thyroid ultrasound from 2021 which shows stable findings compared to 2020 when I saw her.  Suspicious nodules were biopsied and this revealed colloid nodules, benign.    Past Medical History:   Diagnosis Date   • Arthritis    • Cancer (HCC)     SKIN CANCER   • Cataracts, bilateral    • Chronic bilateral low back pain with bilateral sciatica    • Essential hypertension    • H/O: pneumonia    • History of chest pain 2014    SEEN DR SIM   • History of skin cancer    • Hyperlipidemia    • Hypokalemia    • Impaired functional mobility, balance, gait, and endurance    • Localized edema    • Lumbar herniated disc    • Orthostatic hypotension    • PONV (postoperative nausea and  vomiting)    • Thyroid nodule 8/29/2022   • Tinnitus        Past Surgical History:   Procedure Laterality Date   • ADENOIDECTOMY     • BREAST LUMPECTOMY Left    • CARPAL TUNNEL RELEASE     • CHOLECYSTECTOMY     • COLONOSCOPY  2015   • EXTERNAL EAR SURGERY      cyst removed from ear   • EYE SURGERY     • HERNIA REPAIR     • HYSTERECTOMY      partial   • KNEE SURGERY     • LUMBAR DISCECTOMY FUSION INSTRUMENTATION Bilateral 08/07/2018    Procedure: LUMBAR FUSION DECOMPRESSON WITH PEDICLE SCREWS Lumbar 4-5,  posterolateral fusion;  Surgeon: Jarrett Vasques IV, MD;  Location: [facility];  Service: Neurosurgery   • SHOULDER SURGERY     • SUBTOTAL HYSTERECTOMY     • TONSILLECTOMY     • US GUIDED FINE NEEDLE ASPIRATION  10/05/2020   • WRIST SURGERY Bilateral     Cyst removed         Current Outpatient Medications:   •  cyclobenzaprine (FLEXERIL) 10 MG tablet, Take 1 tablet by mouth 3 (Three) Times a Day As Needed for Muscle Spasms., Disp: 60 tablet, Rfl: 1  •  estradiol (ESTRACE) 1 MG tablet, Take 1 tablet by mouth Every Night., Disp: 90 tablet, Rfl: 1  •  furosemide (LASIX) 40 MG tablet, Take 1 tablet by mouth Daily., Disp: 90 tablet, Rfl: 1  •  losartan (COZAAR) 50 MG tablet, Take 1 tablet by mouth Daily., Disp: 90 tablet, Rfl: 1  •  melatonin 1 MG tablet, Take 1 mg by mouth At Night As Needed., Disp: , Rfl:   •  meloxicam (MOBIC) 15 MG tablet, Take 1 tablet by mouth Daily., Disp: , Rfl:   •  potassium chloride (K-DUR,KLOR-CON) 10 MEQ CR tablet, Take 1 tablet by mouth 2 (Two) Times a Day., Disp: 90 tablet, Rfl: 1  •  simvastatin (ZOCOR) 20 MG tablet, TAKE ONE TABLET BY MOUTH ONCE NIGHTLY, Disp: 90 tablet, Rfl: 0     Allergies   Allergen Reactions   • Lisinopril Cough   • Penicillins Hives       Family History   Problem Relation Age of Onset   • Macular degeneration Mother    • Coronary artery disease Father    • Malig Hyperthermia Neg Hx         Social History     Social History Narrative   • Not on file  "      Objective     Vital Signs:   Temp 98 °F (36.7 °C)   Ht 162.6 cm (64.02\")   Wt 86.6 kg (191 lb)   BMI 32.77 kg/m²       Physical Exam         Constitutional   Appearance  · : well developed, well-nourished, alert and in no acute distress, voice clear and strong    Head  Inspection  · : no deformities or lesions  Face  Inspection  · : No facial lesions; House-Brackmann I/VI bilaterally  Palpation  · : No TMJ crepitus nor  muscle tenderness bilaterally    Eyes  Vision  Visual Fields  · : Extraocular movements are intact. No spontaneous or gaze-induced nystagmus.  Conjunctivae  · : clear  Sclerae  · : clear  Pupils and Irises  · : pupils equal, round, and reactive to light.     Ears, Nose, Mouth and Throat    Ears    External Ears  · : appearance within normal limits, no lesions present  Otoscopic Examination  · : Tympanic membrane appearance within normal limits bilaterally without perforations, well-aerated middle ears  Hearing  · : intact to conversational voice both ears  Tunning fork testing:     :    Nose    External Nose  · : appearance normal  Intranasal Exam  · : mucosa within normal limits, vestibules normal, no intranasal lesions present, septum midline, sinuses non tender to percussion  Oral Cavity    Oral Mucosa  · : oral mucosa normal without pallor or cyanosis  Lips  · : lip appearance normal  Teeth  · : normal dentition for age  Gums  · : gums pink, non-swollen, no bleeding present  Tongue  · : tongue appearance normal; normal mobility  Palate  · : hard palate normal, soft palate appearance normal with symmetric mobility    Throat    Oropharynx  · : no inflammation or lesions present, tonsils within normal limits  Hypopharynx  · : appearance within normal limits, superior epiglottis within normal limits  Larynx  · : appearance within normal limits, vocal cords within normal limits, no lesions present    Neck  Inspection/Palpation  · : normal appearance, no masses or tenderness, trachea " midline; thyroid size normal, nontender, palpable nodules, stable, no lymphadenopathy    Respiratory  Respiratory Effort  · : breathing unlabored  Inspection of Chest  · : normal appearance, no retractions    Cardiovascular  Heart  · : regular rate and rhythm    Lymphatic  Neck  · : no lymphadenopathy present  Supraclavicular Nodes  · : no lymphadenopathy present  Preauricular Nodes  · : no lymphadenopathy present    Skin and Subcutaneous Tissue  General Inspection  · : Regarding face and neck - there are no rashes present, no lesions present, and no areas of discoloration    Neurologic  Cranial Nerves  · : cranial nerves II-XII are grossly intact bilaterally  Gait and Station  · : normal gait, able to stand without diffculty    Psychiatric  Judgement and Insight  · : judgment and insight intact  Mood and Affect  · : mood normal, affect appropriate          Assessment and Plan    Diagnoses and all orders for this visit:    1. Tinnitus of both ears (Primary)  -     Comprehensive Hearing Test; Future  -     Tympanometry; Future    2. Sensorineural hearing loss (SNHL) of both ears  -     Comprehensive Hearing Test; Future  -     Tympanometry; Future    3. Thyroid nodule    Examination today revealed stable neck exam with palpable stable thyroid nodules.  Ear exam today was completely normal.  I discussed tinnitus with her, and this is likely related to underlying sensorineural hearing loss which is most likely age-related.  I have ordered an audiogram and tympanogram of the initial work-up for this, and we will plan to see her back in clinic after this is completed to discuss results and further management.  She may benefit from amplification.  We discussed using melatonin 3 to 5 mg nightly as well as background noise distraction techniques for the tinnitus.  I will discuss this further at the follow-up visit.    Follow Up   No follow-ups on file.  Patient was given instructions and counseling regarding her condition  or for health maintenance advice. Please see specific information pulled into the AVS if appropriate.

## 2022-08-30 NOTE — TELEPHONE ENCOUNTER
Good evening, Carolina.  It was good to speak with you earlier.  I am sorry that you have contracted COVID-19.  As we discussed, I would recommend moving ahead with treatment with Paxlovid due to your underlying lung disease.  I have sent a prescription for this to the pharmacy here in the building.  Your dose will be 2 tablets twice daily.  It is important you not take more than this because of your kidney function test.  Also, please do not take simvastatin while on this medication.  I would also recommend you wait a full 24-hour period after your last dose of Paxlovid prior to resuming simvastatin.  I would also recommend you only take 1/2 tablet of estradiol daily.    With regard to aspirin, there are not really clear guidelines in this regard.  I do think it is reasonable to take a low-dose, 81 mg aspirin for a week or so.    With regard to isolation, we would recommend you isolate for a full 5 days.  Based on the timeline we discussed, I would recommend you isolate through and including Thursday.  If you are seeing improvement in your symptoms and have no fever on Thursday, then it would be reasonable to release from isolation on Friday as long as you are able to wear a well fitting mask for at least 5 additional days.    Also, consider seeking medical attention if you have significant shortness of breath, new chest pain, fever of 100.5 degrees or higher that does not respond to Tylenol, or significant weakness.  Hopefully, you will have a fairly mild course with this illness.  Let me know if you have other concerns.    Momo Renteria MD  [facility]  [Sent as [employer] message]

## 2022-09-19 ENCOUNTER — OFFICE VISIT (OUTPATIENT)
Dept: FAMILY MEDICINE CLINIC | Age: 67
End: 2022-09-19

## 2022-09-19 VITALS
HEART RATE: 74 BPM | BODY MASS INDEX: 31.21 KG/M2 | WEIGHT: 182.8 LBS | DIASTOLIC BLOOD PRESSURE: 56 MMHG | SYSTOLIC BLOOD PRESSURE: 138 MMHG | HEIGHT: 64 IN | TEMPERATURE: 98.2 F

## 2022-09-19 DIAGNOSIS — N63.21 MASS OF UPPER OUTER QUADRANT OF LEFT BREAST: Primary | ICD-10-CM

## 2022-09-19 PROCEDURE — 99213 OFFICE O/P EST LOW 20 MIN: CPT | Performed by: FAMILY MEDICINE

## 2022-09-19 NOTE — PROGRESS NOTES
Carolina Ayers presents to [facility].    Chief Complaint:  Left breast mass    Subjective       History of Present Illness:  Carolina is in today for evaluation of a left breast mass.  She noted the mass on the left upper chest near the axilla about 2 to 3 months ago.  She was not overly concerned, but it has persisted, and she would like to move ahead with some evaluation as a precaution.  This does not seem to have significantly increased in size since she first noticed it.  She states that she is up-to-date on mammogram which she usually has done at [facility] locally.  She denies any personal or family history of breast cancer, but she did have a second mammogram last year due to some abnormality.    Review of Systems:  Review of Systems   Constitutional: Negative for chills and fever.   Respiratory: Negative for cough and shortness of breath.    Cardiovascular: Negative for chest pain and palpitations.   Gastrointestinal: Negative for abdominal pain, nausea and vomiting.        Objective   Medical History:  Past Medical History:   • Arthritis   • Cancer (HCC)    SKIN CANCER   • Cataracts, bilateral   • Chronic bilateral low back pain with bilateral sciatica   • Essential hypertension   • H/O: pneumonia   • History of chest pain    SEEN DR SIM   • History of skin cancer   • Hyperlipidemia   • Hypokalemia   • Impaired functional mobility, balance, gait, and endurance   • Localized edema   • Lumbar herniated disc   • Orthostatic hypotension   • PONV (postoperative nausea and vomiting)   • Thyroid nodule   • Tinnitus     Past Surgical History:   • ADENOIDECTOMY   • BREAST LUMPECTOMY   • CARPAL TUNNEL RELEASE   • CHOLECYSTECTOMY   • COLONOSCOPY   • EXTERNAL EAR SURGERY    cyst removed from ear   • EYE SURGERY   • HERNIA REPAIR   • HYSTERECTOMY    partial   • KNEE SURGERY   • LUMBAR DISCECTOMY FUSION INSTRUMENTATION    Procedure: LUMBAR FUSION DECOMPRESSON WITH PEDICLE SCREWS Lumbar 4-5,   posterolateral fusion;  Surgeon: Jarrett Vasques IV, MD;  Location: [facility];  Service: Neurosurgery   • SHOULDER SURGERY   • SUBTOTAL HYSTERECTOMY   • TONSILLECTOMY   • US GUIDED FINE NEEDLE ASPIRATION   • WRIST SURGERY    Cyst removed      Family History   Problem Relation Age of Onset   • Macular degeneration Mother    • Coronary artery disease Father    • Malig Hyperthermia Neg Hx      Social History     Tobacco Use   • Smoking status: Former Smoker     Packs/day: 1.00     Years: 30.00     Pack years: 30.00     Types: Cigarettes     Quit date: 2010     Years since quittin.7   • Smokeless tobacco: Never Used   • Tobacco comment: NO CAFFEINE USE   Substance Use Topics   • Alcohol use: Not Currently     Comment: rarely- 2-3 times a year       Health Maintenance Due   Topic Date Due   • ZOSTER VACCINE (1 of 2) Never done   • DXA SCAN  2017   • PAP SMEAR  Never done   • TDAP/TD VACCINES (2 - Td or Tdap) 10/29/2020        Immunization History   Administered Date(s) Administered   • COVID-19 (MODERNA) 1st, 2nd, 3rd Dose Only 2021, 2021, 10/26/2021   • COVID-19 (MODERNA) BOOSTER 2022   • Fluzone High-Dose 65+yrs 10/12/2021   • Hepatitis A 2018   • Influenza, Unspecified 2019   • Pneumococcal Conjugate 13-Valent (PCV13) 10/21/2020   • Pneumococcal Polysaccharide (PPSV23) 2009, 10/26/2021   • Tdap 10/29/2010       Allergies   Allergen Reactions   • Lisinopril Cough   • Penicillins Hives        Medications:  Current Outpatient Medications on File Prior to Visit   Medication Sig   • cyclobenzaprine (FLEXERIL) 10 MG tablet Take 1 tablet by mouth 3 (Three) Times a Day As Needed for Muscle Spasms.   • estradiol (ESTRACE) 1 MG tablet Take 1 tablet by mouth Every Night.   • furosemide (LASIX) 40 MG tablet Take 1 tablet by mouth Daily.   • losartan (COZAAR) 50 MG tablet Take 1 tablet by mouth Daily.   • melatonin 1 MG tablet Take 1 mg by mouth At Night As Needed.   •  "meloxicam (MOBIC) 15 MG tablet Take 1 tablet by mouth Daily.   • potassium chloride (K-DUR,KLOR-CON) 10 MEQ CR tablet Take 1 tablet by mouth 2 (Two) Times a Day.   • simvastatin (ZOCOR) 20 MG tablet TAKE ONE TABLET BY MOUTH ONCE NIGHTLY   • [DISCONTINUED] hydroCHLOROthiazide (HYDRODIURIL) 25 MG tablet Take 1 tablet by mouth Daily.     No current facility-administered medications on file prior to visit.       Vital Signs:   /56 (BP Location: Right arm, Patient Position: Sitting)   Pulse 74   Temp 98.2 °F (36.8 °C) (Oral)   Ht 162.6 cm (64.02\")   Wt 82.9 kg (182 lb 12.8 oz)   BMI 31.36 kg/m²       Physical Exam:  Physical Exam  Vitals reviewed.   Constitutional:       General: She is not in acute distress.     Appearance: She is not ill-appearing.   Eyes:      Pupils: Pupils are equal, round, and reactive to light.   Neck:      Comments: No thyromegaly  Cardiovascular:      Rate and Rhythm: Normal rate and regular rhythm.   Chest:      Chest wall: Mass (There is a soft subcutaneous mass noted in the left upper outer breast near the axilla.  This has a consistency that seems similar to a lipoma.  No other worrisome finding is noted.) present.   Lymphadenopathy:      Cervical: No cervical adenopathy.   Skin:     Findings: No lesion or rash.   Neurological:      Mental Status: She is alert.         Result Review      The following data was reviewed by Momo Renteria MD on 09/19/2022.  Lab Results   Component Value Date    WBC 6.05 06/28/2022    HGB 12.4 06/28/2022    HCT 38.1 06/28/2022    MCV 95.7 06/28/2022     06/28/2022     Lab Results   Component Value Date    GLUCOSE 91 08/03/2022    BUN 22 08/03/2022    CREATININE 1.14 (H) 08/03/2022     08/03/2022    K 4.7 08/03/2022     08/03/2022    CO2 29.2 (H) 08/03/2022    CALCIUM 9.7 08/03/2022    PROTEINTOT 7.0 06/28/2022    ALBUMIN 4.30 06/28/2022    ALT 19 06/28/2022    AST 22 06/28/2022    ALKPHOS 64 06/28/2022    BILITOT 0.3 " 06/28/2022    EGFRIFNONA 72 09/28/2021    GLOB 2.7 06/28/2022    AGRATIO 1.6 06/28/2022    BCR 19.3 08/03/2022    ANIONGAP 9.8 08/03/2022      Lab Results   Component Value Date    CHOL 152 06/28/2022    CHLPL 157 08/24/2020    TRIG 146 06/28/2022    HDL 59 06/28/2022    LDL 68 06/28/2022     Lab Results   Component Value Date    TSH 2.820 06/28/2022     No results found for: HGBA1C         Assessment and Plan:   Today, we have reviewed her care.  The feel of the mass does not necessarily lend itself toward a cancer being present.  However, I do think additional evaluation is warranted.  We will move ahead with mammogram and ultrasound as below.  We may move ahead with biopsy prior to general surgery referral.        Diagnoses and all orders for this visit:    1. Mass of upper outer quadrant of left breast (Primary)  -     Mammo Diagnostic Digital Tomosynthesis Left With CAD; Future  -     US Breast Left Limited; Future          Follow Up   Return in about 6 weeks (around 11/1/2022) for Recheck as scheduled.  Patient was given instructions and counseling regarding her condition or for health maintenance advice. Please see specific information pulled into the AVS if appropriate.

## 2022-09-21 ENCOUNTER — OFFICE VISIT (OUTPATIENT)
Dept: OTOLARYNGOLOGY | Facility: CLINIC | Age: 67
End: 2022-09-21

## 2022-09-21 VITALS — HEIGHT: 62 IN | BODY MASS INDEX: 33.57 KG/M2 | WEIGHT: 182.4 LBS | TEMPERATURE: 97.4 F

## 2022-09-21 DIAGNOSIS — H93.13 TINNITUS OF BOTH EARS: Primary | ICD-10-CM

## 2022-09-21 DIAGNOSIS — H90.3 SENSORINEURAL HEARING LOSS (SNHL) OF BOTH EARS: ICD-10-CM

## 2022-09-21 DIAGNOSIS — K13.79 MOUTH SORES: ICD-10-CM

## 2022-09-21 PROCEDURE — 99214 OFFICE O/P EST MOD 30 MIN: CPT | Performed by: OTOLARYNGOLOGY

## 2022-09-21 NOTE — PROGRESS NOTES
Patient Name: Carolina Ayers   Visit Date: 09/21/2022   Patient ID: [id number]  Provider: Truong Dean MD    Sex: female  Location: [facility]   YOB: 1955  Location Address: [address] [place] [address], [address],?[place]?[id number]    Primary Care Provider Momo Renteria MD  Location Phone: [phone]    Referring Provider: No ref. provider found        Chief Complaint  Follow-up (Follow up with audio results mouth sore and tongue pain at times )    Subjective    History of Present Illness  Carolina Ayers is a 67 y.o. female who presents to [facility] today as a consult from No ref. provider found.    She presents to the clinic today for follow-up regarding tinnitus, hearing loss, and new issue of mouth sores.  She has had no changes in her ear ringing or hearing since the last time I saw her in August.  Notes that the ringing is minimally bothersome to her, but seems to be slightly louder in her left ear.  An audiogram was performed and this reveals mild to moderate hearing loss with slight downsloping pattern on both sides, slightly worse in the left ear.  Word discrimination scores are 100% on the right side and 92% on the left.  She does not feel like she needs hearing aids or has significant difficulties in day-to-day situations at this point.    She informs me that she has had small sores which come and go in her mouth, and currently has issues along the tip of her tongue.  Is not sure if this is related to any food items, but she has tried to avoid acidic foods.  She has stopped using her whitening toothpaste.  Denies any ulceration.    Past Medical History:   Diagnosis Date   • Arthritis    • Cancer (HCC)     SKIN CANCER   • Cataracts, bilateral    • Chronic bilateral low back pain with bilateral sciatica    • Essential hypertension    • H/O: pneumonia    • History of chest pain 2014    SEEN DR SIM   • History of skin cancer     • HL (hearing loss)    • Hyperlipidemia    • Hypokalemia    • Impaired functional mobility, balance, gait, and endurance    • Localized edema    • Lumbar herniated disc    • Orthostatic hypotension    • PONV (postoperative nausea and vomiting)    • Thyroid nodule 08/29/2022   • Tinnitus        Past Surgical History:   Procedure Laterality Date   • ADENOIDECTOMY     • BREAST LUMPECTOMY Left    • CARPAL TUNNEL RELEASE     • CHOLECYSTECTOMY     • COLONOSCOPY  2015   • EXTERNAL EAR SURGERY      cyst removed from ear   • EYE SURGERY     • HERNIA REPAIR     • HYSTERECTOMY      partial   • KNEE SURGERY     • LUMBAR DISCECTOMY FUSION INSTRUMENTATION Bilateral 08/07/2018    Procedure: LUMBAR FUSION DECOMPRESSON WITH PEDICLE SCREWS Lumbar 4-5,  posterolateral fusion;  Surgeon: Jarrett Vasques IV, MD;  Location: [facility];  Service: Neurosurgery   • SHOULDER SURGERY     • SUBTOTAL HYSTERECTOMY     • TONSILLECTOMY     • US GUIDED FINE NEEDLE ASPIRATION  10/05/2020   • WRIST SURGERY Bilateral     Cyst removed         Current Outpatient Medications:   •  cyclobenzaprine (FLEXERIL) 10 MG tablet, Take 1 tablet by mouth 3 (Three) Times a Day As Needed for Muscle Spasms., Disp: 60 tablet, Rfl: 1  •  estradiol (ESTRACE) 1 MG tablet, Take 1 tablet by mouth Every Night., Disp: 90 tablet, Rfl: 1  •  furosemide (LASIX) 40 MG tablet, Take 1 tablet by mouth Daily., Disp: 90 tablet, Rfl: 1  •  losartan (COZAAR) 50 MG tablet, Take 1 tablet by mouth Daily., Disp: 90 tablet, Rfl: 1  •  melatonin 1 MG tablet, Take 1 mg by mouth At Night As Needed., Disp: , Rfl:   •  meloxicam (MOBIC) 15 MG tablet, Take 1 tablet by mouth Daily., Disp: , Rfl:   •  potassium chloride (K-DUR,KLOR-CON) 10 MEQ CR tablet, Take 1 tablet by mouth 2 (Two) Times a Day., Disp: 90 tablet, Rfl: 1  •  simvastatin (ZOCOR) 20 MG tablet, TAKE ONE TABLET BY MOUTH ONCE NIGHTLY, Disp: 90 tablet, Rfl: 0     Allergies   Allergen Reactions   • Lisinopril Cough   •  "Penicillins Hives       Family History   Problem Relation Age of Onset   • Macular degeneration Mother    • Coronary artery disease Father    • Malig Hyperthermia Neg Hx         Social History     Social History Narrative   • Not on file       Objective     Vital Signs:   Temp 97.4 °F (36.3 °C) (Tympanic)   Ht 157.5 cm (62\")   Wt 82.7 kg (182 lb 6.4 oz)   BMI 33.36 kg/m²       Physical Exam         Constitutional   Appearance  · : well developed, well-nourished, alert and in no acute distress, voice clear and strong    Head  Inspection  · : no deformities or lesions  Face  Inspection  · : No facial lesions; House-Brackmann I/VI bilaterally  Palpation  · : No TMJ crepitus nor  muscle tenderness bilaterally    Eyes  Vision  Visual Fields  · : Extraocular movements are intact. No spontaneous or gaze-induced nystagmus.  Conjunctivae  · : clear  Sclerae  · : clear  Pupils and Irises  · : pupils equal, round, and reactive to light.     Ears, Nose, Mouth and Throat    Ears    External Ears  · : appearance within normal limits, no lesions present  Otoscopic Examination  · : Tympanic membrane appearance within normal limits bilaterally without perforations, well-aerated middle ears  Hearing  · : intact to conversational voice both ears  Tunning fork testing:     :    Nose    External Nose  · : appearance normal  Intranasal Exam  · : mucosa within normal limits, vestibules normal, no intranasal lesions present, septum midline, sinuses non tender to percussion  Oral Cavity    Oral Mucosa  · : oral mucosa normal without pallor or cyanosis  Lips  · : lip appearance normal  Teeth  · : normal dentition for age  Gums  · : gums pink, non-swollen, no bleeding present  Tongue  · : tongue appearance normal; normal mobility  Palate  · : hard palate normal, soft palate appearance normal with symmetric mobility    Throat    Oropharynx  · : no inflammation or lesions present, tonsils within normal limits  Hypopharynx  · : " appearance within normal limits, superior epiglottis within normal limits  Larynx  · : appearance within normal limits, vocal cords within normal limits, no lesions present    Neck  Inspection/Palpation  · : normal appearance, no masses or tenderness, trachea midline; thyroid size normal, nontender, no nodules or masses present on palpation    Respiratory  Respiratory Effort  · : breathing unlabored  Inspection of Chest  · : normal appearance, no retractions    Cardiovascular  Heart  · : regular rate and rhythm    Lymphatic  Neck  · : no lymphadenopathy present  Supraclavicular Nodes  · : no lymphadenopathy present  Preauricular Nodes  · : no lymphadenopathy present    Skin and Subcutaneous Tissue  General Inspection  · : Regarding face and neck - there are no rashes present, no lesions present, and no areas of discoloration    Neurologic  Cranial Nerves  · : cranial nerves II-XII are grossly intact bilaterally  Gait and Station  · : normal gait, able to stand without diffculty    Psychiatric  Judgement and Insight  · : judgment and insight intact  Mood and Affect  · : mood normal, affect appropriate          Assessment and Plan    Diagnoses and all orders for this visit:    1. Tinnitus of both ears (Primary)  -     Comprehensive Hearing Test; Future  -     Tympanometry; Future    2. Sensorineural hearing loss (SNHL) of both ears  -     Comprehensive Hearing Test; Future  -     Tympanometry; Future    3. Mouth sores    Examination today revealed normal-appearing ears.  Oral cavity exam did not reveal any obvious sores.  I recommended that she avoid acidic foods, do some mild baking soda swishes and gargles, or half strength peroxide swishes and gargles.  If there is anything that is worse, I will be glad to see her back for further evaluation.  I discussed her audiogram results, and recommended repeating the hearing test in 1 year as there is some mild asymmetry with the left ear being slightly worse than the right.   I have asked that she contact me should there be any progressive worsening on the left side for sooner evaluation.  It seems that the ringing is not as prominent and is not bothersome to her at this point, we previously discussed background noise distraction techniques and use of melatonin.    Follow Up   No follow-ups on file.  Patient was given instructions and counseling regarding her condition or for health maintenance advice. Please see specific information pulled into the AVS if appropriate.

## 2022-09-27 DIAGNOSIS — N63.21 MASS OF UPPER OUTER QUADRANT OF LEFT BREAST: ICD-10-CM

## 2022-10-05 ENCOUNTER — TELEPHONE (OUTPATIENT)
Dept: FAMILY MEDICINE CLINIC | Age: 67
End: 2022-10-05

## 2022-10-05 DIAGNOSIS — Z87.891 HISTORY OF NICOTINE DEPENDENCE: Primary | ICD-10-CM

## 2022-10-05 DIAGNOSIS — E04.2 MULTIPLE THYROID NODULES: ICD-10-CM

## 2022-10-05 NOTE — TELEPHONE ENCOUNTER
----- Message from Makenzie Arroyo LPN sent at 10/6/2021  8:38 AM EDT -----  LDCT and thyroid US

## 2022-10-19 DIAGNOSIS — I10 ESSENTIAL HYPERTENSION: ICD-10-CM

## 2022-10-19 DIAGNOSIS — R60.0 LOCALIZED EDEMA: ICD-10-CM

## 2022-10-20 RX ORDER — POTASSIUM CHLORIDE 750 MG/1
10 TABLET, EXTENDED RELEASE ORAL 2 TIMES DAILY
Qty: 180 TABLET | Refills: 0 | Status: SHIPPED | OUTPATIENT
Start: 2022-10-20 | End: 2022-11-01 | Stop reason: SDUPTHER

## 2022-10-27 ENCOUNTER — HOSPITAL ENCOUNTER (OUTPATIENT)
Dept: ULTRASOUND IMAGING | Facility: HOSPITAL | Age: 67
Discharge: HOME | End: 2022-10-27

## 2022-10-27 ENCOUNTER — HOSPITAL ENCOUNTER (OUTPATIENT)
Dept: CT IMAGING | Facility: HOSPITAL | Age: 67
Discharge: HOME | End: 2022-10-27

## 2022-10-27 DIAGNOSIS — E04.2 MULTIPLE THYROID NODULES: ICD-10-CM

## 2022-10-27 DIAGNOSIS — Z87.891 HISTORY OF NICOTINE DEPENDENCE: ICD-10-CM

## 2022-10-27 PROCEDURE — 76536 US EXAM OF HEAD AND NECK: CPT

## 2022-10-27 PROCEDURE — 71271 CT THORAX LUNG CANCER SCR C-: CPT

## 2022-11-01 ENCOUNTER — LAB (OUTPATIENT)
Dept: LAB | Facility: HOSPITAL | Age: 67
End: 2022-11-01

## 2022-11-01 ENCOUNTER — OFFICE VISIT (OUTPATIENT)
Dept: FAMILY MEDICINE CLINIC | Age: 67
End: 2022-11-01

## 2022-11-01 VITALS
HEIGHT: 62 IN | WEIGHT: 184.6 LBS | HEART RATE: 63 BPM | TEMPERATURE: 97.2 F | BODY MASS INDEX: 33.97 KG/M2 | SYSTOLIC BLOOD PRESSURE: 165 MMHG | DIASTOLIC BLOOD PRESSURE: 87 MMHG

## 2022-11-01 DIAGNOSIS — M25.561 CHRONIC PAIN OF RIGHT KNEE: ICD-10-CM

## 2022-11-01 DIAGNOSIS — Z00.00 PHYSICAL EXAM: Primary | ICD-10-CM

## 2022-11-01 DIAGNOSIS — Z78.0 ASYMPTOMATIC POSTMENOPAUSAL STATE: ICD-10-CM

## 2022-11-01 DIAGNOSIS — G89.29 CHRONIC PAIN OF RIGHT KNEE: ICD-10-CM

## 2022-11-01 DIAGNOSIS — I10 ESSENTIAL HYPERTENSION: ICD-10-CM

## 2022-11-01 DIAGNOSIS — N95.1 POST MENOPAUSAL SYNDROME: ICD-10-CM

## 2022-11-01 DIAGNOSIS — E78.2 MIXED HYPERLIPIDEMIA: ICD-10-CM

## 2022-11-01 DIAGNOSIS — R60.0 LOCALIZED EDEMA: ICD-10-CM

## 2022-11-01 DIAGNOSIS — R79.89 ELEVATED SERUM CREATININE: ICD-10-CM

## 2022-11-01 LAB
BACTERIA UR QL AUTO: ABNORMAL /HPF
BILIRUB UR QL STRIP: NEGATIVE
CLARITY UR: CLEAR
COLOR UR: YELLOW
GLUCOSE UR STRIP-MCNC: NEGATIVE MG/DL
HGB UR QL STRIP.AUTO: NEGATIVE
HYALINE CASTS UR QL AUTO: ABNORMAL /LPF
KETONES UR QL STRIP: NEGATIVE
LEUKOCYTE ESTERASE UR QL STRIP.AUTO: NEGATIVE
NITRITE UR QL STRIP: NEGATIVE
PH UR STRIP.AUTO: 6.5 [PH] (ref 5–8)
PROT UR QL STRIP: NEGATIVE
RBC # UR STRIP: ABNORMAL /HPF
REF LAB TEST METHOD: ABNORMAL
SP GR UR STRIP: 1.01 (ref 1–1.03)
SQUAMOUS #/AREA URNS HPF: ABNORMAL /HPF
UROBILINOGEN UR QL STRIP: NORMAL
WBC # UR STRIP: ABNORMAL /HPF

## 2022-11-01 PROCEDURE — 36415 COLL VENOUS BLD VENIPUNCTURE: CPT

## 2022-11-01 PROCEDURE — G0439 PPPS, SUBSEQ VISIT: HCPCS | Performed by: FAMILY MEDICINE

## 2022-11-01 PROCEDURE — 1159F MED LIST DOCD IN RCRD: CPT | Performed by: FAMILY MEDICINE

## 2022-11-01 PROCEDURE — 80048 BASIC METABOLIC PNL TOTAL CA: CPT

## 2022-11-01 PROCEDURE — 99214 OFFICE O/P EST MOD 30 MIN: CPT | Performed by: FAMILY MEDICINE

## 2022-11-01 PROCEDURE — 81001 URINALYSIS AUTO W/SCOPE: CPT

## 2022-11-01 PROCEDURE — 1170F FXNL STATUS ASSESSED: CPT | Performed by: FAMILY MEDICINE

## 2022-11-01 RX ORDER — POTASSIUM CHLORIDE 750 MG/1
10 TABLET, EXTENDED RELEASE ORAL 2 TIMES DAILY
Qty: 180 TABLET | Refills: 0 | Status: SHIPPED | OUTPATIENT
Start: 2022-11-01

## 2022-11-01 RX ORDER — SIMVASTATIN 20 MG
20 TABLET ORAL NIGHTLY
Qty: 90 TABLET | Refills: 1 | Status: SHIPPED | OUTPATIENT
Start: 2022-11-01

## 2022-11-01 RX ORDER — ESTRADIOL 1 MG/1
1 TABLET ORAL NIGHTLY
Qty: 90 TABLET | Refills: 1 | Status: SHIPPED | OUTPATIENT
Start: 2022-11-01

## 2022-11-01 RX ORDER — ACETAMINOPHEN AND CODEINE PHOSPHATE 300; 30 MG/1; MG/1
1 TABLET ORAL 3 TIMES DAILY PRN
Qty: 30 TABLET | Refills: 1 | Status: SHIPPED | OUTPATIENT
Start: 2022-11-01 | End: 2023-01-09

## 2022-11-01 RX ORDER — LOSARTAN POTASSIUM 50 MG/1
50 TABLET ORAL DAILY
Qty: 90 TABLET | Refills: 1 | Status: SHIPPED | OUTPATIENT
Start: 2022-11-01

## 2022-11-01 RX ORDER — FUROSEMIDE 40 MG/1
40 TABLET ORAL DAILY
Qty: 90 TABLET | Refills: 1 | Status: SHIPPED | OUTPATIENT
Start: 2022-11-01

## 2022-11-01 NOTE — PROGRESS NOTES
The ABCs of the Annual Wellness Visit  Subsequent Medicare Wellness Visit    Chief Complaint:  Medicare wellness exam, blood pressure, other issues    Subjective    History of Present Illness:  Carolina Ayers is a 67 y.o. female who presents for a Subsequent Medicare Wellness Visit.    The following portions of the patient's history were reviewed and updated as appropriate: allergies, current medications, past family history, past medical history, past social history, past surgical history and problem list.     Compared to one year ago, the patient feels her physical health is the same.  She is having some difficulty with her knee.    Compared to one year ago, the patient feels her mental health is worse.  She is having some increased stress due to her mother's ongoing issues.    Recent Hospitalizations:  She was not admitted to the hospital during the last year.       Current Medical Providers:  Patient Care Team:  Momo Renteria MD as PCP - General (Family Medicine)  Yasmany Day MD as Consulting Physician (Obstetrics and Gynecology)    Outpatient Medications Prior to Visit   Medication Sig Dispense Refill   • melatonin 1 MG tablet Take 1 mg by mouth At Night As Needed.     • meloxicam (MOBIC) 15 MG tablet Take 1 tablet by mouth Daily.     • estradiol (ESTRACE) 1 MG tablet Take 1 tablet by mouth Every Night. 90 tablet 1   • furosemide (LASIX) 40 MG tablet Take 1 tablet by mouth Daily. 90 tablet 1   • losartan (COZAAR) 50 MG tablet Take 1 tablet by mouth Daily. 90 tablet 1   • potassium chloride (K-DUR,KLOR-CON) 10 MEQ CR tablet Take 1 tablet by mouth 2 (Two) Times a Day. 180 tablet 0   • simvastatin (ZOCOR) 20 MG tablet TAKE ONE TABLET BY MOUTH ONCE NIGHTLY 90 tablet 0   • cyclobenzaprine (FLEXERIL) 10 MG tablet Take 1 tablet by mouth 3 (Three) Times a Day As Needed for Muscle Spasms. 60 tablet 1     No facility-administered medications prior to visit.       Opioid medication/s are on active  medication list.  and I have evaluated her active treatment plan and pain score trends (see table).  There were no vitals filed for this visit.  I have reviewed the chart for potential of high risk medication and harmful drug interactions in the elderly.            Aspirin is not on active medication list.  Aspirin use is not indicated based on review of current medical condition/s. Risk of harm outweighs potential benefits.  .    Patient Active Problem List   Diagnosis   • Acute bilateral low back pain without sciatica   • Acquired spondylolisthesis   • Hypokalemia   • Abnormal EKG   • Preop cardiovascular exam   • Essential hypertension   • Localized edema   • Orthostatic hypotension   • Impaired functional mobility, balance, gait, and endurance   • Post-operative state   • Bilateral lower extremity edema   • Precordial pain   • Mixed hyperlipidemia   • Multiple thyroid nodules   • Thyroid nodule   • Sensorineural hearing loss (SNHL) of both ears   • Tinnitus of both ears   • Post menopausal syndrome     Advance Care Planning   Advance Directive is not on file.  ACP discussion was held with the patient during this visit. Patient has an advance directive (not in EMR), copy requested.  Her daughter, Deisy, would make decisions if needed.    In addition to the above, Carolina has hypertension.  Her blood pressure is significantly elevated this morning.  Her pressure has been running in the 140s and 150s at home.  It is over 180 systolic here today though.  Curiously, her systolic blood pressure was 138 on her most recent check.  Her current medications for hypertension include losartan and furosemide.    Also, she was noted to have some mild elevation of her creatinine on most recent evaluation.  She is due for recheck on that.    Carolina is struggling with right knee pain.  She is having very significant pain along the inner portion of the knee.  This is a chronic issue to some degree, but it is worse recently.  She did  "see orthopedics, Dr. Salazar, and was told that she had \"bone-on-bone\".  She does have a follow-up appointment in the next few weeks, but she is concerned about what she can do for pain in the near term.    Carolina also takes estradiol on a regular basis.  She had significant menopausal symptoms for which she was placed on hormone therapy sometime ago.  She has previously tried to come off of this, but she has had recurrent symptoms that are difficult to deal with.  She would like to continue the medication.  Risks associated with its use are discussed including potential risk for blood clot, heart, stroke, and breast cancer.    Review of Systems:  Review of Systems   Constitutional: Negative for chills and fever.   Respiratory: Negative for cough and shortness of breath.    Cardiovascular: Negative for chest pain and palpitations.   Gastrointestinal: Negative for abdominal pain, nausea and vomiting.         Objective       Vitals:    11/01/22 1049   BP: (!) 188/87   BP Location: Left arm   Patient Position: Sitting   Pulse: 64   Temp: 97.2 °F (36.2 °C)   TempSrc: Oral   Weight: 83.7 kg (184 lb 9.6 oz)   Height: 157.5 cm (62.01\")     BMI Readings from Last 1 Encounters:   11/01/22 33.76 kg/m²   BMI is above normal parameters. Recommendations include: nutrition counseling    Does the patient have evidence of cognitive impairment? No    Physical Exam  Vitals and nursing note reviewed.   Constitutional:       General: She is not in acute distress.     Appearance: She is obese. She is not ill-appearing.   HENT:      Right Ear: Tympanic membrane and ear canal normal.      Left Ear: Tympanic membrane and ear canal normal.      Ears:      Comments: Hearing is normal with forced whisper bilaterally.     Mouth/Throat:      Mouth: Mucous membranes are moist.      Comments: Pharynx appears normal  Eyes:      Extraocular Movements: Extraocular movements intact.      Pupils: Pupils are equal, round, and reactive to light.      " Comments: Binocular vision is 20/20 with correction.   Neck:      Thyroid: No thyromegaly.   Cardiovascular:      Rate and Rhythm: Normal rate and regular rhythm.      Heart sounds: No murmur heard.  Pulmonary:      Effort: Pulmonary effort is normal.      Breath sounds: Normal breath sounds.   Abdominal:      General: There is no distension.      Palpations: Abdomen is soft. There is no mass.      Tenderness: There is no abdominal tenderness.   Musculoskeletal:         General: Tenderness (There is some tenderness of the right knee medially.  Decreased range of motion is noted with flexion and extension.) present.      Cervical back: Normal range of motion.   Skin:     Findings: No lesion or rash.   Neurological:      General: No focal deficit present.      Mental Status: She is oriented to person, place, and time.      Cranial Nerves: No cranial nerve deficit.   Psychiatric:         Mood and Affect: Mood normal.       The following data was reviewed by Momo Renteria MD on 11/01/2022.  Lab Results   Component Value Date    WBC 6.05 06/28/2022    HGB 12.4 06/28/2022    HCT 38.1 06/28/2022    MCV 95.7 06/28/2022     06/28/2022     Lab Results   Component Value Date    GLUCOSE 91 08/03/2022    BUN 22 08/03/2022    CREATININE 1.14 (H) 08/03/2022     08/03/2022    K 4.7 08/03/2022     08/03/2022    CO2 29.2 (H) 08/03/2022    CALCIUM 9.7 08/03/2022    PROTEINTOT 7.0 06/28/2022    ALBUMIN 4.30 06/28/2022    ALT 19 06/28/2022    AST 22 06/28/2022    ALKPHOS 64 06/28/2022    BILITOT 0.3 06/28/2022    EGFRIFNONA 72 09/28/2021    GLOB 2.7 06/28/2022    AGRATIO 1.6 06/28/2022    BCR 19.3 08/03/2022    ANIONGAP 9.8 08/03/2022      Lab Results   Component Value Date    CHOL 152 06/28/2022    CHLPL 157 08/24/2020    TRIG 146 06/28/2022    HDL 59 06/28/2022    LDL 68 06/28/2022     Lab Results   Component Value Date    TSH 2.820 06/28/2022     No results found for: HGBA1C       HEALTH RISK  ASSESSMENT    Smoking Status:  Social History     Tobacco Use   Smoking Status Former   • Packs/day: 1.00   • Years: 30.00   • Pack years: 30.00   • Types: Cigarettes   • Quit date: 2010   • Years since quittin.8   Smokeless Tobacco Never   Tobacco Comments    NO CAFFEINE USE     Alcohol Consumption:  Social History     Substance and Sexual Activity   Alcohol Use Not Currently    Comment: rarely- 2-3 times a year     Fall Risk Screen:    TAVOADI Fall Risk Assessment was completed, and patient is at LOW risk for falls.Assessment completed on:2022    Depression Screening:  PHQ-2/PHQ-9 Depression Screening 2022   Retired PHQ-9 Total Score -   Retired Total Score -   Little Interest or Pleasure in Doing Things 0-->not at all   Feeling Down, Depressed or Hopeless 0-->not at all   PHQ-9: Brief Depression Severity Measure Score 0       Health Habits and Functional and Cognitive Screening:  Functional & Cognitive Status 2022   Do you have difficulty preparing food and eating? No   Do you have difficulty bathing yourself, getting dressed or grooming yourself? No   Do you have difficulty using the toilet? No   Do you have difficulty moving around from place to place? No   Do you have trouble with steps or getting out of a bed or a chair? No   Current Diet Well Balanced Diet   Dental Exam Not up to date   Eye Exam Up to date   Exercise (times per week) 0 times per week   Current Exercises Include No Regular Exercise   Do you need help using the phone?  No   Are you deaf or do you have serious difficulty hearing?  No   Do you need help with transportation? No   Do you need help shopping? No   Do you need help preparing meals?  No   Do you need help with housework?  No   Do you need help with laundry? No   Do you need help taking your medications? No   Do you need help managing money? No   Do you ever drive or ride in a car without wearing a seat belt? No   Have you felt unusual stress, anger or loneliness  in the last month? No   Who do you live with? Spouse   If you need help, do you have trouble finding someone available to you? No   Have you been bothered in the last four weeks by sexual problems? No   Do you have difficulty concentrating, remembering or making decisions? No       Age-appropriate Screening Schedule:  Refer to the list below for future screening recommendations based on patient's age, sex and/or medical conditions. Orders for these recommended tests are listed in the plan section. The patient has been provided with a written plan.    Health Maintenance   Topic Date Due   • DXA SCAN  03/05/2017   • TDAP/TD VACCINES (2 - Td or Tdap) 10/29/2020   • LIPID PANEL  06/28/2023   • MAMMOGRAM  09/27/2024   • PAP SMEAR  10/01/2024   • INFLUENZA VACCINE  Completed   • ZOSTER VACCINE  Discontinued                       Assessment and Plan:       CMS Preventative Services Quick Reference  Risk Factors Identified During Encounter  Cardiovascular Disease  Chronic Pain   Immunizations Discussed/Encouraged (specific Immunizations; Tdap and COVID19  Obesity/Overweight     The above risks/problems have been discussed with the patient.  Follow up actions/plans if indicated are seen below in the Assessment/Plan Section.  Pertinent information has been shared with the patient in the After Visit Summary.    Today, we have reviewed her care.  Carolina is mostly up-to-date on needed screenings and vaccines.  We will will arrange DEXA scan.  No other short-term change is anticipated.  She may want to consider a tetanus booster where she to have a cut or scrape, particularly with debbie metal.  She could move ahead with COVID-19 booster at the end of this month or early next month.    With regard to her usual care, her blood pressure is quite high today.  It could be in part due to knee pain.  However, the systolic pressure is 188 on initial check.  We will recheck her blood pressure and then consider how to move forward.  Also, I  do want to arrange for her to have repeat blood work done.  We will move ahead with that today.  In addition, she will be seeing orthopedics again in about 2 weeks, but she is having terrible pain with the knee.  Given the severity of her arthritis, and her other health problems, I do think it is reasonable to treat with a low-dose of a narcotic.  We had discussion about this and we will move ahead with Tylenol 3 as noted.  I do not think a 3-day supply would be adequate in the short-term.  Therefore, we will give her a longer supply for now.  We had some discussion of potential risks including addiction, overdose, and impairment.  However, this is a fairly mild medication.  We will obtain her consent for it today.  We will reach back out to her in the next day or so regarding the blood work and blood pressure.  We will tentatively plan to see her back in about 6 months.    Diagnoses and all orders for this visit:    1. Physical exam (Primary)    2. Essential hypertension  -     Basic Metabolic Panel; Future  -     Urinalysis With Microscopic - Urine, Clean Catch; Future  -     furosemide (LASIX) 40 MG tablet; Take 1 tablet by mouth Daily.  Dispense: 90 tablet; Refill: 1  -     losartan (COZAAR) 50 MG tablet; Take 1 tablet by mouth Daily.  Dispense: 90 tablet; Refill: 1  -     potassium chloride (K-DUR,KLOR-CON) 10 MEQ CR tablet; Take 1 tablet by mouth 2 (Two) Times a Day.  Dispense: 180 tablet; Refill: 0    3. Elevated serum creatinine  -     Basic Metabolic Panel; Future  -     Urinalysis With Microscopic - Urine, Clean Catch; Future    4. Localized edema  -     furosemide (LASIX) 40 MG tablet; Take 1 tablet by mouth Daily.  Dispense: 90 tablet; Refill: 1  -     potassium chloride (K-DUR,KLOR-CON) 10 MEQ CR tablet; Take 1 tablet by mouth 2 (Two) Times a Day.  Dispense: 180 tablet; Refill: 0    5. Mixed hyperlipidemia  -     simvastatin (ZOCOR) 20 MG tablet; Take 1 tablet by mouth Every Night.  Dispense: 90 tablet;  Refill: 1    6. Post menopausal syndrome  -     estradiol (ESTRACE) 1 MG tablet; Take 1 tablet by mouth Every Night.  Dispense: 90 tablet; Refill: 1    7. Asymptomatic postmenopausal state  -     DEXA Bone Density Axial; Future    8. Chronic pain of right knee  -     acetaminophen-codeine (TYLENOL #3) 300-30 MG per tablet; Take 1 tablet by mouth 3 (Three) Times a Day As Needed for Moderate Pain.  Dispense: 30 tablet; Refill: 1        Follow Up:   Return in about 6 months (around 5/1/2023) for Recheck.     An After Visit Summary and PPPS were given to the patient.

## 2022-11-02 LAB
ANION GAP SERPL CALCULATED.3IONS-SCNC: 8.6 MMOL/L (ref 5–15)
BUN SERPL-MCNC: 16 MG/DL (ref 8–23)
BUN/CREAT SERPL: 16 (ref 7–25)
CALCIUM SPEC-SCNC: 10.5 MG/DL (ref 8.6–10.5)
CHLORIDE SERPL-SCNC: 100 MMOL/L (ref 98–107)
CO2 SERPL-SCNC: 28.4 MMOL/L (ref 22–29)
CREAT SERPL-MCNC: 1 MG/DL (ref 0.57–1)
EGFRCR SERPLBLD CKD-EPI 2021: 61.9 ML/MIN/1.73
GLUCOSE SERPL-MCNC: 98 MG/DL (ref 65–99)
POTASSIUM SERPL-SCNC: 4.6 MMOL/L (ref 3.5–5.2)
SODIUM SERPL-SCNC: 137 MMOL/L (ref 136–145)

## 2022-11-07 ENCOUNTER — HOSPITAL ENCOUNTER (OUTPATIENT)
Dept: BONE DENSITY | Facility: HOSPITAL | Age: 67
Discharge: HOME | End: 2022-11-07
Admitting: FAMILY MEDICINE

## 2022-11-07 DIAGNOSIS — Z78.0 ASYMPTOMATIC POSTMENOPAUSAL STATE: ICD-10-CM

## 2022-11-07 PROCEDURE — 77080 DXA BONE DENSITY AXIAL: CPT

## 2022-11-10 NOTE — PLAN OF CARE
Patient asking for nursing supervisor. I went in to talk with her, and she is upset about her pain and how uncomfortable that she is. She was medicated from her bedside nurse. I repositioned her and placed heat behind her back and neck. Nurse is calling physician to update him. Problem: Patient Care Overview  Goal: Plan of Care Review  Outcome: Ongoing (interventions implemented as appropriate)   08/10/18 0191   Coping/Psychosocial   Plan of Care Reviewed With patient   OTHER   Outcome Summary VSS, pain tolerable, up with assist of 1, voiding per BRP, no complaints of dizziness with ambulation, will continue to monitor   Plan of Care Review   Progress improving     Goal: Individualization and Mutuality  Outcome: Ongoing (interventions implemented as appropriate)    Goal: Discharge Needs Assessment  Outcome: Ongoing (interventions implemented as appropriate)      Problem: Fall Risk (Adult)  Goal: Identify Related Risk Factors and Signs and Symptoms  Outcome: Outcome(s) achieved Date Met: 08/10/18    Goal: Absence of Fall  Outcome: Ongoing (interventions implemented as appropriate)      Problem: Skin Injury Risk (Adult)  Goal: Identify Related Risk Factors and Signs and Symptoms  Outcome: Outcome(s) achieved Date Met: 08/10/18    Goal: Skin Health and Integrity  Outcome: Ongoing (interventions implemented as appropriate)

## 2022-11-17 ENCOUNTER — CLINICAL SUPPORT (OUTPATIENT)
Dept: FAMILY MEDICINE CLINIC | Age: 67
End: 2022-11-17

## 2022-11-17 VITALS — SYSTOLIC BLOOD PRESSURE: 149 MMHG | HEART RATE: 67 BPM | DIASTOLIC BLOOD PRESSURE: 80 MMHG

## 2022-11-19 NOTE — PROGRESS NOTES
Vitals:    11/17/22 1114 11/17/22 1132   BP: 179/84  Comment: pt set 5 minutes before B/P check 149/80  Comment: after setting another 15 minutes   BP Location: Left arm Left arm   Patient Position: Sitting Sitting   Pulse: 67 67     Good morning, Carolina.  I hope you are well.  Your blood pressure was quite high initially.  However, it was somewhat better on recheck.  How are your blood pressures are running at home at this time?  Are they still running in the 140s and 150s?  I am leaning toward additional medication for your blood pressure.  Please let me know about the above, and we will move forward from there.  Let me know if you have other concerns.    Momo Renteria MD  [facility]  [Sent as [employer] message]

## 2023-01-09 ENCOUNTER — OFFICE VISIT (OUTPATIENT)
Dept: FAMILY MEDICINE CLINIC | Age: 68
End: 2023-01-09
Payer: MEDICARE

## 2023-01-09 VITALS
DIASTOLIC BLOOD PRESSURE: 80 MMHG | SYSTOLIC BLOOD PRESSURE: 140 MMHG | HEIGHT: 62 IN | HEART RATE: 73 BPM | BODY MASS INDEX: 33.53 KG/M2 | WEIGHT: 182.2 LBS

## 2023-01-09 DIAGNOSIS — M79.605 ANTERIOR LEG PAIN, LEFT: ICD-10-CM

## 2023-01-09 DIAGNOSIS — I83.892 VARICOSE VEINS OF LEFT LEG WITH EDEMA: Primary | ICD-10-CM

## 2023-01-09 PROCEDURE — 99213 OFFICE O/P EST LOW 20 MIN: CPT | Performed by: NURSE PRACTITIONER

## 2023-01-09 NOTE — PROGRESS NOTES
Carolina Ayers presents to [facility] with complaint of  Leg Pain ((L) Pt states she has been having knots come up on her (L) leg the past few weeks that are sore )    SUBJECTIVE  Leg Pain   There was no injury mechanism. The pain is present in the left leg (superficial, occurs at upper left shin). The quality of the pain is described as aching. The pain is moderate. The pain has been fluctuating since onset. Pertinent negatives include no inability to bear weight, loss of motion, loss of sensation, muscle weakness, numbness or tingling. She reports no foreign bodies present. The symptoms are aggravated by palpation. She has tried NSAIDs for the symptoms. The treatment provided mild relief.   She had a similar area occur by her left knee 2 weeks ago that has pretty much resolved.     OBJECTIVE  Vital Signs:   /80   Pulse 73   Ht 157.5 cm (62.01\")   Wt 82.6 kg (182 lb 3.2 oz)   BMI 33.31 kg/m²       Physical Exam  Vitals reviewed.   Constitutional:       General: She is not in acute distress.     Appearance: Normal appearance. She is not ill-appearing.   HENT:      Head: Normocephalic and atraumatic.      Nose: Nose normal.      Mouth/Throat:      Mouth: Mucous membranes are moist.      Pharynx: Oropharynx is clear.   Cardiovascular:      Rate and Rhythm: Normal rate and regular rhythm.      Pulses: Normal pulses.      Heart sounds: Normal heart sounds.   Pulmonary:      Effort: Pulmonary effort is normal.      Breath sounds: Normal breath sounds.   Musculoskeletal:      Cervical back: Neck supple.      Left lower leg: Edema (trace nonpitting ) present.      Comments: Several varicose veins noted, area of concern is approximately 4 cm in length 5 cm width, minimal edema here with visible capillaries. Neg homans, no erythema or increased warmth to extremity    Skin:     General: Skin is warm and dry.   Neurological:      General: No focal deficit present.      Mental  Status: She is alert and oriented to person, place, and time. Mental status is at baseline.   Psychiatric:         Mood and Affect: Mood normal.         Behavior: Behavior normal.         Judgment: Judgment normal.          ASSESSMENT AND PLAN:  Diagnoses and all orders for this visit:    1. Varicose veins of left leg with edema (Primary)  -     US Venous Doppler Lower Extremity Left (duplex); Future    2. Anterior leg pain, left  -     US Venous Doppler Lower Extremity Left (duplex); Future    -low suspicion for DVT or severe clot  -suspect this is superficial phlebitis   -she is already on meloxicam  -recommended she starts daily ASA  -pt reassured that if this is phlebitis, usually this resolves over time   -pt would like to proceed with US to be sure     Follow Up   Return if symptoms worsen or fail to improve. Patient to notify office with any acute concerns or issues.  Patient verbalizes understanding, agrees with plan of care and has no further questions upon discharge.     Patient was given instructions and counseling regarding her condition or for health maintenance advice. Please see specific information pulled into the AVS if appropriate.     Discussed the importance of following up with any needed screening tests/labs/specialist appointments and any requested follow-up recommended by me today. Importance of maintaining follow-up discussed and patient accepts that missed appointments can delay diagnosis and potentially lead to worsening of conditions.

## 2023-01-11 ENCOUNTER — HOSPITAL ENCOUNTER (OUTPATIENT)
Dept: ULTRASOUND IMAGING | Facility: HOSPITAL | Age: 68
Discharge: HOME | End: 2023-01-11
Admitting: NURSE PRACTITIONER
Payer: MEDICARE

## 2023-01-11 DIAGNOSIS — M79.605 ANTERIOR LEG PAIN, LEFT: ICD-10-CM

## 2023-01-11 DIAGNOSIS — I83.892 VARICOSE VEINS OF LEFT LEG WITH EDEMA: ICD-10-CM

## 2023-01-11 PROCEDURE — 93971 EXTREMITY STUDY: CPT

## 2023-05-01 ENCOUNTER — OFFICE VISIT (OUTPATIENT)
Dept: FAMILY MEDICINE CLINIC | Age: 68
End: 2023-05-01
Payer: MEDICARE

## 2023-05-01 VITALS
TEMPERATURE: 97.8 F | HEIGHT: 62 IN | HEART RATE: 66 BPM | BODY MASS INDEX: 32.57 KG/M2 | WEIGHT: 177 LBS | SYSTOLIC BLOOD PRESSURE: 164 MMHG | DIASTOLIC BLOOD PRESSURE: 89 MMHG

## 2023-05-01 DIAGNOSIS — N95.1 POST MENOPAUSAL SYNDROME: ICD-10-CM

## 2023-05-01 DIAGNOSIS — R60.0 LOCALIZED EDEMA: ICD-10-CM

## 2023-05-01 DIAGNOSIS — F32.9 REACTIVE DEPRESSION: ICD-10-CM

## 2023-05-01 DIAGNOSIS — I10 ESSENTIAL HYPERTENSION: Primary | ICD-10-CM

## 2023-05-01 DIAGNOSIS — Z79.899 OTHER LONG TERM (CURRENT) DRUG THERAPY: ICD-10-CM

## 2023-05-01 DIAGNOSIS — E78.2 MIXED HYPERLIPIDEMIA: ICD-10-CM

## 2023-05-01 DIAGNOSIS — M17.11 PRIMARY OSTEOARTHRITIS OF RIGHT KNEE: ICD-10-CM

## 2023-05-01 PROCEDURE — 99214 OFFICE O/P EST MOD 30 MIN: CPT | Performed by: FAMILY MEDICINE

## 2023-05-01 PROCEDURE — 1159F MED LIST DOCD IN RCRD: CPT | Performed by: FAMILY MEDICINE

## 2023-05-01 PROCEDURE — 1160F RVW MEDS BY RX/DR IN RCRD: CPT | Performed by: FAMILY MEDICINE

## 2023-05-01 PROCEDURE — 3079F DIAST BP 80-89 MM HG: CPT | Performed by: FAMILY MEDICINE

## 2023-05-01 PROCEDURE — 3077F SYST BP >= 140 MM HG: CPT | Performed by: FAMILY MEDICINE

## 2023-05-01 RX ORDER — POTASSIUM CHLORIDE 750 MG/1
10 TABLET, EXTENDED RELEASE ORAL 2 TIMES DAILY
Qty: 180 TABLET | Refills: 3 | Status: SHIPPED | OUTPATIENT
Start: 2023-05-01

## 2023-05-01 RX ORDER — SERTRALINE HYDROCHLORIDE 25 MG/1
25 TABLET, FILM COATED ORAL DAILY
Qty: 30 TABLET | Refills: 1 | Status: SHIPPED | OUTPATIENT
Start: 2023-05-01

## 2023-05-01 RX ORDER — MELOXICAM 15 MG/1
15 TABLET ORAL DAILY
Qty: 90 TABLET | Refills: 1 | Status: SHIPPED | OUTPATIENT
Start: 2023-05-01

## 2023-05-01 RX ORDER — LIDOCAINE HYDROCHLORIDE 20 MG/ML
SOLUTION OROPHARYNGEAL
COMMUNITY
Start: 2023-04-25

## 2023-05-01 RX ORDER — LOSARTAN POTASSIUM 50 MG/1
50 TABLET ORAL DAILY
Qty: 90 TABLET | Refills: 3 | Status: SHIPPED | OUTPATIENT
Start: 2023-05-01

## 2023-05-01 RX ORDER — FUROSEMIDE 40 MG/1
40 TABLET ORAL DAILY
Qty: 90 TABLET | Refills: 3 | Status: SHIPPED | OUTPATIENT
Start: 2023-05-01

## 2023-05-01 RX ORDER — SIMVASTATIN 20 MG
20 TABLET ORAL NIGHTLY
Qty: 90 TABLET | Refills: 3 | Status: SHIPPED | OUTPATIENT
Start: 2023-05-01

## 2023-05-01 NOTE — PROGRESS NOTES
Carolina Ayers presents to [facility].    Chief Complaint:  Blood pressure, cholesterol, HRT    Subjective       History of Present Illness:  Carolina is in today for follow-up on her care.  She has hypertension and remains on furosemide and losartan as noted.  Her blood pressure is somewhat elevated today.  She says her blood pressure was in a fairly good range at home.  She was seen at a different physician office recently and states her systolic pressure there was 132.    She also has elevated cholesterol and remains on statin therapy as noted below.    She also remains on hormone replacement therapy in the form of estradiol.  She is currently taking 0.5 mg 3 times per week.  She is up-to-date on mammogram which was completed last September.  It looked good overall.  Risks are discussed including blood clots, stroke, heart issues, and potential impact on breast cancer.    Review of Systems:  Review of Systems   Constitutional: Negative for chills and fever.   Respiratory: Negative for cough and shortness of breath.    Cardiovascular: Negative for chest pain and palpitations.   Gastrointestinal: Negative for abdominal pain, nausea and vomiting.        Objective   Medical History:  Past Medical History:   • Arthritis   • Cancer    SKIN CANCER   • Cataracts, bilateral   • Cholelithiasis   • Chronic bilateral low back pain with bilateral sciatica   • Essential hypertension   • H/O: pneumonia   • History of chest pain    SEEN DR SIM   • History of skin cancer   • HL (hearing loss)   • Hyperlipidemia   • Hypokalemia   • Impaired functional mobility, balance, gait, and endurance   • Localized edema   • Lumbar herniated disc   • Obesity   • Orthostatic hypotension   • PONV (postoperative nausea and vomiting)   • Thyroid nodule   • Tinnitus     Past Surgical History:   • ADENOIDECTOMY   • BREAST LUMPECTOMY   • CARPAL TUNNEL RELEASE   • CHOLECYSTECTOMY   • COLONOSCOPY    Hyperplastic polyp,  otherwise normal   • EXTERNAL EAR SURGERY    cyst removed from ear   • EYE SURGERY   • HERNIA REPAIR   • HYSTERECTOMY    partial   • KNEE SURGERY   • LUMBAR DISCECTOMY FUSION INSTRUMENTATION    Procedure: LUMBAR FUSION DECOMPRESSON WITH PEDICLE SCREWS Lumbar 4-5,  posterolateral fusion;  Surgeon: Jarrett Vasques IV, MD;  Location: [facility];  Service: Neurosurgery   • SHOULDER SURGERY   • SUBTOTAL HYSTERECTOMY   • TONSILLECTOMY   • TUBAL ABDOMINAL LIGATION   • US GUIDED FINE NEEDLE ASPIRATION   • WRIST SURGERY    Cyst removed      Family History   Problem Relation Age of Onset   • Macular degeneration Mother    • Vision loss Mother         Macular degeneration   • Coronary artery disease Father    • Heart disease Father         Heart Attack   • Malig Hyperthermia Neg Hx      Social History     Tobacco Use   • Smoking status: Former     Packs/day: 1.00     Years: 30.00     Pack years: 30.00     Types: Cigarettes     Quit date: 2010     Years since quittin.3   • Smokeless tobacco: Never   • Tobacco comments:     NO CAFFEINE USE   Substance Use Topics   • Alcohol use: Not Currently     Comment: rarely- 2-3 times a year       Health Maintenance Due   Topic Date Due   • TDAP/TD VACCINES (2 - Td or Tdap) 10/29/2020        Immunization History   Administered Date(s) Administered   • COVID-19 (MODERNA) 1st,2nd,3rd Dose Monovalent 2021, 2021, 10/26/2021   • COVID-19 (MODERNA) BIVALENT 12+YRS 2022   • COVID-19 (MODERNA) Monovalent Original Booster 2022   • Fluzone High-Dose 65+yrs 10/12/2021, 10/21/2022   • Hepatitis A 2018   • Influenza Seasonal Injectable 10/01/2018, 2019, 10/01/2020   • Influenza, Unspecified 2019   • Pneumococcal Conjugate 13-Valent (PCV13) 10/21/2020   • Pneumococcal Polysaccharide (PPSV23) 2009, 10/26/2021   • Tdap 10/29/2010       Allergies   Allergen Reactions   • Lisinopril Cough   • Penicillins Hives        Medications:  Current  "Outpatient Medications on File Prior to Visit   Medication Sig   • Lidocaine Viscous HCl (XYLOCAINE) 2 % solution    • melatonin 1 MG tablet Take 1 tablet by mouth At Night As Needed.   • nystatin (MYCOSTATIN) 100,000 unit/mL suspension GARGLE, SWISH AND SPIT 5 MLS BY MOUTH FOUR TIMES DAILY AS NEEDED   • [DISCONTINUED] estradiol (ESTRACE) 1 MG tablet Take 1 tablet by mouth Every Night.   • [DISCONTINUED] furosemide (LASIX) 40 MG tablet Take 1 tablet by mouth Daily.   • [DISCONTINUED] losartan (COZAAR) 50 MG tablet Take 1 tablet by mouth Daily.   • [DISCONTINUED] meloxicam (MOBIC) 15 MG tablet Take 1 tablet by mouth Daily.   • [DISCONTINUED] potassium chloride (K-DUR,KLOR-CON) 10 MEQ CR tablet Take 1 tablet by mouth 2 (Two) Times a Day.   • [DISCONTINUED] simvastatin (ZOCOR) 20 MG tablet Take 1 tablet by mouth Every Night.   • [DISCONTINUED] hydroCHLOROthiazide (HYDRODIURIL) 25 MG tablet Take 1 tablet by mouth Daily.     No current facility-administered medications on file prior to visit.       Vital Signs:   Vitals:    05/01/23 1106 05/01/23 1140   BP: 161/87 164/89   BP Location: Right arm Right arm   Patient Position: Sitting Sitting   Pulse: 67 66   Temp: 97.8 °F (36.6 °C)    TempSrc: Oral    Weight: 80.3 kg (177 lb)    Height: 157.5 cm (62.01\")    Body mass index is 32.37 kg/m².    Physical Exam:  Physical Exam  Vitals reviewed.   Constitutional:       General: She is not in acute distress.     Appearance: She is not ill-appearing.   Eyes:      Pupils: Pupils are equal, round, and reactive to light.   Neck:      Comments: No thyromegaly  Cardiovascular:      Rate and Rhythm: Normal rate and regular rhythm.   Pulmonary:      Effort: Pulmonary effort is normal.      Breath sounds: Normal breath sounds.   Abdominal:      General: There is no distension.      Palpations: Abdomen is soft.      Tenderness: There is no abdominal tenderness.   Musculoskeletal:      Cervical back: Neck supple.   Lymphadenopathy:      " Cervical: No cervical adenopathy.   Skin:     Findings: No lesion or rash.   Neurological:      Mental Status: She is alert.         Result Review      The following data was reviewed by Momo Renteria MD on 05/01/2023.  Lab Results   Component Value Date    WBC 6.05 06/28/2022    HGB 12.4 06/28/2022    HCT 38.1 06/28/2022    MCV 95.7 06/28/2022     06/28/2022     Lab Results   Component Value Date    GLUCOSE 98 11/01/2022    BUN 16 11/01/2022    CREATININE 1.00 11/01/2022     11/01/2022    K 4.6 11/01/2022     11/01/2022    CO2 28.4 11/01/2022    CALCIUM 10.5 11/01/2022    PROTEINTOT 7.0 06/28/2022    ALBUMIN 4.30 06/28/2022    ALT 19 06/28/2022    AST 22 06/28/2022    ALKPHOS 64 06/28/2022    BILITOT 0.3 06/28/2022    EGFR 61.9 11/01/2022    GLOB 2.7 06/28/2022    AGRATIO 1.6 06/28/2022    BCR 16.0 11/01/2022    ANIONGAP 8.6 11/01/2022      Lab Results   Component Value Date    CHOL 152 06/28/2022    CHLPL 157 08/24/2020    TRIG 146 06/28/2022    HDL 59 06/28/2022    LDL 68 06/28/2022     Lab Results   Component Value Date    TSH 2.820 06/28/2022     No results found for: HGBA1C         Assessment and Plan:   Today, we have reviewed Carolina's care.  Her blood pressure was moderately elevated on initial check.  We will recheck it before she leaves.  If it remains elevated, we will likely have her stop back in a few weeks for recheck.  It was in a good range recently.  Otherwise, we will refill needed medications as noted below.  She is having significant issues with arthritis in the right knee, and her current orthopedist will be leaving [place].  We will make referral order as noted below for follow-up on osteoarthritis.  We also talked about the stress that she is under.  I think she probably has some degree of reactive depression.  Also, because of some recent history with phlebitis, I am recommending she stop taking estradiol.  Because of these concerns, we will move ahead with a  trial of generic Zoloft.  We will reach out to her over MyChart in a few weeks to see if she is tolerating it okay and if it is helpful.  We will move forward from there.       Diagnoses and all orders for this visit:    1. Essential hypertension (Primary)  -     furosemide (LASIX) 40 MG tablet; Take 1 tablet by mouth Daily.  Dispense: 90 tablet; Refill: 3  -     losartan (COZAAR) 50 MG tablet; Take 1 tablet by mouth Daily.  Dispense: 90 tablet; Refill: 3  -     potassium chloride (K-DUR,KLOR-CON) 10 MEQ CR tablet; Take 1 tablet by mouth 2 (Two) Times a Day.  Dispense: 180 tablet; Refill: 3  -     Comprehensive Metabolic Panel; Future    2. Mixed hyperlipidemia  -     simvastatin (ZOCOR) 20 MG tablet; Take 1 tablet by mouth Every Night.  Dispense: 90 tablet; Refill: 3  -     Comprehensive Metabolic Panel; Future  -     Lipid Panel; Future  -     TSH; Future    3. Post menopausal syndrome  -     sertraline (Zoloft) 25 MG tablet; Take 1 tablet by mouth Daily.  Dispense: 30 tablet; Refill: 1    4. Reactive depression  -     sertraline (Zoloft) 25 MG tablet; Take 1 tablet by mouth Daily.  Dispense: 30 tablet; Refill: 1    5. Localized edema  -     furosemide (LASIX) 40 MG tablet; Take 1 tablet by mouth Daily.  Dispense: 90 tablet; Refill: 3  -     potassium chloride (K-DUR,KLOR-CON) 10 MEQ CR tablet; Take 1 tablet by mouth 2 (Two) Times a Day.  Dispense: 180 tablet; Refill: 3    6. Primary osteoarthritis of right knee  -     meloxicam (MOBIC) 15 MG tablet; Take 1 tablet by mouth Daily. Take with food.  Dispense: 90 tablet; Refill: 1  -     Ambulatory Referral to Orthopedic Surgery    7. Other long term (current) drug therapy  -     CBC (No Diff); Future    Follow Up   Return in about 6 months (around 11/1/2023) for Recheck, Medicare Wellness.  Patient was given instructions and counseling regarding her condition or for health maintenance advice. Please see specific information pulled into the AVS if appropriate.

## 2023-05-02 ENCOUNTER — LAB (OUTPATIENT)
Dept: LAB | Facility: HOSPITAL | Age: 68
End: 2023-05-02
Payer: MEDICARE

## 2023-05-02 DIAGNOSIS — I10 ESSENTIAL HYPERTENSION: ICD-10-CM

## 2023-05-02 DIAGNOSIS — Z79.899 OTHER LONG TERM (CURRENT) DRUG THERAPY: ICD-10-CM

## 2023-05-02 DIAGNOSIS — R60.0 LOCALIZED EDEMA: ICD-10-CM

## 2023-05-02 DIAGNOSIS — E78.2 MIXED HYPERLIPIDEMIA: ICD-10-CM

## 2023-05-02 LAB
ALBUMIN SERPL-MCNC: 4.5 G/DL (ref 3.5–5.2)
ALBUMIN/GLOB SERPL: 1.7 G/DL
ALP SERPL-CCNC: 70 U/L (ref 39–117)
ALT SERPL W P-5'-P-CCNC: 20 U/L (ref 1–33)
ANION GAP SERPL CALCULATED.3IONS-SCNC: 10.3 MMOL/L (ref 5–15)
AST SERPL-CCNC: 22 U/L (ref 1–32)
BILIRUB SERPL-MCNC: <0.2 MG/DL (ref 0–1.2)
BUN SERPL-MCNC: 19 MG/DL (ref 8–23)
BUN/CREAT SERPL: 16.8 (ref 7–25)
CALCIUM SPEC-SCNC: 10.1 MG/DL (ref 8.6–10.5)
CHLORIDE SERPL-SCNC: 102 MMOL/L (ref 98–107)
CHOLEST SERPL-MCNC: 146 MG/DL (ref 0–200)
CO2 SERPL-SCNC: 27.7 MMOL/L (ref 22–29)
CREAT SERPL-MCNC: 1.13 MG/DL (ref 0.57–1)
DEPRECATED RDW RBC AUTO: 45.9 FL (ref 37–54)
EGFRCR SERPLBLD CKD-EPI 2021: 53.1 ML/MIN/1.73
ERYTHROCYTE [DISTWIDTH] IN BLOOD BY AUTOMATED COUNT: 12.4 % (ref 12.3–15.4)
GLOBULIN UR ELPH-MCNC: 2.6 GM/DL
GLUCOSE SERPL-MCNC: 101 MG/DL (ref 65–99)
HCT VFR BLD AUTO: 40.8 % (ref 34–46.6)
HDLC SERPL-MCNC: 62 MG/DL (ref 40–60)
HGB BLD-MCNC: 13.3 G/DL (ref 12–15.9)
LDLC SERPL CALC-MCNC: 67 MG/DL (ref 0–100)
LDLC/HDLC SERPL: 1.06 {RATIO}
MCH RBC QN AUTO: 32.3 PG (ref 26.6–33)
MCHC RBC AUTO-ENTMCNC: 32.6 G/DL (ref 31.5–35.7)
MCV RBC AUTO: 99 FL (ref 79–97)
PLATELET # BLD AUTO: 322 10*3/MM3 (ref 140–450)
PMV BLD AUTO: 9.3 FL (ref 6–12)
POTASSIUM SERPL-SCNC: 5.5 MMOL/L (ref 3.5–5.2)
PROT SERPL-MCNC: 7.1 G/DL (ref 6–8.5)
RBC # BLD AUTO: 4.12 10*6/MM3 (ref 3.77–5.28)
SODIUM SERPL-SCNC: 140 MMOL/L (ref 136–145)
TRIGL SERPL-MCNC: 92 MG/DL (ref 0–150)
TSH SERPL DL<=0.05 MIU/L-ACNC: 1.98 UIU/ML (ref 0.27–4.2)
VLDLC SERPL-MCNC: 17 MG/DL (ref 5–40)
WBC NRBC COR # BLD: 10.74 10*3/MM3 (ref 3.4–10.8)

## 2023-05-02 PROCEDURE — 84443 ASSAY THYROID STIM HORMONE: CPT

## 2023-05-02 PROCEDURE — 80053 COMPREHEN METABOLIC PANEL: CPT

## 2023-05-02 PROCEDURE — 85027 COMPLETE CBC AUTOMATED: CPT

## 2023-05-02 PROCEDURE — 80061 LIPID PANEL: CPT

## 2023-05-02 PROCEDURE — 36415 COLL VENOUS BLD VENIPUNCTURE: CPT

## 2023-05-02 RX ORDER — POTASSIUM CHLORIDE 750 MG/1
TABLET, EXTENDED RELEASE ORAL
Qty: 180 TABLET | Refills: 3 | OUTPATIENT
Start: 2023-05-02

## 2023-05-03 DIAGNOSIS — N95.1 POST MENOPAUSAL SYNDROME: ICD-10-CM

## 2023-05-03 RX ORDER — ESTRADIOL 1 MG/1
TABLET ORAL
Qty: 90 TABLET | Refills: 1 | OUTPATIENT
Start: 2023-05-03

## 2023-05-05 ENCOUNTER — HOSPITAL ENCOUNTER (OUTPATIENT)
Dept: GENERAL RADIOLOGY | Facility: HOSPITAL | Age: 68
Discharge: HOME | End: 2023-05-05
Payer: MEDICARE

## 2023-05-05 ENCOUNTER — OFFICE VISIT (OUTPATIENT)
Dept: ORTHOPEDIC SURGERY | Facility: CLINIC | Age: 68
End: 2023-05-05
Payer: MEDICARE

## 2023-05-05 VITALS — BODY MASS INDEX: 32.76 KG/M2 | HEIGHT: 62 IN | OXYGEN SATURATION: 94 % | WEIGHT: 178 LBS | HEART RATE: 57 BPM

## 2023-05-05 DIAGNOSIS — M17.11 ARTHRITIS OF RIGHT KNEE: ICD-10-CM

## 2023-05-05 DIAGNOSIS — M25.561 RIGHT KNEE PAIN, UNSPECIFIED CHRONICITY: ICD-10-CM

## 2023-05-05 DIAGNOSIS — M25.561 RIGHT KNEE PAIN, UNSPECIFIED CHRONICITY: Primary | ICD-10-CM

## 2023-05-05 PROCEDURE — 73564 X-RAY EXAM KNEE 4 OR MORE: CPT

## 2023-05-05 NOTE — PROGRESS NOTES
"Chief Complaint  Initial Evaluation and Pain of the Right Knee    Subjective          Carolina Ayers presents to [facility] for   History of Present Illness    Carolina presents today for evaluation of her right knee and to establish care.  She has a long history of right knee pain that has been managed nonoperatively.  She has done well with gel type injections in the past.  She takes meloxicam.  She feels her symptoms are well controlled overall at this time.  She is interested in repeat gel injections in the future.    Allergies   Allergen Reactions   • Lisinopril Cough   • Penicillins Hives        Social History     Socioeconomic History   • Marital status:      Spouse name: Ismael   • Number of children: 2   Tobacco Use   • Smoking status: Former     Packs/day: 1.00     Years: 30.00     Pack years: 30.00     Types: Cigarettes     Quit date: 2010     Years since quittin.3   • Smokeless tobacco: Never   • Tobacco comments:     NO CAFFEINE USE   Vaping Use   • Vaping Use: Never used   Substance and Sexual Activity   • Alcohol use: Not Currently     Comment: rarely- 2-3 times a year   • Drug use: No     Comment: 2-3 q yr   • Sexual activity: Not Currently     Partners: Male        I reviewed the patient's chief complaint, history of present illness, review of systems, past medical history, surgical history, family history, social history, medications, and allergy list.     REVIEW OF SYSTEMS    Constitutional: Denies fevers, chills, weight loss  Cardiovascular: Denies chest pain, shortness of breath  Skin: Denies rashes, acute skin changes  Neurologic: Denies headache, loss of consciousness  MSK: Right knee pain      Objective   Vital Signs:   Pulse 57   Ht 157.5 cm (62\")   Wt 80.7 kg (178 lb)   SpO2 94%   BMI 32.56 kg/m²     Body mass index is 32.56 kg/m².    Physical Exam    General: Alert. No acute distress.   Right lower extremity: No wounds.  Medial joint " tenderness.  Full extension to 110 degrees of flexion.  Crepitus with motion.  Stable to varus and valgus stress.  Stable to Lachman and posterior drawer.  No pain with [place].  No pain with hip motion.  Distal neurovascular intact.    Procedures    Imaging Results (Most Recent)     None                   Assessment and Plan        No results found.     Diagnoses and all orders for this visit:    1. Right knee pain, unspecified chronicity (Primary)  -     XR Knee 4+ View Right; Future    2. Arthritis of right knee        Carolina has a right knee arthritis with complete loss of medial articular height and slight varus alignment.  She has done well with nonoperative management including gel injections thus far.  She is interested in repeat gel injections in the future.  We discussed the timing based off her previous injections and she will coordinate for a 6-month repeat if needed.  We discussed home exercises for both range of motion and strengthening.  We discussed low impact type activities.        Call or return if worsening symptoms.    Scribed for Shabbir Wood MD by Megan Mclaughlin  05/05/2023   09:28 EDT         Follow Up       Repeat Synvisc injection    Patient was given instructions and counseling regarding her condition or for health maintenance advice. Please see specific information pulled into the AVS if appropriate.       I have personally performed the services described in this document as scribed by the above individual and it is both accurate and complete.     Shabbri Wood MD  05/05/23  11:46 EDT

## 2023-05-22 ENCOUNTER — LAB (OUTPATIENT)
Dept: LAB | Facility: HOSPITAL | Age: 68
End: 2023-05-22
Payer: MEDICARE

## 2023-05-22 ENCOUNTER — CLINICAL SUPPORT (OUTPATIENT)
Dept: FAMILY MEDICINE CLINIC | Age: 68
End: 2023-05-22
Payer: MEDICARE

## 2023-05-22 VITALS — HEART RATE: 62 BPM | DIASTOLIC BLOOD PRESSURE: 85 MMHG | SYSTOLIC BLOOD PRESSURE: 181 MMHG

## 2023-05-22 DIAGNOSIS — N18.31 STAGE 3A CHRONIC KIDNEY DISEASE: ICD-10-CM

## 2023-05-22 DIAGNOSIS — R79.89 ELEVATED SERUM CREATININE: ICD-10-CM

## 2023-05-22 DIAGNOSIS — I10 ESSENTIAL HYPERTENSION: ICD-10-CM

## 2023-05-22 DIAGNOSIS — I10 ESSENTIAL HYPERTENSION: Primary | ICD-10-CM

## 2023-05-22 DIAGNOSIS — R79.89 ELEVATED SERUM CREATININE: Primary | ICD-10-CM

## 2023-05-22 LAB
ANION GAP SERPL CALCULATED.3IONS-SCNC: 9.2 MMOL/L (ref 5–15)
BACTERIA UR QL AUTO: NORMAL /HPF
BILIRUB UR QL STRIP: NEGATIVE
BUN SERPL-MCNC: 18 MG/DL (ref 8–23)
BUN/CREAT SERPL: 15.8 (ref 7–25)
CALCIUM SPEC-SCNC: 10.2 MG/DL (ref 8.6–10.5)
CHLORIDE SERPL-SCNC: 100 MMOL/L (ref 98–107)
CLARITY UR: CLEAR
CO2 SERPL-SCNC: 27.8 MMOL/L (ref 22–29)
COLOR UR: YELLOW
CREAT SERPL-MCNC: 1.14 MG/DL (ref 0.57–1)
EGFRCR SERPLBLD CKD-EPI 2021: 52.5 ML/MIN/1.73
GLUCOSE SERPL-MCNC: 88 MG/DL (ref 65–99)
GLUCOSE UR STRIP-MCNC: NEGATIVE MG/DL
HGB UR QL STRIP.AUTO: NEGATIVE
KETONES UR QL STRIP: NEGATIVE
LEUKOCYTE ESTERASE UR QL STRIP.AUTO: NEGATIVE
NITRITE UR QL STRIP: NEGATIVE
PH UR STRIP.AUTO: 6.5 [PH] (ref 5–8)
POTASSIUM SERPL-SCNC: 5.1 MMOL/L (ref 3.5–5.2)
PROT UR QL STRIP: NEGATIVE
RBC # UR STRIP: NORMAL /HPF
REF LAB TEST METHOD: NORMAL
SODIUM SERPL-SCNC: 137 MMOL/L (ref 136–145)
SP GR UR STRIP: 1.01 (ref 1–1.03)
SQUAMOUS #/AREA URNS HPF: NORMAL /HPF
UROBILINOGEN UR QL STRIP: NORMAL
WBC # UR STRIP: NORMAL /HPF

## 2023-05-22 PROCEDURE — 36415 COLL VENOUS BLD VENIPUNCTURE: CPT

## 2023-05-22 PROCEDURE — 81001 URINALYSIS AUTO W/SCOPE: CPT

## 2023-05-22 PROCEDURE — 80048 BASIC METABOLIC PNL TOTAL CA: CPT

## 2023-05-23 ENCOUNTER — TELEPHONE (OUTPATIENT)
Dept: FAMILY MEDICINE CLINIC | Age: 68
End: 2023-05-23

## 2023-05-23 NOTE — TELEPHONE ENCOUNTER
Caller: Carolina Ayers    Relationship: Self    Best call back number: [phone]      What is the best time to reach you: ANYTIME     Who are you requesting to speak with (clinical staff, provider,  specific staff member): CLINICAL         What was the call regarding: PATIENT RECENTLY RECEIVED A PHONE, UNSURE OF THE NATURE OF THE CALL, NO ENCOUNTER TO FOLLOW, POSSIBLE LABS.       Do you require a callback: YES

## 2023-05-23 NOTE — PROGRESS NOTES
Vitals:    05/22/23 1057 05/22/23 1058   BP: 178/85 (!) 181/85  Comment: no symptoms noted   BP Location: Left arm Left arm   Patient Position: Sitting Sitting   Pulse: 63 62     Please see lab addendum from this morning.  Thanks.

## 2023-05-27 DIAGNOSIS — E78.2 MIXED HYPERLIPIDEMIA: ICD-10-CM

## 2023-05-27 RX ORDER — NIFEDIPINE 30 MG/1
30 TABLET, FILM COATED, EXTENDED RELEASE ORAL NIGHTLY
Qty: 90 TABLET | Refills: 3 | Status: SHIPPED | OUTPATIENT
Start: 2023-05-27

## 2023-05-27 NOTE — PROGRESS NOTES
Please let Carolina know that I have reviewed her chart further including the blood pressure log that she brought in.  I would recommend moving ahead with the followin.  Her potassium level continues to be in the upper normal range.  For this reason, I would recommend she STOP potassium.  I have also sent a stop order to [facility] for it.  2.  Regarding her blood pressures, they are generally running above goal.  For this reason, I have sent a prescription for a low-dose of nifedipine to [facility].  This is a different type of blood pressure medication that I use commonly in combination with losartan.  It is usually tolerated well, but it can cause swelling in the legs and/or constipation in some patients.  Because of this, I would recommend she take it at night.  3.  We will be reaching out to her again in the next couple of weeks to see how her blood pressures are tracking.    Let me know if she has other concerns.  Thanks.

## 2023-05-30 DIAGNOSIS — E78.2 MIXED HYPERLIPIDEMIA: ICD-10-CM

## 2023-05-30 RX ORDER — SIMVASTATIN 20 MG
TABLET ORAL
Qty: 90 TABLET | Refills: 3 | OUTPATIENT
Start: 2023-05-30

## 2023-05-30 RX ORDER — SIMVASTATIN 20 MG
20 TABLET ORAL NIGHTLY
Qty: 90 TABLET | Refills: 3 | Status: SHIPPED | OUTPATIENT
Start: 2023-05-30

## 2023-06-05 ENCOUNTER — TELEPHONE (OUTPATIENT)
Dept: FAMILY MEDICINE CLINIC | Age: 68
End: 2023-06-05
Payer: MEDICARE

## 2023-06-05 NOTE — TELEPHONE ENCOUNTER
----- Message from Cathie Grey LPN sent at 5/24/2023 10:23 AM EDT -----  TICKLE to call her back regarding the blood pressure in 2 weeks to check on her home readings.  Thanks.

## 2023-06-05 NOTE — TELEPHONE ENCOUNTER
Pt states blood pressures has been doing good, 117//81, but states yesterday they were up 149//80

## 2023-06-06 ENCOUNTER — PATIENT MESSAGE (OUTPATIENT)
Dept: FAMILY MEDICINE CLINIC | Age: 68
End: 2023-06-06
Payer: MEDICARE

## 2023-06-06 DIAGNOSIS — F32.9 REACTIVE DEPRESSION: ICD-10-CM

## 2023-06-06 DIAGNOSIS — N95.1 POST MENOPAUSAL SYNDROME: ICD-10-CM

## 2023-06-08 ENCOUNTER — HOSPITAL ENCOUNTER (OUTPATIENT)
Dept: ULTRASOUND IMAGING | Facility: HOSPITAL | Age: 68
Discharge: HOME | End: 2023-06-08
Admitting: FAMILY MEDICINE
Payer: MEDICARE

## 2023-06-08 DIAGNOSIS — N18.31 STAGE 3A CHRONIC KIDNEY DISEASE: ICD-10-CM

## 2023-06-08 DIAGNOSIS — I10 ESSENTIAL HYPERTENSION: ICD-10-CM

## 2023-06-08 PROCEDURE — 76775 US EXAM ABDO BACK WALL LIM: CPT

## 2023-07-24 ENCOUNTER — TELEPHONE (OUTPATIENT)
Dept: ORTHOPEDIC SURGERY | Facility: CLINIC | Age: 68
End: 2023-07-24
Payer: MEDICARE

## 2023-07-28 ENCOUNTER — PREP FOR SURGERY (OUTPATIENT)
Dept: OTHER | Facility: HOSPITAL | Age: 68
End: 2023-07-28
Payer: MEDICARE

## 2023-07-28 ENCOUNTER — TELEPHONE (OUTPATIENT)
Dept: ORTHOPEDIC SURGERY | Facility: CLINIC | Age: 68
End: 2023-07-28
Payer: MEDICARE

## 2023-07-28 ENCOUNTER — OFFICE VISIT (OUTPATIENT)
Dept: ORTHOPEDIC SURGERY | Facility: CLINIC | Age: 68
End: 2023-07-28
Payer: MEDICARE

## 2023-07-28 VITALS
SYSTOLIC BLOOD PRESSURE: 171 MMHG | DIASTOLIC BLOOD PRESSURE: 85 MMHG | WEIGHT: 174 LBS | HEART RATE: 63 BPM | OXYGEN SATURATION: 98 % | HEIGHT: 61 IN | BODY MASS INDEX: 32.85 KG/M2

## 2023-07-28 DIAGNOSIS — M17.11 ARTHRITIS OF RIGHT KNEE: Primary | ICD-10-CM

## 2023-07-28 DIAGNOSIS — M17.11 PRIMARY OSTEOARTHRITIS OF RIGHT KNEE: Primary | ICD-10-CM

## 2023-07-28 PROBLEM — M17.0 DEGENERATIVE ARTHRITIS OF KNEE, BILATERAL: Status: ACTIVE | Noted: 2023-07-28

## 2023-07-28 RX ORDER — CEFAZOLIN SODIUM 2 G/100ML
2 INJECTION, SOLUTION INTRAVENOUS ONCE
OUTPATIENT
Start: 2023-07-28 | End: 2023-07-28

## 2023-07-28 RX ORDER — CEFAZOLIN SODIUM IN 0.9 % NACL 3 G/100 ML
3 INTRAVENOUS SOLUTION, PIGGYBACK (ML) INTRAVENOUS ONCE
OUTPATIENT
Start: 2023-07-28 | End: 2023-07-28

## 2023-07-28 RX ORDER — TRANEXAMIC ACID 10 MG/ML
1000 INJECTION, SOLUTION INTRAVENOUS ONCE
OUTPATIENT
Start: 2023-07-28 | End: 2023-07-28

## 2023-07-28 NOTE — TELEPHONE ENCOUNTER
ATTEMPTED TO CALL PATIENT TO SCHEDULE SURGERY, NO ANSWER, LVM FOR PATIENT TO RETURN MY CALL ON MY DIRECT LINE.

## 2023-07-28 NOTE — PROGRESS NOTES
"Chief Complaint  Follow-up and Pain of the Right Knee    Subjective          Carolina Ayers presents to [facility] for   History of Present Illness    Caroilna returns today for follow-up of her right knee.  She has right knee arthritis that we have been treating nonoperatively.  She has had Synvisc type injections in the past.  She was planning for 6-month repeat injections in the future.  However, her pain has significantly worsened.  She is having difficulty with ambulation.  She has instability to the knee.  This has resulted in a fall.  She is interested in proceeding with surgical management.      Allergies   Allergen Reactions    Lisinopril Cough    Penicillins Hives        Social History     Socioeconomic History    Marital status:      Spouse name: Ismael    Number of children: 2   Tobacco Use    Smoking status: Former     Packs/day: 1.00     Years: 30.00     Pack years: 30.00     Types: Cigarettes     Quit date: 2010     Years since quittin.5    Smokeless tobacco: Never    Tobacco comments:     NO CAFFEINE USE   Vaping Use    Vaping Use: Never used   Substance and Sexual Activity    Alcohol use: Not Currently     Comment: rarely- 2-3 times a year    Drug use: No     Comment: 2-3 q yr    Sexual activity: Not Currently     Partners: Male        I reviewed the patient's chief complaint, history of present illness, review of systems, past medical history, surgical history, family history, social history, medications, and allergy list.     REVIEW OF SYSTEMS    Constitutional: Denies fevers, chills, weight loss  Cardiovascular: Denies chest pain, shortness of breath  Skin: Denies rashes, acute skin changes  Neurologic: Denies headache, loss of consciousness  MSK: Right knee pain      Objective   Vital Signs:   /85   Pulse 63   Ht 154.9 cm (61\")   Wt 78.9 kg (174 lb)   SpO2 98%   BMI 32.88 kg/m²     Body mass index is 32.88 kg/m².    Physical Exam    General: " Alert. No acute distress.   Right lower extremity: Mild abrasion over the anterior knee.  Medial joint tenderness.  Full extension to 110 degrees of flexion.  Crepitus with motion.  Stable varus valgus stress.  Stable to Lachman and posterior drawer.  No pain with hip motion.  Distal neurovascular intact.    Procedures    Imaging Results (Most Recent)       None                     Assessment and Plan        No results found.     Diagnoses and all orders for this visit:    1. Arthritis of right knee (Primary)        We discussed treatment for her right knee arthritis.  We discussed both operative and nonoperative management.  We discussed the risks, benefits, indications, and alternatives to right total knee arthroplasty with Vidya robotic assistance.  We discussed primary benefits of surgery including pain relief and improved new function.  We discussed surgery risk including bleeding, infection, damage nerves and blood vessels, implant complications, fracture, loosening, instability, persistent pain, damage to nerves and blood vessels, stiffness, anesthesia risk including mortality, DVT/PE, and need for additional procedures.  She elected to proceed with surgical management.        Educated on risk of elevated BMI.  Discussed options for weight loss/decreasing BMI prior to procedure including dietician consult, weight loss options and exercise program., Discussed surgery., Risks/benefits discussed with patient including, but not limited to: infection, bleeding, neurovascular damage, malunion, nonunion, aesthetic deformity, need for further surgery, and death., Discussed with patient the implant type being used during surgery and patient understands., Surgery pamphlet given., Call or return if worsening symptoms., and DME order for a 3 in 1 given today due to patient will be confined to one room/level of the home that does not offer a toilet during postop recovery.     Scribed for Shabbir Wood MD by Megan  Arnoldo  07/28/2023   11:09 EDT         Follow Up       2-week postop    Patient was given instructions and counseling regarding her condition or for health maintenance advice. Please see specific information pulled into the AVS if appropriate.       I have personally performed the services described in this document as scribed by the above individual and it is both accurate and complete.     Shabbir Wood MD  07/28/23  13:59 EDT

## 2023-07-28 NOTE — H&P (VIEW-ONLY)
"Chief Complaint  Follow-up and Pain of the Right Knee    Subjective          Carolina Ayers presents to [facility] for   History of Present Illness    Carolina returns today for follow-up of her right knee.  She has right knee arthritis that we have been treating nonoperatively.  She has had Synvisc type injections in the past.  She was planning for 6-month repeat injections in the future.  However, her pain has significantly worsened.  She is having difficulty with ambulation.  She has instability to the knee.  This has resulted in a fall.  She is interested in proceeding with surgical management.      Allergies   Allergen Reactions    Lisinopril Cough    Penicillins Hives        Social History     Socioeconomic History    Marital status:      Spouse name: Ismael    Number of children: 2   Tobacco Use    Smoking status: Former     Packs/day: 1.00     Years: 30.00     Pack years: 30.00     Types: Cigarettes     Quit date: 2010     Years since quittin.5    Smokeless tobacco: Never    Tobacco comments:     NO CAFFEINE USE   Vaping Use    Vaping Use: Never used   Substance and Sexual Activity    Alcohol use: Not Currently     Comment: rarely- 2-3 times a year    Drug use: No     Comment: 2-3 q yr    Sexual activity: Not Currently     Partners: Male        I reviewed the patient's chief complaint, history of present illness, review of systems, past medical history, surgical history, family history, social history, medications, and allergy list.     REVIEW OF SYSTEMS    Constitutional: Denies fevers, chills, weight loss  Cardiovascular: Denies chest pain, shortness of breath  Skin: Denies rashes, acute skin changes  Neurologic: Denies headache, loss of consciousness  MSK: Right knee pain      Objective   Vital Signs:   /85   Pulse 63   Ht 154.9 cm (61\")   Wt 78.9 kg (174 lb)   SpO2 98%   BMI 32.88 kg/m²     Body mass index is 32.88 kg/m².    Physical Exam    General: " Alert. No acute distress.   Right lower extremity: Mild abrasion over the anterior knee.  Medial joint tenderness.  Full extension to 110 degrees of flexion.  Crepitus with motion.  Stable varus valgus stress.  Stable to Lachman and posterior drawer.  No pain with hip motion.  Distal neurovascular intact.    Procedures    Imaging Results (Most Recent)       None                     Assessment and Plan        No results found.     Diagnoses and all orders for this visit:    1. Arthritis of right knee (Primary)        We discussed treatment for her right knee arthritis.  We discussed both operative and nonoperative management.  We discussed the risks, benefits, indications, and alternatives to right total knee arthroplasty with Vidya robotic assistance.  We discussed primary benefits of surgery including pain relief and improved new function.  We discussed surgery risk including bleeding, infection, damage nerves and blood vessels, implant complications, fracture, loosening, instability, persistent pain, damage to nerves and blood vessels, stiffness, anesthesia risk including mortality, DVT/PE, and need for additional procedures.  She elected to proceed with surgical management.        Educated on risk of elevated BMI.  Discussed options for weight loss/decreasing BMI prior to procedure including dietician consult, weight loss options and exercise program., Discussed surgery., Risks/benefits discussed with patient including, but not limited to: infection, bleeding, neurovascular damage, malunion, nonunion, aesthetic deformity, need for further surgery, and death., Discussed with patient the implant type being used during surgery and patient understands., Surgery pamphlet given., Call or return if worsening symptoms., and DME order for a 3 in 1 given today due to patient will be confined to one room/level of the home that does not offer a toilet during postop recovery.     Scribed for Shabbir Wood MD by Megan  Arnoldo  07/28/2023   11:09 EDT         Follow Up       2-week postop    Patient was given instructions and counseling regarding her condition or for health maintenance advice. Please see specific information pulled into the AVS if appropriate.       I have personally performed the services described in this document as scribed by the above individual and it is both accurate and complete.     Shabbir Wood MD  07/28/23  13:59 EDT

## 2023-08-02 ENCOUNTER — TELEPHONE (OUTPATIENT)
Dept: ORTHOPEDIC SURGERY | Facility: CLINIC | Age: 68
End: 2023-08-02

## 2023-08-02 NOTE — TELEPHONE ENCOUNTER
Caller: Carolina Ayers    Relationship to patient: Self    Best call back number: [phone]     Additional notes:PATIENT WOULD LIKE TO KNOW IF SHE NEEDS TO REMOVE HER NAIL POLISH BEFORE HER SURGERY ON 08/24/23. PLEASE CALL HER TO DISCUSS.

## 2023-08-14 ENCOUNTER — OFFICE VISIT (OUTPATIENT)
Dept: FAMILY MEDICINE CLINIC | Age: 68
End: 2023-08-14
Payer: MEDICARE

## 2023-08-14 VITALS
OXYGEN SATURATION: 99 % | DIASTOLIC BLOOD PRESSURE: 55 MMHG | HEART RATE: 76 BPM | BODY MASS INDEX: 33.79 KG/M2 | HEIGHT: 61 IN | WEIGHT: 179 LBS | SYSTOLIC BLOOD PRESSURE: 131 MMHG

## 2023-08-14 DIAGNOSIS — Z12.31 BREAST CANCER SCREENING BY MAMMOGRAM: ICD-10-CM

## 2023-08-14 DIAGNOSIS — N95.1 POST MENOPAUSAL SYNDROME: Primary | ICD-10-CM

## 2023-08-14 PROCEDURE — 3078F DIAST BP <80 MM HG: CPT | Performed by: FAMILY MEDICINE

## 2023-08-14 PROCEDURE — 99213 OFFICE O/P EST LOW 20 MIN: CPT | Performed by: FAMILY MEDICINE

## 2023-08-14 PROCEDURE — 1160F RVW MEDS BY RX/DR IN RCRD: CPT | Performed by: FAMILY MEDICINE

## 2023-08-14 PROCEDURE — 1159F MED LIST DOCD IN RCRD: CPT | Performed by: FAMILY MEDICINE

## 2023-08-14 PROCEDURE — 3075F SYST BP GE 130 - 139MM HG: CPT | Performed by: FAMILY MEDICINE

## 2023-08-14 RX ORDER — ASPIRIN 81 MG/1
81 TABLET ORAL DAILY
COMMUNITY
End: 2023-08-15

## 2023-08-14 RX ORDER — VENLAFAXINE 37.5 MG/1
37.5 TABLET ORAL 2 TIMES DAILY
Qty: 60 TABLET | Refills: 1 | Status: SHIPPED | OUTPATIENT
Start: 2023-08-14

## 2023-08-14 RX ORDER — BACLOFEN 10 MG/1
10 TABLET ORAL 2 TIMES DAILY PRN
Qty: 30 TABLET | Refills: 0 | Status: SHIPPED | OUTPATIENT
Start: 2023-08-14

## 2023-08-14 RX ORDER — POTASSIUM CHLORIDE 750 MG/1
10 TABLET, EXTENDED RELEASE ORAL DAILY
COMMUNITY
Start: 2023-07-28

## 2023-08-14 NOTE — PROGRESS NOTES
Carolina Ayers presents to [facility].    Chief Complaint:  Follow up on postmenopausal symptoms    Subjective        History of Present Illness:  Carolina is being seen today for follow-up on her care.  She has ongoing difficulty postmenopausal symptoms.  Specifically, she is having an ongoing problem with hot flash symptoms.  She will have these several times per day.  She finds them difficult to deal with.  She was previously taking estradiol 1 mg daily and had been on it for many years.  However, we discontinued that after she developed an issue with superficial phlebitis.  She is currently taking venlafaxine, but it is of limited benefit.    Review of Systems:  Review of Systems   Constitutional:  Negative for chills and fever.   Respiratory:  Negative for cough and shortness of breath.    Cardiovascular:  Negative for chest pain and palpitations.   Gastrointestinal:  Negative for abdominal pain, nausea and vomiting.      Objective   Medical History:  Past Medical History:    Arthritis    Cancer    SKIN CANCER    Cataracts, bilateral    Cholelithiasis    Chronic bilateral low back pain with bilateral sciatica    Essential hypertension    H/O: pneumonia    History of chest pain    SEEN DR SIM    History of skin cancer    HL (hearing loss)    Hyperlipidemia    Hypokalemia    Impaired functional mobility, balance, gait, and endurance    Localized edema    Lumbar herniated disc    Obesity    Orthostatic hypotension    PONV (postoperative nausea and vomiting)    Thyroid nodule    Tinnitus     Past Surgical History:    ADENOIDECTOMY    BREAST LUMPECTOMY    CARPAL TUNNEL RELEASE    CHOLECYSTECTOMY    COLONOSCOPY    Hyperplastic polyp, otherwise normal    EXTERNAL EAR SURGERY    cyst removed from ear    EYE SURGERY    HERNIA REPAIR    HYSTERECTOMY    partial    KNEE SURGERY    LUMBAR DISCECTOMY FUSION INSTRUMENTATION    Procedure: LUMBAR FUSION DECOMPRESSON WITH PEDICLE SCREWS Lumbar  4-5,  posterolateral fusion;  Surgeon: Jarrett Vasques IV, MD;  Location: [facility] OR;  Service: Neurosurgery    SHOULDER SURGERY    SUBTOTAL HYSTERECTOMY    TONSILLECTOMY    TUBAL ABDOMINAL LIGATION    US GUIDED FINE NEEDLE ASPIRATION    WRIST SURGERY    Cyst removed      Family History   Problem Relation Age of Onset    Macular degeneration Mother     Vision loss Mother         Macular degeneration    Coronary artery disease Father     Heart disease Father         Heart Attack    Malig Hyperthermia Neg Hx      Social History     Tobacco Use    Smoking status: Former     Packs/day: 1.00     Years: 30.00     Pack years: 30.00     Types: Cigarettes     Quit date: 2010     Years since quittin.6    Smokeless tobacco: Never    Tobacco comments:     NO CAFFEINE USE   Substance Use Topics    Alcohol use: Not Currently     Comment: rarely- 2-3 times a year       Health Maintenance Due   Topic Date Due    TDAP/TD VACCINES (2 - Td or Tdap) 10/29/2020    COVID-19 Vaccine (6 - Moderna series) 2023        Immunization History   Administered Date(s) Administered    COVID-19 (MODERNA) 1st,2nd,3rd Dose Monovalent 2021, 2021, 10/26/2021    COVID-19 (MODERNA) BIVALENT 12+YRS 2022    COVID-19 (MODERNA) Monovalent Original Booster 2022    Fluzone High-Dose 65+yrs 10/12/2021, 10/21/2022    Hepatitis A 2018    Influenza Seasonal Injectable 10/01/2018, 2019, 10/01/2020    Influenza, Unspecified 2019    Pneumococcal Conjugate 13-Valent (PCV13) 10/21/2020    Pneumococcal Polysaccharide (PPSV23) 2009, 10/26/2021    Tdap 10/29/2010       Allergies   Allergen Reactions    Lisinopril Cough    Penicillins Hives        Medications:  Current Outpatient Medications on File Prior to Visit   Medication Sig    furosemide (LASIX) 40 MG tablet Take 1 tablet by mouth Daily.    Lidocaine Viscous HCl (XYLOCAINE) 2 % solution     losartan (COZAAR) 50 MG tablet Take 1 tablet by mouth  "Daily.    melatonin 1 MG tablet Take 1 tablet by mouth At Night As Needed.    meloxicam (MOBIC) 15 MG tablet Take 1 tablet by mouth Daily. Take with food.    NIFEdipine CC (Adalat CC) 30 MG 24 hr tablet Take 1 tablet by mouth Every Night.    potassium chloride (K-DUR,KLOR-CON) 10 MEQ CR tablet Take 1 tablet by mouth Daily.    simvastatin (ZOCOR) 20 MG tablet Take 1 tablet by mouth Every Night.    [DISCONTINUED] venlafaxine XR (Effexor XR) 37.5 MG 24 hr capsule Take 1 capsule by mouth Daily.    aspirin 81 MG EC tablet Take 1 tablet by mouth Daily.    [DISCONTINUED] estradiol (ESTRACE) 1 MG tablet Take 1 tablet by mouth Every Night.    [DISCONTINUED] hydroCHLOROthiazide (HYDRODIURIL) 25 MG tablet Take 1 tablet by mouth Daily.    [DISCONTINUED] nystatin (MYCOSTATIN) 100,000 unit/mL suspension GARGLE, SWISH AND SPIT 5 MLS BY MOUTH FOUR TIMES DAILY AS NEEDED     No current facility-administered medications on file prior to visit.       Vital Signs:   /55 (BP Location: Left arm, Patient Position: Sitting, Cuff Size: Large Adult)   Pulse 76   Ht 154.9 cm (61\")   Wt 81.2 kg (179 lb)   SpO2 99%   BMI 33.82 kg/mý       Physical Exam:  Physical Exam  Vitals reviewed.   Constitutional:       General: She is not in acute distress.     Appearance: She is not ill-appearing.   Eyes:      Pupils: Pupils are equal, round, and reactive to light.   Neck:      Comments: No thyromegaly  Cardiovascular:      Rate and Rhythm: Normal rate and regular rhythm.   Pulmonary:      Effort: Pulmonary effort is normal.      Breath sounds: Normal breath sounds.   Abdominal:      General: There is no distension.      Palpations: Abdomen is soft.      Tenderness: There is no abdominal tenderness.   Musculoskeletal:      Cervical back: Neck supple.   Lymphadenopathy:      Cervical: No cervical adenopathy.   Skin:     Findings: No lesion or rash.   Neurological:      Mental Status: She is alert.       Result Review      The following data was " reviewed by Momo Renteria MD on 08/14/2023.  Lab Results   Component Value Date    WBC 10.74 05/02/2023    HGB 13.3 05/02/2023    HCT 40.8 05/02/2023    MCV 99.0 (H) 05/02/2023     05/02/2023     Lab Results   Component Value Date    GLUCOSE 88 05/22/2023    BUN 18 05/22/2023    CREATININE 1.14 (H) 05/22/2023     05/22/2023    K 5.1 05/22/2023     05/22/2023    CO2 27.8 05/22/2023    CALCIUM 10.2 05/22/2023    PROTEINTOT 7.1 05/02/2023    ALBUMIN 4.5 05/02/2023    ALT 20 05/02/2023    AST 22 05/02/2023    ALKPHOS 70 05/02/2023    BILITOT <0.2 05/02/2023    EGFR 52.5 (L) 05/22/2023    GLOB 2.6 05/02/2023    AGRATIO 1.7 05/02/2023    BCR 15.8 05/22/2023    ANIONGAP 9.2 05/22/2023      Lab Results   Component Value Date    CHOL 146 05/02/2023    CHLPL 157 08/24/2020    TRIG 92 05/02/2023    HDL 62 (H) 05/02/2023    LDL 67 05/02/2023     Lab Results   Component Value Date    TSH 1.980 05/02/2023            Assessment and Plan:   Today, we have reviewed her care.  She is continuing to struggle with these menopausal symptoms, specifically hot flashes.  We discussed options and for the near term we will move ahead with venlafaxine twice daily.  We decided to move in this direction in part because of upcoming knee surgery.  I want to try to minimize risk of blood clot associated with that.  However, I did ask her to reach back out in about a month.  If things are going well from the standpoint of the knee replacement and she is fairly mobile, then we may move ahead with a low-dose of estradiol at that time.  Risks of hormone replacement therapy have been reviewed including the potential for blood clots of all kinds, breast and ovarian cancer impact.       Diagnoses and all orders for this visit:    1. Post menopausal syndrome (Primary)  -     venlafaxine (EFFEXOR) 37.5 MG tablet; Take 1 tablet by mouth 2 (Two) Times a Day.  Dispense: 60 tablet; Refill: 1    2. Mammogram  -     Mammo Screening  Digital Tomosynthesis Bilateral With CAD; Future    Other orders  -     baclofen (LIORESAL) 10 MG tablet; Take 1 tablet by mouth 2 (Two) Times a Day As Needed for Muscle Spasms.  Dispense: 30 tablet; Refill: 0    Follow Up   Return in about 4 months (around 12/12/2023) for Recheck as scheduled.  Patient was given instructions and counseling regarding her condition or for health maintenance advice. Please see specific information pulled into the AVS if appropriate.

## 2023-08-15 ENCOUNTER — PRE-ADMISSION TESTING (OUTPATIENT)
Dept: PREADMISSION TESTING | Facility: HOSPITAL | Age: 68
End: 2023-08-15
Payer: MEDICARE

## 2023-08-15 VITALS
BODY MASS INDEX: 33.79 KG/M2 | SYSTOLIC BLOOD PRESSURE: 132 MMHG | TEMPERATURE: 97.2 F | WEIGHT: 179 LBS | HEART RATE: 70 BPM | RESPIRATION RATE: 18 BRPM | DIASTOLIC BLOOD PRESSURE: 50 MMHG | OXYGEN SATURATION: 98 % | HEIGHT: 61 IN

## 2023-08-15 DIAGNOSIS — Z96.651 AFTERCARE FOLLOWING RIGHT KNEE JOINT REPLACEMENT SURGERY: Primary | ICD-10-CM

## 2023-08-15 DIAGNOSIS — M17.11 PRIMARY OSTEOARTHRITIS OF RIGHT KNEE: ICD-10-CM

## 2023-08-15 DIAGNOSIS — Z47.1 AFTERCARE FOLLOWING RIGHT KNEE JOINT REPLACEMENT SURGERY: Primary | ICD-10-CM

## 2023-08-15 LAB
ALBUMIN SERPL-MCNC: 4.6 G/DL (ref 3.5–5.2)
ALBUMIN/GLOB SERPL: 1.6 G/DL
ALP SERPL-CCNC: 82 U/L (ref 39–117)
ALT SERPL W P-5'-P-CCNC: 15 U/L (ref 1–33)
ANION GAP SERPL CALCULATED.3IONS-SCNC: 8.7 MMOL/L (ref 5–15)
AST SERPL-CCNC: 20 U/L (ref 1–32)
BASOPHILS # BLD AUTO: 0.05 10*3/MM3 (ref 0–0.2)
BASOPHILS NFR BLD AUTO: 0.9 % (ref 0–1.5)
BILIRUB SERPL-MCNC: 0.3 MG/DL (ref 0–1.2)
BUN SERPL-MCNC: 22 MG/DL (ref 8–23)
BUN/CREAT SERPL: 20.8 (ref 7–25)
CALCIUM SPEC-SCNC: 9.9 MG/DL (ref 8.6–10.5)
CHLORIDE SERPL-SCNC: 101 MMOL/L (ref 98–107)
CO2 SERPL-SCNC: 30.3 MMOL/L (ref 22–29)
CREAT SERPL-MCNC: 1.06 MG/DL (ref 0.57–1)
DEPRECATED RDW RBC AUTO: 44.7 FL (ref 37–54)
EGFRCR SERPLBLD CKD-EPI 2021: 57.3 ML/MIN/1.73
EOSINOPHIL # BLD AUTO: 0.15 10*3/MM3 (ref 0–0.4)
EOSINOPHIL NFR BLD AUTO: 2.6 % (ref 0.3–6.2)
ERYTHROCYTE [DISTWIDTH] IN BLOOD BY AUTOMATED COUNT: 12.4 % (ref 12.3–15.4)
GLOBULIN UR ELPH-MCNC: 2.8 GM/DL
GLUCOSE SERPL-MCNC: 94 MG/DL (ref 65–99)
HBA1C MFR BLD: 5.8 % (ref 4.8–5.6)
HCT VFR BLD AUTO: 40.6 % (ref 34–46.6)
HGB BLD-MCNC: 13.4 G/DL (ref 12–15.9)
IMM GRANULOCYTES # BLD AUTO: 0.01 10*3/MM3 (ref 0–0.05)
IMM GRANULOCYTES NFR BLD AUTO: 0.2 % (ref 0–0.5)
INR PPP: 0.98 (ref 0.86–1.15)
LYMPHOCYTES # BLD AUTO: 1.33 10*3/MM3 (ref 0.7–3.1)
LYMPHOCYTES NFR BLD AUTO: 22.7 % (ref 19.6–45.3)
MCH RBC QN AUTO: 32.4 PG (ref 26.6–33)
MCHC RBC AUTO-ENTMCNC: 33 G/DL (ref 31.5–35.7)
MCV RBC AUTO: 98.1 FL (ref 79–97)
MONOCYTES # BLD AUTO: 0.62 10*3/MM3 (ref 0.1–0.9)
MONOCYTES NFR BLD AUTO: 10.6 % (ref 5–12)
NEUTROPHILS NFR BLD AUTO: 3.7 10*3/MM3 (ref 1.7–7)
NEUTROPHILS NFR BLD AUTO: 63 % (ref 42.7–76)
NRBC BLD AUTO-RTO: 0 /100 WBC (ref 0–0.2)
PLATELET # BLD AUTO: 291 10*3/MM3 (ref 140–450)
PMV BLD AUTO: 9.4 FL (ref 6–12)
POTASSIUM SERPL-SCNC: 5.3 MMOL/L (ref 3.5–5.2)
PROT SERPL-MCNC: 7.4 G/DL (ref 6–8.5)
PROTHROMBIN TIME: 13.1 SECONDS (ref 11.8–14.9)
QT INTERVAL: 384 MS
RBC # BLD AUTO: 4.14 10*6/MM3 (ref 3.77–5.28)
SODIUM SERPL-SCNC: 140 MMOL/L (ref 136–145)
WBC NRBC COR # BLD: 5.86 10*3/MM3 (ref 3.4–10.8)

## 2023-08-15 PROCEDURE — 93005 ELECTROCARDIOGRAM TRACING: CPT

## 2023-08-15 PROCEDURE — 85025 COMPLETE CBC W/AUTO DIFF WBC: CPT

## 2023-08-15 PROCEDURE — 80053 COMPREHEN METABOLIC PANEL: CPT

## 2023-08-15 PROCEDURE — 85610 PROTHROMBIN TIME: CPT

## 2023-08-15 PROCEDURE — 83036 HEMOGLOBIN GLYCOSYLATED A1C: CPT

## 2023-08-15 PROCEDURE — 36415 COLL VENOUS BLD VENIPUNCTURE: CPT

## 2023-08-15 RX ORDER — DOCUSATE SODIUM 100 MG/1
100 CAPSULE, LIQUID FILLED ORAL DAILY
COMMUNITY

## 2023-08-15 RX ORDER — DIPHENHYDRAMINE HCL 25 MG
25 CAPSULE ORAL NIGHTLY PRN
COMMUNITY

## 2023-08-15 RX ORDER — CHOLECALCIFEROL (VITAMIN D3) 125 MCG
5 CAPSULE ORAL NIGHTLY PRN
COMMUNITY

## 2023-08-15 NOTE — DISCHARGE INSTRUCTIONS
"IMPORTANT INSTRUCTIONS - PRE-ADMISSION TESTING  DO NOT EAT OR CHEW anything after midnight the night before your procedure.    You may have CLEAR liquids up to 3 hours prior to ARRIVAL time.   Take the following medications the morning of your procedure with JUST A SIP OF WATER: BACLOFEN IF NEEDED, VENLAFAXINE    DO NOT BRING your medications to the hospital with you, UNLESS something has changed since your PRE-Admission Testing appointment.  Hold all vitamins, supplements, and NSAIDS (Non- steroidal anti-inflammatory meds) for one week prior to surgery (you MAY take Tylenol or Acetaminophen).  If you are diabetic, check your blood sugar the morning of your procedure. If it is less than 70 or if you are feeling symptomatic, call the following number for further instructions: [phone]  Use your inhalers/nebulizers as usual, the morning of your procedure. BRING YOUR INHALERS with you.   Bring your CPAP or BIPAP to hospital, ONLY IF YOU WILL BE SPENDING THE NIGHT.   Make sure you have a ride home and have someone who will stay with you the day of your procedure after you go home.  If you have any questions, please call your Pre-Admission Testing NurseZARI at [phone]_.   Per anesthesia request, do not smoke for 24 hours before your procedure or as instructed by your surgeon.    Clear Liquid Diet        Find out when you need to start a clear liquid diet.   Think of "clear liquids" as anything you could read a newspaper through. This includes things like water, broth, sports drinks, or tea WITHOUT any kind of milk or cream.           Once you are told to start a clear liquid diet, only drink these things until 3 hours before arrival to the hospital or when the hospital says to stop. Total volume limitation: 8 oz.       Clear liquids you CAN drink:   Water   Clear broth: beef, chicken, vegetable, or bone broth with nothing in it   Gatorade   Lemonade or Sae-aid   Soda   Tea, coffee (NO cream or honey) "   Jell-O (without fruit)   Popsicles (without fruit or cream)   Italian ices   Juice without pulp: apple, white, grape   You may use salt, pepper, and sugar    Do NOT drink:   Milk or cream   Soy milk, almond milk, coconut milk, or other non-dairy drinks and   creamers   Milkshakes or smoothies   Tomato juice   Orange juice   Grapefruit juice   Cream soups or any other than broth         Clear Liquid Diet:  Do NOT eat any solid food.  Do NOT eat or suck on mints or candy.  Do NOT chew gum.  Do NOT drink thick liquids like milk or juice with pulp in it.  Do NOT add milk, cream, or anything like soy milk or almond milk to coffee or tea.

## 2023-08-16 ENCOUNTER — TELEPHONE (OUTPATIENT)
Dept: ORTHOPEDIC SURGERY | Facility: CLINIC | Age: 68
End: 2023-08-16
Payer: MEDICARE

## 2023-08-16 NOTE — TELEPHONE ENCOUNTER
Caller: Carolina Ayers    Relationship to patient: Self    Best call back number: [phone]    Patient is needing: TO R/S FIRST POST OP WHICH WAS SCHEDULED BY OFFICE ([facility])      UNABLE TO WARM TRANSFER

## 2023-08-17 ENCOUNTER — ANESTHESIA EVENT (OUTPATIENT)
Dept: PERIOP | Facility: HOSPITAL | Age: 68
End: 2023-08-17
Payer: MEDICARE

## 2023-08-18 ENCOUNTER — TELEPHONE (OUTPATIENT)
Dept: ORTHOPEDIC SURGERY | Facility: CLINIC | Age: 68
End: 2023-08-18
Payer: MEDICARE

## 2023-08-18 NOTE — TELEPHONE ENCOUNTER
PATIENT SENT IN PICTURES OF A HEALING SCRATCH ON LEG.  PATIENT WAS ADVISED TO KEEP AREA CLEAN AND DRY AND CALL THE OFFICE WITH ANY ADDITIONAL CONCERNS OR ISSUES.  PATIENT VOICES UNDERSTANDING.

## 2023-08-24 ENCOUNTER — ANESTHESIA (OUTPATIENT)
Dept: PERIOP | Facility: HOSPITAL | Age: 68
End: 2023-08-24
Payer: MEDICARE

## 2023-08-24 ENCOUNTER — HOSPITAL ENCOUNTER (OUTPATIENT)
Facility: HOSPITAL | Age: 68
Discharge: HOME | End: 2023-08-25
Attending: STUDENT IN AN ORGANIZED HEALTH CARE EDUCATION/TRAINING PROGRAM | Admitting: STUDENT IN AN ORGANIZED HEALTH CARE EDUCATION/TRAINING PROGRAM
Payer: MEDICARE

## 2023-08-24 ENCOUNTER — ANESTHESIA EVENT CONVERTED (OUTPATIENT)
Dept: ANESTHESIOLOGY | Facility: HOSPITAL | Age: 68
End: 2023-08-24
Payer: MEDICARE

## 2023-08-24 ENCOUNTER — APPOINTMENT (OUTPATIENT)
Dept: GENERAL RADIOLOGY | Facility: HOSPITAL | Age: 68
End: 2023-08-24
Payer: MEDICARE

## 2023-08-24 DIAGNOSIS — Z78.9 DECREASED ACTIVITIES OF DAILY LIVING (ADL): ICD-10-CM

## 2023-08-24 DIAGNOSIS — M17.11 PRIMARY OSTEOARTHRITIS OF RIGHT KNEE: ICD-10-CM

## 2023-08-24 DIAGNOSIS — R26.2 DIFFICULTY IN WALKING: Primary | ICD-10-CM

## 2023-08-24 PROCEDURE — 25010000002 ONDANSETRON PER 1 MG: Performed by: NURSE ANESTHETIST, CERTIFIED REGISTERED

## 2023-08-24 PROCEDURE — 94761 N-INVAS EAR/PLS OXIMETRY MLT: CPT

## 2023-08-24 PROCEDURE — 25010000002 PROPOFOL 10 MG/ML EMULSION: Performed by: NURSE ANESTHETIST, CERTIFIED REGISTERED

## 2023-08-24 PROCEDURE — 25010000002 KETOROLAC TROMETHAMINE PER 15 MG: Performed by: STUDENT IN AN ORGANIZED HEALTH CARE EDUCATION/TRAINING PROGRAM

## 2023-08-24 PROCEDURE — 73560 X-RAY EXAM OF KNEE 1 OR 2: CPT

## 2023-08-24 PROCEDURE — 25010000002 CEFAZOLIN IN DEXTROSE 2000 MG/ 100 ML SOLUTION: Performed by: STUDENT IN AN ORGANIZED HEALTH CARE EDUCATION/TRAINING PROGRAM

## 2023-08-24 PROCEDURE — 25010000002 CEFAZOLIN IN DEXTROSE 2-4 GM/100ML-% SOLUTION: Performed by: STUDENT IN AN ORGANIZED HEALTH CARE EDUCATION/TRAINING PROGRAM

## 2023-08-24 PROCEDURE — 25010000002 DEXAMETHASONE PER 1 MG: Performed by: NURSE ANESTHETIST, CERTIFIED REGISTERED

## 2023-08-24 PROCEDURE — C1776 JOINT DEVICE (IMPLANTABLE): HCPCS | Performed by: STUDENT IN AN ORGANIZED HEALTH CARE EDUCATION/TRAINING PROGRAM

## 2023-08-24 PROCEDURE — 94799 UNLISTED PULMONARY SVC/PX: CPT

## 2023-08-24 PROCEDURE — 20985 CPTR-ASST DIR MS PX: CPT | Performed by: STUDENT IN AN ORGANIZED HEALTH CARE EDUCATION/TRAINING PROGRAM

## 2023-08-24 PROCEDURE — 25010000002 FENTANYL CITRATE (PF) 50 MCG/ML SOLUTION: Performed by: NURSE ANESTHETIST, CERTIFIED REGISTERED

## 2023-08-24 PROCEDURE — C1713 ANCHOR/SCREW BN/BN,TIS/BN: HCPCS | Performed by: STUDENT IN AN ORGANIZED HEALTH CARE EDUCATION/TRAINING PROGRAM

## 2023-08-24 PROCEDURE — 99203 OFFICE O/P NEW LOW 30 MIN: CPT | Performed by: INTERNAL MEDICINE

## 2023-08-24 PROCEDURE — 25010000002 SUGAMMADEX 200 MG/2ML SOLUTION: Performed by: NURSE ANESTHETIST, CERTIFIED REGISTERED

## 2023-08-24 PROCEDURE — 27447 TOTAL KNEE ARTHROPLASTY: CPT | Performed by: STUDENT IN AN ORGANIZED HEALTH CARE EDUCATION/TRAINING PROGRAM

## 2023-08-24 PROCEDURE — 25010000002 MIDAZOLAM PER 1MG: Performed by: ANESTHESIOLOGY

## 2023-08-24 PROCEDURE — 25010000002 HYDROMORPHONE 1 MG/ML SOLUTION: Performed by: NURSE ANESTHETIST, CERTIFIED REGISTERED

## 2023-08-24 PROCEDURE — 25010000002 EPINEPHRINE 1 MG/ML SOLUTION: Performed by: STUDENT IN AN ORGANIZED HEALTH CARE EDUCATION/TRAINING PROGRAM

## 2023-08-24 PROCEDURE — 97161 PT EVAL LOW COMPLEX 20 MIN: CPT

## 2023-08-24 PROCEDURE — 25010000002 MORPHINE PER 10 MG: Performed by: STUDENT IN AN ORGANIZED HEALTH CARE EDUCATION/TRAINING PROGRAM

## 2023-08-24 PROCEDURE — 63710000001 PROMETHAZINE PER 12.5 MG: Performed by: NURSE ANESTHETIST, CERTIFIED REGISTERED

## 2023-08-24 PROCEDURE — 25010000002 ROPIVACAINE PER 1 MG: Performed by: STUDENT IN AN ORGANIZED HEALTH CARE EDUCATION/TRAINING PROGRAM

## 2023-08-24 PROCEDURE — 25010000002 HYDRALAZINE PER 20 MG: Performed by: NURSE ANESTHETIST, CERTIFIED REGISTERED

## 2023-08-24 DEVICE — STEM TIB/KN PERSONA CMT 5D SZC RT: Type: IMPLANTABLE DEVICE | Site: KNEE | Status: FUNCTIONAL

## 2023-08-24 DEVICE — ART/SRF KN PERSONA/VE MC CD 4TO5 12MM RT: Type: IMPLANTABLE DEVICE | Site: KNEE | Status: FUNCTIONAL

## 2023-08-24 DEVICE — CAP TOTL KN CMT PRIMARY W/ROSA: Type: IMPLANTABLE DEVICE | Site: KNEE | Status: FUNCTIONAL

## 2023-08-24 DEVICE — COMP FEM PERSONA CR CMT COCR STD SZ4 RT: Type: IMPLANTABLE DEVICE | Site: KNEE | Status: FUNCTIONAL

## 2023-08-24 DEVICE — CMT BONE PALACOS R HI/VISC 1X40: Type: IMPLANTABLE DEVICE | Site: KNEE | Status: FUNCTIONAL

## 2023-08-24 RX ORDER — OXYCODONE HYDROCHLORIDE AND ACETAMINOPHEN 5; 325 MG/1; MG/1
1 TABLET ORAL EVERY 4 HOURS PRN
Status: DISCONTINUED | OUTPATIENT
Start: 2023-08-24 | End: 2023-08-25 | Stop reason: HOSPADM

## 2023-08-24 RX ORDER — CEFAZOLIN SODIUM 2 G/100ML
2000 INJECTION, SOLUTION INTRAVENOUS EVERY 8 HOURS
Status: COMPLETED | OUTPATIENT
Start: 2023-08-24 | End: 2023-08-25

## 2023-08-24 RX ORDER — ONDANSETRON 2 MG/ML
INJECTION INTRAMUSCULAR; INTRAVENOUS AS NEEDED
Status: DISCONTINUED | OUTPATIENT
Start: 2023-08-24 | End: 2023-08-24 | Stop reason: SURG

## 2023-08-24 RX ORDER — MEPERIDINE HYDROCHLORIDE 25 MG/ML
12.5 INJECTION INTRAMUSCULAR; INTRAVENOUS; SUBCUTANEOUS
Status: DISCONTINUED | OUTPATIENT
Start: 2023-08-24 | End: 2023-08-24 | Stop reason: HOSPADM

## 2023-08-24 RX ORDER — HYDRALAZINE HYDROCHLORIDE 20 MG/ML
INJECTION INTRAMUSCULAR; INTRAVENOUS AS NEEDED
Status: DISCONTINUED | OUTPATIENT
Start: 2023-08-24 | End: 2023-08-24 | Stop reason: SURG

## 2023-08-24 RX ORDER — DEXAMETHASONE SODIUM PHOSPHATE 4 MG/ML
INJECTION, SOLUTION INTRA-ARTICULAR; INTRALESIONAL; INTRAMUSCULAR; INTRAVENOUS; SOFT TISSUE AS NEEDED
Status: DISCONTINUED | OUTPATIENT
Start: 2023-08-24 | End: 2023-08-24 | Stop reason: SURG

## 2023-08-24 RX ORDER — CELECOXIB 100 MG/1
200 CAPSULE ORAL ONCE
Status: DISCONTINUED | OUTPATIENT
Start: 2023-08-24 | End: 2023-08-24 | Stop reason: SDUPTHER

## 2023-08-24 RX ORDER — CELECOXIB 100 MG/1
200 CAPSULE ORAL ONCE
Status: COMPLETED | OUTPATIENT
Start: 2023-08-24 | End: 2023-08-24

## 2023-08-24 RX ORDER — AMOXICILLIN 250 MG
2 CAPSULE ORAL 2 TIMES DAILY
Status: DISCONTINUED | OUTPATIENT
Start: 2023-08-24 | End: 2023-08-25 | Stop reason: HOSPADM

## 2023-08-24 RX ORDER — SODIUM CHLORIDE, SODIUM LACTATE, POTASSIUM CHLORIDE, CALCIUM CHLORIDE 600; 310; 30; 20 MG/100ML; MG/100ML; MG/100ML; MG/100ML
9 INJECTION, SOLUTION INTRAVENOUS CONTINUOUS PRN
Status: DISCONTINUED | OUTPATIENT
Start: 2023-08-24 | End: 2023-08-25 | Stop reason: HOSPADM

## 2023-08-24 RX ORDER — BUPIVACAINE HYDROCHLORIDE AND EPINEPHRINE 5; 5 MG/ML; UG/ML
INJECTION, SOLUTION EPIDURAL; INTRACAUDAL; PERINEURAL
Status: COMPLETED | OUTPATIENT
Start: 2023-08-24 | End: 2023-08-24

## 2023-08-24 RX ORDER — ACETAMINOPHEN 500 MG
1000 TABLET ORAL ONCE
Status: COMPLETED | OUTPATIENT
Start: 2023-08-24 | End: 2023-08-24

## 2023-08-24 RX ORDER — OXYCODONE HYDROCHLORIDE AND ACETAMINOPHEN 5; 325 MG/1; MG/1
2 TABLET ORAL EVERY 4 HOURS PRN
Status: DISCONTINUED | OUTPATIENT
Start: 2023-08-24 | End: 2023-08-25 | Stop reason: HOSPADM

## 2023-08-24 RX ORDER — FERROUS SULFATE 325(65) MG
325 TABLET ORAL
Status: DISCONTINUED | OUTPATIENT
Start: 2023-08-25 | End: 2023-08-25 | Stop reason: HOSPADM

## 2023-08-24 RX ORDER — ACETAMINOPHEN 500 MG
1000 TABLET ORAL ONCE
Status: DISCONTINUED | OUTPATIENT
Start: 2023-08-24 | End: 2023-08-24 | Stop reason: SDUPTHER

## 2023-08-24 RX ORDER — PROPOFOL 10 MG/ML
VIAL (ML) INTRAVENOUS AS NEEDED
Status: DISCONTINUED | OUTPATIENT
Start: 2023-08-24 | End: 2023-08-24 | Stop reason: SURG

## 2023-08-24 RX ORDER — ACETAMINOPHEN 500 MG
1000 TABLET ORAL EVERY 8 HOURS
Status: DISCONTINUED | OUTPATIENT
Start: 2023-08-24 | End: 2023-08-25 | Stop reason: HOSPADM

## 2023-08-24 RX ORDER — VENLAFAXINE 37.5 MG/1
37.5 TABLET ORAL 2 TIMES DAILY
Status: DISCONTINUED | OUTPATIENT
Start: 2023-08-24 | End: 2023-08-25 | Stop reason: HOSPADM

## 2023-08-24 RX ORDER — ROCURONIUM BROMIDE 10 MG/ML
INJECTION, SOLUTION INTRAVENOUS AS NEEDED
Status: DISCONTINUED | OUTPATIENT
Start: 2023-08-24 | End: 2023-08-24 | Stop reason: SURG

## 2023-08-24 RX ORDER — SODIUM CHLORIDE 0.9 % (FLUSH) 0.9 %
10 SYRINGE (ML) INJECTION AS NEEDED
Status: DISCONTINUED | OUTPATIENT
Start: 2023-08-24 | End: 2023-08-24 | Stop reason: HOSPADM

## 2023-08-24 RX ORDER — CEFAZOLIN SODIUM IN 0.9 % NACL 3 G/100 ML
3 INTRAVENOUS SOLUTION, PIGGYBACK (ML) INTRAVENOUS ONCE
Status: DISCONTINUED | OUTPATIENT
Start: 2023-08-24 | End: 2023-08-24 | Stop reason: DRUGHIGH

## 2023-08-24 RX ORDER — ONDANSETRON 2 MG/ML
4 INJECTION INTRAMUSCULAR; INTRAVENOUS EVERY 6 HOURS PRN
Status: DISCONTINUED | OUTPATIENT
Start: 2023-08-24 | End: 2023-08-25 | Stop reason: HOSPADM

## 2023-08-24 RX ORDER — NALOXONE HCL 0.4 MG/ML
0.4 VIAL (ML) INJECTION
Status: DISCONTINUED | OUTPATIENT
Start: 2023-08-24 | End: 2023-08-25 | Stop reason: HOSPADM

## 2023-08-24 RX ORDER — CEFAZOLIN SODIUM 2 G/100ML
2 INJECTION, SOLUTION INTRAVENOUS ONCE
Status: COMPLETED | OUTPATIENT
Start: 2023-08-24 | End: 2023-08-24

## 2023-08-24 RX ORDER — MIDAZOLAM HYDROCHLORIDE 2 MG/2ML
2 INJECTION, SOLUTION INTRAMUSCULAR; INTRAVENOUS ONCE
Status: COMPLETED | OUTPATIENT
Start: 2023-08-24 | End: 2023-08-24

## 2023-08-24 RX ORDER — OXYCODONE HCL 5 MG/5 ML
5 SOLUTION, ORAL ORAL EVERY 4 HOURS PRN
Status: DISCONTINUED | OUTPATIENT
Start: 2023-08-24 | End: 2023-08-24 | Stop reason: HOSPADM

## 2023-08-24 RX ORDER — LIDOCAINE HYDROCHLORIDE 20 MG/ML
INJECTION, SOLUTION EPIDURAL; INFILTRATION; INTRACAUDAL; PERINEURAL AS NEEDED
Status: DISCONTINUED | OUTPATIENT
Start: 2023-08-24 | End: 2023-08-24 | Stop reason: SURG

## 2023-08-24 RX ORDER — SODIUM CHLORIDE 9 MG/ML
40 INJECTION, SOLUTION INTRAVENOUS AS NEEDED
Status: DISCONTINUED | OUTPATIENT
Start: 2023-08-24 | End: 2023-08-24 | Stop reason: HOSPADM

## 2023-08-24 RX ORDER — FENTANYL CITRATE 50 UG/ML
INJECTION, SOLUTION INTRAMUSCULAR; INTRAVENOUS AS NEEDED
Status: DISCONTINUED | OUTPATIENT
Start: 2023-08-24 | End: 2023-08-24 | Stop reason: SURG

## 2023-08-24 RX ORDER — MIDAZOLAM HYDROCHLORIDE 2 MG/2ML
2 INJECTION, SOLUTION INTRAMUSCULAR; INTRAVENOUS ONCE
Status: DISCONTINUED | OUTPATIENT
Start: 2023-08-24 | End: 2023-08-24 | Stop reason: SDUPTHER

## 2023-08-24 RX ORDER — FAMOTIDINE 20 MG/1
40 TABLET, FILM COATED ORAL DAILY
Status: DISCONTINUED | OUTPATIENT
Start: 2023-08-24 | End: 2023-08-25 | Stop reason: HOSPADM

## 2023-08-24 RX ORDER — PROMETHAZINE HYDROCHLORIDE 12.5 MG/1
25 TABLET ORAL ONCE AS NEEDED
Status: COMPLETED | OUTPATIENT
Start: 2023-08-24 | End: 2023-08-24

## 2023-08-24 RX ORDER — SODIUM CHLORIDE, SODIUM LACTATE, POTASSIUM CHLORIDE, CALCIUM CHLORIDE 600; 310; 30; 20 MG/100ML; MG/100ML; MG/100ML; MG/100ML
100 INJECTION, SOLUTION INTRAVENOUS CONTINUOUS
Status: DISCONTINUED | OUTPATIENT
Start: 2023-08-24 | End: 2023-08-24

## 2023-08-24 RX ORDER — EPHEDRINE SULFATE 50 MG/ML
INJECTION, SOLUTION INTRAVENOUS AS NEEDED
Status: DISCONTINUED | OUTPATIENT
Start: 2023-08-24 | End: 2023-08-24 | Stop reason: SURG

## 2023-08-24 RX ORDER — ONDANSETRON 2 MG/ML
4 INJECTION INTRAMUSCULAR; INTRAVENOUS ONCE AS NEEDED
Status: DISCONTINUED | OUTPATIENT
Start: 2023-08-24 | End: 2023-08-24 | Stop reason: HOSPADM

## 2023-08-24 RX ORDER — DEXMEDETOMIDINE HYDROCHLORIDE 100 UG/ML
INJECTION, SOLUTION INTRAVENOUS AS NEEDED
Status: DISCONTINUED | OUTPATIENT
Start: 2023-08-24 | End: 2023-08-24 | Stop reason: SURG

## 2023-08-24 RX ORDER — NIFEDIPINE 30 MG/1
30 TABLET, EXTENDED RELEASE ORAL NIGHTLY
Status: DISCONTINUED | OUTPATIENT
Start: 2023-08-24 | End: 2023-08-25 | Stop reason: HOSPADM

## 2023-08-24 RX ORDER — KETOROLAC TROMETHAMINE 30 MG/ML
15 INJECTION, SOLUTION INTRAMUSCULAR; INTRAVENOUS EVERY 6 HOURS
Status: DISCONTINUED | OUTPATIENT
Start: 2023-08-24 | End: 2023-08-25 | Stop reason: HOSPADM

## 2023-08-24 RX ORDER — ASPIRIN 325 MG
325 TABLET ORAL EVERY 12 HOURS SCHEDULED
Status: DISCONTINUED | OUTPATIENT
Start: 2023-08-25 | End: 2023-08-25 | Stop reason: HOSPADM

## 2023-08-24 RX ORDER — ONDANSETRON 4 MG/1
4 TABLET, FILM COATED ORAL EVERY 6 HOURS PRN
Status: DISCONTINUED | OUTPATIENT
Start: 2023-08-24 | End: 2023-08-25 | Stop reason: HOSPADM

## 2023-08-24 RX ORDER — MAGNESIUM HYDROXIDE 1200 MG/15ML
LIQUID ORAL AS NEEDED
Status: DISCONTINUED | OUTPATIENT
Start: 2023-08-24 | End: 2023-08-24 | Stop reason: HOSPADM

## 2023-08-24 RX ORDER — PROMETHAZINE HYDROCHLORIDE 25 MG/1
25 SUPPOSITORY RECTAL ONCE AS NEEDED
Status: COMPLETED | OUTPATIENT
Start: 2023-08-24 | End: 2023-08-24

## 2023-08-24 RX ADMIN — CELECOXIB 200 MG: 100 CAPSULE ORAL at 07:56

## 2023-08-24 RX ADMIN — VENLAFAXINE HYDROCHLORIDE 37.5 MG: 37.5 TABLET ORAL at 20:59

## 2023-08-24 RX ADMIN — HYDRALAZINE HYDROCHLORIDE 5 MG: 20 INJECTION, SOLUTION INTRAMUSCULAR; INTRAVENOUS at 12:51

## 2023-08-24 RX ADMIN — SENNOSIDES AND DOCUSATE SODIUM 2 TABLET: 50; 8.6 TABLET ORAL at 20:59

## 2023-08-24 RX ADMIN — EPHEDRINE SULFATE 5 MG: 50 INJECTION INTRAVENOUS at 13:06

## 2023-08-24 RX ADMIN — EPHEDRINE SULFATE 5 MG: 50 INJECTION INTRAVENOUS at 13:03

## 2023-08-24 RX ADMIN — KETOROLAC TROMETHAMINE 15 MG: 30 INJECTION, SOLUTION INTRAMUSCULAR; INTRAVENOUS at 18:26

## 2023-08-24 RX ADMIN — CEFAZOLIN SODIUM 2 G: 2 INJECTION, SOLUTION INTRAVENOUS at 11:49

## 2023-08-24 RX ADMIN — FENTANYL CITRATE 50 MCG: 50 INJECTION, SOLUTION INTRAMUSCULAR; INTRAVENOUS at 11:56

## 2023-08-24 RX ADMIN — EPHEDRINE SULFATE 5 MG: 50 INJECTION INTRAVENOUS at 12:08

## 2023-08-24 RX ADMIN — FENTANYL CITRATE 25 MCG: 50 INJECTION, SOLUTION INTRAMUSCULAR; INTRAVENOUS at 12:05

## 2023-08-24 RX ADMIN — HYDROMORPHONE HYDROCHLORIDE 0.25 MG: 1 INJECTION, SOLUTION INTRAMUSCULAR; INTRAVENOUS; SUBCUTANEOUS at 14:10

## 2023-08-24 RX ADMIN — PROPOFOL 130 MG: 10 INJECTION, EMULSION INTRAVENOUS at 11:57

## 2023-08-24 RX ADMIN — ONDANSETRON 4 MG: 2 INJECTION INTRAMUSCULAR; INTRAVENOUS at 12:13

## 2023-08-24 RX ADMIN — MIDAZOLAM HYDROCHLORIDE 2 MG: 1 INJECTION, SOLUTION INTRAMUSCULAR; INTRAVENOUS at 10:25

## 2023-08-24 RX ADMIN — PROMETHAZINE HYDROCHLORIDE 25 MG: 12.5 TABLET ORAL at 14:22

## 2023-08-24 RX ADMIN — BUPIVACAINE HYDROCHLORIDE AND EPINEPHRINE BITARTRATE 40 ML: 5; .005 INJECTION, SOLUTION EPIDURAL; INTRACAUDAL; PERINEURAL at 11:05

## 2023-08-24 RX ADMIN — FAMOTIDINE 40 MG: 20 TABLET ORAL at 15:59

## 2023-08-24 RX ADMIN — HYDRALAZINE HYDROCHLORIDE 5 MG: 20 INJECTION, SOLUTION INTRAMUSCULAR; INTRAVENOUS at 12:38

## 2023-08-24 RX ADMIN — ACETAMINOPHEN 1000 MG: 500 TABLET ORAL at 15:59

## 2023-08-24 RX ADMIN — ROCURONIUM BROMIDE 50 MG: 50 INJECTION INTRAVENOUS at 11:57

## 2023-08-24 RX ADMIN — DEXMEDETOMIDINE 10 MCG: 100 INJECTION, SOLUTION INTRAVENOUS at 12:36

## 2023-08-24 RX ADMIN — DEXAMETHASONE SODIUM PHOSPHATE 4 MG: 4 INJECTION, SOLUTION INTRAMUSCULAR; INTRAVENOUS at 12:13

## 2023-08-24 RX ADMIN — CEFAZOLIN SODIUM 2000 MG: 2 INJECTION, SOLUTION INTRAVENOUS at 20:58

## 2023-08-24 RX ADMIN — DEXMEDETOMIDINE 10 MCG: 100 INJECTION, SOLUTION INTRAVENOUS at 12:43

## 2023-08-24 RX ADMIN — FENTANYL CITRATE 25 MCG: 50 INJECTION, SOLUTION INTRAMUSCULAR; INTRAVENOUS at 12:20

## 2023-08-24 RX ADMIN — EPHEDRINE SULFATE 5 MG: 50 INJECTION INTRAVENOUS at 12:14

## 2023-08-24 RX ADMIN — DEXMEDETOMIDINE 10 MCG: 100 INJECTION, SOLUTION INTRAVENOUS at 12:50

## 2023-08-24 RX ADMIN — SUGAMMADEX 200 MG: 100 INJECTION, SOLUTION INTRAVENOUS at 13:18

## 2023-08-24 RX ADMIN — ONDANSETRON 4 MG: 2 INJECTION INTRAMUSCULAR; INTRAVENOUS at 14:05

## 2023-08-24 RX ADMIN — SODIUM CHLORIDE, POTASSIUM CHLORIDE, SODIUM LACTATE AND CALCIUM CHLORIDE: 600; 310; 30; 20 INJECTION, SOLUTION INTRAVENOUS at 12:36

## 2023-08-24 RX ADMIN — LIDOCAINE HYDROCHLORIDE 80 MG: 20 INJECTION, SOLUTION EPIDURAL; INFILTRATION; INTRACAUDAL; PERINEURAL at 11:56

## 2023-08-24 RX ADMIN — SODIUM CHLORIDE, POTASSIUM CHLORIDE, SODIUM LACTATE AND CALCIUM CHLORIDE 9 ML/HR: 600; 310; 30; 20 INJECTION, SOLUTION INTRAVENOUS at 07:52

## 2023-08-24 RX ADMIN — NIFEDIPINE 30 MG: 30 TABLET, FILM COATED, EXTENDED RELEASE ORAL at 21:05

## 2023-08-24 RX ADMIN — ACETAMINOPHEN 1000 MG: 500 TABLET ORAL at 07:56

## 2023-08-24 NOTE — OP NOTE
TOTAL KNEE ARTHROPLASTY WITH ESTHER ROBOT  Procedure Report    Patient Name:  Carolina Ayers  YOB: 1955    Date of Surgery:  8/24/2023     Indications: Carolina is a 68-year-old female with a long history of right knee pain.  Advanced arthritic changes on x-ray and signs of previous patella fracture.  We discussed treatment options.  We discussed with operative and nonoperative management.  We discussed the risks, benefits, indications, and alternatives to right total knee arthroplasty with Esther robotic assistance.  She elected to proceed with surgery and consent was obtained.    Pre-op Diagnosis:   Primary osteoarthritis of right knee [M17.11]       Post-Op Diagnosis Codes:     * Primary osteoarthritis of right knee [M17.11]    Procedure/CPT® Codes:      Procedure(s):  RIGHT TOTAL KNEE ARTHROPLASTY WITH ESTHER ROBOT.    Staff:  Surgeon(s):  Shabbir Wood MD    Assistant: Grisel Blue Christopher, RN    Surgical Approach: Knee Medial Parapatellar        Anesthesia: General with Block    Estimated Blood Loss: 50 mL    Implants:    Implant Name Type Inv. Item Serial No.  Lot No. LRB No. Used Action   CMT BONE PALACOS R HI/VISC 1X40 - [id number] Implant CMT BONE PALACOS R HI/VISC 1X40  [facility] [id number] Right 2 Implanted   COMP FEM PERSONA CR CMT COCR STD SZ4 RT - [id number] Implant COMP FEM PERSONA CR CMT COCR STD SZ4 RT  [employer] [id number] Right 1 Implanted   STEM TIB/KN PERSONA CMT 5D SZC RT - [id number] Implant STEM TIB/KN PERSONA CMT 5D SZC RT  [employer] [id number] Right 1 Implanted   ART/SRF KN PERSONA/VE MC CD 4TO5 12MM RT - [id number] Implant ART/SRF KN PERSONA/VE MC CD 4TO5 12MM RT  [employer] [id number] Right 1 Implanted       Specimen:          None        Findings: Right knee osteoarthritis    Complications: None    Description of Procedure: The patient was met in the preoperative holding area and the operative extremity was marked.  The patient received a  preoperative peripheral nerve block.  The patient was transported to the operating room and laid supine on the operating table.  General anesthesia was induced without complication.  2 g of preoperative Ancef were administered.  An upper thigh tourniquet was applied.  The right lower extremity was then prepped and draped in the usual sterile fashion.  A timeout was taken to ensure the appropriate patient, procedure, and procedural site.  All were in agreement.  The right lower extremity was exsanguinated and tourniquet was inflated to 300 mmHg.  A legholding device was used for assistance during the case.  A standard midline longitudinal incision was created for an anterior approach to the knee.  Sharp dissection was carried down through the skin and subcutaneous tissues.  The extensor mechanism and retinacular tissues were identified.  A medial parapatellar arthrotomy was created in standard fashion.  A medial release off the proximal tibia was performed and the anterior horn of the medial meniscus was resected.   The fat pad was sharply excised.  The patella was everted and the leg was brought into 90 degrees of flexion.  I  inserted 2 threaded pins for the femoral tracker through the primary incision.  Distally I made 2 stab incisions distal to the tibial tubercle and 2 threaded pins were placed here as well.  The femoral and tibial trackers were then applied.   I then proceeded to navigate the femur and tibia and performed the knee state evaluation in standard fashion using the Apprema robotic assistance device.  I planned my femoral and tibial cuts.  The cutting arm was then brought onto the field with the knee in 90 degrees of flexion.  The distal femoral cutting guide was pinned in place and the cut was completed without complication.  The bony resection was removed.  I then drilled the  holes for the 4-in-1 cutting block and move the guide arm to the tibia.  This was pinned in place.  Retractors were  repositioned and the tibial cut was created without complication.  The 4-in-1 cutting block was then impacted onto the distal femur.  I was satisfied with the rotation of the cutting block relative to the epicondylar axis.  I then performed the anterior, posterior, and chamfer cuts without complication while protecting the collateral ligaments.  Bony resections were removed.  A lamina  was inserted.  The remaining portions of the medial and lateral menisci were sharply excised using the Bovie.    Posterior osteophytes were removed with a curved osteotome.  The PCL retractor was again inserted and the proximal tibia was exposed.  I sized the proximal tibia at C.  I then inserted my size 4 right femoral component trial and size C right tibial component trial with a size 10 trial insert.  The knee was brought into extension.  With these trials in place the knee there was slight hyperextension and the knee was stable to varus and valgus stress.  The patella tracked centrally with no liftoff of the tibial tray in deep flexion.  The knee was brought back into extension.  The patella was everted.  Patella osteophytes were removed, but given the history of trauma and poor bone quality and the patella I elected not to resurface.  The knee was again taken through range of motion.  Tracking was excellent and the knee was stable to varus and valgus stress throughout motion.  I was satisfied with these trial implants.  The rotation of the tibial tray was marked.  The patellar and tibial trials were removed.  The femoral lug holes were drilled in anticipation of cemented components.  The femoral trial was removed.  The PCL retractor was inserted and the knee was taken into hyperflexion again.  The proximal tibia was then punched in standard fashion in line with the previously marked rotation.  This was done without complication.  The knee was then thoroughly irrigated with normal saline.  A size 4 cemented cruciate  retaining femoral component and size C tibial tray were opened.  The cement was mixed on the back table.  I then cemented the tibial and femoral components in standard fashion removing excess cement during the process.  The knee was brought into extension with an 12 mm spacer in place and axial pressure was applied.  The patella was everted.  The patella was cemented in standard fashion without complication.  Excess cement was removed.  The knee was thoroughly irrigated with normal saline followed by irrisept solution.  Periarticular local was injected into the soft tissues.  The cement was allowed to fully dry.  The tibial and femoral trackers were removed. Once dry, the knee was taken through a range of motion again.  The knee achieved full extension, patellar tracking was excellent, and the knee was stable to varus and valgus stress throughout motion.  The knee was brought into flexion and the size 12 spacer was removed.  The size 12 MC polyinsert was opened.  The tibial tray was cleared.  The polyinsert was then impacted without complication and was fully seated.  The knee was again taken through range of motion with the final implants in place.  Full extension could be achieved.  Tracking was excellent.  The knee was stable to varus and valgus stress.  The knee was again irrigated with normal saline solution.  The extensor mechanism and retinacular tissues were closed with #1 Ethibond in interrupted figure-of-eight fashion.  The tourniquet was released and adequate hemostasis was obtained.  Subcutaneous tissues were closed with 0 Vicryl and 2-0 Vicryl.  Skin was closed with skin staples.  Tibial pin sites were closed with nylon sutures. The wound was dressed with an Aquacel dressing, soft roll, ABD, and an Ace wrap from the foot to the proximal thigh.  The patient awoke from anesthesia without complication and was transferred to the recovery room in stable condition.  All surgical counts were correct.  The  patient had palpable pedal pulses prior to leaving the operating room.    Assistant: Grisel Blue Christopher, ROSALIO  was responsible for performing the following activities: Retraction, Suction, Irrigation, Suturing, Closing, and Placing Dressing and their skilled assistance was necessary for the success of this case.    Shabbir Wood MD     Date: 8/24/2023  Time: 13:43 EDT

## 2023-08-24 NOTE — ANESTHESIA POSTPROCEDURE EVALUATION
Patient: Carolina Ayers    Procedure Summary       Date: 08/24/23 Room / Location: [facility] OR 06 / [facility] MAIN OR    Anesthesia Start: 1149 Anesthesia Stop: 1348    Procedure: RIGHT TOTAL KNEE ARTHROPLASTY WITH ESTHER ROBOT. (Right: Knee) Diagnosis:       Primary osteoarthritis of right knee      (Primary osteoarthritis of right knee [M17.11])    Surgeons: Shabbir Wood MD Provider: Soumya Motta CRNA    Anesthesia Type: general with block ASA Status: 3            Anesthesia Type: general with block    Vitals  Vitals Value Taken Time   /54 08/24/23 1412   Temp 36.3 øC (97.3 øF) 08/24/23 1345   Pulse 93 08/24/23 1413   Resp 17 08/24/23 1410   SpO2 95 % 08/24/23 1413   Vitals shown include unvalidated device data.        Post Anesthesia Care and Evaluation    Patient location during evaluation: bedside  Patient participation: complete - patient participated  Level of consciousness: awake  Pain score: 0  Pain management: adequate    Airway patency: patent  PONV Status: none  Cardiovascular status: acceptable and stable  Respiratory status: acceptable  Hydration status: acceptable    Comments: An Anesthesiologist personally participated in the most demanding procedures (including induction and emergence if applicable) in the anesthesia plan, monitored the course of anesthesia administration at frequent intervals and remained physically present and available for immediate diagnosis and treatment of emergencies.

## 2023-08-24 NOTE — H&P
[facility]   HOSPITALIST HISTORY AND PHYSICAL  Date: 2023   Patient Name: Carolina Ayers  : 1955  MRN: [id number]  Primary Care Physician:  Momo Renteria MD  Date of admission: 2023    Subjective   Subjective     Chief Complaint: Medical management     HPI:    Carolina Ayers is a 68 y.o. female with multiple medical problems which include anxiety, depression, history of left lower extremity DVT, hypertension, hyperlipidemia who presented for an elective right knee replacement secondary to osteoarthritis.  Hospital service was asked to manage patient's multiple medical problems as listed above.  Patient is still very sleepy from surgery.  Patient's family at the bedside and states that it does take her a while to wake up from anesthesia.  Patient has some nausea but without any vomiting.  Patient denies any pain.      Personal History     Past Medical History:  Past Medical History:   Diagnosis Date    Anesthesia     Pt HARD TO WAKE DURING RECOVERY PER FAMILY REPORT    Anxiety and depression     Arthritis     Embolism and thrombosis 2023    LEFT LOWER EXTREMITY    Essential hypertension     History of chest pain     SEEN DR SIM/NO  ISSUES SINCE    History of lumbar fusion     History of orthostatic hypotension     SEEN BY DR. PRETTY-PT STATES NO RECENT ISSUES    History of skin cancer     REMOVED    HL (hearing loss)     Hyperlipidemia     Hypokalemia     Impaired functional mobility, balance, gait, and endurance     Localized edema     LEGS/ANKLES-TAKES DIURETIC    Lumbar herniated disc     Orthostatic hypotension     PONV (postoperative nausea and vomiting)     Restrictive lung disease     DR. ONEAL    Thyroid nodule 2022    MONITORED    Tinnitus     Varicose veins of both lower extremities          Past Surgical History:  Past Surgical History:   Procedure Laterality Date    ADENOIDECTOMY      BREAST LUMPECTOMY Left     BENIGN    CARPAL TUNNEL RELEASE       CHOLECYSTECTOMY      COLONOSCOPY  03/07/2017    Hyperplastic polyp, otherwise normal    EXTERNAL EAR SURGERY      cyst removed from ear    EYE SURGERY      HERNIA REPAIR      HYSTERECTOMY      partial    KNEE SURGERY      FRACTURE REPAIR    LUMBAR DISCECTOMY FUSION INSTRUMENTATION Bilateral 08/07/2018    Procedure: LUMBAR FUSION DECOMPRESSON WITH PEDICLE SCREWS Lumbar 4-5,  posterolateral fusion;  Surgeon: Jarrett Vasques IV, MD;  Location: [facility] OR;  Service: Neurosurgery    SHOULDER SURGERY Right     RCR    SUBTOTAL HYSTERECTOMY      TONSILLECTOMY      TUBAL ABDOMINAL LIGATION      US GUIDED FINE NEEDLE ASPIRATION  10/05/2020    WRIST SURGERY Bilateral     Cyst removed         Family History:   Family History   Problem Relation Age of Onset    Macular degeneration Mother     Vision loss Mother         Macular degeneration    Coronary artery disease Father     Heart disease Father         Heart Attack    Malig Hyperthermia Neg Hx          Social History:   Social History     Substance and Sexual Activity   Alcohol Use Not Currently    Comment: rarely- 2-3 times a year     Tobacco Use: Medium Risk    Smoking Tobacco Use: Former    Smokeless Tobacco Use: Never    Passive Exposure: Not on file         Home Medications:  Calcium-Magnesium-Zinc, NIFEdipine CC, baclofen, diphenhydrAMINE, docusate sodium, estradiol, furosemide, hydroCHLOROthiazide, losartan, melatonin, meloxicam, potassium chloride, simvastatin, and venlafaxine    Allergies:  Allergies   Allergen Reactions    Lisinopril Cough    Penicillins Hives       Review of Systems  Limited secondary to patient is still very sleepy.  However patient states that she is having nausea however denies any vomiting, abdominal pain or pain in her knee    Objective   Objective     Vitals:   Temp:  [96.6 °F (35.9 °C)-97.4 °F (36.3 °C)] 97.3 °F (36.3 °C)  Heart Rate:  [] 80  Resp:  [14-18] 16  BP: ()/(42-73) 122/51  Flow (L/min):  [2-6] 2  FiO2 (%):  [50  %] 50 %    Physical Exam    Constitutional: Patient is sleeping in the bedside chair with her family at the bedside   Eyes: Pupils equal, sclerae anicteric, no conjunctival injection   HENT: NCAT, mucous membranes moist   Neck: Supple, no thyromegaly, no lymphadenopathy, trachea midline   Respiratory: Clear to auscultation bilaterally, nonlabored respirations    Cardiovascular: RRR, no murmurs,    Gastrointestinal: Positive bowel sounds, soft, nontender, nondistended   Musculoskeletal: Limited range of motion in right knee   Psychiatric: Patient is sleeping but calm   Neurologic: Unable to obtain as patient is very sleepy however patient is alert and oriented to person and place.  Patient is able to follow commands.  Speech is clear   Skin: No rashes     Result Review    Result Review:  I have personally reviewed the results from the time of this admission to 8/24/2023 16:54 EDT and agree with these findings:  [x]  Laboratory  CBC          5/2/2023    10:13 8/15/2023    11:09   CBC   WBC 10.74  5.86    RBC 4.12  4.14    Hemoglobin 13.3  13.4    Hematocrit 40.8  40.6    MCV 99.0  98.1    MCH 32.3  32.4    MCHC 32.6  33.0    RDW 12.4  12.4    Platelets 322  291      BMP          5/2/2023    10:13 5/22/2023    10:38 8/15/2023    11:09   BMP   BUN 19  18  22    Creatinine 1.13  1.14  1.06    Sodium 140  137  140    Potassium 5.5  5.1  5.3    Chloride 102  100  101    CO2 27.7  27.8  30.3    Calcium 10.1  10.2  9.9        [x]  Microbiology  No results found for: ACANTHNAEG, AFBCX, BPERTUSSISCX, BLOODCX  No results found for: BCIDPCR, CXREFLEX, CSFCX, CULTURETIS  No results found for: CULTURES, HSVCX, URCX  No results found for: EYECULTURE, GCCX, HSVCULTURE, LABHSV  No results found for: LEGIONELLA, MRSACX, MUMPSCX, MYCOPLASCX  No results found for: NOCARDIACX, STOOLCX  No results found for: THROATCX, UNSTIMCULT, URINECX, CULTURE, VZVCULTUR  No results found for: VIRALCULTU, WOUNDCX    [x]  Radiology  Right knee xray   1.  Expected postoperative changes from total knee arthroplasty with patellar resurfacing       []  EKG/Telemetry   []  Cardiology/Vascular   []  Pathology  []  Old records  []  Other:      Assessment & Plan   Assessment / Plan     Assessment/Plan:   Hypertension  HLD  Hyperkalemia  Anxiety and depression   S/p right knee replacement    PLAN  -- Given patient's slightly elevated potassium we will hold her Lasix and her Cozaar and her potassium supplement  -- We will resume patient on her nifedipine nightly for blood pressure  -- Resume patient on Effexor twice daily for her anxiety and depression  -- We will restart her other blood pressure medications as needed  -- Postoperative care per orthopedic for her joint replacement      DVT prophylaxis:  Mechanical DVT prophylaxis orders are present.    CODE STATUS:             Electronically signed by Cam Siegel DO, 08/24/23, 4:54 PM EDT.

## 2023-08-24 NOTE — ANESTHESIA PREPROCEDURE EVALUATION
Anesthesia Evaluation     Patient summary reviewed and Nursing notes reviewed   history of anesthetic complications:  PONV  NPO Solid Status: > 8 hours  NPO Liquid Status: > 2 hours           Airway   Mallampati: III  TM distance: >3 FB  Neck ROM: full  Possible difficult intubation  Dental    (+) poor dentition    Pulmonary - negative pulmonary ROS and normal exam    breath sounds clear to auscultation  Cardiovascular - normal exam  Exercise tolerance: good (4-7 METS)    Rhythm: regular  Rate: normal    (+) hypertension      Neuro/Psych- negative ROS  GI/Hepatic/Renal/Endo    (+) thyroid problem     Musculoskeletal (-) negative ROS    Abdominal    Substance History - negative use     OB/GYN negative ob/gyn ROS         Other - negative ROS       ROS/Med Hx Other: >4METS, HX ORTHOSTATIC HYPOTENSION. ECHO 2018 TRACE TR, STRESS 2020 EF 70%,NO ISCHEMIA. HX THROMBUS,LUMBAR FUSION,  RESTREICTIVE LUNG DISEASE, LAST PULM OV 7/7/23 F/U 1 YR.  K+ 5.3  TJR. KT                 Anesthesia Plan    ASA 3     general with block       Anesthetic plan, risks, benefits, and alternatives have been provided, discussed and informed consent has been obtained with: patient, spouse/significant other and child.    CODE STATUS:

## 2023-08-24 NOTE — PLAN OF CARE
Goal Outcome Evaluation:  Plan of Care Reviewed With: patient, daughter           Outcome Evaluation: Patient presents following a right TKA. She was very lethargic coming out of her surgery and was not able to follow commands to perform MMT. She is not able to safely transfer or ambulate independently at this time. Skilled PT is required to address her deficits.      Anticipated Discharge Disposition (PT): home with outpatient therapy services

## 2023-08-24 NOTE — ANESTHESIA PROCEDURE NOTES
Peripheral Block    Pre-sedation assessment completed: 8/24/2023 10:57 AM    Patient reassessed immediately prior to procedure    Patient location during procedure: pre-op  Start time: 8/24/2023 10:58 AM  Stop time: 8/24/2023 11:05 AM  Reason for block: at surgeon's request and post-op pain management  Performed by  Anesthesiologist: Yair Brunson MD  Preanesthetic Checklist  Completed: patient identified, IV checked, site marked, risks and benefits discussed, surgical consent, monitors and equipment checked, pre-op evaluation and timeout performed  Prep:  Pt Position: supine  Sterile barriers:alcohol skin prep, partial drape, cap, washed/disinfected hands, mask and gloves  Prep: ChloraPrep  Patient monitoring: blood pressure monitoring, continuous pulse oximetry and EKG  Procedure    Sedation: yes  Performed under: local infiltration  Guidance:ultrasound guided and nerve stimulator    ULTRASOUND INTERPRETATION.  Using ultrasound guidance a 20 G gauge needle was placed in close proximity to the nerve, at which point, under ultrasound guidance anesthetic was injected in the area of the nerve and spread of the anesthesia was seen on ultrasound in close proximity thereto.  There were no abnormalities seen on ultrasound; a digital image was taken; and the patient tolerated the procedure with no complications. Images:still images obtained, printed/placed on chart    Laterality:right  Block Type:adductor canal block  Injection Technique:single-shot  Needle Type:echogenic  Needle Gauge:20 G (4in)  Resistance on Injection: none    Medications Used: bupivacaine-EPINEPHrine PF (MARCAINE w/EPI) 0.5% -[id number] injection - Injection, Adductor Canal   40 mL - 8/24/2023 11:05:00 AM      Medications  Comment:Precedex 50mcg and decadron 4mg added to solution    Post Assessment  Injection Assessment: negative aspiration for heme, no paresthesia on injection and incremental injection  Patient Tolerance:comfortable throughout  block  Complications:no  Additional Notes  The block or continuous infusion is requested by the referring physician for management of postoperative pain, or pain related to a procedure. Ultrasound guidance (deemed medically necessary). Painless injection, pt was awake and conversant during the procedure without complications. Needle and surrounding structures visualized throughout procedure. No adverse reactions or complications seen during this period. Post-procedure image showed no signs of complication, and anatomy was consistent with an uncomplicated nerve blockade.

## 2023-08-24 NOTE — THERAPY EVALUATION
Acute Care - Physical Therapy Initial Evaluation  [facility]     Patient Name: Carolina Ayers  : 1955  MRN: [id number]  Today's Date: 2023   Admit date: 2023     Referring Physician: Cam Siegel DO     Surgery Date:2023   Procedure(s) (LRB):  RIGHT TOTAL KNEE ARTHROPLASTY WITH ESTHER ROBOT. (Right)        Visit Dx:     ICD-10-CM ICD-9-CM   1. Difficulty in walking  R26.2 719.7   2. Primary osteoarthritis of right knee  M17.11 715.16     Patient Active Problem List   Diagnosis    Acute bilateral low back pain without sciatica    Acquired spondylolisthesis    Hypokalemia    Abnormal EKG    Preop cardiovascular exam    Essential hypertension    Localized edema    Orthostatic hypotension    Impaired functional mobility, balance, gait, and endurance    Post-operative state    Bilateral lower extremity edema    Precordial pain    Mixed hyperlipidemia    Multiple thyroid nodules    Thyroid nodule    Sensorineural hearing loss (SNHL) of both ears    Tinnitus of both ears    Post menopausal syndrome    Degenerative arthritis of knee, bilateral     Past Medical History:   Diagnosis Date    Anesthesia     Pt HARD TO WAKE DURING RECOVERY PER FAMILY REPORT    Anxiety and depression     Arthritis     Embolism and thrombosis 2023    LEFT LOWER EXTREMITY    Essential hypertension     History of chest pain     SEEN DR SIM/NO  ISSUES SINCE    History of lumbar fusion     History of orthostatic hypotension     SEEN BY DR. PRETTY-PT STATES NO RECENT ISSUES    History of skin cancer     REMOVED    HL (hearing loss)     Hyperlipidemia     Hypokalemia     Impaired functional mobility, balance, gait, and endurance     Localized edema     LEGS/ANKLES-TAKES DIURETIC    Lumbar herniated disc     Orthostatic hypotension     PONV (postoperative nausea and vomiting)     Restrictive lung disease     DR. ONEAL    Thyroid nodule 2022    MONITORED    Tinnitus     Varicose veins of both lower extremities       Past Surgical History:   Procedure Laterality Date    ADENOIDECTOMY      BREAST LUMPECTOMY Left     BENIGN    CARPAL TUNNEL RELEASE      CHOLECYSTECTOMY      COLONOSCOPY  03/07/2017    Hyperplastic polyp, otherwise normal    EXTERNAL EAR SURGERY      cyst removed from ear    EYE SURGERY      HERNIA REPAIR      HYSTERECTOMY      partial    KNEE SURGERY      FRACTURE REPAIR    LUMBAR DISCECTOMY FUSION INSTRUMENTATION Bilateral 08/07/2018    Procedure: LUMBAR FUSION DECOMPRESSON WITH PEDICLE SCREWS Lumbar 4-5,  posterolateral fusion;  Surgeon: Jarrett Vasques IV, MD;  Location: [facility] OR;  Service: Neurosurgery    SHOULDER SURGERY Right     RCR    SUBTOTAL HYSTERECTOMY      TONSILLECTOMY      TUBAL ABDOMINAL LIGATION      US GUIDED FINE NEEDLE ASPIRATION  10/05/2020    WRIST SURGERY Bilateral     Cyst removed     PT Assessment (last 12 hours)       PT Evaluation and Treatment       Row Name 08/24/23 1511          Physical Therapy Time and Intention    Subjective Information complains of;weakness;pain  -FRANCO     Document Type evaluation  -FRANCO     Mode of Treatment individual therapy;group therapy  -FRANCO     Patient Effort good  -FRANCO     Symptoms Noted During/After Treatment increased pain  -FRANCO       Row Name 08/24/23 1511          General Information    Patient Profile Reviewed yes  -FRANCO     Patient Observations alert;cooperative;agree to therapy  -FRANCO     Prior Level of Function independent:;all household mobility;gait;ADL's  -FRANCO     Equipment Currently Used at Home walker, rolling  -FRANCO     Existing Precautions/Restrictions fall  -FRANCO       Row Name 08/24/23 1511          Living Environment    Current Living Arrangements home  -FRANCO     Home Accessibility stairs to enter home  -FRANCO     People in Home child(ravi), adult  Lives with daughter and son-in-law  -FRANCO     Primary Care Provided by self  -FRANCO       Row Name 08/24/23 1511          Home Main Entrance    Number of Stairs, Main Entrance one  -FRANCO     Stair Railings,  Main Entrance none  -FRANCO       Row Name 08/24/23 1511          Home Use of Assistive/Adaptive Equipment    Equipment Currently Used at Home walker, rolling  -FRANCO       Row Name 08/24/23 1511          Range of Motion (ROM)    Range of Motion bilateral lower extremities  LLE: WNL   RLE: 5-105 degrees flexion  -FRANCO       Row Name 08/24/23 1511          Strength (Manual Muscle Testing)    Strength (Manual Muscle Testing) bilateral lower extremities  MMT deferred due to lethargy following surgery  -FRANCO       Row Name 08/24/23 1511          Bed Mobility    Bed Mobility bed mobility (all) activities  -FRANCO     All Activities, [place] (Bed Mobility) contact guard  -FRANCO       Row Name 08/24/23 1511          Transfers    Transfers bed-chair transfer;stand-sit transfer;sit-stand transfer  -FRANCO       Row Name 08/24/23 1511          Bed-Chair Transfer    Bed-Chair [place] (Transfers) contact guard  -FRANCO     Assistive Device (Bed-Chair Transfers) walker, front-wheeled  -FRANCO       Row Name 08/24/23 1511          Sit-Stand Transfer    Sit-Stand [place] (Transfers) contact guard  -FRANCO     Assistive Device (Sit-Stand Transfers) walker, front-wheeled  -FRANCO       Row Name 08/24/23 1511          Stand-Sit Transfer    Stand-Sit [place] (Transfers) contact guard  -FRANCO     Assistive Device (Stand-Sit Transfers) walker, front-wheeled  -FRANCO       Row Name 08/24/23 1511          Gait/Stairs (Locomotion)    Gait/Stairs Locomotion gait/ambulation assistive device  -FRANCO     [place] Level (Gait) contact guard  -FRANCO     Assistive Device (Gait) walker, front-wheeled  -FRANCO     Patient was Unable to Ambulate yes  -FRANCO     Distance in Feet (Gait) 15  -FRANCO       Row Name 08/24/23 1511          Safety Issues, Functional Mobility    Impairments Affecting Function (Mobility) balance;pain;range of motion (ROM);strength  -FRANCO       Row Name 08/24/23 1511          Balance    Balance Assessment standing dynamic balance  -FRANCO     Dynamic Standing Balance  contact guard  -FRANCO     Position/Device Used, Standing Balance walker, front-wheeled  -FRANCO       Row Name             Wound 08/24/23 1231 Right anterior knee Incision    Wound - Properties Group Placement Date: 08/24/23  -RL Placement Time: 1231  -RL Present on Hospital Admission: N  -RL Side: Right  -RL Orientation: anterior  -RL Location: knee  -RL Primary Wound Type: Incision  -RL    Retired Wound - Properties Group Placement Date: 08/24/23  -RL Placement Time: 1231  -RL Present on Hospital Admission: N  -RL Side: Right  -RL Orientation: anterior  -RL Location: knee  -RL Primary Wound Type: Incision  -RL    Retired Wound - Properties Group Date first assessed: 08/24/23  -RL Time first assessed: 1231  -RL Present on Hospital Admission: N  -RL Side: Right  -RL Location: knee  -RL Primary Wound Type: Incision  -RL      Row Name             Wound 08/24/23 1243 Right proximal leg Incision    Wound - Properties Group Placement Date: 08/24/23  -BW Placement Time: 1243  -BW Present on Hospital Admission: N  -BW Side: Right  -BW Orientation: proximal  -BW Location: leg  -BW Primary Wound Type: Incision  -BW    Retired Wound - Properties Group Placement Date: 08/24/23  -BW Placement Time: 1243  -BW Present on Hospital Admission: N  -BW Side: Right  -BW Orientation: proximal  -BW Location: leg  -BW Primary Wound Type: Incision  -BW    Retired Wound - Properties Group Date first assessed: 08/24/23  -BW Time first assessed: 1243  -BW Present on Hospital Admission: N  -BW Side: Right  -BW Location: leg  -BW Primary Wound Type: Incision  -BW      Row Name 08/24/23 1511          Plan of Care Review    Plan of Care Reviewed With patient;daughter  -FRANCO     Outcome Evaluation Patient presents following a right TKA. She was very lethargic coming out of her surgery and was not able to follow commands to perform MMT. She is not able to safely transfer or ambulate independently at this time. Skilled PT is required to address her deficits.   -FRANCO       Row Name 08/24/23 1511          Therapy Assessment/Plan (PT)    Patient/Family Therapy Goals Statement (PT) Not hearing grinding in knee  -FRANCO     Rehab Potential (PT) good, to achieve stated therapy goals  -FRANCO     Criteria for Skilled Interventions Met (PT) skilled treatment is necessary  -FRANCO     Therapy Frequency (PT) 2 times/day  -FRANCO     Predicted Duration of Therapy Intervention (PT) 10 days  -FRANCO     Problem List (PT) problems related to;balance;mobility;range of motion (ROM);strength;pain  -FRANCO     Activity Limitations Related to Problem List (PT) unable to ambulate safely;BADLs not performed adequately or safely;unable to transfer safely;IADLs not performed adequately or safely;community activities not performed adequately or safely  -FRANCO       Row Name 08/24/23 1511          Therapy Plan Review/Discharge Plan (PT)    Therapy Plan Review (PT) evaluation/treatment results reviewed;care plan/treatment goals reviewed;participants included;patient;daughter  -FRANCO       Row Name 08/24/23 1511          Physical Therapy Goals    Transfer Goal Selection (PT) transfer, PT goal 1  -FRANCO     Gait Training Goal Selection (PT) gait training, PT goal 2  -FRANCO     Strength Goal Selection (PT) strength, PT goal 3  -FRANCO       Row Name 08/24/23 1511          Transfer Goal 1 (PT)    Activity/Assistive Device (Transfer Goal 1, PT) transfers, all  -FRANCO     [place] Level/Cues Needed (Transfer Goal 1, PT) independent  -FRANCO     Time Frame (Transfer Goal 1, PT) long term goal (LTG);10 days  -FRANCO       Row Name 08/24/23 1511          Gait Training Goal 2 (PT)    Activity/Assistive Device (Gait Training Goal 2, PT) gait (walking locomotion)  -FRANCO     [place] Level (Gait Training Goal 2, PT) independent  -FRANCO     Distance (Gait Training Goal 2, PT) 150ft  -FRANCO     Time Frame (Gait Training Goal 2, PT) long term goal (LTG);10 days  -FRANCO       Row Name 08/24/23 1511          Strength Goal 3 (PT)    Strength Goal 3 (PT) Patient will  have 5/5 strength in bilateral LE.  -FRANCO     Time Frame (Strength Goal 3, PT) long term goal (LTG);10 days  -FRANCO               User Key  (r) = Recorded By, (t) = Taken By, (c) = Cosigned By      Initials Name Provider Type    Benjamin Hough, RN Registered Nurse    Tashi Aguilar PT Physical Therapist    Cherry Hernández RN Registered Nurse                    Physical Therapy Education       Title: PT OT SLP Therapies (Done)       Topic: Physical Therapy (Done)       Point: Mobility training (Done)       Learning Progress Summary             Patient Acceptance, E,TB, VU by FRANCO at 8/24/2023 1520                         Point: Precautions (Done)       Learning Progress Summary             Patient Acceptance, E,TB, VU by FRANCO at 8/24/2023 1520                                         User Key       Initials Effective Dates Name Provider Type Discipline    FRANCO 06/03/21 -  Tashi June PT Physical Therapist PT                  PT Recommendation and Plan  Anticipated Discharge Disposition (PT): home with outpatient therapy services  Planned Therapy Interventions (PT): balance training, bed mobility training, gait training, home exercise program, motor coordination training, neuromuscular re-education, ROM (range of motion), stair training, strengthening, stretching, transfer training  Therapy Frequency (PT): 2 times/day  Plan of Care Reviewed With: patient, daughter  Outcome Evaluation: Patient presents following a right TKA. She was very lethargic coming out of her surgery and was not able to follow commands to perform MMT. She is not able to safely transfer or ambulate independently at this time. Skilled PT is required to address her deficits.   Outcome Measures       Row Name 08/24/23 1515             How much help from another person do you currently need...    Turning from your back to your side while in flat bed without using bedrails? 4  -FRANCO      Moving from lying on back to sitting on the side of a  flat bed without bedrails? 3  -FRANCO      Moving to and from a bed to a chair (including a wheelchair)? 3  -FRANCO      Standing up from a chair using your arms (e.g., wheelchair, bedside chair)? 3  -FRANCO      Climbing 3-5 steps with a railing? 2  -FRANCO      To walk in hospital room? 3  -FRANCO      AM-PAC 6 Clicks Score (PT) 18  -FRANCO                User Key  (r) = Recorded By, (t) = Taken By, (c) = Cosigned By      Initials Name Provider Type    Tashi Aguilar, PT Physical Therapist                     Time Calculation:    PT Charges       Row Name 08/24/23 1511             Time Calculation    PT Received On 08/24/23  -FRANCO      PT Goal Re-Cert Due Date 09/02/23  -FRANCO         Untimed Charges    PT Eval/Re-eval Minutes 25  -FRANCO         Total Minutes    Untimed Charges Total Minutes 25  -FRANCO       Total Minutes 25  -FRANCO                User Key  (r) = Recorded By, (t) = Taken By, (c) = Cosigned By      Initials Name Provider Type    Tashi Aguilar, PT Physical Therapist                  Therapy Charges for Today       Code Description Service Date Service Provider Modifiers Qty    33701608115 HC PT EVAL LOW COMPLEXITY 2 8/24/2023 Tashi June, PT GP 1            PT G-Codes  AM-PAC 6 Clicks Score (PT): 18    Tashi June, PT  8/24/2023

## 2023-08-24 NOTE — INTERVAL H&P NOTE
H&P reviewed. The patient was examined and there are no changes to the H&P.    I have seen and examined the above patient and agree with the plan.     Cardiac: Intact peripheral pulses  Pulmonary: Nonlabored respirations on room air.   Right lower extremity: Skin intact.  Small abrasion to the lateral aspect of the lower leg with no signs of infection.  No active drainage.  Distal neurovascular intact    We reviewed the risks, benefits, indications, and alternatives to right total knee arthroplasty with Vidya robotic assistance.  She elected to proceed with surgery and consent was obtained.    Electronically signed by Shabbir Wood MD, 08/24/23, 9:15 AM EDT.

## 2023-08-25 VITALS
DIASTOLIC BLOOD PRESSURE: 58 MMHG | TEMPERATURE: 97.9 F | WEIGHT: 177.47 LBS | SYSTOLIC BLOOD PRESSURE: 120 MMHG | OXYGEN SATURATION: 98 % | HEIGHT: 62 IN | HEART RATE: 70 BPM | BODY MASS INDEX: 32.66 KG/M2 | RESPIRATION RATE: 18 BRPM

## 2023-08-25 LAB
ANION GAP SERPL CALCULATED.3IONS-SCNC: 6.8 MMOL/L (ref 5–15)
BUN SERPL-MCNC: 14 MG/DL (ref 8–23)
BUN/CREAT SERPL: 15.9 (ref 7–25)
CALCIUM SPEC-SCNC: 9.3 MG/DL (ref 8.6–10.5)
CHLORIDE SERPL-SCNC: 101 MMOL/L (ref 98–107)
CO2 SERPL-SCNC: 27.2 MMOL/L (ref 22–29)
CREAT SERPL-MCNC: 0.88 MG/DL (ref 0.57–1)
DEPRECATED RDW RBC AUTO: 44.6 FL (ref 37–54)
EGFRCR SERPLBLD CKD-EPI 2021: 71.7 ML/MIN/1.73
ERYTHROCYTE [DISTWIDTH] IN BLOOD BY AUTOMATED COUNT: 12.2 % (ref 12.3–15.4)
GLUCOSE SERPL-MCNC: 139 MG/DL (ref 65–99)
HCT VFR BLD AUTO: 35.3 % (ref 34–46.6)
HGB BLD-MCNC: 11.3 G/DL (ref 12–15.9)
MCH RBC QN AUTO: 31.7 PG (ref 26.6–33)
MCHC RBC AUTO-ENTMCNC: 32 G/DL (ref 31.5–35.7)
MCV RBC AUTO: 99.2 FL (ref 79–97)
PLATELET # BLD AUTO: 260 10*3/MM3 (ref 140–450)
PMV BLD AUTO: 10.1 FL (ref 6–12)
POTASSIUM SERPL-SCNC: 4.8 MMOL/L (ref 3.5–5.2)
RBC # BLD AUTO: 3.56 10*6/MM3 (ref 3.77–5.28)
SODIUM SERPL-SCNC: 135 MMOL/L (ref 136–145)
WBC NRBC COR # BLD: 12.88 10*3/MM3 (ref 3.4–10.8)

## 2023-08-25 PROCEDURE — 97165 OT EVAL LOW COMPLEX 30 MIN: CPT

## 2023-08-25 PROCEDURE — 25010000002 CEFAZOLIN IN DEXTROSE 2-4 GM/100ML-% SOLUTION: Performed by: STUDENT IN AN ORGANIZED HEALTH CARE EDUCATION/TRAINING PROGRAM

## 2023-08-25 PROCEDURE — 94799 UNLISTED PULMONARY SVC/PX: CPT

## 2023-08-25 PROCEDURE — 97116 GAIT TRAINING THERAPY: CPT

## 2023-08-25 PROCEDURE — 97535 SELF CARE MNGMENT TRAINING: CPT

## 2023-08-25 PROCEDURE — 99024 POSTOP FOLLOW-UP VISIT: CPT | Performed by: STUDENT IN AN ORGANIZED HEALTH CARE EDUCATION/TRAINING PROGRAM

## 2023-08-25 PROCEDURE — 99213 OFFICE O/P EST LOW 20 MIN: CPT | Performed by: INTERNAL MEDICINE

## 2023-08-25 PROCEDURE — 97530 THERAPEUTIC ACTIVITIES: CPT

## 2023-08-25 PROCEDURE — 85027 COMPLETE CBC AUTOMATED: CPT | Performed by: INTERNAL MEDICINE

## 2023-08-25 PROCEDURE — 97150 GROUP THERAPEUTIC PROCEDURES: CPT

## 2023-08-25 PROCEDURE — 80048 BASIC METABOLIC PNL TOTAL CA: CPT | Performed by: STUDENT IN AN ORGANIZED HEALTH CARE EDUCATION/TRAINING PROGRAM

## 2023-08-25 PROCEDURE — 25010000002 KETOROLAC TROMETHAMINE PER 15 MG: Performed by: STUDENT IN AN ORGANIZED HEALTH CARE EDUCATION/TRAINING PROGRAM

## 2023-08-25 RX ORDER — AMOXICILLIN 250 MG
2 CAPSULE ORAL 2 TIMES DAILY
Qty: 20 TABLET | Refills: 0 | Status: SHIPPED | OUTPATIENT
Start: 2023-08-25

## 2023-08-25 RX ORDER — ASPIRIN 325 MG
325 TABLET ORAL EVERY 12 HOURS SCHEDULED
Qty: 60 TABLET | Refills: 0 | Status: SHIPPED | OUTPATIENT
Start: 2023-08-25 | End: 2023-09-24

## 2023-08-25 RX ORDER — OXYCODONE HYDROCHLORIDE AND ACETAMINOPHEN 5; 325 MG/1; MG/1
1 TABLET ORAL EVERY 4 HOURS PRN
Qty: 40 TABLET | Refills: 0 | Status: SHIPPED | OUTPATIENT
Start: 2023-08-25

## 2023-08-25 RX ADMIN — OXYCODONE HYDROCHLORIDE AND ACETAMINOPHEN 1 TABLET: 5; 325 TABLET ORAL at 08:54

## 2023-08-25 RX ADMIN — VENLAFAXINE HYDROCHLORIDE 37.5 MG: 37.5 TABLET ORAL at 08:54

## 2023-08-25 RX ADMIN — ACETAMINOPHEN 1000 MG: 500 TABLET ORAL at 00:08

## 2023-08-25 RX ADMIN — ASPIRIN 325 MG: 325 TABLET ORAL at 08:54

## 2023-08-25 RX ADMIN — KETOROLAC TROMETHAMINE 15 MG: 30 INJECTION, SOLUTION INTRAMUSCULAR; INTRAVENOUS at 06:23

## 2023-08-25 RX ADMIN — FAMOTIDINE 40 MG: 20 TABLET ORAL at 08:54

## 2023-08-25 RX ADMIN — SENNOSIDES AND DOCUSATE SODIUM 2 TABLET: 50; 8.6 TABLET ORAL at 08:54

## 2023-08-25 RX ADMIN — KETOROLAC TROMETHAMINE 15 MG: 30 INJECTION, SOLUTION INTRAMUSCULAR; INTRAVENOUS at 00:08

## 2023-08-25 RX ADMIN — FERROUS SULFATE TAB 325 MG (65 MG ELEMENTAL FE) 325 MG: 325 (65 FE) TAB at 08:55

## 2023-08-25 RX ADMIN — CEFAZOLIN SODIUM 2000 MG: 2 INJECTION, SOLUTION INTRAVENOUS at 03:21

## 2023-08-25 NOTE — THERAPY TREATMENT NOTE
Acute Care - Physical Therapy Treatment Note  [facility]     Patient Name: Carolina Ayers  : 1955  MRN: [id number]  Today's Date: 2023 Gait- individual; ther- ex -group setting; 6 participants        Visit Dx:     ICD-10-CM ICD-9-CM   1. Difficulty in walking  R26.2 719.7   2. Primary osteoarthritis of right knee  M17.11 715.16     Patient Active Problem List   Diagnosis    Acute bilateral low back pain without sciatica    Acquired spondylolisthesis    Hypokalemia    Abnormal EKG    Preop cardiovascular exam    Essential hypertension    Localized edema    Orthostatic hypotension    Impaired functional mobility, balance, gait, and endurance    Post-operative state    Bilateral lower extremity edema    Precordial pain    Mixed hyperlipidemia    Multiple thyroid nodules    Thyroid nodule    Sensorineural hearing loss (SNHL) of both ears    Tinnitus of both ears    Post menopausal syndrome    Degenerative arthritis of knee, bilateral     Past Medical History:   Diagnosis Date    Anesthesia     Pt HARD TO WAKE DURING RECOVERY PER FAMILY REPORT    Anxiety and depression     Arthritis     Embolism and thrombosis 2023    LEFT LOWER EXTREMITY    Essential hypertension     History of chest pain     SEEN DR SIM/NO  ISSUES SINCE    History of lumbar fusion     History of orthostatic hypotension     SEEN BY DR. PRETTY-PT STATES NO RECENT ISSUES    History of skin cancer     REMOVED    HL (hearing loss)     Hyperlipidemia     Hypokalemia     Impaired functional mobility, balance, gait, and endurance     Localized edema     LEGS/ANKLES-TAKES DIURETIC    Lumbar herniated disc     Orthostatic hypotension     PONV (postoperative nausea and vomiting)     Restrictive lung disease     DR. ONEAL    Thyroid nodule 2022    MONITORED    Tinnitus     Varicose veins of both lower extremities      Past Surgical History:   Procedure Laterality Date    ADENOIDECTOMY      BREAST LUMPECTOMY Left     BENIGN    CARPAL  TUNNEL RELEASE      CHOLECYSTECTOMY      COLONOSCOPY  03/07/2017    Hyperplastic polyp, otherwise normal    EXTERNAL EAR SURGERY      cyst removed from ear    EYE SURGERY      HERNIA REPAIR      HYSTERECTOMY      partial    KNEE SURGERY      FRACTURE REPAIR    LUMBAR DISCECTOMY FUSION INSTRUMENTATION Bilateral 08/07/2018    Procedure: LUMBAR FUSION DECOMPRESSON WITH PEDICLE SCREWS Lumbar 4-5,  posterolateral fusion;  Surgeon: Jarrett Vasques IV, MD;  Location: [facility] MAIN OR;  Service: Neurosurgery    SHOULDER SURGERY Right     RCR    SUBTOTAL HYSTERECTOMY      TONSILLECTOMY      TOTAL KNEE ARTHROPLASTY Right 8/24/2023    Procedure: RIGHT TOTAL KNEE ARTHROPLASTY WITH ESTHER ROBOT.;  Surgeon: Shabbir Wood MD;  Location: [facility] MAIN OR;  Service: Robotics - Ortho;  Laterality: Right;    TUBAL ABDOMINAL LIGATION      US GUIDED FINE NEEDLE ASPIRATION  10/05/2020    WRIST SURGERY Bilateral     Cyst removed     PT Assessment (last 12 hours)       PT Evaluation and Treatment       Row Name 08/25/23 1410          Physical Therapy Time and Intention    Subjective Information no complaints  -     Document Type therapy note (daily note)  -     Mode of Treatment physical therapy;group therapy;individual therapy  -     Patient Effort good  -RH       Row Name 08/25/23 1410          Pain    Additional Documentation Pain Scale: FACES Pre/Post-Treatment (Group)  -RH       Row Name 08/25/23 1410          Pain Scale: FACES Pre/Post-Treatment    Pain: FACES Scale, Pretreatment 0-->no hurt  -     Posttreatment Pain Rating 0-->no hurt  -RH       Row Name 08/25/23 1410          Range of Motion (ROM)    Range of Motion --  Pt R knee AAROM at 95 degrees flex and 5 degrees ext.  -RH       Row Name 08/25/23 1410          Strength (Manual Muscle Testing)    Strength (Manual Muscle Testing) --  Pt R knee ext strength at 3-/5.  -RH       Row Name 08/25/23 1410          Transfers    Transfers sit-stand transfer;stand-sit transfer  -        Row Name 08/25/23 1410          Sit-Stand Transfer    Sit-Stand [place] (Transfers) contact guard  -RH     Assistive Device (Sit-Stand Transfers) walker, front-wheeled  -RH       Row Name 08/25/23 1410          Stand-Sit Transfer    Stand-Sit [place] (Transfers) contact guard  -RH     Assistive Device (Stand-Sit Transfers) walker, front-wheeled  -RH       Row Name 08/25/23 1410          Gait/Stairs (Locomotion)    Gait/Stairs Locomotion gait/ambulation independence;gait/ambulation assistive device;distance ambulated;gait pattern;gait deviations  -RH     [place] Level (Gait) contact guard  -RH     Assistive Device (Gait) walker, front-wheeled  -RH     Distance in Feet (Gait) 125  -RH     Pattern (Gait) --  Pt able to achieve and maintain reciprocal gait.  -     Gait Assessment/Intervention Pt amb with RW and CGA with reciprocal gait with decreased gait speed and stride length.  -       Row Name 08/25/23 1410          Balance    Dynamic Standing Balance contact guard  -     Position/Device Used, Standing Balance walker, front-wheeled  -RH       Row Name             Wound 08/24/23 1231 Right anterior knee Incision    Wound - Properties Group Placement Date: 08/24/23  -RL Placement Time: 1231  -RL Present on Hospital Admission: N  -RL Side: Right  -RL Orientation: anterior  -RL Location: knee  -RL Primary Wound Type: Incision  -RL    Retired Wound - Properties Group Placement Date: 08/24/23  -RL Placement Time: 1231  -RL Present on Hospital Admission: N  -RL Side: Right  -RL Orientation: anterior  -RL Location: knee  -RL Primary Wound Type: Incision  -RL    Retired Wound - Properties Group Date first assessed: 08/24/23  -RL Time first assessed: 1231  -RL Present on Hospital Admission: N  -RL Side: Right  -RL Location: knee  -RL Primary Wound Type: Incision  -RL      Row Name             Wound 08/24/23 1243 Right proximal leg Incision    Wound - Properties Group Placement Date: 08/24/23  -BW  Placement Time: 1243  -BW Present on Hospital Admission: N  -BW Side: Right  -BW Orientation: proximal  -BW Location: leg  -BW Primary Wound Type: Incision  -BW    Retired Wound - Properties Group Placement Date: 08/24/23  -BW Placement Time: 1243  -BW Present on Hospital Admission: N  -BW Side: Right  -BW Orientation: proximal  -BW Location: leg  -BW Primary Wound Type: Incision  -BW    Retired Wound - Properties Group Date first assessed: 08/24/23  -BW Time first assessed: 1243  -BW Present on Hospital Admission: N  -BW Side: Right  -BW Location: leg  -BW Primary Wound Type: Incision  -BW      Row Name 08/25/23 1410          Progress Summary (PT)    Progress Toward Functional Goals (PT) progress toward functional goals is good  -RH               User Key  (r) = Recorded By, (t) = Taken By, (c) = Cosigned By      Initials Name Provider Type    BW Benjamin Hanson, RN Registered Nurse    Carl Holland PTA Physical Therapist Assistant    RL Cherry Meyers RN Registered Nurse                   Right Knee Ther-ex   Exercise  Reps  Sets    Long arc Quads   10 2   Short arc Quads  10 2   Heel Slides  10 2   Ankle Pumps  10 2   Quad sets  10 2   Glut sets  10 2   Straight leg raise  10 2        Physical Therapy Education       Title: PT OT SLP Therapies (Resolved)       Topic: Physical Therapy (Resolved)       Point: Mobility training (Resolved)       Learning Progress Summary             Patient Acceptance, E, VU,NR by WINNIE at 8/25/2023 0207    Acceptance, E,TB, VU by FRANCO at 8/24/2023 1520                         Point: Home exercise program (Resolved)       Learner Progress:  Not documented in this visit.              Point: Body mechanics (Resolved)       Learner Progress:  Not documented in this visit.              Point: Precautions (Resolved)       Learning Progress Summary             Patient Acceptance, E, VU,NR by WINNIE at 8/25/2023 0207    Acceptance, E,TB, VU by FRANCO at 8/24/2023 1520                                          User Key       Initials Effective Dates Name Provider Type Discipline    RK 01/16/23 -  Veronica Hicks RN Registered Nurse Nurse    FRANCO 06/03/21 -  Tashi June, PT Physical Therapist PT                  PT Recommendation and Plan     Progress Summary (PT)  Progress Toward Functional Goals (PT): progress toward functional goals is good   Outcome Measures       Row Name 08/25/23 1400 08/24/23 1518          How much help from another person do you currently need...    Turning from your back to your side while in flat bed without using bedrails? 4  -RH 4  -FRANCO     Moving from lying on back to sitting on the side of a flat bed without bedrails? 4  -RH 3  -FRANCO     Moving to and from a bed to a chair (including a wheelchair)? 4  -RH 3  -FRANCO     Standing up from a chair using your arms (e.g., wheelchair, bedside chair)? 4  -RH 3  -FRANCO     Climbing 3-5 steps with a railing? 4  -RH 2  -FRANCO     To walk in hospital room? 4  -RH 3  -FRANCO     AM-PAC 6 Clicks Score (PT) 24  -RH 18  -FRANCO               User Key  (r) = Recorded By, (t) = Taken By, (c) = Cosigned By      Initials Name Provider Type    RH Carl Paul PTA Physical Therapist Assistant    Tashi Aguilar, PT Physical Therapist                     Time Calculation:    PT Charges       Row Name 08/25/23 1409             Time Calculation    PT Received On 08/25/23  -RH         Timed Charges    27954 - Gait Training Minutes  9  -RH      64600 - PT Therapeutic Activity Minutes 4  -RH         Untimed Charges    PT Group Therapy Minutes 30  -RH         Total Minutes    Timed Charges Total Minutes 13  -RH      Untimed Charges Total Minutes 30  -RH       Total Minutes 43  -RH                User Key  (r) = Recorded By, (t) = Taken By, (c) = Cosigned By      Initials Name Provider Type    RH Carl Paul PTA Physical Therapist Assistant                  Therapy Charges for Today       Code Description Service Date Service Provider Modifiers Qty    35772489335   GAIT TRAINING EA 15 MIN 8/25/2023 Carl Paul, TAYLOR GP 1    17418549945 HC PT THER PROC GROUP 8/25/2023 Carl Paul PTA GP 1            PT G-Codes  AM-PAC 6 Clicks Score (PT): 24    Carl Paul PTA  8/25/2023

## 2023-08-25 NOTE — DISCHARGE INSTRUCTIONS
Orthopedic instructions: You may bear weight as tolerated on the right lower extremity.  Follow the instructions provided by physical therapy for range of motion.  Use your walker at all times.  Take aspirin 325 mg twice daily.  Follow the dressing change instructions provided by the nursing staff.  Monitor for increasing redness, drainage, fevers, chills.  Call the office with any concerns.  2-week follow-up for reevaluation.

## 2023-08-25 NOTE — PLAN OF CARE
Goal Outcome Evaluation:  Plan of Care Reviewed With: patient        Progress: improving  Outcome Evaluation: Pt alert and able to make needs known. Pain controlled with scheduled APAP and Toradol. Pt urinating without difficulty. Pt one person assist with gait belt and rolling walker for transfers, ambulated 80+ feet-tolerated well. Pt plan to d/c home today with daughter to transport, has all DME needed, will be utilizing meds to bed for discharge medications and has outpatient therapy scheduled.

## 2023-08-25 NOTE — SIGNIFICANT NOTE
08/25/23 0757   Plan   Final Discharge Disposition Code 01 - home or self-care   Final Note Home with Outpatient Therapy at [facility]. Appt: 08/29 at 1pm EST.

## 2023-08-25 NOTE — PLAN OF CARE
Goal Outcome Evaluation:  Plan of Care Reviewed With: patient        Progress: no change  Outcome Evaluation: Patient presents with limitations in self-care, functional transfers, and balance. She would benefit from continued skilled occupational therapy services to maximize independence with ADLs/functional transfers.      Anticipated Discharge Disposition (OT): home with assist, home with outpatient therapy services

## 2023-08-25 NOTE — THERAPY EVALUATION
Patient Name: Carolina Ayers  : 1955    MRN: [id number]                              Today's Date: 2023       Admit Date: 2023    Visit Dx:     ICD-10-CM ICD-9-CM   1. Difficulty in walking  R26.2 719.7   2. Primary osteoarthritis of right knee  M17.11 715.16   3. Decreased activities of daily living (ADL)  Z78.9 V49.89     Patient Active Problem List   Diagnosis    Acute bilateral low back pain without sciatica    Acquired spondylolisthesis    Hypokalemia    Abnormal EKG    Preop cardiovascular exam    Essential hypertension    Localized edema    Orthostatic hypotension    Impaired functional mobility, balance, gait, and endurance    Post-operative state    Bilateral lower extremity edema    Precordial pain    Mixed hyperlipidemia    Multiple thyroid nodules    Thyroid nodule    Sensorineural hearing loss (SNHL) of both ears    Tinnitus of both ears    Post menopausal syndrome    Degenerative arthritis of knee, bilateral     Past Medical History:   Diagnosis Date    Anesthesia     Pt HARD TO WAKE DURING RECOVERY PER FAMILY REPORT    Anxiety and depression     Arthritis     Embolism and thrombosis 2023    LEFT LOWER EXTREMITY    Essential hypertension     History of chest pain     SEEN DR SIM/NO  ISSUES SINCE    History of lumbar fusion     History of orthostatic hypotension     SEEN BY DR. PRETTY-PT STATES NO RECENT ISSUES    History of skin cancer     REMOVED    HL (hearing loss)     Hyperlipidemia     Hypokalemia     Impaired functional mobility, balance, gait, and endurance     Localized edema     LEGS/ANKLES-TAKES DIURETIC    Lumbar herniated disc     Orthostatic hypotension     PONV (postoperative nausea and vomiting)     Restrictive lung disease     DR. ONEAL    Thyroid nodule 2022    MONITORED    Tinnitus     Varicose veins of both lower extremities      Past Surgical History:   Procedure Laterality Date    ADENOIDECTOMY      BREAST LUMPECTOMY Left     BENIGN    CARPAL  TUNNEL RELEASE      CHOLECYSTECTOMY      COLONOSCOPY  03/07/2017    Hyperplastic polyp, otherwise normal    EXTERNAL EAR SURGERY      cyst removed from ear    EYE SURGERY      HERNIA REPAIR      HYSTERECTOMY      partial    KNEE SURGERY      FRACTURE REPAIR    LUMBAR DISCECTOMY FUSION INSTRUMENTATION Bilateral 08/07/2018    Procedure: LUMBAR FUSION DECOMPRESSON WITH PEDICLE SCREWS Lumbar 4-5,  posterolateral fusion;  Surgeon: Jarrett Vasques IV, MD;  Location: [facility] MAIN OR;  Service: Neurosurgery    SHOULDER SURGERY Right     RCR    SUBTOTAL HYSTERECTOMY      TONSILLECTOMY      TOTAL KNEE ARTHROPLASTY Right 8/24/2023    Procedure: RIGHT TOTAL KNEE ARTHROPLASTY WITH JAYSHREE ROBOT.;  Surgeon: Shabbir Wood MD;  Location: [facility] MAIN OR;  Service: Robotics - Ortho;  Laterality: Right;    TUBAL ABDOMINAL LIGATION      US GUIDED FINE NEEDLE ASPIRATION  10/05/2020    WRIST SURGERY Bilateral     Cyst removed      General Information       Row Name 08/25/23 1423 08/25/23 1414       OT Time and Intention    Document Type therapy note (daily note)  -LF evaluation  -LF    Mode of Treatment individual therapy;occupational therapy  -LF individual therapy;occupational therapy  -LF      Row Name 08/25/23 1414          General Information    Patient Profile Reviewed yes  -LF     Prior Level of Function --  (I) with ADLs, ambulated w/o a device, has a walk-in shower with built in bench/grab bars/handheld shower head, elevated commode, stands to groom, drives, and no home O2.  -LF     Existing Precautions/Restrictions fall;weight bearing  WBAT RLE  -LF     Barriers to Rehab none identified  -LF       Row Name 08/25/23 1414          Occupational Profile    Reason for Services/Referral (Occupational Profile) Patient is a 68 year old female who is currently status post right total knee replacement using jayshree robot on August 24th, 2023. Occupational therapy consulted due to recent decline in ADLs/functional transfers. No previous  occupational therapy services for current condition.  -LF       Row Name 08/25/23 1414          Living Environment    People in Home spouse;child(ravi), adult  -LF       Row Name 08/25/23 1414          Home Main Entrance    Number of Stairs, Main Entrance one  -LF     Stair Railings, Main Entrance none  -LF       Row Name 08/25/23 1414          Cognition    Orientation Status (Cognition) oriented x 4  -       Row Name 08/25/23 1414          Safety Issues, Functional Mobility    Impairments Affecting Function (Mobility) balance;pain  -LF               User Key  (r) = Recorded By, (t) = Taken By, (c) = Cosigned By      Initials Name Provider Type    LF Jaylin Longo OT Occupational Therapist                     Mobility/ADL's       Row Name 08/25/23 1423 08/25/23 1416       Bed Mobility    Comment, (Bed Mobility) Patient upright and seated in recliner upon therapist arrival.  -LF Patient upright and seated in recliner upon therapist arrival.  -LF      Row Name 08/25/23 1423 08/25/23 1416       Transfers    Transfers sit-stand transfer;stand-sit transfer  -LF sit-stand transfer;stand-sit transfer  -LF      Row Name 08/25/23 1423 08/25/23 1416       Sit-Stand Transfer    Sit-Stand [place] (Transfers) contact guard  -LF contact guard  -LF    Assistive Device (Sit-Stand Transfers) walker, front-wheeled  -LF walker, front-wheeled  -LF      Row Name 08/25/23 1423 08/25/23 1416       Stand-Sit Transfer    Stand-Sit [place] (Transfers) contact guard  -LF contact guard  -LF    Assistive Device (Stand-Sit Transfers) walker, front-wheeled  -LF walker, front-wheeled  -LF      Row Name 08/25/23 1423 08/25/23 1416       Functional Mobility    Functional Mobility- Ind. Level contact guard assist  -LF contact guard assist  -LF    Functional Mobility- Device walker, front-wheeled  -LF walker, front-wheeled  -LF    Functional Mobility- Comment Patient completed functional mobility from recliner, to bathroom in  preparation for toileting, to the sink for grooming, and back with CGA using RW requiring min cues for proper technique/safety.  -LF Patient completed functional mobility from recliner, to bathroom in preparation for toileting, to the sink for grooming, and back with CGA using RW requiring min cues for proper technique/safety.  -      Row Name 08/25/23 1423 08/25/23 1416       Activities of Daily Living    BADL Assessment/Intervention bathing;upper body dressing;lower body dressing;grooming;toileting  -LF bathing;upper body dressing;lower body dressing;grooming;feeding;toileting  -      Row Name 08/25/23 1423 08/25/23 1416       Mobility    Extremity Weight-bearing Status right lower extremity  -LF right lower extremity  -LF    Right Lower Extremity (Weight-bearing Status) weight-bearing as tolerated (WBAT)  -LF weight-bearing as tolerated (WBAT)  -      Row Name 08/25/23 1423 08/25/23 1416       Bathing Assessment/Intervention    [place] Level (Bathing) bathing skills;upper body;set up;lower body;minimum assist (75% patient effort)  -LF bathing skills;upper body;set up;lower body;minimum assist (75% patient effort)  -LF    Position (Bathing) unsupported sitting;supported standing  -LF --      Row Name 08/25/23 1423 08/25/23 1416       Upper Body Dressing Assessment/Training    [place] Level (Upper Body Dressing) upper body dressing skills;don;bra/undergarment;pull-over garment;set up  -LF upper body dressing skills;set up  -LF    Position (Upper Body Dressing) unsupported sitting  -LF --      Row Name 08/25/23 1423 08/25/23 1416       Lower Body Dressing Assessment/Training    [place] Level (Lower Body Dressing) lower body dressing skills;don;pants/bottoms;socks;shoes/slippers;minimum assist (75% patient effort)  -LF lower body dressing skills;minimum assist (75% patient effort)  -LF    Position (Lower Body Dressing) unsupported sitting;supported standing  -LF --    Comment, (Lower Body  Dressing) Educated patient on lower body adaptive dressing technique, verified learning via teachback.  -LF --      Row Name 08/25/23 1423 08/25/23 1416       Grooming Assessment/Training    [place] Level (Grooming) grooming skills;hair care, combing/brushing;wash face, hands;oral care regimen;set up  -LF grooming skills;set up  -LF    Position (Grooming) supported standing;sink side;unsupported sitting  for oral care  -LF --      Row Name 08/25/23 1416          Self-Feeding Assessment/Training    [place] Level (Feeding) feeding skills;set up  -LF       Row Name 08/25/23 1423 08/25/23 1416       Toileting Assessment/Training    [place] Level (Toileting) toileting skills;adjust/manage clothing;change pad/brief;perform perineal hygiene;contact guard assist  -LF toileting skills;contact guard assist  -LF    Position (Toileting) unsupported sitting;supported standing  -LF --              User Key  (r) = Recorded By, (t) = Taken By, (c) = Cosigned By      Initials Name Provider Type    LF Jaylin Longo OT Occupational Therapist                   Obj/Interventions       Row Name 08/25/23 1418          Sensory Assessment (Somatosensory)    Sensory Assessment (Somatosensory) UE sensation intact  -LF       Row Name 08/25/23 1418          Vision Assessment/Intervention    Visual Impairment/Limitations WFL  -LF       Row Name 08/25/23 1418          Range of Motion Comprehensive    General Range of Motion bilateral upper extremity ROM WFL  -       Row Name 08/25/23 1418          Motor Skills    Motor Skills coordination;functional endurance  -LF     Coordination WFL  -     Functional Endurance Good for BADLs  -LF       Row Name 08/25/23 1424 08/25/23 1418       Balance    Balance Assessment sitting dynamic balance;standing dynamic balance  -LF sitting dynamic balance;standing dynamic balance  -LF    Dynamic Sitting Balance independent  -LF independent  -LF    Position, Sitting Balance unsupported;sitting  in chair  -LF unsupported  -LF    Dynamic Standing Balance contact guard  -LF contact guard  -LF    Position/Device Used, Standing Balance supported;walker, front-wheeled  -LF supported;walker, front-wheeled  -LF    Balance Interventions sitting;standing;sit to stand;supported;dynamic;occupation based/functional task;weight shifting activity  -LF --              User Key  (r) = Recorded By, (t) = Taken By, (c) = Cosigned By      Initials Name Provider Type     Jaylin Longo, OT Occupational Therapist                   Goals/Plan       Row Name 08/25/23 1421          Bed Mobility Goal 1 (OT)    Activity/Assistive Device (Bed Mobility Goal 1, OT) bed mobility activities, all  -LF     [place] Level/Cues Needed (Bed Mobility Goal 1, OT) modified independence  -LF     Time Frame (Bed Mobility Goal 1, OT) long term goal (LTG);10 days  -LF       Row Name 08/25/23 1421          Transfer Goal 1 (OT)    Activity/Assistive Device (Transfer Goal 1, OT) transfers, all;walker, rolling  -LF     [place] Level/Cues Needed (Transfer Goal 1, OT) modified independence  -LF     Time Frame (Transfer Goal 1, OT) long term goal (LTG);10 days  -LF       Row Name 08/25/23 1421          Bathing Goal 1 (OT)    Activity/Device (Bathing Goal 1, OT) bathing skills, all  -LF     [place] Level/Cues Needed (Bathing Goal 1, OT) modified independence  -LF     Time Frame (Bathing Goal 1, OT) long term goal (LTG);10 days  -LF       Row Name 08/25/23 1421          Dressing Goal 1 (OT)    Activity/Device (Dressing Goal 1, OT) dressing skills, all  -LF     [place]/Cues Needed (Dressing Goal 1, OT) modified independence  -LF     Time Frame (Dressing Goal 1, OT) long term goal (LTG);10 days  -LF       Row Name 08/25/23 1421          Toileting Goal 1 (OT)    Activity/Device (Toileting Goal 1, OT) toileting skills, all  -LF     [place] Level/Cues Needed (Toileting Goal 1, OT) modified independence  -LF     Time Frame (Toileting  Goal 1, OT) long term goal (LTG);10 days  -       Row Name 08/25/23 1421          Therapy Assessment/Plan (OT)    Planned Therapy Interventions (OT) activity tolerance training;patient/caregiver education/training;BADL retraining;functional balance retraining;occupation/activity based interventions;transfer/mobility retraining  -               User Key  (r) = Recorded By, (t) = Taken By, (c) = Cosigned By      Initials Name Provider Type     Jaylin Longo, OUMAR Occupational Therapist                   Clinical Impression       Row Name 08/25/23 1420          Pain Assessment    Additional Documentation Pain Scale: FACES Pre/Post-Treatment (Group)  -       Row Name 08/25/23 1420          Pain Scale: FACES Pre/Post-Treatment    Pain: FACES Scale, Pretreatment 2-->hurts little bit  -LF     Posttreatment Pain Rating 2-->hurts little bit  -LF     Pain Location - Side/Orientation Right  -     Pain Location - knee  -       Row Name 08/25/23 1420          Plan of Care Review    Plan of Care Reviewed With patient  -     Progress no change  -     Outcome Evaluation Patient presents with limitations in self-care, functional transfers, and balance. She would benefit from continued skilled occupational therapy services to maximize independence with ADLs/functional transfers.  -       Row Name 08/25/23 1420          Therapy Assessment/Plan (OT)    Patient/Family Therapy Goal Statement (OT) To not hear the grinding of her knee.  -     Rehab Potential (OT) good, to achieve stated therapy goals  -     Criteria for Skilled Therapeutic Interventions Met (OT) yes;meets criteria;skilled treatment is necessary  -     Therapy Frequency (OT) 5 times/wk  -       Row Name 08/25/23 1420          Therapy Plan Review/Discharge Plan (OT)    Equipment Needs Upon Discharge (OT) walker, rolling  -     Anticipated Discharge Disposition (OT) home with assist;home with outpatient therapy services  -       Row Name  08/25/23 1420          Vital Signs    O2 Delivery Pre Treatment room air  -LF     O2 Delivery Intra Treatment room air  -LF     O2 Delivery Post Treatment room air  -LF               User Key  (r) = Recorded By, (t) = Taken By, (c) = Cosigned By      Initials Name Provider Type    LF Jaylin Longo OT Occupational Therapist                   Outcome Measures       Row Name 08/25/23 1421          How much help from another is currently needed...    Putting on and taking off regular lower body clothing? 3  -LF     Bathing (including washing, rinsing, and drying) 3  -LF     Toileting (which includes using toilet bed pan or urinal) 3  -LF     Putting on and taking off regular upper body clothing 4  -LF     Taking care of personal grooming (such as brushing teeth) 4  -LF     Eating meals 4  -LF     AM-PAC 6 Clicks Score (OT) 21  -LF       Row Name 08/25/23 1400 08/25/23 0720       How much help from another person do you currently need...    Turning from your back to your side while in flat bed without using bedrails? 4  -RH 4  -MS    Moving from lying on back to sitting on the side of a flat bed without bedrails? 4  -RH 4  -MS    Moving to and from a bed to a chair (including a wheelchair)? 4  -RH 3  -MS    Standing up from a chair using your arms (e.g., wheelchair, bedside chair)? 4  -RH 4  -MS    Climbing 3-5 steps with a railing? 4  -RH 3  -MS    To walk in hospital room? 4  -RH 3  -MS    AM-PAC 6 Clicks Score (PT) 24  -RH 21  -MS    Highest level of mobility 8 --> Walked 250 feet or more  -RH 6 --> Walked 10 steps or more  -MS      Row Name 08/25/23 1421          Functional Assessment    Outcome Measure Options AM-PAC 6 Clicks Daily Activity (OT);Optimal Instrument  -LF       Row Name 08/25/23 1421          Optimal Instrument    Optimal Instrument Optimal - 3  -LF     Bending/Stooping 2  -LF     Standing 2  -LF     Reaching 1  -LF     From the list, choose the 3 activities you would most like to be able to do  without any difficulty Bending/stooping;Standing;Reaching  -     Total Score Optimal - 3 5  -LF               User Key  (r) = Recorded By, (t) = Taken By, (c) = Cosigned By      Initials Name Provider Type    Elayne Luciano, RN Registered Nurse    Carl Holland, TAYLOR Physical Therapist Assistant     Jaylin Longo OT Occupational Therapist                    Occupational Therapy Education       Title: PT OT SLP Therapies (Done)       Topic: Occupational Therapy (Done)       Point: ADL training (Done)       Description:   Instruct learner(s) on proper safety adaptation and remediation techniques during self care or transfers.   Instruct in proper use of assistive devices.                  Learning Progress Summary             Patient Acceptance, E,TB, VU by  at 8/25/2023 1422                         Point: Precautions (Done)       Description:   Instruct learner(s) on prescribed precautions during self-care and functional transfers.                  Learning Progress Summary             Patient Acceptance, E,TB, VU by  at 8/25/2023 1422                         Point: Body mechanics (Done)       Description:   Instruct learner(s) on proper positioning and spine alignment during self-care, functional mobility activities and/or exercises.                  Learning Progress Summary             Patient Acceptance, E,TB, VU by  at 8/25/2023 1422                                         User Key       Initials Effective Dates Name Provider Type [facility] 06/16/21 -  Jaylin Longo, OUMAR Occupational Therapist OT                  OT Recommendation and Plan  Planned Therapy Interventions (OT): activity tolerance training, patient/caregiver education/training, BADL retraining, functional balance retraining, occupation/activity based interventions, transfer/mobility retraining  Therapy Frequency (OT): 5 times/wk  Plan of Care Review  Plan of Care Reviewed With: patient  Progress: no change  Outcome  Evaluation: Patient presents with limitations in self-care, functional transfers, and balance. She would benefit from continued skilled occupational therapy services to maximize independence with ADLs/functional transfers.     Time Calculation:   Evaluation Complexity (OT)  Review Occupational Profile/Medical/Therapy History Complexity: brief/low complexity  Assessment, Occupational Performance/Identification of Deficit Complexity: 1-3 performance deficits  Clinical Decision Making Complexity (OT): problem focused assessment/low complexity  Overall Complexity of Evaluation (OT): low complexity     Time Calculation- OT       Row Name 08/25/23 1423 08/25/23 1409          Time Calculation- OT    OT Received On 08/25/23  -LF --     OT Goal Re-Cert Due Date 09/03/23  -LF --        Timed Charges    07923 - Gait Training Minutes  -- 9  -RH     55885 - OT Therapeutic Activity Minutes 10  -LF --     39939 - OT Self Care/Mgmt Minutes 17  -LF --        Untimed Charges    OT Eval/Re-eval Minutes 20  -LF --        Total Minutes    Timed Charges Total Minutes 27  -LF 9  -RH     Untimed Charges Total Minutes 20  -LF --      Total Minutes 47  -LF 9  -RH               User Key  (r) = Recorded By, (t) = Taken By, (c) = Cosigned By      Initials Name Provider Type    RH Carl Paul, PTA Physical Therapist Assistant     Jaylin Longo OT Occupational Therapist                  Therapy Charges for Today       Code Description Service Date Service Provider Modifiers Qty    81752946776 HC OT THERAPEUTIC ACT EA 15 MIN 8/25/2023 Jaylin Longo OT GO 1    57774379130 HC OT SELF CARE/MGMT/TRAIN EA 15 MIN 8/25/2023 Jaylin Longo OT GO 1    06477493024 HC OT EVAL LOW COMPLEXITY 2 8/25/2023 Jaylin Longo OT GO 1                 Jaylin Longo OT  8/25/2023

## 2023-08-25 NOTE — PROGRESS NOTES
[facility]   Hospitalist Progress Note  Date: 2023  Patient Name: Carolina Ayers  : 1955  MRN: [id number]  Date of admission: 2023  Room/Bed: [facility]      Subjective   Subjective     Chief Complaint: Medical management     Summary:Carolina Ayers is a 68 y.o. female  with multiple medical problems which include anxiety, depression, history of left lower extremity DVT, hypertension, hyperlipidemia who presented for an elective right knee replacement secondary to osteoarthritis.  Hospital service was asked to manage patient's multiple medical problems as listed above.  Patient is still very sleepy from surgery.  Patient's family at the bedside and states that it does take her a while to wake up from anesthesia.  Patient has some nausea but without any vomiting.  Patient denies any pain.     Interval Followup:   Patient has done well after knee replacement.  Patient denies any nausea and vomiting.  Patient's vitals are stable.  Patient is anxious to discharge home    Review of Systems    All systems reviewed and negative except for what is outlined above.      Objective   Objective     Vitals:   Temp:  [97.3 °F (36.3 °C)-98.2 °F (36.8 °C)] 97.9 °F (36.6 °C)  Heart Rate:  [] 70  Resp:  [14-18] 18  BP: (111-136)/(45-58) 120/58  Flow (L/min):  [2-6] 2  FiO2 (%):  [50 %] 50 %    Physical Exam   General: Awake, alert, NAD. Resting in bedside chair   HENT: NCAT, MMM  Eyes: pupils equal, no scleral icterus  Cardiovascular: RRR, no murmurs   Pulmonary: CTA bilaterally; no wheezes; no conversational dyspnea  Gastrointestinal: S/ND/NT +BS  Musculoskeletal: No gross deformities  Skin: No jaundice, no rash on exposed skin appreciated. Incision is clean dry and intact   Neuro: CN II through XII grossly intact; speech clear; no tremor  Psych: Mood and affect appropriate  : No Alas catheter; no suprapubic tenderness    Result Review    Result Review:  I have personally reviewed these results:  [x]   Laboratory      Lab 08/25/23  0419   WBC 12.88*   HEMOGLOBIN 11.3*   HEMATOCRIT 35.3   PLATELETS 260   MCV 99.2*         Lab 08/25/23  0419   SODIUM 135*   POTASSIUM 4.8   CHLORIDE 101   CO2 27.2   ANION GAP 6.8   BUN 14   CREATININE 0.88   EGFR 71.7   GLUCOSE 139*   CALCIUM 9.3                         Brief Urine Lab Results  (Last result in the past 365 days)        Color   Clarity   Blood   Leuk Est   Nitrite   Protein   CREAT   Urine HCG        05/22/23 1038 Yellow   Clear   Negative   Negative   Negative   Negative                 [x]  Microbiology   Microbiology Results (last 10 days)       ** No results found for the last 240 hours. **          [x]  Radiology  XR Knee 1 or 2 View Right    Result Date: 8/24/2023    1. Expected postoperative changes from total knee arthroplasty with patellar resurfacing      JONO GILBERT MD       Electronically Signed and Approved By: JONO GILBERT MD on 8/24/2023 at 14:09            []  EKG/Telemetry   []  Cardiology/Vascular   []  Pathology  []  Old records  []  Other:    Assessment & Plan   Assessment / Plan     Assessment:  Hypertension  HLD  Hyperkalemia  Anxiety and depression   S/p right knee replacement     PLAN  -- I have recommended to discontinue her Cozaar given her elevation of potassium on admission  -- Patient can continue all other home medication.  Patient probably should continue her 10 mg of oral potassium if she is on her daily Lasix  -- Patient is stable for discharge home.  Patient will need follow-up with orthopedic as scheduled.  Follow-up with primary care within 1 week 2.  Patient should have her renal panel rechecked within 1 week by primary care.         Discussed with RN.    DVT prophylaxis:  Mechanical DVT prophylaxis orders are present.    CODE STATUS:        Electronically signed by Cam Siegel DO, 08/25/23, 12:21 PM EDT.

## 2023-08-25 NOTE — NURSING NOTE
Pt has had no new changes throughout shift and continues to remain stable. Pt had right TKA performed with the jayshree robot by Dr. Wood. Pt has had complaints of pain that has been controlled with PRN medication. Pt has been medicated x1. Pt is x1 assist to bathroom. Pt has voided without issue. Pt plans to DC home this afternoon with daughter as her ride. Pt will use meds to bed and will be doing outpatient therapy.

## 2023-08-30 ENCOUNTER — TELEPHONE (OUTPATIENT)
Dept: ORTHOPEDIC SURGERY | Facility: CLINIC | Age: 68
End: 2023-08-30
Payer: MEDICARE

## 2023-08-30 NOTE — TELEPHONE ENCOUNTER
PATIENT STATES THE KNEE IS VERY SWOLLEN TODAY AND WANTED TO KNOW IF THAT IS NORMAL. SHE DENIES CALF PAIN, WARMTH, INCREASED REDNESS OR FEVER. SHE STATES SHE IS HAVING DIFFICULTY DOING HER EXERCISES DUE TO SWELLING, BUT WENT TO PT YESTERDAY. SHE HAS NOT BEEN WEARING HER SHANTANU HOSE. ADVISED PATIENT TO GO BACK TO WEARING THE SHANTANU HOSE AND TO FOCUS ON GENTLE ROM AND ICING AND ELEVATING FOR THE REMAINED OF TODAY TO SEE IF SHE CAN GET THE SWELLING UNDER CONTROL PATIENT VOICED UNDERSTANDING AND WILL CALL BACK IN THE MORNING IF SWELLING IS NO BETTER.

## 2023-09-05 DIAGNOSIS — N95.1 POST MENOPAUSAL SYNDROME: ICD-10-CM

## 2023-09-05 RX ORDER — VENLAFAXINE 37.5 MG/1
37.5 TABLET ORAL 2 TIMES DAILY
Qty: 180 TABLET | Refills: 1 | Status: SHIPPED | OUTPATIENT
Start: 2023-09-05

## 2023-09-05 NOTE — TELEPHONE ENCOUNTER
Noted.  I have sent a refill on venlafaxine.  Her potassium level was in the normal range on 8/25/2023 when it was 4.8.  I would not recommend any follow-up labs in the near term.  She does not need a potassium supplement though.  If she is on one, she should be able to stop it.  Let me know if she has other concerns.  Thanks.

## 2023-09-05 NOTE — TELEPHONE ENCOUNTER
Please reach out to her and see how she is doing.  When we last spoke, we had some discussion about whether to continue this medication or possibly consider resuming estradiol at a low dose.  Thanks.

## 2023-09-05 NOTE — TELEPHONE ENCOUNTER
Pt states she is doing pretty good on the medication, so she would like to continue it. She also states she had a knee replacement a couple weeks ago and they noted her potassium was high. Would like to know if you want to order lab to recheck it. Please advise.

## 2023-09-11 ENCOUNTER — OFFICE VISIT (OUTPATIENT)
Dept: ORTHOPEDIC SURGERY | Facility: CLINIC | Age: 68
End: 2023-09-11
Payer: MEDICARE

## 2023-09-11 VITALS
HEART RATE: 68 BPM | BODY MASS INDEX: 33.13 KG/M2 | WEIGHT: 180 LBS | OXYGEN SATURATION: 95 % | SYSTOLIC BLOOD PRESSURE: 158 MMHG | DIASTOLIC BLOOD PRESSURE: 78 MMHG | HEIGHT: 62 IN

## 2023-09-11 DIAGNOSIS — Z96.651 AFTERCARE FOLLOWING RIGHT KNEE JOINT REPLACEMENT SURGERY: Primary | ICD-10-CM

## 2023-09-11 DIAGNOSIS — Z47.1 AFTERCARE FOLLOWING RIGHT KNEE JOINT REPLACEMENT SURGERY: Primary | ICD-10-CM

## 2023-09-11 PROCEDURE — 99024 POSTOP FOLLOW-UP VISIT: CPT | Performed by: PHYSICIAN ASSISTANT

## 2023-09-11 PROCEDURE — 3078F DIAST BP <80 MM HG: CPT | Performed by: PHYSICIAN ASSISTANT

## 2023-09-11 PROCEDURE — 3077F SYST BP >= 140 MM HG: CPT | Performed by: PHYSICIAN ASSISTANT

## 2023-09-11 NOTE — PROGRESS NOTES
Chief Complaint  Follow-up and Pain of the Right Knee    Subjective          Carolina Ayers presents to [facility]   History of Present Illness     Carolina Ayers presents today for a follow up of her right knee. Patient is 2 weeks post op right total knee arthroplasty with Vidya Robot performed by Dr. Wood on 2023. Today, patient states that she is doing great.  She has been doing her home exercises as well as attending PTA.  PT reports full knee extension and knee flexion of 114.  Patient states that she is not take any pain medications and reports no pain.  She reports good range of motion and improving strength.  Denies complications, discomfort or drainage from her incision sites.  Denies fever or chills.  Denies swelling.  Denies lower extremity numbness or tingling.  She is currently taking aspirin for DVT prophylaxis.      Allergies   Allergen Reactions    Lisinopril Cough    Penicillins Hives        Social History     Socioeconomic History    Marital status:      Spouse name: Ismael    Number of children: 2   Tobacco Use    Smoking status: Former     Packs/day: 1.00     Years: 30.00     Pack years: 30.00     Types: Cigarettes     Quit date: 2010     Years since quittin.7    Smokeless tobacco: Never    Tobacco comments:     NO CAFFEINE USE   Vaping Use    Vaping Use: Never used   Substance and Sexual Activity    Alcohol use: Not Currently     Comment: rarely- 2-3 times a year    Drug use: No    Sexual activity: Defer        I reviewed the patient's chief complaint, history of present illness, review of systems, past medical history, surgical history, family history, social history, medications, and allergy list.     REVIEW OF SYSTEMS    Constitutional: Denies fevers, chills, weight loss  Cardiovascular: Denies chest pain, shortness of breath  Skin: Denies rashes, acute skin changes  Neurologic: Denies headache, loss of consciousness  MSK: Right knee  "pain      Objective   Vital Signs:   /78   Pulse 68   Ht 157.5 cm (62\")   Wt 81.6 kg (180 lb)   SpO2 95%   BMI 32.92 kg/m²     Body mass index is 32.92 kg/m².    Physical Exam    General: Alert, no acute distress  Right lower extremity: Staples and sutures removed today without complications.  Anterior knee incision is healing appropriately.  2 distal incisions are well-healing.  No active drainage or redness noted.  No concerning signs for infection.  No knee effusion. Knee extensor mechanism intact.  No pain on passive hip range of motion.  110 degrees flexion.  Full knee extension. Knee stable to varus and valgus stress. Calf soft, nontender. Demonstrates active ankle plantarflexion and dorsiflexion. Sensation intact over the dorsal and plantar foot. Palpable pedal pulses.       Procedures    Imaging Results (Most Recent)       Procedure Component Value Units Date/Time    XR Knee 3 View Right [[id number]] Resulted: 09/11/23 1035     Updated: 09/11/23 1037    Narrative:      Indications: Follow-up right total knee arthroplasty    Views: AP, lateral, sunrise view right knee    Findings: Cemented, cruciate retaining right total knee arthroplasty   implants in place.  Implant positioning is stable compared to immediate   postoperative films.  No hardware loosening or complications.  Patella   tracks centrally on sunrise view.  No periprosthetic fractures.    Comparative Data: Comparative data found and reviewed today.                     Assessment and Plan    Diagnoses and all orders for this visit:    1. Aftercare following right knee joint replacement surgery (Primary)  -     XR Knee 3 View Right         Carolina Ayers presents today 2 weeks post op right total knee arthroplasty with Vidya Robot performed by Dr. Wood on 8/24/2023. X-rays were reviewed with the patient today. Staples and sutures removed today without complications. Incisions are well healing without active drainage or redness noted. No " concerning signs of infection. Patient denies fever or chills. Incision site care was reviewed today. Patient instructed not to soak or submerge incisions. Do not apply any lotions, creams, or ointments to the incisions at this time.  We discussed concerning signs and symptoms regarding the incision sites.  Patient understood and agreed. Patient instructed to continue with formal physical therapy at [facility] as well as home exercises. We discussed the importance of these exercises to improve range of motion. Patient understood and agreed.  We discussed swelling management with ice, elevation, and compressive wraps as needed. Patient will continue aspirin for DVT prophylaxis until 4 weeks post operative.      Patient will follow-up in 4 weeks for reevaluation.  We will obtain new x-rays of the right knee at next visit.      Call or return if symptoms worsen or patient has any concerns.       Follow Up   Return in about 4 weeks (around 10/9/2023).  Patient was given instructions and counseling regarding her condition or for health maintenance advice. Please see specific information pulled into the AVS if appropriate.     Cheyanne Zhou PA-C  09/11/23  12:26 EDT

## 2023-09-12 RX ORDER — VENLAFAXINE HYDROCHLORIDE 37.5 MG/1
37.5 CAPSULE, EXTENDED RELEASE ORAL DAILY
Qty: 30 CAPSULE | Refills: 1 | OUTPATIENT
Start: 2023-09-12

## 2023-09-20 ENCOUNTER — OFFICE VISIT (OUTPATIENT)
Dept: FAMILY MEDICINE CLINIC | Age: 68
End: 2023-09-20
Payer: MEDICARE

## 2023-09-20 VITALS
OXYGEN SATURATION: 93 % | HEIGHT: 62 IN | WEIGHT: 179.2 LBS | HEART RATE: 93 BPM | SYSTOLIC BLOOD PRESSURE: 155 MMHG | TEMPERATURE: 99.4 F | DIASTOLIC BLOOD PRESSURE: 109 MMHG | BODY MASS INDEX: 32.97 KG/M2

## 2023-09-20 DIAGNOSIS — U07.1 COVID-19: Primary | ICD-10-CM

## 2023-09-20 DIAGNOSIS — R09.81 NASAL CONGESTION: ICD-10-CM

## 2023-09-20 LAB
EXPIRATION DATE: ABNORMAL
INTERNAL CONTROL: ABNORMAL
Lab: ABNORMAL
SARS-COV-2 AG UPPER RESP QL IA.RAPID: DETECTED

## 2023-09-20 RX ORDER — BENZONATATE 100 MG/1
100 CAPSULE ORAL 3 TIMES DAILY PRN
Qty: 30 CAPSULE | Refills: 0 | Status: SHIPPED | OUTPATIENT
Start: 2023-09-20

## 2023-09-20 RX ORDER — BENZONATATE 100 MG/1
100 CAPSULE ORAL 3 TIMES DAILY PRN
Qty: 30 CAPSULE | Refills: 0 | Status: SHIPPED | OUTPATIENT
Start: 2023-09-20 | End: 2023-09-20 | Stop reason: SDUPTHER

## 2023-09-20 NOTE — PROGRESS NOTES
"Chief Complaint  Covid Positive (Cough, runny nose, fever, chills./Pt tested positive for Covid on an at home test last night./)    Subjective          Carolina Ayers presents to [facility]  History of Present Illness  Her symptoms started Monday. She tested positive for COVID. Her  was positive for covid.  URI   This is a new problem. The current episode started in the past 7 days. The problem has been gradually worsening. Maximum temperature: 100.3. Associated symptoms include congestion, coughing (productive- sputum), headaches, rhinorrhea, sinus pain, sneezing (\"not much\"), a sore throat and wheezing. Pertinent negatives include no diarrhea, ear pain, nausea, plugged ear sensation, swollen glands or vomiting. She has tried NSAIDs for the symptoms. The treatment provided no relief.     Objective   Vital Signs:   BP (!) 155/109 (BP Location: Left arm, Patient Position: Sitting)   Pulse 93   Temp 99.4 °F (37.4 °C) (Oral)   Ht 157.5 cm (62\")   Wt 81.3 kg (179 lb 3.2 oz)   SpO2 93% Comment: room air  BMI 32.78 kg/m²     Physical Exam  Constitutional:       Appearance: Normal appearance. She is obese.   HENT:      Head: Normocephalic.      Right Ear: Ear canal and external ear normal. A middle ear effusion is present.      Left Ear: Ear canal and external ear normal. A middle ear effusion is present.      Nose: Nose normal.      Right Sinus: No maxillary sinus tenderness or frontal sinus tenderness.      Left Sinus: No maxillary sinus tenderness or frontal sinus tenderness.      Mouth/Throat:      Mouth: Mucous membranes are moist.      Pharynx: Oropharynx is clear. No oropharyngeal exudate or posterior oropharyngeal erythema.   Eyes:      Conjunctiva/sclera: Conjunctivae normal.      Pupils: Pupils are equal, round, and reactive to light.   Cardiovascular:      Rate and Rhythm: Normal rate and regular rhythm.      Pulses: Normal pulses.      Heart sounds: Normal heart " sounds.   Pulmonary:      Effort: Pulmonary effort is normal. No respiratory distress.      Breath sounds: Normal breath sounds. No wheezing, rhonchi or rales.   Musculoskeletal:      Cervical back: Normal range of motion.   Lymphadenopathy:      Cervical: No cervical adenopathy.   Neurological:      Mental Status: She is alert and oriented to person, place, and time.   Psychiatric:         Mood and Affect: Mood normal.         Behavior: Behavior normal.         Thought Content: Thought content normal.      Result Review :   The following data was reviewed by: MARI Reyna on 09/20/2023:                  Assessment and Plan    Diagnoses and all orders for this visit:    1. COVID-19 (Primary)  -     Nirmatrelvir&Ritonavir 300/100 (PAXLOVID) 20 x 150 MG & 10 x 100MG tablet therapy pack tablet; Take 3 tablets by mouth 2 (Two) Times a Day for 5 days.  Dispense: 30 tablet; Refill: 0    2. Nasal congestion  -     Discontinue: benzonatate (Tessalon Perles) 100 MG capsule; Take 1 capsule by mouth 3 (Three) Times a Day As Needed for Cough.  Dispense: 30 capsule; Refill: 0  -     POCT SARS-CoV-2 Antigen ELISABETH  -     benzonatate (Tessalon Perles) 100 MG capsule; Take 1 capsule by mouth 3 (Three) Times a Day As Needed for Cough.  Dispense: 30 capsule; Refill: 0    She has positive for COVID, so Paxlovid sent in per patient request.  We have discussed stopping her simvastatin prior while taking Paxlovid and that the medication may affect her nifedipine.  She is continue her aspirin 325 mg daily, as prescribed from her Ortho surgeon.  We have discussed worrisome symptoms that warrant a visit to the ED.  She verbalized understanding.  I do recommend using medication that has decongestants due to her high blood pressure.  I do recommend Flonase and see if that would help with her congestion and her middle ear effusion.      Follow Up   Return if symptoms worsen or fail to improve.  Patient was given instructions and  counseling regarding her condition or for health maintenance advice. Please see specific information pulled into the AVS if appropriate.

## 2023-09-26 ENCOUNTER — TELEPHONE (OUTPATIENT)
Dept: FAMILY MEDICINE CLINIC | Age: 68
End: 2023-09-26
Payer: MEDICARE

## 2023-09-26 NOTE — TELEPHONE ENCOUNTER
Caller: Carolina Ayers    Relationship: Self    Best call back number: [phone]    What medication are you requesting: COUGH MEDICATION, POSSIBLY LIQUID     What are your current symptoms: PERSISTENT COUGH UNABLE TO COUGH UP MUCUS     How long have you been experiencing symptoms: SINCE 09/23/2023    Have you had these symptoms before:    [] Yes  [x] No    Have you been treated for these symptoms before:   [] Yes  [x] No    If a prescription is needed, what is your preferred pharmacy and phone number: "[facility]" [address] [phone] [facility] [phone] FX     Additional notes: COUGH MEDICATION MEDICATION PRESCRIBED 09/20/2023 IS NOT HELPING, PLEASE NOTIFY WHEN SENT

## 2023-09-26 NOTE — TELEPHONE ENCOUNTER
She can try Robitussin OTC, as I believe that comes in liquid form.  She had Paxlovid, so she may be experiencing rebound symptoms of COVID.  The cough from COVID can last for several weeks, even months.

## 2023-09-29 ENCOUNTER — TELEPHONE (OUTPATIENT)
Dept: FAMILY MEDICINE CLINIC | Age: 68
End: 2023-09-29

## 2023-09-29 NOTE — TELEPHONE ENCOUNTER
Caller: Carolina Ayers    Relationship: Self    Best call back number: [phone]     What was the call regarding: PATIENT STATES SHE BELIEVES SHE IS TAKING EITHER VENLAFAXINE OR NIFEDIPINE FOR HOT FLASHES BUT ISNT SURE WHICH ONE. PATIENT STATES SHE WOULD LIKE TO KNOW WHICH MEDICATION IS FOR THE HOT FLASHES BECAUSE IT IS WORKING.

## 2023-10-01 NOTE — THERAPY TREATMENT NOTE
Acute Care - Physical Therapy Treatment Note  [facility]     Patient Name: Carolina Ayers  : 1955  MRN: [id number]  Today's Date: 2018  Onset of Illness/Injury or Date of Surgery: 18  Date of Referral to PT: 18  Referring Physician: Dr. Vasques    Admit Date: 2018    Visit Dx:    ICD-10-CM ICD-9-CM   1. Impaired functional mobility, balance, gait, and endurance Z74.09 V49.89   2. Acquired spondylolisthesis M43.10 738.4   3. Chronic bilateral low back pain with bilateral sciatica M54.42 724.2    M54.41 724.3    G89.29 338.29   4. Orthostatic hypotension I95.1 458.0   5. Hypokalemia E87.6 276.8     Patient Active Problem List   Diagnosis   • Chronic bilateral low back pain with sciatica   • Acquired spondylolisthesis   • Chronic bilateral low back pain with bilateral sciatica   • Hypokalemia   • Abnormal EKG   • Preop cardiovascular exam   • Essential hypertension   • Localized edema   • Orthostatic hypotension   • Impaired functional mobility, balance, gait, and endurance       Therapy Treatment          Rehabilitation Treatment Summary     Row Name 18 1000             Treatment Time/Intention    Discipline physical therapy assistant  -      Document Type therapy note (daily note)  -      Subjective Information complains of;pain  -      Mode of Treatment physical therapy  -      Patient/Family Observations supine in bed  -      Patient Effort excellent  -      Comment (I) with brace management  -      Existing Precautions/Restrictions brace worn when out of bed;spinal   verbalizes understanding of surgical precautions  -RH      Recorded by [RH] Vasu Sanford, PTA 18 1045      Row Name 18 1000             Vital Signs    O2 Delivery Pre Treatment room air  -RH      Recorded by [RH] Vasu Sanford, PTA 18 1045      Row Name 18 1000             Cognitive Assessment/Intervention- PT/OT    Orientation Status (Cognition) oriented x 4  -       Follows Commands (Cognition) follows multi-step commands;over 90% accuracy  -RH      Recorded by [RH] Vasu Sanford, PTA 08/12/18 1045      Row Name 08/12/18 1000             Bed Mobility Assessment/Treatment    Supine-Sit [place] (Bed Mobility) independent  -RH      Sit-Supine [place] (Bed Mobility) independent  -RH      Assistive Device (Bed Mobility) bed rails  -RH      Comment (Bed Mobility) --   [place] (I)  with good technique  -RH      Recorded by [RH] Vasu Sanford, PTA 08/12/18 1045      Row Name 08/12/18 1000             Sit-Stand Transfer    Sit-Stand [place] (Transfers) independent  -RH      Recorded by [RH] Vasu Sanford, PTA 08/12/18 1045      Row Name 08/12/18 1000             Stand-Sit Transfer    Stand-Sit [place] (Transfers) independent  -RH      Recorded by [RH] Vasu Sanford, PTA 08/12/18 1045      Row Name 08/12/18 1000             Stand Pivot/Stand Step Transfer    Stand Pivot/Stand Step [place] independent  -RH      Assistive Device (Stand Pivot Stand Step Transfer) walker, front-wheeled  -RH      Recorded by [RH] Vasu Sanford, PTA 08/12/18 1045      Row Name 08/12/18 1000             Toilet Transfer    [place] Level (Toilet Transfer) independent  -RH      Assistive Device (Toilet Transfer) walker, front-wheeled  -RH      Recorded by [RH] Vasu Sanford, PTA 08/12/18 1045      Row Name 08/12/18 1000             Gait/Stairs Assessment/Training    [place] Level (Gait) independent;supervision  -RH      Assistive Device (Gait) walker, front-wheeled  -RH      Distance in Feet (Gait) 800  -RH      Recorded by [RH] Vasu Sanford, PTA 08/12/18 1045      Row Name 08/12/18 1000             General ROM    GENERAL ROM COMMENTS WFL  -RH      Recorded by [RH] Vasu Sanford, PTA 08/12/18 1045      Row Name 08/12/18 1000             Static Sitting Balance    Level of [place] (Unsupported Sitting, Static Balance) independent  -RH       Sitting Position (Unsupported Sitting, Static Balance) sitting on edge of bed;other (see comments)   toilet  -RH      Recorded by [RH] Vasu Sanford, PTA 08/12/18 1045      Row Name 08/12/18 1000             Static Standing Balance    Level of [place] (Supported Standing, Static Balance) independent  -RH      Time Able to Maintain Position (Supported Standing, Static Balance) more than 5 minutes  -RH      Assistive Device Utilized (Supported Standing, Static Balance) rolling walker  -RH      Recorded by [RH] Vasu Sanford, PTA 08/12/18 1045      Row Name 08/12/18 1000             Positioning and Restraints    Pre-Treatment Position in bed  -RH      Post Treatment Position bed  -RH      In Bed fowlers;call light within reach  -RH      Recorded by [RH] Vasu Sanford, PTA 08/12/18 1045      Row Name                Wound 08/07/18 1112 back incision    Wound - Properties Group Date first assessed: 08/07/18 [AH] Time first assessed: 1112 [] Location: back [] Type: incision [] Recorded by:  [] Jenn Lopez RN 08/07/18 1112    Row Name 08/12/18 1000             Coping    Observed Emotional State accepting  -RH      Verbalized Emotional State acceptance  -RH      Recorded by [RH] Vasu Sanford, PTA 08/12/18 1045      Row Name 08/12/18 1000             Outcome Summary/Treatment Plan (PT)    Daily Summary of Progress (PT) progress toward functional goals is good  -RH      Barriers to Overall Progress (PT) --   none  -RH      Recorded by [RH] Vasu Sanford, PTA 08/12/18 1045        User Key  (r) = Recorded By, (t) = Taken By, (c) = Cosigned By    Initials Name Effective Dates Discipline     Jenn Lopez RN 12/07/17 -  Nurse    RH Vasu Sanford, PTA 03/07/18 -  PT          Wound 08/07/18 1112 back incision (Active)   Dressing Appearance open to air 8/11/2018  9:00 PM   Closure Liquid skin adhesive 8/11/2018  9:00 PM   Base clean;dry 8/11/2018  9:00 PM   Drainage Amount none 8/11/2018   9:00 PM   Dressing Care, Wound open to air 8/11/2018  9:00 PM             Physical Therapy Education     Title: PT OT SLP Therapies (Done)     Topic: Physical Therapy (Done)     Point: Mobility training (Done)    Learning Progress Summary     Learner Status Readiness Method Response Comment Documented by    Patient Done Acceptance E,TB VU,NR   08/10/18 1019     Active Acceptance E NR  MA 08/08/18 1620          Point: Home exercise program (Done)    Learning Progress Summary     Learner Status Readiness Method Response Comment Documented by    Patient Done Acceptance E,TB VU,NR   08/10/18 1019     Active Acceptance E NR  MA 08/08/18 1620          Point: Body mechanics (Done)    Learning Progress Summary     Learner Status Readiness Method Response Comment Documented by    Patient Done Acceptance E,TB VU,NR   08/10/18 1019     Active Acceptance E NR  MA 08/08/18 1620          Point: Precautions (Done)    Learning Progress Summary     Learner Status Readiness Method Response Comment Documented by    Patient Done Acceptance E,TB VU,NR   08/10/18 1019     Active Acceptance E NR  MA 08/08/18 1620                      User Key     Initials Effective Dates Name Provider Type Discipline     03/07/18 -  Desiree Stafford, PTA Physical Therapy Assistant PT    MA 04/03/18 -  Shantel Blair PT Physical Therapist PT                    PT Recommendation and Plan     Outcome Summary/Treatment Plan (PT)  Daily Summary of Progress (PT): progress toward functional goals is good  Barriers to Overall Progress (PT):  (none)  Plan of Care Reviewed With: patient  Progress: improving          Outcome Measures     Row Name 08/12/18 1000 08/11/18 1000 08/10/18 1000       How much help from another person do you currently need...    Turning from your back to your side while in flat bed without using bedrails? 4  -RH 3  -RH 3  -SM    Moving from lying on back to sitting on the side of a flat bed without bedrails? 4  -RH 3   -RH 3  -SM    Moving to and from a bed to a chair (including a wheelchair)? 4  -RH 3  -RH 3  -SM    Standing up from a chair using your arms (e.g., wheelchair, bedside chair)? 4  -RH 3  -RH 3  -SM    Climbing 3-5 steps with a railing? 3  -RH 3  -RH 2  -SM    To walk in hospital room? 4  -RH 3  -RH 2  -SM    AM-PAC 6 Clicks Score 23  -RH 18  -RH 16  -SM       Functional Assessment    Outcome Measure Options  --  -- AM-PAC 6 Clicks Basic Mobility (PT)  -      User Key  (r) = Recorded By, (t) = Taken By, (c) = Cosigned By    Initials Name Provider Type     Vasu Sanford PTA Physical Therapy Assistant     Desiree Stafford PTA Physical Therapy Assistant           Time Calculation:         PT Charges     Row Name 08/12/18 1046             Time Calculation    Start Time 1025  -      Stop Time 1048  -      Time Calculation (min) 23 min  -RH      PT Received On 08/13/18  -      PT - Next Appointment 08/13/18  -        User Key  (r) = Recorded By, (t) = Taken By, (c) = Cosigned By    Initials Name Provider Type     Vasu Sanford, TAYLOR Physical Therapy Assistant        Therapy Suggested Charges     Code   Minutes Charges    None           Therapy Charges for Today     Code Description Service Date Service Provider Modifiers Qty    46167404009 HC PT THER PROC EA 15 MIN 8/11/2018 Vasu Sanford, TAYLOR GP 1    00136817166 HC PT THER PROC EA 15 MIN 8/12/2018 Vasu Sanford, TAYLOR GP 2          PT G-Codes  Outcome Measure Options: AM-PAC 6 Clicks Basic Mobility (PT)    Vasu Sanford PTA  8/12/2018        IV discontinued, cath removed intact

## 2023-10-04 DIAGNOSIS — Z96.651 AFTERCARE FOLLOWING RIGHT KNEE JOINT REPLACEMENT SURGERY: Primary | ICD-10-CM

## 2023-10-04 DIAGNOSIS — Z47.1 AFTERCARE FOLLOWING RIGHT KNEE JOINT REPLACEMENT SURGERY: Primary | ICD-10-CM

## 2023-10-05 ENCOUNTER — HOSPITAL ENCOUNTER (OUTPATIENT)
Dept: GENERAL RADIOLOGY | Facility: HOSPITAL | Age: 68
Discharge: HOME | End: 2023-10-05
Admitting: STUDENT IN AN ORGANIZED HEALTH CARE EDUCATION/TRAINING PROGRAM
Payer: MEDICARE

## 2023-10-05 DIAGNOSIS — Z47.1 AFTERCARE FOLLOWING RIGHT KNEE JOINT REPLACEMENT SURGERY: ICD-10-CM

## 2023-10-05 DIAGNOSIS — Z96.651 AFTERCARE FOLLOWING RIGHT KNEE JOINT REPLACEMENT SURGERY: ICD-10-CM

## 2023-10-05 PROCEDURE — 73562 X-RAY EXAM OF KNEE 3: CPT

## 2023-10-06 ENCOUNTER — OFFICE VISIT (OUTPATIENT)
Dept: ORTHOPEDIC SURGERY | Facility: CLINIC | Age: 68
End: 2023-10-06
Payer: MEDICARE

## 2023-10-06 VITALS
WEIGHT: 177 LBS | HEIGHT: 62 IN | OXYGEN SATURATION: 97 % | DIASTOLIC BLOOD PRESSURE: 77 MMHG | SYSTOLIC BLOOD PRESSURE: 155 MMHG | BODY MASS INDEX: 32.57 KG/M2 | HEART RATE: 78 BPM

## 2023-10-06 DIAGNOSIS — Z96.651 AFTERCARE FOLLOWING RIGHT KNEE JOINT REPLACEMENT SURGERY: Primary | ICD-10-CM

## 2023-10-06 DIAGNOSIS — Z47.1 AFTERCARE FOLLOWING RIGHT KNEE JOINT REPLACEMENT SURGERY: Primary | ICD-10-CM

## 2023-10-06 RX ORDER — CEPHALEXIN 500 MG/1
500 CAPSULE ORAL EVERY 6 HOURS
Qty: 40 CAPSULE | Refills: 0 | Status: SHIPPED | OUTPATIENT
Start: 2023-10-06

## 2023-10-06 NOTE — PROGRESS NOTES
"Chief Complaint  Follow-up and Pain of the Right Knee    Subjective          Carolina Ayers presents to [facility] for   History of Present Illness    Carolina presents today for follow-up of her right knee.  She is 6 weeks out from right total knee arthroplasty.  She is doing very well overall.  She has minimal pain.  Mild stiffness in the morning.  She reports up to 120 degrees of flexion physical therapy.  She denies any redness or drainage from her incision although yesterday she did notice a small area of swelling over the proximal third.  No fevers or chills.  Very happy with her progress overall.    Allergies   Allergen Reactions    Lisinopril Cough    Penicillins Hives        Social History     Socioeconomic History    Marital status:      Spouse name: Ismael    Number of children: 2   Tobacco Use    Smoking status: Former     Packs/day: 1.00     Years: 30.00     Pack years: 30.00     Types: Cigarettes     Quit date: 2010     Years since quittin.7    Smokeless tobacco: Never    Tobacco comments:     NO CAFFEINE USE   Vaping Use    Vaping Use: Never used   Substance and Sexual Activity    Alcohol use: Not Currently     Comment: rarely- 2-3 times a year    Drug use: No    Sexual activity: Defer        I reviewed the patient's chief complaint, history of present illness, review of systems, past medical history, surgical history, family history, social history, medications, and allergy list.     REVIEW OF SYSTEMS    Constitutional: Denies fevers, chills, weight loss  Cardiovascular: Denies chest pain, shortness of breath  Skin: Denies rashes, acute skin changes  Neurologic: Denies headache, loss of consciousness  MSK: Right knee pain      Objective   Vital Signs:   /77   Pulse 78   Ht 157.5 cm (62\")   Wt 80.3 kg (177 lb)   SpO2 97%   BMI 32.37 kg/m²     Body mass index is 32.37 kg/m².    Physical Exam    General: Alert. No acute distress.   Right lower " extremity: Total knee arthroplasty incision is healing well.  There is a small area of swelling over the proximal third consistent with a suture reaction.  No cellulitis.  No effusion.  0-1 20 motion.  Stable to varus and valgus stress.  Calf soft.  Distal neurovascular intact.    Procedures    Imaging Results (Most Recent)       None                     Assessment and Plan        XR Knee 3 View Right    Result Date: 10/5/2023  Narrative: PROCEDURE: XR KNEE 3 VW RIGHT  COMPARISON: [facility]  , XR KNEE 3 VW RIGHT, 9/11/2023, 9:06.  INDICATIONS: FOLLOW UP RIGHT TOTAL KNEE REPLACEMENT  8/24/23  FINDINGS:  There are postoperative changes of right total knee arthroplasty.  The prosthesis remains in appropriate position and alignment.  The adjacent bony structures appear unremarkable.      Impression:  Satisfactory postop appearance following right total knee arthroplasty      Jarrett Alexander MD       Electronically Signed and Approved By: Jarrett Alexander MD on 10/05/2023 at 11:43             XR Knee 3 View Right    Result Date: 9/15/2023  Narrative: Indications: Follow-up right total knee arthroplasty Views: AP, lateral, sunrise view right knee Findings: Cemented, cruciate retaining right total knee arthroplasty implants in place.  Implant positioning is stable compared to immediate postoperative films.  No hardware loosening or complications.  Patella tracks centrally on sunrise view.  No periprosthetic fractures. Comparative Data: Comparative data found and reviewed today.      Diagnoses and all orders for this visit:    1. Aftercare following right knee joint replacement surgery (Primary)        We reviewed her x-rays.  She is doing very well clinically.  Her motion is excellent.  She will transition to home exercises with PT.  We will start her on Keflex for her suture reaction as a prophylaxis to any surgical site infection.  We discussed incision care.  We discussed concerning signs and symptoms.  We will  plan for a 6-week follow-up with repeat x-rays of the right knee.  She does report some mild left hip pain as well.  We will obtain a left hip x-ray prior to her next visit.  She will follow-up sooner if the suture reaction does not resolve.        Call or return if worsening symptoms.    Scribed for Shabbir Wood MD by Shabbir Wood MD  10/06/2023   10:13 EDT         Follow Up       6 weeks    Patient was given instructions and counseling regarding her condition or for health maintenance advice. Please see specific information pulled into the AVS if appropriate.       I have personally performed the services described in this document as scribed by the above individual and it is both accurate and complete.     Shabbir Wood MD  10/06/23  10:13 EDT

## 2023-10-25 DIAGNOSIS — Z12.31 BREAST CANCER SCREENING BY MAMMOGRAM: ICD-10-CM

## 2023-10-31 ENCOUNTER — TELEPHONE (OUTPATIENT)
Dept: FAMILY MEDICINE CLINIC | Age: 68
End: 2023-10-31
Payer: MEDICARE

## 2023-10-31 DIAGNOSIS — Z87.891 HISTORY OF NICOTINE DEPENDENCE: ICD-10-CM

## 2023-10-31 DIAGNOSIS — E04.2 MULTIPLE THYROID NODULES: Primary | ICD-10-CM

## 2023-11-14 ENCOUNTER — HOSPITAL ENCOUNTER (OUTPATIENT)
Dept: CT IMAGING | Facility: HOSPITAL | Age: 68
Discharge: HOME | End: 2023-11-14
Admitting: FAMILY MEDICINE
Payer: MEDICARE

## 2023-11-14 ENCOUNTER — HOSPITAL ENCOUNTER (OUTPATIENT)
Dept: ULTRASOUND IMAGING | Facility: HOSPITAL | Age: 68
Discharge: HOME | End: 2023-11-14
Payer: MEDICARE

## 2023-11-14 ENCOUNTER — TELEPHONE (OUTPATIENT)
Dept: ORTHOPEDIC SURGERY | Facility: CLINIC | Age: 68
End: 2023-11-14
Payer: MEDICARE

## 2023-11-14 DIAGNOSIS — Z47.1 AFTERCARE FOLLOWING RIGHT KNEE JOINT REPLACEMENT SURGERY: Primary | ICD-10-CM

## 2023-11-14 DIAGNOSIS — Z96.651 AFTERCARE FOLLOWING RIGHT KNEE JOINT REPLACEMENT SURGERY: Primary | ICD-10-CM

## 2023-11-14 DIAGNOSIS — Z87.891 HISTORY OF NICOTINE DEPENDENCE: ICD-10-CM

## 2023-11-14 DIAGNOSIS — E04.2 MULTIPLE THYROID NODULES: ICD-10-CM

## 2023-11-14 DIAGNOSIS — M25.552 LEFT HIP PAIN: ICD-10-CM

## 2023-11-14 PROCEDURE — 71271 CT THORAX LUNG CANCER SCR C-: CPT

## 2023-11-14 PROCEDURE — 76536 US EXAM OF HEAD AND NECK: CPT

## 2023-11-14 NOTE — TELEPHONE ENCOUNTER
PATIENT NOTIFIED OF RIGHT KNEE AND LEFT HIP X-RAYS PRIOR TO APPOINTMENT WITH DR AMZA ON FRIDAY 11/17/23 AT OUR [place] OFFICE. PATIENT VERBALIZED UNDERSTANDING.

## 2023-11-15 ENCOUNTER — HOSPITAL ENCOUNTER (OUTPATIENT)
Dept: GENERAL RADIOLOGY | Facility: HOSPITAL | Age: 68
Discharge: HOME | End: 2023-11-15
Admitting: STUDENT IN AN ORGANIZED HEALTH CARE EDUCATION/TRAINING PROGRAM
Payer: MEDICARE

## 2023-11-15 DIAGNOSIS — M25.552 LEFT HIP PAIN: ICD-10-CM

## 2023-11-15 DIAGNOSIS — Z96.651 AFTERCARE FOLLOWING RIGHT KNEE JOINT REPLACEMENT SURGERY: ICD-10-CM

## 2023-11-15 DIAGNOSIS — Z47.1 AFTERCARE FOLLOWING RIGHT KNEE JOINT REPLACEMENT SURGERY: ICD-10-CM

## 2023-11-15 PROCEDURE — 73502 X-RAY EXAM HIP UNI 2-3 VIEWS: CPT

## 2023-11-15 PROCEDURE — 73562 X-RAY EXAM OF KNEE 3: CPT

## 2023-11-17 ENCOUNTER — OFFICE VISIT (OUTPATIENT)
Dept: ORTHOPEDIC SURGERY | Facility: CLINIC | Age: 68
End: 2023-11-17
Payer: MEDICARE

## 2023-11-17 VITALS
HEART RATE: 79 BPM | BODY MASS INDEX: 33.13 KG/M2 | SYSTOLIC BLOOD PRESSURE: 124 MMHG | OXYGEN SATURATION: 95 % | DIASTOLIC BLOOD PRESSURE: 73 MMHG | HEIGHT: 62 IN | WEIGHT: 180 LBS

## 2023-11-17 DIAGNOSIS — Z47.1 AFTERCARE FOLLOWING RIGHT KNEE JOINT REPLACEMENT SURGERY: Primary | ICD-10-CM

## 2023-11-17 DIAGNOSIS — Z96.651 AFTERCARE FOLLOWING RIGHT KNEE JOINT REPLACEMENT SURGERY: Primary | ICD-10-CM

## 2023-11-17 DIAGNOSIS — M70.62 TROCHANTERIC BURSITIS OF LEFT HIP: ICD-10-CM

## 2023-11-17 RX ORDER — TRIAMCINOLONE ACETONIDE 40 MG/ML
40 INJECTION, SUSPENSION INTRA-ARTICULAR; INTRAMUSCULAR
Status: COMPLETED | OUTPATIENT
Start: 2023-11-17 | End: 2023-11-17

## 2023-11-17 RX ORDER — LIDOCAINE HYDROCHLORIDE 10 MG/ML
5 INJECTION, SOLUTION INFILTRATION; PERINEURAL
Status: COMPLETED | OUTPATIENT
Start: 2023-11-17 | End: 2023-11-17

## 2023-11-17 RX ADMIN — LIDOCAINE HYDROCHLORIDE 5 ML: 10 INJECTION, SOLUTION INFILTRATION; PERINEURAL at 11:29

## 2023-11-17 RX ADMIN — TRIAMCINOLONE ACETONIDE 40 MG: 40 INJECTION, SUSPENSION INTRA-ARTICULAR; INTRAMUSCULAR at 11:29

## 2023-11-17 NOTE — PROGRESS NOTES
Chief Complaint  Follow-up and Pain of the Right Knee and Follow-up and Pain of the Left Hip    Subjective          Carolina Ayers presents to [facility] for   History of Present Illness    Carolina presents today for follow-up of her right knee and evaluation of her left hip.  She is now 3 months out from right total knee arthroplasty.  She reports she is doing very well overall.  She has graduated therapy.  She is ambulating without any assistive devices.  She denies any swelling.  She denies any incisional complications.  She also has left hip pain.  This predominately on the lateral aspect of the hip.  She has pain with direct pressure.  She denies any prior trauma to the hip.  She has not had any treatment thus far.    Allergies   Allergen Reactions    Lisinopril Cough    Penicillins Hives        Social History     Socioeconomic History    Marital status:      Spouse name: Ismael    Number of children: 2   Tobacco Use    Smoking status: Former     Packs/day: 1.00     Years: 30.00     Additional pack years: 0.00     Total pack years: 30.00     Types: Cigarettes     Quit date: 2010     Years since quittin.8    Smokeless tobacco: Never    Tobacco comments:     NO CAFFEINE USE   Vaping Use    Vaping Use: Never used   Substance and Sexual Activity    Alcohol use: Not Currently     Comment: rarely- 2-3 times a year    Drug use: No    Sexual activity: Defer        I reviewed the patient's chief complaint, history of present illness, review of systems, past medical history, surgical history, family history, social history, medications, and allergy list.     REVIEW OF SYSTEMS    Constitutional: Denies fevers, chills, weight loss  Cardiovascular: Denies chest pain, shortness of breath  Skin: Denies rashes, acute skin changes  Neurologic: Denies headache, loss of consciousness  MSK: Right knee pain, left hip pain      Objective   Vital Signs:   /73   Pulse 79   Ht 157.5 cm  "(62\")   Wt 81.6 kg (180 lb)   SpO2 95%   BMI 32.92 kg/m²     Body mass index is 32.92 kg/m².    Physical Exam    General: Alert. No acute distress.   Right lower extremity: Well-healed midline incision.  No effusion.  Full extension to 120 degrees of flexion.  Knee stable to varus and valgus stress.  Extensor mechanism intact.  Calf soft.  Distal neurovascular intact.  Left lower extremity: Point tender over the trochanteric bursa.  No pain with hip flexion.  Internal rotation 20 degrees, external rotation to 45 degrees.  Smooth hip motion.  Calf soft.  Distal neurovascular intact.    Large Joint Arthrocentesis: L greater trochanteric bursa  Date/Time: 11/17/2023 11:29 AM  Consent given by: patient  Site marked: site marked  Timeout: Immediately prior to procedure a time out was called to verify the correct patient, procedure, equipment, support staff and site/side marked as required   Supporting Documentation  Indications: pain   Procedure Details  Location: hip - L greater trochanteric bursa  Needle gauge: 21 G.  Medications administered: 5 mL lidocaine 1 %; 40 mg triamcinolone acetonide 40 MG/ML  Patient tolerance: patient tolerated the procedure well with no immediate complications        Imaging Results (Most Recent)       None                     Assessment and Plan        XR Hip With or Without Pelvis 2 - 3 View Left    Result Date: 11/15/2023  Narrative: PROCEDURE: XR HIP W OR WO PELVIS 2-3 VIEW LEFT  COMPARISON: None  INDICATIONS: LEFT HIP PAIN  FINDINGS:  There is no acute fracture or dislocation.  There is mild right and moderate left hip joint osteoarthritis.  There is mild spurring the sacroiliac joints indicating arthritic changes.  The pubic symphysis is intact.  The patient has had fusion at L4-L5 with underlying degenerative changes.  The soft tissues are within range of normal.      Impression:   1. No acute fracture or dislocation. 2. Mild right and moderate left hip joint degenerative " arthritis. 3. Degenerative changes and postsurgical changes in the lower lumbar spine. 4. Mild sacroiliac joint degenerative arthritis.      MAURICIO GONZALES MD       Electronically Signed and Approved By: MAURICIO GONZALES MD on 11/15/2023 at 14:03             XR Knee 3 View Right    Result Date: 11/15/2023  Narrative: PROCEDURE: XR KNEE 3 VW RIGHT  COMPARISON: [facility], , XR KNEE 3 VW RIGHT, 10/05/2023, 11:30.  INDICATIONS: RIGHT KNEE PAIN  FINDINGS:  The patient has a stable intact total right knee arthroplasty.  There is no periprosthetic fracture or loosening.  There is no knee joint effusion.  The soft tissues are unremarkable.      Impression:  Intact right knee arthroplasty with no radiographic complications.      MAURICIO GONZALES MD       Electronically Signed and Approved By: MAURICIO GONZALES MD on 11/15/2023 at 14:01             US Thyroid    Result Date: 11/14/2023  Narrative: PROCEDURE: US THYROID  COMPARISON: [facility], , US THYROID, 10/27/2022, 11:47.  INDICATIONS: thyroid nodules  TECHNIQUE: High-resolution ultrasound examination of the thyroid gland was performed.   FINDINGS:  Background thyroid echotexture appears fairly homogeneous without signs of active thyroiditis.  Right thyroid gland measures 4.6 x 2.1 x 1.9 cm and the left 5 x 1.9 x 1.5 cm.  Isthmus measures 5 mm.  Nodules under 1 cm are noted similar to prior exam and require no follow-up.  Nodules over 1 cm will be listed below.  1.5 cm isoechoic spongiform nodule right mid thyroid image 27.  No associated calcifications.  Small linear echogenicity along the periphery of the cystic component and spongiform spaces suggestive of colloid.  Nodule is wider than tall.  Margins are circumscribed.  This is considered a TI-RADS 1 nodule requiring no follow-up.  Solid isoechoic 1.4 cm left mid thyroid nodule image 58. No associated calcifications.  Margins are smooth.  Nodule is wider than tall.  This is considered a sub threshold  TI-RADS 3 nodule and requires no follow-up.  Solid isoechoic 1.3 cm left mid thyroid nodule image 64.  No associated calcifications.  Margins are smooth.  Nodule is wider than tall.  This is considered a sub threshold TI-RADS 3 nodule and requires no follow-up.       Impression:   Stable thyroid nodules that require no follow-up per ACR TI-RADS criteria.     CASSIDY HERNANDEZ MD       Electronically Signed and Approved By: CASSIDY HERNANDEZ MD on 2023 at 15:48             CT Chest Low Dose Cancer Screening WO    Result Date: 2023  Narrative: PROCEDURE: CT CHEST LOW DOSE CANCER SCREENING WO  COMPARISON: [facility], CT, CT CHEST LOW DOSE CANCER SCREENING WO, 10/27/2022, 12:26.  REASON FOR SCREENING: Patient is 68 years of age, asymptomatic, and has a smoking history of more than 30 pack years. SMOKING STATUS: Former smoker.  Years since quittin    SCREENING VISIT: Year 2.      TECHNIQUE: Axial unenhanced LDCT images from the apices through mid-kidney were obtained. Evaluation of solid organs and vascular structures is suboptimal due to the lack of IV contrast. Imaging was performed on a [employer] Cloud[employer] 128 CT scanner.  RECONSTRUCTED IMAGE WIDTH: 2 mm. RADIATION: CT Dose Index Vol (CTDIvol):  2.685  mGy  Dose Length Product (DLP):  96.8  mGy-cm   FINDINGS:  LUNG NODULES: Multiple benign calcified granulomas are noted.  No concerning pulmonary nodule is identified. LUNGS:  COPD: None. FIBROSIS: None LYMPH NODES: None.  OTHER FINDINGS: None.   RIGHT PLEURAL SPACE:  EFFUSION: None. CALCIFICATION: None. THICKENING: None. PNEUMOTHORAX: None.  LEFT PLEURAL SPACE:  EFFUSION: None. CALCIFICATION: None. THICKENING: None. PNEUMOTHORAX: None.  HEART:  SIZE: Normal. CORONARY CALC: Moderate. PERICARDIAL EFFUSION: None. OTHER FINDINGS: None.   UPPER ABDOMEN: None.  THORAX: None.  BASE OF NECK: None.       Impression:   1. No evidence of lung cancer. 2. No acute cardiopulmonary process.  LUNG RADS:                            1 (negative, less than 1% chance of malignancy)     KATJA SANTANA MD       Electronically Signed and Approved By: KATJA SANTNAA MD on 11/14/2023 at 14:14             Mammo Screening Digital Tomosynthesis Bilateral With CAD    Result Date: 10/24/2023  Narrative: BILATERAL SCREENING DIGITAL MAMMOGRAPHY CLINICAL INDICATION: Routine screening. TECHNIQUE: Bilateral CC, exaggerated CC and MLO views were obtained with 2-D and 3-D digital acquisitions. The study was read with the assistance of CAD.   COMPARISON:  Previous studies back to 2019. FINDINGS:   Scattered fibroglandular: There are scattered areas of fibroglandular density. No suspicious mass, calcifications or architectural distortion is seen. No change identified.    Impression: No mammographic evidence of malignancy. BI-RADS CATEGORY: 1 , NEGATIVE.     RECOMMENDED FOLLOW-UP:  Routine annual screening mammography. Images reviewed, interpreted, and dictated by Antoni Lora DO      Diagnoses and all orders for this visit:    1. Aftercare following right knee joint replacement surgery (Primary)    2. Trochanteric bursitis of left hip    Other orders  -     Large Joint Arthrocentesis: L greater trochanteric bursa        We reviewed her imaging.  She is doing very well with her right total knee arthroplasty.  We discussed incision site care.  She will continue home exercises.  She has no restrictions with regard to the right knee at this time.  We discussed treatment options for her left hip.  We discussed the risk, benefits, indications, alternatives to a left hip bursa injection.  She elected to proceed and tolerated the injection well.  Follow-up in 3 months for reevaluation.  We will obtain new x-rays of the right knee when she returns.      Call or return if worsening symptoms.    Scribed for Shabbir Wood MD by Megan Mclaughlin  11/17/2023   11:09 EST         Follow Up       3 months    Patient was given instructions and counseling regarding  her condition or for health maintenance advice. Please see specific information pulled into the AVS if appropriate.       I have personally performed the services described in this document as scribed by the above individual and it is both accurate and complete.     Shabbir Wood MD  11/17/23  11:43 EST

## 2023-12-12 ENCOUNTER — OFFICE VISIT (OUTPATIENT)
Dept: FAMILY MEDICINE CLINIC | Age: 68
End: 2023-12-12
Payer: MEDICARE

## 2023-12-12 VITALS
TEMPERATURE: 98.1 F | WEIGHT: 183.6 LBS | DIASTOLIC BLOOD PRESSURE: 65 MMHG | HEIGHT: 62 IN | BODY MASS INDEX: 33.79 KG/M2 | HEART RATE: 79 BPM | SYSTOLIC BLOOD PRESSURE: 143 MMHG

## 2023-12-12 DIAGNOSIS — E78.2 MIXED HYPERLIPIDEMIA: ICD-10-CM

## 2023-12-12 DIAGNOSIS — R60.0 LOCALIZED EDEMA: ICD-10-CM

## 2023-12-12 DIAGNOSIS — Z00.00 PHYSICAL EXAM: Primary | ICD-10-CM

## 2023-12-12 DIAGNOSIS — N95.1 POST MENOPAUSAL SYNDROME: ICD-10-CM

## 2023-12-12 DIAGNOSIS — I25.84 CORONARY ARTERY CALCIFICATION: ICD-10-CM

## 2023-12-12 DIAGNOSIS — M17.11 PRIMARY OSTEOARTHRITIS OF RIGHT KNEE: ICD-10-CM

## 2023-12-12 DIAGNOSIS — I10 ESSENTIAL HYPERTENSION: ICD-10-CM

## 2023-12-12 DIAGNOSIS — I25.10 CORONARY ARTERY CALCIFICATION: ICD-10-CM

## 2023-12-12 RX ORDER — LOSARTAN POTASSIUM 50 MG/1
50 TABLET ORAL DAILY
COMMUNITY
Start: 2023-12-01 | End: 2023-12-12 | Stop reason: SDUPTHER

## 2023-12-12 RX ORDER — VENLAFAXINE HYDROCHLORIDE 75 MG/1
75 CAPSULE, EXTENDED RELEASE ORAL DAILY
Qty: 30 CAPSULE | Refills: 1 | Status: SHIPPED | OUTPATIENT
Start: 2023-12-12

## 2023-12-12 RX ORDER — BACLOFEN 10 MG/1
10 TABLET ORAL 2 TIMES DAILY PRN
Qty: 30 TABLET | Refills: 0 | Status: SHIPPED | OUTPATIENT
Start: 2023-12-12

## 2023-12-12 RX ORDER — NIFEDIPINE 30 MG/1
30 TABLET, FILM COATED, EXTENDED RELEASE ORAL NIGHTLY
Qty: 90 TABLET | Refills: 3 | Status: SHIPPED | OUTPATIENT
Start: 2023-12-12

## 2023-12-12 RX ORDER — LOSARTAN POTASSIUM 50 MG/1
50 TABLET ORAL DAILY
Qty: 90 TABLET | Refills: 3 | Status: SHIPPED | OUTPATIENT
Start: 2023-12-12

## 2023-12-12 RX ORDER — FUROSEMIDE 40 MG/1
40 TABLET ORAL DAILY
Qty: 90 TABLET | Refills: 3 | Status: SHIPPED | OUTPATIENT
Start: 2023-12-12

## 2023-12-12 RX ORDER — MELOXICAM 15 MG/1
15 TABLET ORAL DAILY
Qty: 90 TABLET | Refills: 3 | Status: SHIPPED | OUTPATIENT
Start: 2023-12-12

## 2023-12-12 RX ORDER — ASPIRIN 81 MG/1
81 TABLET ORAL DAILY
COMMUNITY

## 2023-12-12 RX ORDER — SIMVASTATIN 20 MG
20 TABLET ORAL NIGHTLY
Qty: 90 TABLET | Refills: 3 | Status: SHIPPED | OUTPATIENT
Start: 2023-12-12

## 2023-12-12 NOTE — PROGRESS NOTES
The ABCs of the Annual Wellness Visit  Subsequent Medicare Wellness Visit    Subjective    Carolina Ayers is a 68 y.o. female who presents for a Subsequent Medicare Wellness Visit.    The following portions of the patient's history were reviewed and updated as appropriate: allergies, current medications, past family history, past medical history, past social history, past surgical history, and problem list.    Compared to one year ago, the patient feels her physical health is better.    Compared to one year ago, the patient feels her mental health is the same.    Recent Hospitalizations:  She was admitted within the past 365 days at [facility].     Current Medical Providers:  Patient Care Team:  Momo Renteria MD as PCP - General (Family Medicine)    Outpatient Medications Prior to Visit   Medication Sig Dispense Refill    diphenhydrAMINE (BENADRYL) 25 mg capsule Take 1 capsule by mouth At Night As Needed for Itching.      docusate sodium (COLACE) 100 MG capsule Take 1 capsule by mouth Daily.      melatonin 5 MG tablet tablet Take 1 tablet by mouth At Night As Needed.      baclofen (LIORESAL) 10 MG tablet Take 1 tablet by mouth 2 (Two) Times a Day As Needed for Muscle Spasms. 30 tablet 0    furosemide (LASIX) 40 MG tablet Take 1 tablet by mouth Daily. 90 tablet 3    losartan (COZAAR) 50 MG tablet Take 1 tablet by mouth Daily.      meloxicam (MOBIC) 15 MG tablet Take 1 tablet by mouth Daily. Take with food. 90 tablet 1    NIFEdipine CC (Adalat CC) 30 MG 24 hr tablet Take 1 tablet by mouth Every Night. 90 tablet 3    simvastatin (ZOCOR) 20 MG tablet Take 1 tablet by mouth Every Night. 90 tablet 3    venlafaxine (EFFEXOR) 37.5 MG tablet Take 1 tablet by mouth 2 (Two) Times a Day. 180 tablet 1    aspirin 81 MG EC tablet Take 1 tablet by mouth Daily.      benzonatate (Tessalon Perles) 100 MG capsule Take 1 capsule by mouth 3 (Three) Times a Day As Needed for Cough. 30 capsule 0    cephalexin  "(KEFLEX) 500 MG capsule Take 1 capsule by mouth Every 6 (Six) Hours. 40 capsule 0    oxyCODONE-acetaminophen (PERCOCET) 5-325 MG per tablet Take 1 tablet by mouth Every 4 (Four) Hours As Needed for Moderate Pain. 40 tablet 0    potassium chloride (K-DUR,KLOR-CON) 10 MEQ CR tablet Take 1 tablet by mouth Daily.      sennosides-docusate (PERICOLACE) 8.6-50 MG per tablet Take 2 tablets by mouth 2 (Two) Times a Day. 20 tablet 0     No facility-administered medications prior to visit.       No opioid medication identified on active medication list. I have reviewed chart for other potential  high risk medication/s and harmful drug interactions in the elderly.      Aspirin is not on active medication list.  Aspirin use is not indicated based on review of current medical condition/s. Risk of harm outweighs potential benefits.      Patient Active Problem List   Diagnosis    Acute bilateral low back pain without sciatica    Acquired spondylolisthesis    Hypokalemia    Abnormal EKG    Preop cardiovascular exam    Essential hypertension    Localized edema    Orthostatic hypotension    Impaired functional mobility, balance, gait, and endurance    Post-operative state    Bilateral lower extremity edema    Precordial pain    Mixed hyperlipidemia    Multiple thyroid nodules    Thyroid nodule    Sensorineural hearing loss (SNHL) of both ears    Tinnitus of both ears    Post menopausal syndrome    Degenerative arthritis of knee, bilateral     Advance Care Planning  Advance Directive is not on file.  ACP discussion was held with the patient during this visit. Patient has an advance directive (not in EMR), copy requested.  Her  or daughter would make decisions if needed.     Objective    Vitals:    12/12/23 1318   BP: 143/65   BP Location: Right arm   Patient Position: Sitting   Pulse: 79   Temp: 98.1 °F (36.7 °C)   TempSrc: Oral   Weight: 83.3 kg (183 lb 9.6 oz)   Height: 157.5 cm (62.01\")   PainSc:   4   PainLoc: Shoulder " "    Estimated body mass index is 33.57 kg/m² as calculated from the following:    Height as of this encounter: 157.5 cm (62.01\").    Weight as of this encounter: 83.3 kg (183 lb 9.6 oz).    BMI is >= 30 and <35. (Class 1 Obesity). The following options were offered after discussion;: exercise counseling/recommendations and nutrition counseling/recommendations    Does the patient have evidence of cognitive impairment? No.        HEALTH RISK ASSESSMENT    Smoking Status:  Social History     Tobacco Use   Smoking Status Former    Packs/day: 1.00    Years: 30.00    Additional pack years: 0.00    Total pack years: 30.00    Types: Cigarettes    Quit date: 2010    Years since quittin.9   Smokeless Tobacco Never   Tobacco Comments    NO CAFFEINE USE     Alcohol Consumption:  Social History     Substance and Sexual Activity   Alcohol Use Not Currently    Comment: rarely- 2-3 times a year     Fall Risk Screen:    CRIS Fall Risk Assessment was completed, and patient is at LOW risk for falls.Assessment completed on:2023    Depression Screenin/12/2023     1:19 PM   PHQ-2/PHQ-9 Depression Screening   Little Interest or Pleasure in Doing Things 0-->not at all   Feeling Down, Depressed or Hopeless 0-->not at all   PHQ-9: Brief Depression Severity Measure Score 0       Health Habits and Functional and Cognitive Screenin/12/2023     1:19 PM   Functional & Cognitive Status   Do you have difficulty preparing food and eating? No   Do you have difficulty bathing yourself, getting dressed or grooming yourself? No   Do you have difficulty using the toilet? No   Do you have difficulty moving around from place to place? No   Do you have trouble with steps or getting out of a bed or a chair? No   Current Diet Unhealthy Diet   Dental Exam Not up to date   Eye Exam Up to date   Exercise (times per week) 0 times per week   Current Exercises Include No Regular Exercise   Do you need help using the phone?  No "   Are you deaf or do you have serious difficulty hearing?  No   Do you need help to go to places out of walking distance? No   Do you need help shopping? No   Do you need help preparing meals?  No   Do you need help with housework?  No   Do you need help with laundry? No   Do you need help taking your medications? No   Do you need help managing money? No   Do you ever drive or ride in a car without wearing a seat belt? No   Have you felt unusual stress, anger or loneliness in the last month? Yes   Who do you live with? Spouse   If you need help, do you have trouble finding someone available to you? No   Have you been bothered in the last four weeks by sexual problems? No   Do you have difficulty concentrating, remembering or making decisions? Yes     Age-appropriate Screening Schedule:  Refer to the list below for future screening recommendations based on patient's age, sex and/or medical conditions. Orders for these recommended tests are listed in the plan section. The patient has been provided with a written plan.    Health Maintenance   Topic Date Due    TDAP/TD VACCINES (2 - Td or Tdap) 10/29/2020    LIPID PANEL  05/02/2024    BMI FOLLOWUP  08/15/2024    PAP SMEAR  10/01/2024    DXA SCAN  11/07/2024    LUNG CANCER SCREENING  11/14/2024    ANNUAL WELLNESS VISIT  12/12/2024    MAMMOGRAM  10/24/2025    COLORECTAL CANCER SCREENING  03/07/2027    HEPATITIS C SCREENING  Completed    COVID-19 Vaccine  Completed    INFLUENZA VACCINE  Completed    Pneumococcal Vaccine 65+  Completed    ZOSTER VACCINE  Discontinued          CMS Preventative Services Quick Reference  Risk Factors Identified During Encounter  Chronic Pain:  Continue current medications.  Depression/Dysphoria:  Medication adjustment as noted.  Immunizations Discussed/Encouraged: Tdap  The above risks/problems have been discussed with the patient.  Pertinent information has been shared with the patient in the After Visit Summary.  An After Visit Summary and  "PPPS were made available to the patient.    Follow Up:   Next Medicare Wellness visit to be scheduled in 1 year.     Additional E&M Note during same encounter follows:  Patient has multiple medical problems which are significant and separately identifiable that require additional work above and beyond the Medicare Wellness Visit.      Chief Complaint:  Blood pressure, cholesterol, postmenopausal symptoms    Subjective    History of Present Illness:  In addition to the Medicare wellness exam, Carolina is also here for follow-up on her usual care.  She has hypertension and remains on treatment for it as noted.  Her blood pressure is just above goal on initial check.  Her numbers are up and down somewhat at home.  Her systolic pressure is probably around 130 on average though.    She also remains on statin therapy for cholesterol.  She is not aware of obvious side effects related to it.    She is continuing to have difficulty with menopausal symptoms.  She did see some improvement in how she felt with the initial dosing of venlafaxine.  She might want to consider an additional change.    Review of Systems:  Review of Systems   Constitutional:  Negative for chills and fever.   Respiratory:  Negative for cough and shortness of breath.    Cardiovascular:  Negative for chest pain and palpitations.   Gastrointestinal:  Negative for abdominal pain, nausea and vomiting.      Objective   Vital Signs:  /65 (BP Location: Right arm, Patient Position: Sitting)   Pulse 79   Temp 98.1 °F (36.7 °C) (Oral)   Ht 157.5 cm (62.01\")   Wt 83.3 kg (183 lb 9.6 oz)   BMI 33.57 kg/m²     Physical Exam  Vitals and nursing note reviewed.   Constitutional:       General: She is not in acute distress.     Appearance: She is obese. She is not ill-appearing.   HENT:      Right Ear: Tympanic membrane and ear canal normal.      Left Ear: Tympanic membrane and ear canal normal.      Ears:      Comments: Hearing is normal with forced whisper " bilaterally.     Mouth/Throat:      Mouth: Mucous membranes are moist.      Comments: Pharynx appears normal  Eyes:      Extraocular Movements: Extraocular movements intact.      Pupils: Pupils are equal, round, and reactive to light.      Comments: Hearing is normal with forced whisper bilaterally.   Neck:      Thyroid: No thyromegaly.   Cardiovascular:      Rate and Rhythm: Normal rate and regular rhythm.      Heart sounds: No murmur heard.  Pulmonary:      Effort: Pulmonary effort is normal.      Breath sounds: Normal breath sounds.   Abdominal:      General: There is no distension.      Palpations: Abdomen is soft. There is no mass.      Tenderness: There is no abdominal tenderness.   Musculoskeletal:      Cervical back: Normal range of motion.   Skin:     Findings: No lesion or rash.   Neurological:      General: No focal deficit present.      Mental Status: She is oriented to person, place, and time.      Cranial Nerves: No cranial nerve deficit.   Psychiatric:         Mood and Affect: Mood normal.       The following data was reviewed by Momo Renteria MD on 12/12/2023.  Lab Results   Component Value Date    WBC 12.88 (H) 08/25/2023    HGB 11.3 (L) 08/25/2023    HCT 35.3 08/25/2023    MCV 99.2 (H) 08/25/2023     08/25/2023     Lab Results   Component Value Date    GLUCOSE 139 (H) 08/25/2023    BUN 14 08/25/2023    CREATININE 0.88 08/25/2023     (L) 08/25/2023    K 4.8 08/25/2023     08/25/2023    CO2 27.2 08/25/2023    CALCIUM 9.3 08/25/2023    PROTEINTOT 7.4 08/15/2023    ALBUMIN 4.6 08/15/2023    ALT 15 08/15/2023    AST 20 08/15/2023    ALKPHOS 82 08/15/2023    BILITOT 0.3 08/15/2023    EGFR 71.7 08/25/2023    GLOB 2.8 08/15/2023    AGRATIO 1.6 08/15/2023    BCR 15.9 08/25/2023    ANIONGAP 6.8 08/25/2023      Lab Results   Component Value Date    CHOL 146 05/02/2023    CHLPL 157 08/24/2020    TRIG 92 05/02/2023    HDL 62 (H) 05/02/2023    LDL 67 05/02/2023     Lab Results    Component Value Date    TSH 1.980 05/02/2023     Lab Results   Component Value Date    HGBA1C 5.80 (H) 08/15/2023             Assessment and Plan:   Today, we have reviewed her care.  Regarding the Medicare wellness exam, Carolina is basically up-to-date on recommended cancer screenings.  There are no additional recommendations in this regard.    Regarding her usual care, we will refill needed medications as noted.  Her blood pressure was just above normal on initial check, and we will recheck it before she leaves.  We also reviewed her most recent labs.  They look good overall, and we will hold off on additional testing today.  Her postmenopausal symptoms continue to be significant.  We will change her over to extended release venlafaxine.  I sent a limited quantity of it and we will reach out to her over MyChart in a few weeks to see how she is doing.  We might consider an increased dose at that time.  Tentatively, we will plan to see her back in about 6 months.    ADDENDUM - Her blood pressure is mildly elevated on recheck.  We will reach out to her in about 30 days for a recheck on this.      Diagnoses and all orders for this visit:    1. Physical exam (Primary)    2. Coronary artery calcification  -     simvastatin (ZOCOR) 20 MG tablet; Take 1 tablet by mouth Every Night.  Dispense: 90 tablet; Refill: 3    3. Essential hypertension  -     furosemide (LASIX) 40 MG tablet; Take 1 tablet by mouth Daily.  Dispense: 90 tablet; Refill: 3  -     losartan (COZAAR) 50 MG tablet; Take 1 tablet by mouth Daily.  Dispense: 90 tablet; Refill: 3  -     NIFEdipine CC (Adalat CC) 30 MG 24 hr tablet; Take 1 tablet by mouth Every Night.  Dispense: 90 tablet; Refill: 3    4. Mixed hyperlipidemia  -     simvastatin (ZOCOR) 20 MG tablet; Take 1 tablet by mouth Every Night.  Dispense: 90 tablet; Refill: 3    5. Localized edema  -     furosemide (LASIX) 40 MG tablet; Take 1 tablet by mouth Daily.  Dispense: 90 tablet; Refill: 3    6.  Primary osteoarthritis of right knee  -     baclofen (LIORESAL) 10 MG tablet; Take 1 tablet by mouth 2 (Two) Times a Day As Needed for Muscle Spasms.  Dispense: 30 tablet; Refill: 0  -     meloxicam (MOBIC) 15 MG tablet; Take 1 tablet by mouth Daily. Take with food.  Dispense: 90 tablet; Refill: 3    7. Post menopausal syndrome  -     venlafaxine XR (Effexor XR) 75 MG 24 hr capsule; Take 1 capsule by mouth Daily.  Dispense: 30 capsule; Refill: 1       Follow Up  Return in about 6 months (around 6/12/2024) for Recheck.  Patient was given instructions and counseling regarding her condition or for health maintenance advice. Please see specific information pulled into the AVS if appropriate.

## 2024-01-04 ENCOUNTER — OFFICE VISIT (OUTPATIENT)
Dept: FAMILY MEDICINE CLINIC | Age: 69
End: 2024-01-04
Payer: MEDICARE

## 2024-01-04 VITALS
OXYGEN SATURATION: 98 % | BODY MASS INDEX: 34.41 KG/M2 | WEIGHT: 187 LBS | DIASTOLIC BLOOD PRESSURE: 73 MMHG | HEIGHT: 62 IN | SYSTOLIC BLOOD PRESSURE: 136 MMHG | HEART RATE: 88 BPM | TEMPERATURE: 98 F

## 2024-01-04 DIAGNOSIS — R05.1 ACUTE COUGH: ICD-10-CM

## 2024-01-04 DIAGNOSIS — R09.81 NASAL CONGESTION: ICD-10-CM

## 2024-01-04 DIAGNOSIS — J01.00 ACUTE NON-RECURRENT MAXILLARY SINUSITIS: Primary | ICD-10-CM

## 2024-01-04 LAB
EXPIRATION DATE: NORMAL
FLUAV AG UPPER RESP QL IA.RAPID: NOT DETECTED
FLUBV AG UPPER RESP QL IA.RAPID: NOT DETECTED
INTERNAL CONTROL: NORMAL
Lab: NORMAL
SARS-COV-2 AG UPPER RESP QL IA.RAPID: NOT DETECTED

## 2024-01-04 PROCEDURE — 1160F RVW MEDS BY RX/DR IN RCRD: CPT | Performed by: NURSE PRACTITIONER

## 2024-01-04 PROCEDURE — 87428 SARSCOV & INF VIR A&B AG IA: CPT | Performed by: NURSE PRACTITIONER

## 2024-01-04 PROCEDURE — 1159F MED LIST DOCD IN RCRD: CPT | Performed by: NURSE PRACTITIONER

## 2024-01-04 PROCEDURE — 3078F DIAST BP <80 MM HG: CPT | Performed by: NURSE PRACTITIONER

## 2024-01-04 PROCEDURE — 3075F SYST BP GE 130 - 139MM HG: CPT | Performed by: NURSE PRACTITIONER

## 2024-01-04 PROCEDURE — 99214 OFFICE O/P EST MOD 30 MIN: CPT | Performed by: NURSE PRACTITIONER

## 2024-01-04 RX ORDER — GUAIFENESIN 600 MG/1
1200 TABLET, EXTENDED RELEASE ORAL 2 TIMES DAILY
Qty: 40 TABLET | Refills: 0 | Status: SHIPPED | OUTPATIENT
Start: 2024-01-04

## 2024-01-04 RX ORDER — AMOXICILLIN AND CLAVULANATE POTASSIUM 875; 125 MG/1; MG/1
1 TABLET, FILM COATED ORAL 2 TIMES DAILY
Qty: 14 TABLET | Refills: 0 | Status: SHIPPED | OUTPATIENT
Start: 2024-01-04 | End: 2024-01-11

## 2024-01-04 RX ORDER — DEXTROMETHORPHAN HYDROBROMIDE AND PROMETHAZINE HYDROCHLORIDE 15; 6.25 MG/5ML; MG/5ML
5 SYRUP ORAL NIGHTLY PRN
Qty: 118 ML | Refills: 0 | Status: SHIPPED | OUTPATIENT
Start: 2024-01-04

## 2024-01-04 NOTE — PROGRESS NOTES
"Chief Complaint  Nasal Congestion (Sx started Saturday ) and Cough    Subjective    Patient is in today with complaints of nasal congestion, sinus pressure and cough that she believes is from drainage. Denies sick contacts, no one else in home is ill. Does have history of seasonal allergies and feels like she has a sinus infection. Denies fever, chills, body aches, chest pain or shortness of breath at this time.         Carolina Ayers presents to [facility]  Cough  This is a new problem. The current episode started in the past 7 days. The problem has been unchanged. The cough is Dry. Associated symptoms include ear congestion, ear pain, headaches, nasal congestion, postnasal drip and rhinorrhea. Pertinent negatives include no chest pain, chills, fever, myalgias, sore throat or shortness of breath. Treatments tried: mucinex D. Her past medical history is significant for environmental allergies.       Objective   Vital Signs:  /73 (BP Location: Left arm, Patient Position: Sitting, Cuff Size: Adult)   Pulse 88   Temp 98 °F (36.7 °C) (Temporal)   Ht 157.5 cm (62.01\")   Wt 84.8 kg (187 lb)   SpO2 98% Comment: room air  BMI 34.19 kg/m²   Estimated body mass index is 34.19 kg/m² as calculated from the following:    Height as of this encounter: 157.5 cm (62.01\").    Weight as of this encounter: 84.8 kg (187 lb).               Physical Exam  HENT:      Head: Normocephalic.      Right Ear: A middle ear effusion is present.      Left Ear: A middle ear effusion is present.      Nose: Congestion present.      Right Sinus: Maxillary sinus tenderness present.      Left Sinus: Maxillary sinus tenderness present.      Mouth/Throat:      Pharynx: Posterior oropharyngeal erythema present.   Cardiovascular:      Rate and Rhythm: Normal rate and regular rhythm.   Pulmonary:      Effort: Pulmonary effort is normal. No respiratory distress.      Breath sounds: Normal breath sounds. No " stridor. No wheezing, rhonchi or rales.   Skin:     General: Skin is warm and dry.   Neurological:      Mental Status: She is alert and oriented to person, place, and time.   Psychiatric:         Mood and Affect: Mood normal.        Result Review :          Results for orders placed or performed in visit on 01/04/24   POCT SARS-CoV-2 Antigen ELISABETH + Flu    Specimen: Swab   Result Value Ref Range    SARS Antigen Not Detected Not Detected, Presumptive Negative    Influenza A Antigen ELISABETH Not Detected Not Detected    Influenza B Antigen ELISABETH Not Detected Not Detected    Internal Control Passed Passed    Lot Number [id number]     Expiration Date 9/9/2024               Assessment and Plan   Diagnoses and all orders for this visit:    1. Acute non-recurrent maxillary sinusitis (Primary)  -     amoxicillin-clavulanate (AUGMENTIN) 875-125 MG per tablet; Take 1 tablet by mouth 2 (Two) Times a Day for 7 days.  Dispense: 14 tablet; Refill: 0    2. Nasal congestion  -     POCT SARS-CoV-2 Antigen ELISABETH + Flu  -     guaiFENesin (Mucinex) 600 MG 12 hr tablet; Take 2 tablets by mouth 2 (Two) Times a Day.  Dispense: 40 tablet; Refill: 0    3. Acute cough  -     promethazine-dextromethorphan (PROMETHAZINE-DM) 6.25-15 MG/5ML syrup; Take 5 mL by mouth At Night As Needed for Cough.  Dispense: 118 mL; Refill: 0    Rapid testing negative. No known exposure. Treating today based on duration of symptoms, color of drainage and discomfort felt. She has taken Augmentin in the past with no issues. Patient to notify office if no improvement or side effects. Force fluids, supportive care as discussed. ER for CP or SOA.         Follow Up   Return if symptoms worsen or fail to improve.  Patient was given instructions and counseling regarding her condition or for health maintenance advice. Please see specific information pulled into the AVS if appropriate.

## 2024-02-15 DIAGNOSIS — N95.1 POST MENOPAUSAL SYNDROME: ICD-10-CM

## 2024-02-15 DIAGNOSIS — F32.9 REACTIVE DEPRESSION: ICD-10-CM

## 2024-02-17 DIAGNOSIS — N95.1 POST MENOPAUSAL SYNDROME: ICD-10-CM

## 2024-02-19 RX ORDER — VENLAFAXINE HYDROCHLORIDE 75 MG/1
75 CAPSULE, EXTENDED RELEASE ORAL DAILY
Qty: 90 CAPSULE | Refills: 0 | Status: SHIPPED | OUTPATIENT
Start: 2024-02-19

## 2024-02-26 ENCOUNTER — OFFICE VISIT (OUTPATIENT)
Dept: ORTHOPEDIC SURGERY | Facility: CLINIC | Age: 69
End: 2024-02-26
Payer: MEDICARE

## 2024-02-26 VITALS
SYSTOLIC BLOOD PRESSURE: 158 MMHG | OXYGEN SATURATION: 97 % | HEIGHT: 62 IN | HEART RATE: 78 BPM | DIASTOLIC BLOOD PRESSURE: 79 MMHG | BODY MASS INDEX: 34.41 KG/M2 | WEIGHT: 187 LBS

## 2024-02-26 DIAGNOSIS — Z96.651 AFTERCARE FOLLOWING RIGHT KNEE JOINT REPLACEMENT SURGERY: ICD-10-CM

## 2024-02-26 DIAGNOSIS — Z47.1 AFTERCARE FOLLOWING RIGHT KNEE JOINT REPLACEMENT SURGERY: ICD-10-CM

## 2024-02-26 DIAGNOSIS — M25.561 RIGHT KNEE PAIN, UNSPECIFIED CHRONICITY: Primary | ICD-10-CM

## 2024-02-26 PROCEDURE — 3077F SYST BP >= 140 MM HG: CPT | Performed by: STUDENT IN AN ORGANIZED HEALTH CARE EDUCATION/TRAINING PROGRAM

## 2024-02-26 PROCEDURE — 3078F DIAST BP <80 MM HG: CPT | Performed by: STUDENT IN AN ORGANIZED HEALTH CARE EDUCATION/TRAINING PROGRAM

## 2024-02-26 PROCEDURE — 1160F RVW MEDS BY RX/DR IN RCRD: CPT | Performed by: STUDENT IN AN ORGANIZED HEALTH CARE EDUCATION/TRAINING PROGRAM

## 2024-02-26 PROCEDURE — 1159F MED LIST DOCD IN RCRD: CPT | Performed by: STUDENT IN AN ORGANIZED HEALTH CARE EDUCATION/TRAINING PROGRAM

## 2024-02-26 PROCEDURE — 99213 OFFICE O/P EST LOW 20 MIN: CPT | Performed by: STUDENT IN AN ORGANIZED HEALTH CARE EDUCATION/TRAINING PROGRAM

## 2024-02-26 RX ORDER — CLINDAMYCIN HYDROCHLORIDE 300 MG/1
CAPSULE ORAL
Qty: 2 CAPSULE | Refills: 0 | Status: SHIPPED | OUTPATIENT
Start: 2024-02-26

## 2024-02-26 NOTE — PROGRESS NOTES
"Chief Complaint  Pain and Follow-up of the Right Knee    Subjective          Carolina Ayers presents to [facility] for   History of Present Illness    The patient presents here today for follow up evaluation of the right knee. The patient is S/P Right Total Knee Arthroplasty with Vidya [employer]ot 23. She is overall doing well. She denies any complications. She has no other complaints.     Allergies   Allergen Reactions    Lisinopril Cough    Penicillins Hives        Social History     Socioeconomic History    Marital status:      Spouse name: Ismael    Number of children: 2   Tobacco Use    Smoking status: Former     Packs/day: 1.00     Years: 30.00     Additional pack years: 0.00     Total pack years: 30.00     Types: Cigarettes     Quit date: 2010     Years since quittin.1    Smokeless tobacco: Never    Tobacco comments:     NO CAFFEINE USE   Vaping Use    Vaping Use: Never used   Substance and Sexual Activity    Alcohol use: Not Currently     Comment: rarely- 2-3 times a year    Drug use: No    Sexual activity: Defer        I reviewed the patient's chief complaint, history of present illness, review of systems, past medical history, surgical history, family history, social history, medications, and allergy list.     REVIEW OF SYSTEMS    Constitutional: Denies fevers, chills, weight loss  Cardiovascular: Denies chest pain, shortness of breath  Skin: Denies rashes, acute skin changes  Neurologic: Denies headache, loss of consciousness  MSK: Right knee pain      Objective   Vital Signs:   /79   Pulse 78   Ht 157.5 cm (62\")   Wt 84.8 kg (187 lb)   SpO2 97%   BMI 34.20 kg/m²     Body mass index is 34.2 kg/m².    Physical Exam    General: Alert. No acute distress.   Right knee- Stable to varus/valgus stress. Stable to anterior/posterior drawer. Full extension. Flexion 110 degrees. Incision well healed. No skin discoloration, atrophy or swelling. Positive EHL, FHL, " GS and TA. Sensation intact to all 5 nerves of the foot. Positive pulses. Calf soft, non-tender.     Procedures    Imaging Results (Most Recent)       Procedure Component Value Units Date/Time    XR Knee 3 View Right [[id number]] Resulted: 02/26/24 1431     Updated: 02/26/24 1432    Narrative:      Indications: Follow-up right total knee arthroplasty    Views: AP, lateral, sunrise right knee    Findings: Cemented cruciate retaining total knee arthroplasty implants in   place.  No periprosthetic fractures or signs of loosening.    Comparative Data: Comparative data found and reviewed today                     Assessment and Plan        XR Knee 3 View Right    Result Date: 2/26/2024  Narrative: Indications: Follow-up right total knee arthroplasty Views: AP, lateral, sunrise right knee Findings: Cemented cruciate retaining total knee arthroplasty implants in place.  No periprosthetic fractures or signs of loosening. Comparative Data: Comparative data found and reviewed today      Diagnoses and all orders for this visit:    1. Right knee pain, unspecified chronicity (Primary)  -     XR Knee 3 View Right    2. Aftercare following right knee joint replacement surgery  -     clindamycin (Cleocin) 300 MG capsule; Take 2 Tablets 1 hr before dental procedure  Dispense: 2 capsule; Refill: 0        Discussed the treatment plan with the patient.  I reviewed the x-rays that were obtained today with the patient. Plan for activity as tolerated. The patient expressed understanding and wished to proceed.       Will obtain X-Rays of right knee at next visit.     Call or return if worsening symptoms.    Scribed for Shabbir Wood MD by Marilee Everett  02/26/2024   14:09 EST         Follow Up       6 months    Patient was given instructions and counseling regarding her condition or for health maintenance advice. Please see specific information pulled into the AVS if appropriate.       I have personally performed the services  described in this document as scribed by the above individual and it is both accurate and complete.     Shabbir Wood MD  02/26/24  14:37 EST

## 2024-03-05 DIAGNOSIS — N95.1 POST MENOPAUSAL SYNDROME: ICD-10-CM

## 2024-03-05 RX ORDER — VENLAFAXINE 37.5 MG/1
37.5 TABLET ORAL 2 TIMES DAILY
Qty: 180 TABLET | Refills: 1 | OUTPATIENT
Start: 2024-03-05

## 2024-03-12 ENCOUNTER — OFFICE VISIT (OUTPATIENT)
Dept: FAMILY MEDICINE CLINIC | Age: 69
End: 2024-03-12
Payer: MEDICARE

## 2024-03-12 ENCOUNTER — HOSPITAL ENCOUNTER (OUTPATIENT)
Dept: GENERAL RADIOLOGY | Facility: HOSPITAL | Age: 69
Discharge: HOME | End: 2024-03-12
Payer: MEDICARE

## 2024-03-12 ENCOUNTER — LAB (OUTPATIENT)
Dept: LAB | Facility: HOSPITAL | Age: 69
End: 2024-03-12
Payer: MEDICARE

## 2024-03-12 VITALS
HEART RATE: 80 BPM | HEIGHT: 62 IN | BODY MASS INDEX: 35.77 KG/M2 | DIASTOLIC BLOOD PRESSURE: 85 MMHG | SYSTOLIC BLOOD PRESSURE: 137 MMHG | TEMPERATURE: 97.6 F | WEIGHT: 194.4 LBS

## 2024-03-12 DIAGNOSIS — M25.562 ACUTE PAIN OF LEFT KNEE: ICD-10-CM

## 2024-03-12 DIAGNOSIS — E78.2 MIXED HYPERLIPIDEMIA: ICD-10-CM

## 2024-03-12 DIAGNOSIS — E66.01 MORBID OBESITY: ICD-10-CM

## 2024-03-12 DIAGNOSIS — I10 ESSENTIAL HYPERTENSION: ICD-10-CM

## 2024-03-12 DIAGNOSIS — R73.9 HYPERGLYCEMIA: ICD-10-CM

## 2024-03-12 DIAGNOSIS — R63.5 WEIGHT GAIN: ICD-10-CM

## 2024-03-12 DIAGNOSIS — Z79.899 OTHER LONG TERM (CURRENT) DRUG THERAPY: ICD-10-CM

## 2024-03-12 DIAGNOSIS — M25.562 ACUTE PAIN OF LEFT KNEE: Primary | ICD-10-CM

## 2024-03-12 LAB
ALBUMIN SERPL-MCNC: 4.4 G/DL (ref 3.5–5.2)
ALBUMIN/GLOB SERPL: 1.6 G/DL
ALP SERPL-CCNC: 85 U/L (ref 39–117)
ALT SERPL W P-5'-P-CCNC: 20 U/L (ref 1–33)
ANION GAP SERPL CALCULATED.3IONS-SCNC: 10 MMOL/L (ref 5–15)
AST SERPL-CCNC: 23 U/L (ref 1–32)
BASOPHILS # BLD AUTO: 0.06 10*3/MM3 (ref 0–0.2)
BASOPHILS NFR BLD AUTO: 0.8 % (ref 0–1.5)
BILIRUB SERPL-MCNC: 0.2 MG/DL (ref 0–1.2)
BUN SERPL-MCNC: 16 MG/DL (ref 8–23)
BUN/CREAT SERPL: 14.5 (ref 7–25)
CALCIUM SPEC-SCNC: 10.5 MG/DL (ref 8.6–10.5)
CHLORIDE SERPL-SCNC: 103 MMOL/L (ref 98–107)
CHOLEST SERPL-MCNC: 149 MG/DL (ref 0–200)
CO2 SERPL-SCNC: 30 MMOL/L (ref 22–29)
CORTIS SERPL-MCNC: 10.9 MCG/DL
CREAT SERPL-MCNC: 1.1 MG/DL (ref 0.57–1)
CRP SERPL-MCNC: 0.41 MG/DL (ref 0–0.5)
DEPRECATED RDW RBC AUTO: 42 FL (ref 37–54)
EGFRCR SERPLBLD CKD-EPI 2021: 54.5 ML/MIN/1.73
EOSINOPHIL # BLD AUTO: 0.28 10*3/MM3 (ref 0–0.4)
EOSINOPHIL NFR BLD AUTO: 3.9 % (ref 0.3–6.2)
ERYTHROCYTE [DISTWIDTH] IN BLOOD BY AUTOMATED COUNT: 12.1 % (ref 12.3–15.4)
ERYTHROCYTE [SEDIMENTATION RATE] IN BLOOD: 13 MM/HR (ref 0–30)
GLOBULIN UR ELPH-MCNC: 2.8 GM/DL
GLUCOSE SERPL-MCNC: 93 MG/DL (ref 65–99)
HBA1C MFR BLD: 5.8 % (ref 4.8–5.6)
HCT VFR BLD AUTO: 36.1 % (ref 34–46.6)
HDLC SERPL-MCNC: 51 MG/DL (ref 40–60)
HGB BLD-MCNC: 12.1 G/DL (ref 12–15.9)
IMM GRANULOCYTES # BLD AUTO: 0.02 10*3/MM3 (ref 0–0.05)
IMM GRANULOCYTES NFR BLD AUTO: 0.3 % (ref 0–0.5)
LDLC SERPL CALC-MCNC: 67 MG/DL (ref 0–100)
LDLC/HDLC SERPL: 1.2 {RATIO}
LYMPHOCYTES # BLD AUTO: 1.44 10*3/MM3 (ref 0.7–3.1)
LYMPHOCYTES NFR BLD AUTO: 20.1 % (ref 19.6–45.3)
MCH RBC QN AUTO: 31.8 PG (ref 26.6–33)
MCHC RBC AUTO-ENTMCNC: 33.5 G/DL (ref 31.5–35.7)
MCV RBC AUTO: 94.8 FL (ref 79–97)
MONOCYTES # BLD AUTO: 0.86 10*3/MM3 (ref 0.1–0.9)
MONOCYTES NFR BLD AUTO: 12 % (ref 5–12)
NEUTROPHILS NFR BLD AUTO: 4.5 10*3/MM3 (ref 1.7–7)
NEUTROPHILS NFR BLD AUTO: 62.9 % (ref 42.7–76)
NRBC BLD AUTO-RTO: 0 /100 WBC (ref 0–0.2)
PLATELET # BLD AUTO: 302 10*3/MM3 (ref 140–450)
PMV BLD AUTO: 10.4 FL (ref 6–12)
POTASSIUM SERPL-SCNC: 4.3 MMOL/L (ref 3.5–5.2)
PROT SERPL-MCNC: 7.2 G/DL (ref 6–8.5)
RBC # BLD AUTO: 3.81 10*6/MM3 (ref 3.77–5.28)
SODIUM SERPL-SCNC: 143 MMOL/L (ref 136–145)
T4 FREE SERPL-MCNC: 1.13 NG/DL (ref 0.93–1.7)
TRIGL SERPL-MCNC: 185 MG/DL (ref 0–150)
TSH SERPL DL<=0.05 MIU/L-ACNC: 1.9 UIU/ML (ref 0.27–4.2)
URATE SERPL-MCNC: 6.1 MG/DL (ref 2.4–5.7)
VLDLC SERPL-MCNC: 31 MG/DL (ref 5–40)
WBC NRBC COR # BLD AUTO: 7.16 10*3/MM3 (ref 3.4–10.8)

## 2024-03-12 PROCEDURE — 85025 COMPLETE CBC W/AUTO DIFF WBC: CPT

## 2024-03-12 PROCEDURE — 36415 COLL VENOUS BLD VENIPUNCTURE: CPT

## 2024-03-12 PROCEDURE — 84439 ASSAY OF FREE THYROXINE: CPT

## 2024-03-12 PROCEDURE — 80053 COMPREHEN METABOLIC PANEL: CPT

## 2024-03-12 PROCEDURE — 85652 RBC SED RATE AUTOMATED: CPT

## 2024-03-12 PROCEDURE — 82533 TOTAL CORTISOL: CPT

## 2024-03-12 PROCEDURE — 84550 ASSAY OF BLOOD/URIC ACID: CPT

## 2024-03-12 PROCEDURE — 86140 C-REACTIVE PROTEIN: CPT

## 2024-03-12 PROCEDURE — 80061 LIPID PANEL: CPT

## 2024-03-12 PROCEDURE — 84443 ASSAY THYROID STIM HORMONE: CPT

## 2024-03-12 PROCEDURE — 83036 HEMOGLOBIN GLYCOSYLATED A1C: CPT

## 2024-03-12 PROCEDURE — 73562 X-RAY EXAM OF KNEE 3: CPT

## 2024-03-12 RX ORDER — FLUTICASONE PROPIONATE 50 MCG
1 SPRAY, SUSPENSION (ML) NASAL DAILY
COMMUNITY
Start: 2024-02-15 | End: 2025-02-14

## 2024-03-12 NOTE — PROGRESS NOTES
Carolina Ayers presents to [facility].    Chief Complaint:  Left knee pain    Subjective   History of Present Illness:  Carolina is being seen today for follow-up on left knee pain.  She has been having pain of the anterior knee for the last couple of weeks or longer.  She is not aware of any specific injury to the knee that may have caused this.  She has really not had significant difficulty with the left knee previously.  She did have a right-sided knee replacement in August 2023.  She became concerned yesterday because of some swelling of the left lateral knee.  She has had some issues with phlebitis.    Review of Systems:  Review of Systems   Constitutional:  Negative for chills and fever.   Respiratory:  Positive for cough. Negative for shortness of breath.    Cardiovascular:  Negative for chest pain and palpitations.   Gastrointestinal:  Negative for abdominal pain, nausea and vomiting.       Objective   Medical History:  Past Medical History:    Anesthesia    Pt HARD TO WAKE DURING RECOVERY PER FAMILY REPORT    Anxiety and depression    Arthritis    Embolism and thrombosis    LEFT LOWER EXTREMITY    Essential hypertension    History of chest pain    SEEN DR SIM/NO  ISSUES SINCE    History of lumbar fusion    History of orthostatic hypotension    SEEN BY DR. PRETTY-PT STATES NO RECENT ISSUES    History of skin cancer    REMOVED    HL (hearing loss)    Hyperlipidemia    Hypokalemia    Impaired functional mobility, balance, gait, and endurance    Localized edema    LEGS/ANKLES-TAKES DIURETIC    Lumbar herniated disc    Orthostatic hypotension    PONV (postoperative nausea and vomiting)    Restrictive lung disease    DR. ONEAL    Thyroid nodule    MONITORED    Tinnitus    Varicose veins of both lower extremities     Past Surgical History:    ADENOIDECTOMY    BREAST LUMPECTOMY    BENIGN    CARPAL TUNNEL RELEASE    CHOLECYSTECTOMY    COLONOSCOPY    Hyperplastic polyp, otherwise normal     EXTERNAL EAR SURGERY    cyst removed from ear    EYE SURGERY    HERNIA REPAIR    HYSTERECTOMY    partial    KNEE SURGERY    FRACTURE REPAIR    LUMBAR DISCECTOMY FUSION INSTRUMENTATION    Procedure: LUMBAR FUSION DECOMPRESSON WITH PEDICLE SCREWS Lumbar 4-5,  posterolateral fusion;  Surgeon: Jarrett Vasques IV, MD;  Location: [facility] MAIN OR;  Service: Neurosurgery    SHOULDER SURGERY    RCR    SUBTOTAL HYSTERECTOMY    TONSILLECTOMY    TOTAL KNEE ARTHROPLASTY    Procedure: RIGHT TOTAL KNEE ARTHROPLASTY WITH ESTHER ROBOT.;  Surgeon: Shabbir Wood MD;  Location: [facility] MAIN OR;  Service: Robotics - Ortho;  Laterality: Right;    TUBAL ABDOMINAL LIGATION    US GUIDED FINE NEEDLE ASPIRATION    WRIST SURGERY    Cyst removed      Family History   Problem Relation Age of Onset    Macular degeneration Mother     Vision loss Mother         Macular degeneration    Coronary artery disease Father     Heart disease Father         Heart Attack    Malig Hyperthermia Neg Hx      Social History     Tobacco Use    Smoking status: Former     Current packs/day: 0.00     Average packs/day: 1 pack/day for 30.0 years (30.0 ttl pk-yrs)     Types: Cigarettes     Start date: 1980     Quit date: 2010     Years since quittin.2    Smokeless tobacco: Never    Tobacco comments:     NO CAFFEINE USE   Substance Use Topics    Alcohol use: Not Currently     Comment: rarely- 2-3 times a year       Health Maintenance Due   Topic Date Due    RSV Vaccine - Adults (1 - 1-dose 60+ series) Never done    TDAP/TD VACCINES (2 - Td or Tdap) 10/29/2020        Immunization History   Administered Date(s) Administered    COVID-19 (MODERNA) 1st,2nd,3rd Dose Monovalent 2021, 2021, 10/26/2021    COVID-19 (MODERNA) BIVALENT 12+YRS 2022    COVID-19 (MODERNA) Monovalent Original Booster 2022    COVID-19 F23 (MODERNA) 12YRS+ (SPIKEVAX) 2023    Fluzone High-Dose 65+yrs 10/12/2021, 10/21/2022, 2023    Hepatitis A 2018     Influenza Seasonal Injectable 10/01/2018, 09/01/2019, 10/01/2020    Influenza, Unspecified 09/20/2019    Pneumococcal Conjugate 13-Valent (PCV13) 10/21/2020    Pneumococcal Polysaccharide (PPSV23) 11/09/2009, 10/26/2021    Tdap 10/29/2010       Allergies   Allergen Reactions    Lisinopril Cough    Penicillins Hives        Medications:  Current Outpatient Medications on File Prior to Visit   Medication Sig    aspirin 81 MG EC tablet Take 1 tablet by mouth Daily.    baclofen (LIORESAL) 10 MG tablet Take 1 tablet by mouth 2 (Two) Times a Day As Needed for Muscle Spasms.    diphenhydrAMINE (BENADRYL) 25 mg capsule Take 1 capsule by mouth At Night As Needed for Itching.    docusate sodium (COLACE) 100 MG capsule Take 1 capsule by mouth Daily.    fluticasone (FLONASE) 50 MCG/ACT nasal spray 1 spray into the nostril(s) as directed by provider Daily.    furosemide (LASIX) 40 MG tablet Take 1 tablet by mouth Daily.    losartan (COZAAR) 50 MG tablet Take 1 tablet by mouth Daily.    melatonin 5 MG tablet tablet Take 1 tablet by mouth At Night As Needed.    meloxicam (MOBIC) 15 MG tablet Take 1 tablet by mouth Daily. Take with food.    NIFEdipine CC (Adalat CC) 30 MG 24 hr tablet Take 1 tablet by mouth Every Night.    simvastatin (ZOCOR) 20 MG tablet Take 1 tablet by mouth Every Night.    venlafaxine XR (EFFEXOR-XR) 75 MG 24 hr capsule Take 1 capsule by mouth Daily.    [DISCONTINUED] clindamycin (Cleocin) 300 MG capsule Take 2 Tablets 1 hr before dental procedure    [DISCONTINUED] estradiol (ESTRACE) 1 MG tablet Take 1 tablet by mouth Every Night.    [DISCONTINUED] guaiFENesin (Mucinex) 600 MG 12 hr tablet Take 2 tablets by mouth 2 (Two) Times a Day. (Patient not taking: Reported on 3/12/2024)    [DISCONTINUED] hydroCHLOROthiazide (HYDRODIURIL) 25 MG tablet Take 1 tablet by mouth Daily.    [DISCONTINUED] promethazine-dextromethorphan (PROMETHAZINE-DM) 6.25-15 MG/5ML syrup Take 5 mL by mouth At Night As Needed for Cough.  "(Patient not taking: Reported on 3/12/2024)     No current facility-administered medications on file prior to visit.     Vital Signs:   Vitals:    03/12/24 1510 03/12/24 1551   BP: 156/65 137/85   BP Location: Left arm Left arm   Patient Position: Sitting Sitting   Pulse: 82 80   Temp: 97.6 °F (36.4 °C)    TempSrc: Oral    Weight: 88.2 kg (194 lb 6.4 oz)    Height: 157.5 cm (62.01\")    Body mass index is 35.55 kg/m².    Physical Exam:  Physical Exam  Vitals reviewed.   Constitutional:       General: She is not in acute distress.     Appearance: She is not ill-appearing.   Eyes:      Pupils: Pupils are equal, round, and reactive to light.   Neck:      Comments: No thyromegaly  Cardiovascular:      Rate and Rhythm: Normal rate and regular rhythm.   Pulmonary:      Effort: Pulmonary effort is normal.      Breath sounds: Normal breath sounds.   Abdominal:      General: There is no distension.      Palpations: Abdomen is soft.      Tenderness: There is no abdominal tenderness.   Musculoskeletal:      Cervical back: Neck supple.   Lymphadenopathy:      Cervical: No cervical adenopathy.   Skin:     Findings: No lesion or rash.   Neurological:      Mental Status: She is alert.         Result Review   The following data was reviewed by Momo Renteria MD on 03/12/2024.  Lab Results   Component Value Date    WBC 12.88 (H) 08/25/2023    HGB 11.3 (L) 08/25/2023    HCT 35.3 08/25/2023    MCV 99.2 (H) 08/25/2023     08/25/2023     Lab Results   Component Value Date    GLUCOSE 139 (H) 08/25/2023    BUN 14 08/25/2023    CREATININE 0.88 08/25/2023     (L) 08/25/2023    K 4.8 08/25/2023     08/25/2023    CO2 27.2 08/25/2023    CALCIUM 9.3 08/25/2023    PROTEINTOT 7.4 08/15/2023    ALBUMIN 4.6 08/15/2023    ALT 15 08/15/2023    AST 20 08/15/2023    ALKPHOS 82 08/15/2023    BILITOT 0.3 08/15/2023    EGFR 71.7 08/25/2023    GLOB 2.8 08/15/2023    AGRATIO 1.6 08/15/2023    BCR 15.9 08/25/2023    ANIONGAP 6.8 " 08/25/2023      Lab Results   Component Value Date    CHOL 146 05/02/2023    CHLPL 157 08/24/2020    TRIG 92 05/02/2023    HDL 62 (H) 05/02/2023    LDL 67 05/02/2023     Lab Results   Component Value Date    TSH 1.980 05/02/2023     Lab Results   Component Value Date    HGBA1C 5.80 (H) 08/15/2023     Class 2 Severe Obesity (BMI >=35 and <=39.9). Obesity-related health conditions include the following: hypertension, dyslipidemias, and osteoarthritis. Obesity is worsening. BMI is is above average; BMI management plan is completed. We discussed portion control and increasing exercise.         Assessment and Plan:   Today, we have reviewed her care.  Carolina is frustrated by the pain she is having and the ongoing edema.  We will move ahead with evaluation of her concerns as noted below.  I may consider a brief steroid treatment to try to calm this knee down.  We will see what the labs show and then reach back out to her tomorrow.  Her blood pressure continues to run above goal for the most part at home.    Diagnoses and all orders for this visit:    1. Acute pain of left knee (Primary)  -     XR Knee 3 View Left; Future  -     CBC Auto Differential; Future  -     Sedimentation Rate; Future  -     C-reactive protein; Future  -     Uric Acid; Future    2. Essential hypertension  -     Comprehensive Metabolic Panel; Future    3. Mixed hyperlipidemia  -     TSH+Free T4; Future  -     Comprehensive Metabolic Panel; Future  -     Lipid Panel; Future    4. Other long term (current) drug therapy  -     CBC Auto Differential; Future  -     Cortisol; Future    5. Morbid obesity  -     Comprehensive Metabolic Panel; Future  -     Hemoglobin A1c; Future  -     Cortisol; Future    6. Weight gain  -     Cortisol; Future    7. Hyperglycemia  -     Hemoglobin A1c; Future    Follow Up  Return for Next scheduled follow up.  Patient was given instructions and counseling regarding her condition or for health maintenance advice. Please see  specific information pulled into the AVS if appropriate.

## 2024-03-13 RX ORDER — METHYLPREDNISOLONE 4 MG/1
TABLET ORAL
Qty: 1 EACH | Refills: 0 | Status: SHIPPED | OUTPATIENT
Start: 2024-03-13

## 2024-04-04 ENCOUNTER — PATIENT MESSAGE (OUTPATIENT)
Dept: FAMILY MEDICINE CLINIC | Age: 69
End: 2024-04-04
Payer: MEDICARE

## 2024-04-04 DIAGNOSIS — N95.1 POST MENOPAUSAL SYNDROME: ICD-10-CM

## 2024-04-04 RX ORDER — VENLAFAXINE HYDROCHLORIDE 150 MG/1
150 CAPSULE, EXTENDED RELEASE ORAL DAILY
Qty: 90 CAPSULE | Refills: 1 | Status: SHIPPED | OUTPATIENT
Start: 2024-04-04

## 2024-04-04 NOTE — TELEPHONE ENCOUNTER
Pt states she has been taking the 75mg, but would like to increase it, she has a lot going on right now. Denies any thoughts of suicide. Would like it sent to [facility]

## 2024-04-04 NOTE — TELEPHONE ENCOUNTER
Please call the patient regarding this.  A prescription for the 75 mg dose of venlafaxine was sent on 2/19/2024 to [facility].  Is she wanting the dose increased to 150 mg?  And is [facility] okay?  Thanks.

## 2024-05-16 DIAGNOSIS — N95.1 POST MENOPAUSAL SYNDROME: ICD-10-CM

## 2024-05-16 RX ORDER — VENLAFAXINE HYDROCHLORIDE 75 MG/1
75 CAPSULE, EXTENDED RELEASE ORAL DAILY
Qty: 90 CAPSULE | Refills: 0 | OUTPATIENT
Start: 2024-05-16

## 2024-06-10 ENCOUNTER — OFFICE VISIT (OUTPATIENT)
Dept: FAMILY MEDICINE CLINIC | Age: 69
End: 2024-06-10
Payer: MEDICARE

## 2024-06-10 VITALS
WEIGHT: 189 LBS | TEMPERATURE: 98.2 F | BODY MASS INDEX: 34.78 KG/M2 | HEART RATE: 76 BPM | OXYGEN SATURATION: 99 % | DIASTOLIC BLOOD PRESSURE: 75 MMHG | SYSTOLIC BLOOD PRESSURE: 147 MMHG | HEIGHT: 62 IN

## 2024-06-10 DIAGNOSIS — N95.1 POST MENOPAUSAL SYNDROME: ICD-10-CM

## 2024-06-10 DIAGNOSIS — R60.0 LOCALIZED EDEMA: ICD-10-CM

## 2024-06-10 DIAGNOSIS — I25.84 CORONARY ARTERY CALCIFICATION: ICD-10-CM

## 2024-06-10 DIAGNOSIS — I10 ESSENTIAL HYPERTENSION: ICD-10-CM

## 2024-06-10 DIAGNOSIS — I25.10 CORONARY ARTERY CALCIFICATION: ICD-10-CM

## 2024-06-10 DIAGNOSIS — M17.11 PRIMARY OSTEOARTHRITIS OF RIGHT KNEE: ICD-10-CM

## 2024-06-10 DIAGNOSIS — E78.2 MIXED HYPERLIPIDEMIA: ICD-10-CM

## 2024-06-10 PROCEDURE — 1159F MED LIST DOCD IN RCRD: CPT | Performed by: FAMILY MEDICINE

## 2024-06-10 PROCEDURE — 1160F RVW MEDS BY RX/DR IN RCRD: CPT | Performed by: FAMILY MEDICINE

## 2024-06-10 PROCEDURE — G2211 COMPLEX E/M VISIT ADD ON: HCPCS | Performed by: FAMILY MEDICINE

## 2024-06-10 PROCEDURE — 99214 OFFICE O/P EST MOD 30 MIN: CPT | Performed by: FAMILY MEDICINE

## 2024-06-10 PROCEDURE — 3077F SYST BP >= 140 MM HG: CPT | Performed by: FAMILY MEDICINE

## 2024-06-10 PROCEDURE — 1125F AMNT PAIN NOTED PAIN PRSNT: CPT | Performed by: FAMILY MEDICINE

## 2024-06-10 PROCEDURE — 3078F DIAST BP <80 MM HG: CPT | Performed by: FAMILY MEDICINE

## 2024-06-10 RX ORDER — MELOXICAM 15 MG/1
15 TABLET ORAL DAILY
Qty: 90 TABLET | Refills: 3 | Status: SHIPPED | OUTPATIENT
Start: 2024-06-10

## 2024-06-10 RX ORDER — VENLAFAXINE HYDROCHLORIDE 150 MG/1
150 CAPSULE, EXTENDED RELEASE ORAL DAILY
Qty: 90 CAPSULE | Refills: 1 | Status: SHIPPED | OUTPATIENT
Start: 2024-06-10

## 2024-06-10 RX ORDER — SIMVASTATIN 20 MG
20 TABLET ORAL NIGHTLY
Qty: 90 TABLET | Refills: 3 | Status: SHIPPED | OUTPATIENT
Start: 2024-06-10

## 2024-06-10 RX ORDER — NIFEDIPINE 30 MG
30 TABLET, EXTENDED RELEASE ORAL NIGHTLY
Qty: 90 TABLET | Refills: 3 | Status: SHIPPED | OUTPATIENT
Start: 2024-06-10

## 2024-06-10 RX ORDER — FUROSEMIDE 40 MG/1
40 TABLET ORAL DAILY
Qty: 90 TABLET | Refills: 3 | Status: SHIPPED | OUTPATIENT
Start: 2024-06-10

## 2024-06-10 RX ORDER — LOSARTAN POTASSIUM 50 MG/1
50 TABLET ORAL DAILY
Qty: 90 TABLET | Refills: 3 | Status: SHIPPED | OUTPATIENT
Start: 2024-06-10

## 2024-06-10 NOTE — PROGRESS NOTES
Carolina Ayers presents to [facility].    Chief Complaint:  Blood pressure, cholesterol, anxiety    Subjective   History of Present Illness:  Carolina is being seen today for follow-up on her care.  She has hypertension and elevated cholesterol for which she remains on treatments as noted.  Her blood pressure is just above goal on initial check.  Her blood pressure will vary somewhat at home, but it was for the most part above goal.  She is not aware of obvious side effects from the blood pressure cholesterol-lowering medications.    She also has quite a bit of stress that she deals with, and she currently takes Effexor XR for this.  She says the medication is helpful, and she would like to continue it at this time.    Review of Systems:  Review of Systems   Constitutional:  Negative for chills and fever.   Respiratory:  Negative for cough and shortness of breath.    Cardiovascular:  Negative for chest pain and palpitations.   Gastrointestinal:  Negative for abdominal pain, nausea and vomiting.      Objective   Medical History:  Past Medical History:    Anesthesia    Pt HARD TO WAKE DURING RECOVERY PER FAMILY REPORT    Anxiety and depression    Arthritis    Embolism and thrombosis    LEFT LOWER EXTREMITY    Essential hypertension    History of chest pain    SEEN DR SIM/NO  ISSUES SINCE    History of lumbar fusion    History of orthostatic hypotension    SEEN BY DR. PRETTY-PT STATES NO RECENT ISSUES    History of skin cancer    REMOVED    HL (hearing loss)    Hyperlipidemia    Hypokalemia    Impaired functional mobility, balance, gait, and endurance    Localized edema    LEGS/ANKLES-TAKES DIURETIC    Lumbar herniated disc    Orthostatic hypotension    PONV (postoperative nausea and vomiting)    Restrictive lung disease    DR. ONEAL    Thyroid nodule    MONITORED    Tinnitus    Varicose veins of both lower extremities     Past Surgical History:    ADENOIDECTOMY    BREAST LUMPECTOMY     BENIGN    CARPAL TUNNEL RELEASE    CHOLECYSTECTOMY    COLONOSCOPY    Hyperplastic polyp, otherwise normal    EXTERNAL EAR SURGERY    cyst removed from ear    EYE SURGERY    HERNIA REPAIR    HYSTERECTOMY    partial    KNEE SURGERY    FRACTURE REPAIR    LUMBAR DISCECTOMY FUSION INSTRUMENTATION    Procedure: LUMBAR FUSION DECOMPRESSON WITH PEDICLE SCREWS Lumbar 4-5,  posterolateral fusion;  Surgeon: Jarrett Vasques IV, MD;  Location: [facility] MAIN OR;  Service: Neurosurgery    SHOULDER SURGERY    RCR    SUBTOTAL HYSTERECTOMY    TONSILLECTOMY    TOTAL KNEE ARTHROPLASTY    Procedure: RIGHT TOTAL KNEE ARTHROPLASTY WITH ESTHER ROBOT.;  Surgeon: Shabbir Wood MD;  Location: [facility] MAIN OR;  Service: Robotics - Ortho;  Laterality: Right;    TUBAL ABDOMINAL LIGATION    US GUIDED FINE NEEDLE ASPIRATION    WRIST SURGERY    Cyst removed      Family History   Problem Relation Age of Onset    Macular degeneration Mother     Vision loss Mother         Macular degeneration    Coronary artery disease Father     Heart disease Father         Heart Attack    Malig Hyperthermia Neg Hx      Social History     Tobacco Use    Smoking status: Former     Current packs/day: 0.00     Average packs/day: 1 pack/day for 30.0 years (30.0 ttl pk-yrs)     Types: Cigarettes     Start date: 1980     Quit date: 2010     Years since quittin.4    Smokeless tobacco: Never    Tobacco comments:     NO CAFFEINE USE   Substance Use Topics    Alcohol use: Not Currently     Comment: rarely- 2-3 times a year       Health Maintenance Due   Topic Date Due    RSV Vaccine - Adults (1 - 1-dose 60+ series) Never done    TDAP/TD VACCINES (2 - Td or Tdap) 10/29/2020    COVID-19 Vaccine (2023- season) 2024        Immunization History   Administered Date(s) Administered    COVID-19 (MODERNA) 1st,2nd,3rd Dose Monovalent 2021, 2021, 10/26/2021    COVID-19 (MODERNA) BIVALENT 12+YRS 2022    COVID-19 (MODERNA) Monovalent Original  Booster 05/19/2022    COVID-19 F23 (MODERNA) 12YRS+ (SPIKEVAX) 11/06/2023    Fluzone High-Dose 65+yrs 10/12/2021, 10/21/2022, 11/06/2023    Hepatitis A 11/28/2018    Influenza Seasonal Injectable 10/01/2018, 09/01/2019, 10/01/2020    Influenza, Unspecified 09/20/2019    Pneumococcal Conjugate 13-Valent (PCV13) 10/21/2020    Pneumococcal Polysaccharide (PPSV23) 11/09/2009, 10/26/2021    Tdap 10/29/2010       Allergies   Allergen Reactions    Lisinopril Cough    Penicillins Hives      Medications:  Current Outpatient Medications on File Prior to Visit   Medication Sig    aspirin 81 MG EC tablet Take 1 tablet by mouth Daily.    baclofen (LIORESAL) 10 MG tablet Take 1 tablet by mouth 2 (Two) Times a Day As Needed for Muscle Spasms.    diphenhydrAMINE (BENADRYL) 25 mg capsule Take 1 capsule by mouth At Night As Needed for Itching.    docusate sodium (COLACE) 100 MG capsule Take 1 capsule by mouth Daily.    fluticasone (FLONASE) 50 MCG/ACT nasal spray 1 spray into the nostril(s) as directed by provider Daily.    melatonin 5 MG tablet tablet Take 1 tablet by mouth At Night As Needed.    [DISCONTINUED] furosemide (LASIX) 40 MG tablet Take 1 tablet by mouth Daily.    [DISCONTINUED] losartan (COZAAR) 50 MG tablet Take 1 tablet by mouth Daily.    [DISCONTINUED] meloxicam (MOBIC) 15 MG tablet Take 1 tablet by mouth Daily. Take with food.    [DISCONTINUED] NIFEdipine CC (Adalat CC) 30 MG 24 hr tablet Take 1 tablet by mouth Every Night.    [DISCONTINUED] simvastatin (ZOCOR) 20 MG tablet Take 1 tablet by mouth Every Night.    [DISCONTINUED] venlafaxine XR (EFFEXOR-XR) 150 MG 24 hr capsule Take 1 capsule by mouth Daily.    [DISCONTINUED] estradiol (ESTRACE) 1 MG tablet Take 1 tablet by mouth Every Night.    [DISCONTINUED] hydroCHLOROthiazide (HYDRODIURIL) 25 MG tablet Take 1 tablet by mouth Daily.    [DISCONTINUED] methylPREDNISolone (MEDROL) 4 MG dose pack Take as directed on package instructions. (Patient not taking: Reported on  "6/10/2024)     No current facility-administered medications on file prior to visit.     Vital Signs:   Vitals:    06/10/24 1254 06/10/24 1323   BP: 146/62 147/75   BP Location: Right arm Right arm   Patient Position: Sitting Sitting   Pulse: 78 76   Temp: 98.2 °F (36.8 °C)    TempSrc: Oral    SpO2: 99%    Weight: 85.7 kg (189 lb)    Height: 157.5 cm (62.01\")    Body mass index is 34.56 kg/m².    Physical Exam:  Physical Exam  Vitals reviewed.   Constitutional:       General: She is not in acute distress.     Appearance: She is obese. She is not ill-appearing.   Eyes:      Pupils: Pupils are equal, round, and reactive to light.   Neck:      Comments: No thyromegaly  Cardiovascular:      Rate and Rhythm: Normal rate and regular rhythm.   Pulmonary:      Effort: Pulmonary effort is normal.      Breath sounds: Normal breath sounds.   Abdominal:      General: There is no distension.      Palpations: Abdomen is soft.      Tenderness: There is no abdominal tenderness.   Musculoskeletal:      Cervical back: Neck supple.   Lymphadenopathy:      Cervical: No cervical adenopathy.   Skin:     Findings: No lesion or rash.   Neurological:      Mental Status: She is alert.     Result Review   The following data was reviewed by Momo Renteria MD on 06/10/2024.  Lab Results   Component Value Date    WBC 7.16 03/12/2024    HGB 12.1 03/12/2024    HCT 36.1 03/12/2024    MCV 94.8 03/12/2024     03/12/2024     Lab Results   Component Value Date    GLUCOSE 93 03/12/2024    BUN 16 03/12/2024    CREATININE 1.10 (H) 03/12/2024     03/12/2024    K 4.3 03/12/2024     03/12/2024    CO2 30.0 (H) 03/12/2024    CALCIUM 10.5 03/12/2024    PROTEINTOT 7.2 03/12/2024    ALBUMIN 4.4 03/12/2024    ALT 20 03/12/2024    AST 23 03/12/2024    ALKPHOS 85 03/12/2024    BILITOT 0.2 03/12/2024    EGFR 54.5 (L) 03/12/2024    GLOB 2.8 03/12/2024    AGRATIO 1.6 03/12/2024    BCR 14.5 03/12/2024    ANIONGAP 10.0 03/12/2024      Lab " Results   Component Value Date    CHOL 149 03/12/2024    CHLPL 157 08/24/2020    TRIG 185 (H) 03/12/2024    HDL 51 03/12/2024    LDL 67 03/12/2024     Lab Results   Component Value Date    TSH 1.900 03/12/2024     Lab Results   Component Value Date    HGBA1C 5.80 (H) 03/12/2024     BMI is >= 30 and <35. (Class 1 Obesity). The following options were offered after discussion;: exercise counseling/recommendations and nutrition counseling/recommendations         Assessment and Plan:   Today, we have reviewed her care.  Carolina seems to be doing fairly well overall.  For the near term, we will make no immediate change in care.  Her blood pressure will be rechecked before she leaves.  We also reviewed her most recent labs and we will hold off on additional testing today.  Tentative follow-up with me will be again in about 6 months, sooner if needed.    Diagnoses and all orders for this visit:    1. Essential hypertension  -     furosemide (LASIX) 40 MG tablet; Take 1 tablet by mouth Daily.  Dispense: 90 tablet; Refill: 3  -     losartan (COZAAR) 50 MG tablet; Take 1 tablet by mouth Daily.  Dispense: 90 tablet; Refill: 3  -     NIFEdipine CC (Adalat CC) 30 MG 24 hr tablet; Take 1 tablet by mouth Every Night.  Dispense: 90 tablet; Refill: 3    2. Localized edema  -     furosemide (LASIX) 40 MG tablet; Take 1 tablet by mouth Daily.  Dispense: 90 tablet; Refill: 3    3. Primary osteoarthritis of right knee  -     meloxicam (MOBIC) 15 MG tablet; Take 1 tablet by mouth Daily. Take with food.  Dispense: 90 tablet; Refill: 3    4. Coronary artery calcification  -     simvastatin (ZOCOR) 20 MG tablet; Take 1 tablet by mouth Every Night.  Dispense: 90 tablet; Refill: 3    5. Mixed hyperlipidemia  -     simvastatin (ZOCOR) 20 MG tablet; Take 1 tablet by mouth Every Night.  Dispense: 90 tablet; Refill: 3    6. Post menopausal syndrome  -     venlafaxine XR (EFFEXOR-XR) 150 MG 24 hr capsule; Take 1 capsule by mouth Daily.  Dispense: 90  capsule; Refill: 1    Follow Up  Return in about 6 months (around 12/10/2024) for Recheck, Medicare Wellness.  Patient was given instructions and counseling regarding her condition or for health maintenance advice. Please see specific information pulled into the AVS if appropriate.

## 2024-06-20 ENCOUNTER — HOSPITAL ENCOUNTER (OUTPATIENT)
Dept: ULTRASOUND IMAGING | Facility: HOSPITAL | Age: 69
Discharge: HOME | End: 2024-06-20
Admitting: NURSE PRACTITIONER
Payer: MEDICARE

## 2024-06-20 ENCOUNTER — OFFICE VISIT (OUTPATIENT)
Dept: FAMILY MEDICINE CLINIC | Age: 69
End: 2024-06-20
Payer: MEDICARE

## 2024-06-20 VITALS
HEIGHT: 62 IN | WEIGHT: 188.6 LBS | BODY MASS INDEX: 34.71 KG/M2 | OXYGEN SATURATION: 99 % | HEART RATE: 72 BPM | DIASTOLIC BLOOD PRESSURE: 65 MMHG | TEMPERATURE: 98 F | SYSTOLIC BLOOD PRESSURE: 150 MMHG

## 2024-06-20 DIAGNOSIS — M79.605 ACUTE LEG PAIN, LEFT: ICD-10-CM

## 2024-06-20 DIAGNOSIS — M79.605 ACUTE LEG PAIN, LEFT: Primary | ICD-10-CM

## 2024-06-20 PROCEDURE — 93971 EXTREMITY STUDY: CPT

## 2024-06-20 PROCEDURE — 99213 OFFICE O/P EST LOW 20 MIN: CPT | Performed by: NURSE PRACTITIONER

## 2024-06-20 PROCEDURE — 1159F MED LIST DOCD IN RCRD: CPT | Performed by: NURSE PRACTITIONER

## 2024-06-20 PROCEDURE — 1125F AMNT PAIN NOTED PAIN PRSNT: CPT | Performed by: NURSE PRACTITIONER

## 2024-06-20 PROCEDURE — 3077F SYST BP >= 140 MM HG: CPT | Performed by: NURSE PRACTITIONER

## 2024-06-20 PROCEDURE — 3078F DIAST BP <80 MM HG: CPT | Performed by: NURSE PRACTITIONER

## 2024-06-20 PROCEDURE — 1160F RVW MEDS BY RX/DR IN RCRD: CPT | Performed by: NURSE PRACTITIONER

## 2024-06-20 NOTE — PROGRESS NOTES
"Chief Complaint  Knee Injury (Left knee pain working into the leg area. Cramping up and down the left leg. X2-3 days. )      Subjective        Carolina Ayers presents to [facility]  Knee Pain   The incident occurred more than 1 week ago. There was no injury mechanism. The pain is present in the left knee (Left Knee pain located on patella location and posterior.). Quality: pain with weight bearing. The pain is moderate. The pain has been Intermittent since onset. Associated symptoms include muscle weakness. Pertinent negatives include no loss of sensation, numbness or tingling. Associated symptoms comments: Lower calf muscle cramping . The symptoms are aggravated by weight bearing. She has tried non-weight bearing and elevation (Voltaren Gel) for the symptoms. The treatment provided no relief.   PMH includes superficial venous thrombosis on 1/2023 and osteoarthritis bilateral knees. Reports had injection of left knee by Dr. Serrano several years ago. In 7/2023 pt saw Dr. Sung r/t to varicose veins and reports cauterization of left leg.     Objective   Vital Signs:  /65 (BP Location: Left arm, Patient Position: Sitting)   Pulse 72   Temp 98 °F (36.7 °C) (Oral)   Ht 157.5 cm (62.01\")   Wt 85.5 kg (188 lb 9.6 oz)   SpO2 99%   BMI 34.49 kg/m²   Estimated body mass index is 34.49 kg/m² as calculated from the following:    Height as of this encounter: 157.5 cm (62.01\").    Weight as of this encounter: 85.5 kg (188 lb 9.6 oz).               Physical Exam  Vitals reviewed.   HENT:      Head: Normocephalic.   Cardiovascular:      Rate and Rhythm: Normal rate and regular rhythm.      Pulses:           Popliteal pulses are 2+ on the left side.        Dorsalis pedis pulses are 2+ on the left side.        Posterior tibial pulses are 2+ on the left side.   Pulmonary:      Effort: Pulmonary effort is normal.      Breath sounds: Normal breath sounds.   Musculoskeletal:      Left knee: " No ecchymosis, lacerations, bony tenderness or crepitus. No tenderness.      Left lower leg: Swelling present.      Comments: Pain located posterior knee and patellar location when weight bearing. Pain not present with PROM.    Skin:     General: Skin is warm and dry.   Neurological:      Mental Status: She is alert.   Psychiatric:         Attention and Perception: Attention and perception normal.         Behavior: Behavior is cooperative.        Result Review :                     Assessment and Plan     Diagnoses and all orders for this visit:    1. Acute leg pain, left (Primary)  Comments:  US to r/o any potential blood clot. Discussed with pt if negative to f/u with Dr. Wood for further evaluation and possible injection.  Orders:  -     US Venous Doppler Lower Extremity Left (duplex); Future    Discussed BP during visit. Confirmed pt taking medications as prescribed. Pt reports that is her normal range of BP.        Follow Up     Return if symptoms worsen or fail to improve.  Patient was given instructions and counseling regarding her condition or for health maintenance advice. Please see specific information pulled into the AVS if appropriate.

## 2024-06-28 ENCOUNTER — OFFICE VISIT (OUTPATIENT)
Dept: ORTHOPEDIC SURGERY | Facility: CLINIC | Age: 69
End: 2024-06-28
Payer: MEDICARE

## 2024-06-28 VITALS
HEIGHT: 62 IN | HEART RATE: 75 BPM | DIASTOLIC BLOOD PRESSURE: 88 MMHG | SYSTOLIC BLOOD PRESSURE: 168 MMHG | BODY MASS INDEX: 34.6 KG/M2 | OXYGEN SATURATION: 98 % | WEIGHT: 188 LBS

## 2024-06-28 DIAGNOSIS — M25.562 LEFT KNEE PAIN, UNSPECIFIED CHRONICITY: Primary | ICD-10-CM

## 2024-06-28 DIAGNOSIS — M17.12 ARTHRITIS OF LEFT KNEE: ICD-10-CM

## 2024-06-28 RX ORDER — LIDOCAINE HYDROCHLORIDE 10 MG/ML
5 INJECTION, SOLUTION INFILTRATION; PERINEURAL
Status: COMPLETED | OUTPATIENT
Start: 2024-06-28 | End: 2024-06-28

## 2024-06-28 RX ADMIN — LIDOCAINE HYDROCHLORIDE 5 ML: 10 INJECTION, SOLUTION INFILTRATION; PERINEURAL at 09:33

## 2024-06-28 NOTE — PROGRESS NOTES
"Chief Complaint  Pain and Initial Evaluation of the Left Knee    Subjective          Carolina Ayers presents to [facility] for   History of Present Illness    Carolina presents today for evaluation of her left knee.  She has a history of left knee pain that has been mild in the past.  She reports 1 week ago she had an increase in her pain with no associated injury.  She had difficulty walking.  She had pain in the posterior aspect of the knee with radiation into the calf.  She reports a previous gel injection to the knee.  Has a history of right total knee arthroplasty.    Allergies   Allergen Reactions    Lisinopril Cough    Penicillins Hives        Social History     Socioeconomic History    Marital status:      Spouse name: Ismael    Number of children: 2   Tobacco Use    Smoking status: Former     Current packs/day: 0.00     Average packs/day: 1 pack/day for 30.0 years (30.0 ttl pk-yrs)     Types: Cigarettes     Start date: 1980     Quit date: 2010     Years since quittin.4    Smokeless tobacco: Never    Tobacco comments:     NO CAFFEINE USE   Vaping Use    Vaping status: Never Used   Substance and Sexual Activity    Alcohol use: Not Currently     Comment: rarely- 2-3 times a year    Drug use: No    Sexual activity: Defer        I reviewed the patient's chief complaint, history of present illness, review of systems, past medical history, surgical history, family history, social history, medications, and allergy list.     REVIEW OF SYSTEMS    Constitutional: Denies fevers, chills, weight loss  Cardiovascular: Denies chest pain, shortness of breath  Skin: Denies rashes, acute skin changes  Neurologic: Denies headache, loss of consciousness  MSK: Left knee pain      Objective   Vital Signs:   /88   Pulse 75   Ht 157.5 cm (62\")   Wt 85.3 kg (188 lb)   SpO2 98%   BMI 34.39 kg/m²     Body mass index is 34.39 kg/m².    Physical Exam    General: Alert. No acute " distress.   Left lower extremity: No wounds.  Mild effusion.  Palpable Baker's cyst.  Range of motion from 5-110.  Crepitus with motion.  Stable to varus valgus stress.  Extensor mechanism intact.  No pain with hip motion.  Calf nontender.  Distal neurovascular intact.    Large Joint Arthrocentesis: L knee  Date/Time: 6/28/2024 9:33 AM  Consent given by: patient  Site marked: site marked  Timeout: Immediately prior to procedure a time out was called to verify the correct patient, procedure, equipment, support staff and site/side marked as required   Supporting Documentation  Indications: pain   Procedure Details  Location: knee - L knee  Needle gauge: 21 G.  Medications administered: 5 mL lidocaine 1 %; 32 mg Triamcinolone Acetonide 32 MG  Patient tolerance: patient tolerated the procedure well with no immediate complications      This injection documentation was Scribed for Shabbir Wood MD by Megan Mclaughlin.  06/28/24   09:34 EDT    Imaging Results (Most Recent)       None                     Assessment and Plan        US Venous Doppler Lower Extremity Left (duplex)    Result Date: 6/20/2024  Narrative: US VENOUS DOPPLER LOWER EXTREMITY LEFT (DUPLEX) Date of Exam: 6/20/2024 2:43 PM EDT Indication: Left leg pain and swelling. Comparison: None available. Technique:  Routine gray scale, color flow, compression and spectral Doppler analysis of the left lower extremity. A complete venous study was performed with image documentation obtained per protocol. Findings: The veins are compressible throughout. No intraluminal filling defects are seen. There is normal phasicity and augmentation of flow     Impression: Impression: Negative left lower extremity venous Doppler Electronically Signed: Jarrett Alexander MD  6/20/2024 3:11 PM EDT  Workstation ID: [id number]      Diagnoses and all orders for this visit:    1. Left knee pain, unspecified chronicity (Primary)    2. Arthritis of left knee    Other orders  -     Large Joint  Arthrocentesis: L knee        We reviewed her previous imaging which does show moderate arthritic change.  We discussed treatment options.  We discussed the risk, benefits, indications, alternatives to left knee steroid injection.  She elected proceed and tolerated the injection well.  She will continue home exercise and activity modification.  She may follow-up as needed.      Call or return if worsening symptoms.    Scribed for Shabbir Wood MD by Tiffany Nation MA  06/28/2024   09:04 EDT         Follow Up       As needed    Patient was given instructions and counseling regarding her condition or for health maintenance advice. Please see specific information pulled into the AVS if appropriate.       I have personally performed the services described in this document as scribed by the above individual and it is both accurate and complete. Shabbir Wood MD 06/28/24 10:01 EDT

## 2024-07-09 ENCOUNTER — TELEPHONE (OUTPATIENT)
Dept: ORTHOPEDIC SURGERY | Facility: CLINIC | Age: 69
End: 2024-07-09

## 2024-07-09 ENCOUNTER — TELEPHONE (OUTPATIENT)
Dept: FAMILY MEDICINE CLINIC | Age: 69
End: 2024-07-09
Payer: MEDICARE

## 2024-07-09 NOTE — TELEPHONE ENCOUNTER
----- Message from Chyna SEGURA sent at 6/11/2024  9:22 AM EDT -----  Please TICKLE blood pressure check in 4 weeks.  Thanks.

## 2024-07-09 NOTE — TELEPHONE ENCOUNTER
Caller: Carolina Ayers    Relationship: Self    Best call back number: [phone]    What is the best time to reach you: ANYTIME     Who are you requesting to speak with (clinical staff, provider,  specific staff member): CLINICAL     Do you know the name of the person who called: PATIENT INCOMING     What was the call regarding: PATIENT HAD AN INJECTION ON 6-28-24. SHE STATES THAT ABOUT A WEEK AGO THAT SHE STARTED HAVING PAIN BELOW THE KNEE TOWARDS THE INSIDE OF THE KNEE THAT IS GETTING WORSE. SHE WOULD LIKE TO KNOW IF THERE IS ANYTHING SHE CAN DO OR IF SHE MAY NEED TO COME IN FOR AN XRAY. SHE HAD TRIED HEAT AND ICE BOTH WITH NO RELIEF. OKAY TO LEAVE A VOICEMAIL.    Is it okay if the provider responds through MyChart: NO

## 2024-07-11 ENCOUNTER — HOSPITAL ENCOUNTER (OUTPATIENT)
Dept: GENERAL RADIOLOGY | Facility: HOSPITAL | Age: 69
Discharge: HOME | End: 2024-07-11
Admitting: FAMILY MEDICINE
Payer: MEDICARE

## 2024-07-11 ENCOUNTER — TELEPHONE (OUTPATIENT)
Dept: FAMILY MEDICINE CLINIC | Age: 69
End: 2024-07-11
Payer: MEDICARE

## 2024-07-11 DIAGNOSIS — M25.562 ACUTE PAIN OF LEFT KNEE: Primary | ICD-10-CM

## 2024-07-11 DIAGNOSIS — M25.562 ACUTE PAIN OF LEFT KNEE: ICD-10-CM

## 2024-07-11 PROCEDURE — 73562 X-RAY EXAM OF KNEE 3: CPT

## 2024-07-11 NOTE — TELEPHONE ENCOUNTER
Pt states her left knee is hurting bad. She doesn't see Dr. Wood until next Friday. She would like to know if you can order an xray for her today. Please advise.

## 2024-07-19 ENCOUNTER — OFFICE VISIT (OUTPATIENT)
Dept: ORTHOPEDIC SURGERY | Facility: CLINIC | Age: 69
End: 2024-07-19
Payer: MEDICARE

## 2024-07-19 ENCOUNTER — TELEPHONE (OUTPATIENT)
Dept: ORTHOPEDIC SURGERY | Facility: CLINIC | Age: 69
End: 2024-07-19

## 2024-07-19 VITALS
SYSTOLIC BLOOD PRESSURE: 142 MMHG | DIASTOLIC BLOOD PRESSURE: 81 MMHG | HEIGHT: 62 IN | OXYGEN SATURATION: 97 % | BODY MASS INDEX: 34.04 KG/M2 | WEIGHT: 185 LBS | HEART RATE: 69 BPM

## 2024-07-19 DIAGNOSIS — M17.12 ARTHRITIS OF LEFT KNEE: Primary | ICD-10-CM

## 2024-07-19 PROCEDURE — 3077F SYST BP >= 140 MM HG: CPT | Performed by: STUDENT IN AN ORGANIZED HEALTH CARE EDUCATION/TRAINING PROGRAM

## 2024-07-19 PROCEDURE — 1160F RVW MEDS BY RX/DR IN RCRD: CPT | Performed by: STUDENT IN AN ORGANIZED HEALTH CARE EDUCATION/TRAINING PROGRAM

## 2024-07-19 PROCEDURE — 3079F DIAST BP 80-89 MM HG: CPT | Performed by: STUDENT IN AN ORGANIZED HEALTH CARE EDUCATION/TRAINING PROGRAM

## 2024-07-19 PROCEDURE — 99213 OFFICE O/P EST LOW 20 MIN: CPT | Performed by: STUDENT IN AN ORGANIZED HEALTH CARE EDUCATION/TRAINING PROGRAM

## 2024-07-19 PROCEDURE — 1159F MED LIST DOCD IN RCRD: CPT | Performed by: STUDENT IN AN ORGANIZED HEALTH CARE EDUCATION/TRAINING PROGRAM

## 2024-07-19 RX ORDER — ALBUTEROL SULFATE 90 UG/1
AEROSOL, METERED RESPIRATORY (INHALATION)
COMMUNITY
Start: 2024-07-09

## 2024-07-19 RX ORDER — TRAMADOL HYDROCHLORIDE 50 MG/1
50 TABLET ORAL EVERY 12 HOURS PRN
COMMUNITY

## 2024-07-19 NOTE — PROGRESS NOTES
"Chief Complaint  Follow-up and Pain of the Left Knee    Subjective          Carolina Ayers presents to [facility] for   History of Present Illness    Carolina returns for follow-up of her left knee.  She has left knee arthritis.  She had a Zilretta injection at her previous visit.  She had 2 days of pain relief and then her pain returned.  She denies any new trauma.  Pain remains predominately on the medial joint line.    Allergies   Allergen Reactions    Lisinopril Cough    Penicillins Hives        Social History     Socioeconomic History    Marital status:      Spouse name: Ismael    Number of children: 2   Tobacco Use    Smoking status: Former     Current packs/day: 0.00     Average packs/day: 1 pack/day for 30.0 years (30.0 ttl pk-yrs)     Types: Cigarettes     Start date: 1980     Quit date: 2010     Years since quittin.5    Smokeless tobacco: Never    Tobacco comments:     NO CAFFEINE USE   Vaping Use    Vaping status: Never Used   Substance and Sexual Activity    Alcohol use: Not Currently     Comment: rarely- 2-3 times a year    Drug use: No    Sexual activity: Defer        I reviewed the patient's chief complaint, history of present illness, review of systems, past medical history, surgical history, family history, social history, medications, and allergy list.     REVIEW OF SYSTEMS    Constitutional: Denies fevers, chills, weight loss  Cardiovascular: Denies chest pain, shortness of breath  Skin: Denies rashes, acute skin changes  Neurologic: Denies headache, loss of consciousness  MSK: Left knee pain      Objective   Vital Signs:   /81   Pulse 69   Ht 157.5 cm (62\")   Wt 83.9 kg (185 lb)   SpO2 97%   BMI 33.84 kg/m²     Body mass index is 33.84 kg/m².    Physical Exam    General: Alert. No acute distress.   Left lower extremity: Tender over the medial joint line.  Mild effusion.  No signs of infection.  Lacks 5 degrees of extension.  Flexion 120 " degrees.  Crepitus with motion.  Stable to varus valgus stress.  No pain with hip motion.  Calf nontender.  Distal neurovascular intact.    Procedures    Imaging Results (Most Recent)       None                     Assessment and Plan        XR Knee 3 View Left    Result Date: 7/12/2024  Narrative: XR KNEE 3 VW LEFT Date of Exam: 7/11/2024 1:36 PM EDT Indication: knee pain Comparison: 3/12/2024 Findings: There is tricompartmental osteoarthritis with medial compartment joint space narrowing. No fracture or dislocation. No bone erosion or destruction. No joint effusion.     Impression: Osteoarthritis. No acute findings and no significant change. Electronically Signed: Teodoro Krueger MD  7/12/2024 3:39 AM EDT  Workstation ID: [id number]      Diagnoses and all orders for this visit:    1. Arthritis of left knee (Primary)        We discussed additional treatment options.  We discussed continued nonoperative management.  She is interested in a left knee Synvisc injection.  She will follow-up at the 6-week flor from her previous injection for a trial of Synvisc.  We discussed oral over-the-counter medications and Ultram.  She will continue home exercises.    Call or return if worsening symptoms.    Scribed for Shabbir Wood MD by Megan Mclaughlin  07/19/2024   18:02 EDT         Follow Up       Synvisc injection    Patient was given instructions and counseling regarding her condition or for health maintenance advice. Please see specific information pulled into the AVS if appropriate.     I have personally performed the services described in this document as scribed by the above individual and it is both accurate and complete. Shabbir Wood MD 07/22/24 09:24 EDT

## 2024-07-25 ENCOUNTER — TELEPHONE (OUTPATIENT)
Dept: ORTHOPEDIC SURGERY | Facility: CLINIC | Age: 69
End: 2024-07-25

## 2024-07-25 NOTE — TELEPHONE ENCOUNTER
Caller: Carolina Ayers    Relationship: Self    Best call back number: [phone]    What is the best time to reach you: any     Who are you requesting to speak with (clinical staff, provider,  specific staff member): clinical     Do you know the name of the person who called: YES     What was the call regarding: PATIENT STATES THAT SHE STOPPED TAKING THE TRAMADOL PRESCRIBED BY THE PROVIDER AT APPT 07/19/2024 ON 07/20/2024 DUE TO THE MEDICATION MAKING HER FEEL NAUSEOUS- PATIENT STATES THAT SHE WILL JUST STICK TO THE TYLENOL

## 2024-07-29 ENCOUNTER — CLINICAL SUPPORT (OUTPATIENT)
Dept: FAMILY MEDICINE CLINIC | Age: 69
End: 2024-07-29
Payer: MEDICARE

## 2024-07-29 VITALS — SYSTOLIC BLOOD PRESSURE: 151 MMHG | HEART RATE: 65 BPM | DIASTOLIC BLOOD PRESSURE: 75 MMHG

## 2024-08-05 NOTE — PROGRESS NOTES
Vitals:    07/29/24 1147 07/29/24 1148   BP: 160/76 151/75   BP Location: Left arm Right arm   Patient Position: Sitting Sitting   Pulse: 67 65     Blood pressure noted.  Allergy room staff indicated that the knee was giving her significant pain.  Confirm if Carolina is checking her blood pressure at home, and if so, how it is running there.  Let me know.  Thanks.

## 2024-08-05 NOTE — PROGRESS NOTES
Pt states she has not been checking at home. She does have a cuff. She will check daily and keep written record.

## 2024-08-19 ENCOUNTER — TELEPHONE (OUTPATIENT)
Dept: FAMILY MEDICINE CLINIC | Age: 69
End: 2024-08-19
Payer: MEDICARE

## 2024-08-19 NOTE — TELEPHONE ENCOUNTER
Noted.  It sounds like most of her pressures are reasonable.  Continue current care, but TICKLE for blood pressure check here again in 4 weeks.  Bring home cuff for comparison.  Thanks.

## 2024-08-23 ENCOUNTER — OFFICE VISIT (OUTPATIENT)
Dept: ORTHOPEDIC SURGERY | Facility: CLINIC | Age: 69
End: 2024-08-23
Payer: MEDICARE

## 2024-08-23 VITALS
BODY MASS INDEX: 33.13 KG/M2 | SYSTOLIC BLOOD PRESSURE: 156 MMHG | WEIGHT: 180 LBS | HEIGHT: 62 IN | DIASTOLIC BLOOD PRESSURE: 86 MMHG | HEART RATE: 70 BPM | OXYGEN SATURATION: 96 %

## 2024-08-23 DIAGNOSIS — M17.12 ARTHRITIS OF LEFT KNEE: Primary | ICD-10-CM

## 2024-08-23 NOTE — PROGRESS NOTES
"Chief Complaint  Follow-up and Pain of the Left Knee    Subjective          Carolina Ayers presents to [facility] for   History of Present Illness    Carolina returns for follow-up of her left knee.  She has left knee arthritis that we are treating nonoperatively.  She had a Zilretta injection previously.  She was initially interested in a left knee Synvisc injection, however her knee pain has resolved.    Allergies   Allergen Reactions    Lisinopril Cough    Penicillins Hives        Social History     Socioeconomic History    Marital status:      Spouse name: Ismael    Number of children: 2   Tobacco Use    Smoking status: Former     Current packs/day: 0.00     Average packs/day: 1 pack/day for 30.0 years (30.0 ttl pk-yrs)     Types: Cigarettes     Start date: 1980     Quit date: 2010     Years since quittin.6    Smokeless tobacco: Never    Tobacco comments:     NO CAFFEINE USE   Vaping Use    Vaping status: Never Used   Substance and Sexual Activity    Alcohol use: Not Currently     Comment: rarely- 2-3 times a year    Drug use: No    Sexual activity: Defer        I reviewed the patient's chief complaint, history of present illness, review of systems, past medical history, surgical history, family history, social history, medications, and allergy list.     REVIEW OF SYSTEMS    Constitutional: Denies fevers, chills, weight loss  Cardiovascular: Denies chest pain, shortness of breath  Skin: Denies rashes, acute skin changes  Neurologic: Denies headache, loss of consciousness  MSK: Left knee pain      Objective   Vital Signs:   /86   Pulse 70   Ht 157.5 cm (62\")   Wt 81.6 kg (180 lb)   SpO2 96%   BMI 32.92 kg/m²     Body mass index is 32.92 kg/m².    Physical Exam    General: Alert. No acute distress.   Left lower extremity: No tenderness.  No effusion.  Lacks 5 degrees of extension.  Flexion to 120 degrees.  Crepitus with motion.  Stable to varus and valgus " stress.  No pain with hip motion.  Distal neurovascular intact.    Procedures    Imaging Results (Most Recent)       None                     Assessment and Plan        No results found.     Diagnoses and all orders for this visit:    1. Arthritis of left knee (Primary)        We discussed treatment options.  She is not having any pain today.  She will defer the injection for now.  She may call as needed for injections in the future.        Call or return if worsening symptoms.    Scribed for Shabbir Wood MD by Megan Mclaughlin  08/23/2024   09:35 EDT         Follow Up       As needed    Patient was given instructions and counseling regarding her condition or for health maintenance advice. Please see specific information pulled into the AVS if appropriate.     I have personally performed the services described in this document as scribed by the above individual and it is both accurate and complete. Shabbir Wood MD 08/23/24 12:14 EDT

## 2024-09-16 ENCOUNTER — TELEPHONE (OUTPATIENT)
Dept: FAMILY MEDICINE CLINIC | Age: 69
End: 2024-09-16
Payer: MEDICARE

## 2024-09-17 ENCOUNTER — CLINICAL SUPPORT (OUTPATIENT)
Dept: FAMILY MEDICINE CLINIC | Age: 69
End: 2024-09-17
Payer: MEDICARE

## 2024-09-17 DIAGNOSIS — I10 ESSENTIAL HYPERTENSION: ICD-10-CM

## 2024-09-18 VITALS — HEART RATE: 75 BPM | DIASTOLIC BLOOD PRESSURE: 83 MMHG | SYSTOLIC BLOOD PRESSURE: 151 MMHG

## 2024-09-18 RX ORDER — LOSARTAN POTASSIUM 100 MG/1
100 TABLET ORAL DAILY
Qty: 90 TABLET | Refills: 3 | Status: SHIPPED | OUTPATIENT
Start: 2024-09-18

## 2024-10-28 ENCOUNTER — TELEPHONE (OUTPATIENT)
Dept: FAMILY MEDICINE CLINIC | Age: 69
End: 2024-10-28
Payer: MEDICARE

## 2024-10-28 DIAGNOSIS — E78.2 MIXED HYPERLIPIDEMIA: ICD-10-CM

## 2024-10-28 DIAGNOSIS — Z12.31 ENCOUNTER FOR SCREENING MAMMOGRAM FOR BREAST CANCER: Primary | ICD-10-CM

## 2024-10-28 DIAGNOSIS — I10 ESSENTIAL HYPERTENSION: Primary | ICD-10-CM

## 2024-10-28 DIAGNOSIS — Z79.899 OTHER LONG TERM (CURRENT) DRUG THERAPY: ICD-10-CM

## 2024-10-28 DIAGNOSIS — R73.9 HYPERGLYCEMIA: ICD-10-CM

## 2024-10-28 NOTE — TELEPHONE ENCOUNTER
Pt dropped her blood pressure log off, 10/21/24, it has been scanned into the chart for you to review, pt states she does still have swelling in her legs.

## 2024-11-04 VITALS — SYSTOLIC BLOOD PRESSURE: 139 MMHG | DIASTOLIC BLOOD PRESSURE: 82 MMHG

## 2024-11-04 NOTE — TELEPHONE ENCOUNTER
Noted.  I have reviewed the blood pressure log.  Please document a blood pressure of 139/82 in our flowsheet.  For the immediate near term, I would not recommend a change of medication.  I do want to move ahead with labs and have placed orders for blood work and urine testing to be done at her convenience.  She DOES NOT need to be fasting for these labs.  She should have ample fluids prior to coming.  Let me know if she has other immediate concerns.  Thanks.

## 2024-11-05 ENCOUNTER — LAB (OUTPATIENT)
Dept: LAB | Facility: HOSPITAL | Age: 69
End: 2024-11-05
Payer: MEDICARE

## 2024-11-05 DIAGNOSIS — E78.2 MIXED HYPERLIPIDEMIA: ICD-10-CM

## 2024-11-05 DIAGNOSIS — Z79.899 OTHER LONG TERM (CURRENT) DRUG THERAPY: ICD-10-CM

## 2024-11-05 DIAGNOSIS — I10 ESSENTIAL HYPERTENSION: ICD-10-CM

## 2024-11-05 DIAGNOSIS — R73.9 HYPERGLYCEMIA: ICD-10-CM

## 2024-11-05 LAB
ALBUMIN SERPL-MCNC: 4.2 G/DL (ref 3.5–5.2)
ALBUMIN/GLOB SERPL: 1.3 G/DL
ALP SERPL-CCNC: 87 U/L (ref 39–117)
ALT SERPL W P-5'-P-CCNC: 15 U/L (ref 1–33)
ANION GAP SERPL CALCULATED.3IONS-SCNC: 12.1 MMOL/L (ref 5–15)
AST SERPL-CCNC: 24 U/L (ref 1–32)
BACTERIA UR QL AUTO: NORMAL /HPF
BASOPHILS # BLD AUTO: 0.06 10*3/MM3 (ref 0–0.2)
BASOPHILS NFR BLD AUTO: 1 % (ref 0–1.5)
BILIRUB SERPL-MCNC: 0.4 MG/DL (ref 0–1.2)
BILIRUB UR QL STRIP: NEGATIVE
BUN SERPL-MCNC: 17 MG/DL (ref 8–23)
BUN/CREAT SERPL: 17 (ref 7–25)
CALCIUM SPEC-SCNC: 10.2 MG/DL (ref 8.6–10.5)
CHLORIDE SERPL-SCNC: 96 MMOL/L (ref 98–107)
CLARITY UR: CLEAR
CO2 SERPL-SCNC: 27.9 MMOL/L (ref 22–29)
COLOR UR: YELLOW
CREAT SERPL-MCNC: 1 MG/DL (ref 0.57–1)
DEPRECATED RDW RBC AUTO: 44.7 FL (ref 37–54)
EGFRCR SERPLBLD CKD-EPI 2021: 61.1 ML/MIN/1.73
EOSINOPHIL # BLD AUTO: 0.14 10*3/MM3 (ref 0–0.4)
EOSINOPHIL NFR BLD AUTO: 2.4 % (ref 0.3–6.2)
ERYTHROCYTE [DISTWIDTH] IN BLOOD BY AUTOMATED COUNT: 12.4 % (ref 12.3–15.4)
FOLATE SERPL-MCNC: >20 NG/ML (ref 4.78–24.2)
GLOBULIN UR ELPH-MCNC: 3.3 GM/DL
GLUCOSE SERPL-MCNC: 88 MG/DL (ref 65–99)
GLUCOSE UR STRIP-MCNC: NEGATIVE MG/DL
HBA1C MFR BLD: 5.6 % (ref 4.8–5.6)
HCT VFR BLD AUTO: 39.7 % (ref 34–46.6)
HGB BLD-MCNC: 12.8 G/DL (ref 12–15.9)
HGB UR QL STRIP.AUTO: NEGATIVE
HYALINE CASTS UR QL AUTO: NORMAL /LPF
IMM GRANULOCYTES # BLD AUTO: 0.02 10*3/MM3 (ref 0–0.05)
IMM GRANULOCYTES NFR BLD AUTO: 0.3 % (ref 0–0.5)
KETONES UR QL STRIP: NEGATIVE
LEUKOCYTE ESTERASE UR QL STRIP.AUTO: NEGATIVE
LYMPHOCYTES # BLD AUTO: 1.4 10*3/MM3 (ref 0.7–3.1)
LYMPHOCYTES NFR BLD AUTO: 23.7 % (ref 19.6–45.3)
MCH RBC QN AUTO: 31.2 PG (ref 26.6–33)
MCHC RBC AUTO-ENTMCNC: 32.2 G/DL (ref 31.5–35.7)
MCV RBC AUTO: 96.8 FL (ref 79–97)
MONOCYTES # BLD AUTO: 0.61 10*3/MM3 (ref 0.1–0.9)
MONOCYTES NFR BLD AUTO: 10.3 % (ref 5–12)
NEUTROPHILS NFR BLD AUTO: 3.68 10*3/MM3 (ref 1.7–7)
NEUTROPHILS NFR BLD AUTO: 62.3 % (ref 42.7–76)
NITRITE UR QL STRIP: NEGATIVE
PH UR STRIP.AUTO: 7 [PH] (ref 5–8)
PLATELET # BLD AUTO: 331 10*3/MM3 (ref 140–450)
PMV BLD AUTO: 9.1 FL (ref 6–12)
POTASSIUM SERPL-SCNC: 4.2 MMOL/L (ref 3.5–5.2)
PROT SERPL-MCNC: 7.5 G/DL (ref 6–8.5)
PROT UR QL STRIP: NEGATIVE
RBC # BLD AUTO: 4.1 10*6/MM3 (ref 3.77–5.28)
RBC # UR STRIP: NORMAL /HPF
REF LAB TEST METHOD: NORMAL
SODIUM SERPL-SCNC: 136 MMOL/L (ref 136–145)
SP GR UR STRIP: 1.01 (ref 1–1.03)
SQUAMOUS #/AREA URNS HPF: NORMAL /HPF
TSH SERPL DL<=0.05 MIU/L-ACNC: 1.32 UIU/ML (ref 0.27–4.2)
UROBILINOGEN UR QL STRIP: NORMAL
VIT B12 BLD-MCNC: 694 PG/ML (ref 211–946)
WBC # UR STRIP: NORMAL /HPF
WBC NRBC COR # BLD AUTO: 5.91 10*3/MM3 (ref 3.4–10.8)

## 2024-11-05 PROCEDURE — 83036 HEMOGLOBIN GLYCOSYLATED A1C: CPT

## 2024-11-05 PROCEDURE — 82746 ASSAY OF FOLIC ACID SERUM: CPT

## 2024-11-05 PROCEDURE — 85025 COMPLETE CBC W/AUTO DIFF WBC: CPT

## 2024-11-05 PROCEDURE — 82607 VITAMIN B-12: CPT

## 2024-11-05 PROCEDURE — 81001 URINALYSIS AUTO W/SCOPE: CPT

## 2024-11-05 PROCEDURE — 36415 COLL VENOUS BLD VENIPUNCTURE: CPT

## 2024-11-05 PROCEDURE — 84443 ASSAY THYROID STIM HORMONE: CPT

## 2024-11-05 PROCEDURE — 80053 COMPREHEN METABOLIC PANEL: CPT

## 2024-11-14 ENCOUNTER — TELEPHONE (OUTPATIENT)
Dept: FAMILY MEDICINE CLINIC | Age: 69
End: 2024-11-14
Payer: MEDICARE

## 2024-11-14 DIAGNOSIS — Z87.891 HX OF NICOTINE DEPENDENCE: Primary | ICD-10-CM

## 2024-11-15 ENCOUNTER — OFFICE VISIT (OUTPATIENT)
Dept: ORTHOPEDIC SURGERY | Facility: CLINIC | Age: 69
End: 2024-11-15
Payer: MEDICARE

## 2024-11-15 VITALS
HEIGHT: 62 IN | WEIGHT: 180 LBS | DIASTOLIC BLOOD PRESSURE: 84 MMHG | SYSTOLIC BLOOD PRESSURE: 163 MMHG | BODY MASS INDEX: 33.13 KG/M2 | HEART RATE: 75 BPM | OXYGEN SATURATION: 94 %

## 2024-11-15 DIAGNOSIS — M17.12 ARTHRITIS OF LEFT KNEE: Primary | ICD-10-CM

## 2024-11-15 NOTE — PROGRESS NOTES
"Chief Complaint  Follow-up and Pain of the Left Knee    Subjective          Carolina Ayers presents to [facility] for   History of Present Illness    Carolina returns today for follow-up of her left knee.  She has left knee arthritis we are treating nonoperatively.  She is interested in a left knee gel injection today.    Allergies   Allergen Reactions    Lisinopril Cough    Penicillins Hives        Social History     Socioeconomic History    Marital status:      Spouse name: Ismael    Number of children: 2   Tobacco Use    Smoking status: Former     Current packs/day: 0.00     Average packs/day: 1 pack/day for 30.0 years (30.0 ttl pk-yrs)     Types: Cigarettes     Start date: 1980     Quit date: 2010     Years since quittin.8    Smokeless tobacco: Never    Tobacco comments:     NO CAFFEINE USE   Vaping Use    Vaping status: Never Used   Substance and Sexual Activity    Alcohol use: Not Currently     Comment: rarely- 2-3 times a year    Drug use: No    Sexual activity: Defer        I reviewed the patient's chief complaint, history of present illness, review of systems, past medical history, surgical history, family history, social history, medications, and allergy list.     REVIEW OF SYSTEMS    Constitutional: Denies fevers, chills, weight loss  Cardiovascular: Denies chest pain, shortness of breath  Skin: Denies rashes, acute skin changes  Neurologic: Denies headache, loss of consciousness  MSK: Left knee pain      Objective   Vital Signs:   /84   Pulse 75   Ht 157.5 cm (62\")   Wt 81.6 kg (180 lb)   SpO2 94%   BMI 32.92 kg/m²     Body mass index is 32.92 kg/m².    Physical Exam    General: Alert. No acute distress.   Left lower extremity: No tenderness.  Mild effusion. Lacks 5 degrees of extension. Flexion to 120 degrees. Crepitus with motion. Stable to varus and valgus stress. No pain with hip motion. Distal neurovascular intact.     Large Joint " Arthrocentesis: L knee  Date/Time: 11/15/2024 11:00 AM  Consent given by: patient  Site marked: site marked  Timeout: Immediately prior to procedure a time out was called to verify the correct patient, procedure, equipment, support staff and site/side marked as required   Supporting Documentation  Indications: pain   Procedure Details  Location: knee - L knee  Needle gauge: 21g.  Medications administered: 48 mg hylan 48 MG/6ML  Patient tolerance: patient tolerated the procedure well with no immediate complications      This injection documentation was Scribed for Shabbir Wood MD by Tiffany Nation MA.  11/15/24   11:01 EST    Imaging Results (Most Recent)       None                     Assessment and Plan        No results found.     Diagnoses and all orders for this visit:    1. Arthritis of left knee (Primary)    Other orders  -     Large Joint Arthrocentesis: L knee        We discussed the risk, benefits, indications, and alternatives to a left knee Synvisc injection.  She elected to proceed and tolerated the injection well.  She will continue home exercises/activity modifications.  She may follow-up as needed.  We will obtain new x-rays of the left knee when she returns.        Call or return if worsening symptoms.    Scribed for Shabbir Wood MD by Megan Mclaughlin  11/15/2024   10:56 EST         Follow Up       As needed    Patient was given instructions and counseling regarding her condition or for health maintenance advice. Please see specific information pulled into the AVS if appropriate.       I have personally performed the services described in this document as scribed by the above individual and it is both accurate and complete. Shabbir Wood MD 11/15/24 11:07 EST

## 2024-11-25 ENCOUNTER — HOSPITAL ENCOUNTER (OUTPATIENT)
Dept: CT IMAGING | Facility: HOSPITAL | Age: 69
Discharge: HOME | End: 2024-11-25
Admitting: FAMILY MEDICINE
Payer: MEDICARE

## 2024-11-25 DIAGNOSIS — Z87.891 HX OF NICOTINE DEPENDENCE: ICD-10-CM

## 2024-11-25 PROCEDURE — 71271 CT THORAX LUNG CANCER SCR C-: CPT

## 2024-12-11 NOTE — PROGRESS NOTES
"Chief Complaint  No chief complaint on file.    Subjective      HPI: Carolina Ayers is a 69 y.o. female seen for follow-up of varicose veins of the left lower extremity.  Actually has varicose veins in both lower extremities, asymptomatic on the right.  She has history of symptomatic varicose veins in the left lower extremity, and in May, 2023 underwent EVLA of the left great and anterior saphenous veins.  Postprocedure imaging confirmed successful ablation of the treated segments.  She was having some residual symptoms in the medial thigh and calf related to clusters of varicose veins but also had some procedure-associated superficial vein thrombosis.  I speculated that her symptoms would improve.  In fact she is having ongoing aching and throbbing pain on a most day basis, of moderate intensity, associated with difficulty walking.  Has not been using compression.  Has the periprocedural SVT above but no other history of VTE.    Objective   Vital Signs:  /77 (BP Location: Left arm)   Pulse 78   Estimated body mass index is 32.92 kg/m² as calculated from the following:    Height as of 11/15/24: 157.5 cm (62\").    Weight as of 11/15/24: 81.6 kg (180 lb).        Carolina Ayers  reports that she quit smoking about 14 years ago. Her smoking use included cigarettes. She started smoking about 44 years ago. She has a 30 pack-year smoking history. She has never used smokeless tobacco.     Exam: BMI 32.9.  Pedal artery pulses palpated bilaterally.  Large thighs are present bilaterally, but there are 8 mm nontender, compressible varicose veins in the medial left thigh and calf.  Scattered 6 mm nontender, compressible varicose veins on the right.  Spider veins present bilaterally.  Right and left calves soft and nontender.  No skin changes.  No swelling or pitting edema.  Ankle circumferences 20 cm bilaterally.  Calf circumferences 34 and 35 cm on the right and left, respectively.    Assessment and Plan     Diagnoses " and all orders for this visit:    1. Varicose veins of left lower extremity with pain (Primary)  -     Venous w Reflux Lower Extremity - Unilateral CAR; Future    2. Asymptomatic varicose veins of right lower extremity    3. Spider veins    Summary: 69-year-old woman returns with residual and possibly recurrent varicose veins of the left lower extremity about a year and a half post laser ablation of the left great and anterior saphenous veins.  She has asymptomatic varicose veins on the contralateral right side.  Based upon frequent and significant symptoms she is interested in further evaluation and treatment.  In the past I had speculated she may be a candidate for Varithena sclerotherapy, and this is still the most likely treatment.  In addition, her insurance carrier will require a 6-week trial of compression.  She already owns compression stockings so will begin to wear them.  I will request class II vein map to define patterns of reflux and management options, and see her after her test.    Follow Up     Return for Follow-up after test.  Patient was given instructions and counseling regarding her condition or for health maintenance advice. Please see specific information pulled into the AVS if appropriate.

## 2024-12-12 ENCOUNTER — OFFICE VISIT (OUTPATIENT)
Age: 69
End: 2024-12-12
Payer: MEDICARE

## 2024-12-12 VITALS — DIASTOLIC BLOOD PRESSURE: 77 MMHG | HEART RATE: 78 BPM | SYSTOLIC BLOOD PRESSURE: 145 MMHG

## 2024-12-12 DIAGNOSIS — I83.812 VARICOSE VEINS OF LEFT LOWER EXTREMITY WITH PAIN: Primary | ICD-10-CM

## 2024-12-12 DIAGNOSIS — I78.1 SPIDER VEINS: ICD-10-CM

## 2024-12-12 DIAGNOSIS — I83.91 ASYMPTOMATIC VARICOSE VEINS OF RIGHT LOWER EXTREMITY: ICD-10-CM

## 2024-12-12 NOTE — PATIENT INSTRUCTIONS
Compression Stockings    Prescription graded compression stockings have been proven to help people with varicose veins, swelling, blood clots, and venous ulcers.  Compression stockings reduce blood pooling, reduce swelling, reduce the chance of blood clots, and heal venous ulcers.  They also relieve pain.    Compression stockings are available in different styles and strengths.  Just as pills have numbers indicating their doses in milligrams, stockings have numbers indicating their doses in millimeters of mercury, or mmHg.  Stockings with compression less than 20 mmHg can alleviate mild symptoms, but may not be tight enough for severe symptoms.  20-30 mmHg stockings are more snug, and treat advanced symptoms.  At times, severe symptoms may require a 30-40 mmHg stocking.  No matter what compression dose is chosen, everyone must be measured for stockings to ensure a proper fit.    Choosing the tightness of a stocking is a trade-off between the loosest stocking that feels good and the tightest stocking a person can apply.  The correct tightness may be different from person to person.  For example a person may need a 30-40 mmHg stocking to treat symptoms, but finger arthritis, reduced  strength, and back pain leave them unable to apply it.  A 20-30 mmHg stocking may be the best the person can do.  A looser stocking that a person actually wears may improve quality of life more than a tighter stocking that sits at home in a drawer.  There are many brands of stockings, and many are good.  [employer], [employer] and [employer] are companies that produce medical grade compression stockings of good quality.    Stockings are available in knee-high, thigh-high, and panty styles.  Each style has advantages and disadvantages.  Knee high stockings are easier to put on and take off.  Though stockings do not cut off the circulation to the calves and feet, some people feel like they do and do not like to wear them for this reason.  A  thigh high stocking is a bit warmer and gives a smooth transition below the knee so it doesn't feel like it is cutting off the circulation.  But thigh high stockings can roll down.  “It Stays” is a roll-on adhesive that can prevent roll-down.  Panty style stockings are most challenging to apply and remove, and are the warmest to wear, but give compression to the mid thigh.  They won't roll down, but they have to be dealt with when using the rest room.  In general, nonprescription calf high stockings cost around $30. For prescription style stockings, calf high styles cost about $50, thigh high about $75, and panty style about $120.  20-30 mmHg stockings generally cost the same as 30-40 mmHg stockings.  They are usually not covered by insurance.     It is best to wear the stockings all day, every day, especially if swelling, discoloration, or sores are present. If leg symptoms are less severe, stockings can be worn as needed, like when a person expects to be standing for a long time, or when experience has shown that the legs are going to hurt and swell.  Stockings help leg symptoms when they are worn, but will not help when they are not worn, and do not correct the underlying disease process.  For most people it is unnecessary to wear them at night, but it is not harmful to do so.  If it is hard to put on and take off stockings, changing them every other night can reduce by one half the number of application/removal cycles.    Many people have problems applying stockings because they do not apply them correctly.  Stockings should be applied like pants, allowing them to hang down.  First slide them over the foot and heel and work them up from there.  Bunching the stocking up like nylons is a sure setup for failure.  Devices like an Easy Slide, Magnide and Cfoy-J-Krwvad are available to help one to put on stockings.  We can demonstrate proper application techniques and the use of these devices.  There are a number of  Youtube videos outlining stocking strategies as well. Stockings can be applied, worn, and removed by anyone who is motivated to wear them.

## 2025-01-08 ENCOUNTER — OFFICE VISIT (OUTPATIENT)
Dept: ORTHOPEDIC SURGERY | Facility: CLINIC | Age: 70
End: 2025-01-08
Payer: MEDICARE

## 2025-01-08 VITALS — DIASTOLIC BLOOD PRESSURE: 83 MMHG | OXYGEN SATURATION: 93 % | SYSTOLIC BLOOD PRESSURE: 170 MMHG | HEART RATE: 86 BPM

## 2025-01-08 DIAGNOSIS — Z47.1 AFTERCARE FOLLOWING RIGHT KNEE JOINT REPLACEMENT SURGERY: ICD-10-CM

## 2025-01-08 DIAGNOSIS — Z96.651 AFTERCARE FOLLOWING RIGHT KNEE JOINT REPLACEMENT SURGERY: ICD-10-CM

## 2025-01-08 DIAGNOSIS — M25.562 LEFT KNEE PAIN, UNSPECIFIED CHRONICITY: ICD-10-CM

## 2025-01-08 DIAGNOSIS — M25.561 RIGHT KNEE PAIN, UNSPECIFIED CHRONICITY: ICD-10-CM

## 2025-01-08 DIAGNOSIS — M17.12 ARTHRITIS OF LEFT KNEE: Primary | ICD-10-CM

## 2025-01-08 RX ADMIN — TRIAMCINOLONE ACETONIDE 40 MG: 40 INJECTION, SUSPENSION INTRA-ARTICULAR; INTRAMUSCULAR at 14:34

## 2025-01-08 RX ADMIN — LIDOCAINE HYDROCHLORIDE 5 ML: 10 INJECTION, SOLUTION INFILTRATION; PERINEURAL at 14:34

## 2025-01-08 NOTE — PROGRESS NOTES
Chief Complaint  Follow-up of the Left Knee and Follow-up of the Right Knee    Subjective          Carolina Ayers presents to [facility]   History of Present Illness    Carolina Ayers presents today for a follow-up of her bilateral knees.  Patient has left knee arthritis that we are treating conservatively and is status post right total knee arthroplasty with Vidya robot performed on 8/24/2023.  Today, patient explains that she had a fall at the end of November and was experiencing pain to her right knee.  She states that her pain has since improved.  She feels that her right knee has good range of motion, and is stable, and is strong.  Patient states that her left knee is doing okay.  She does report some relief from her previous left knee gel injection.  She denies any new injuries to her left knee.      Allergies   Allergen Reactions    Lisinopril Cough    Penicillins Hives        Social History     Socioeconomic History    Marital status:      Spouse name: Ismael    Number of children: 2   Tobacco Use    Smoking status: Former     Current packs/day: 0.00     Average packs/day: 1 pack/day for 30.0 years (30.0 ttl pk-yrs)     Types: Cigarettes     Start date: 1/1/1980     Quit date: 1/1/2010     Years since quitting: 15.0    Smokeless tobacco: Never    Tobacco comments:     NO CAFFEINE USE   Vaping Use    Vaping status: Never Used   Substance and Sexual Activity    Alcohol use: Not Currently     Comment: rarely- 2-3 times a year    Drug use: No    Sexual activity: Defer        I reviewed the patient's chief complaint, history of present illness, review of systems, past medical history, surgical history, family history, social history, medications, and allergy list.     REVIEW OF SYSTEMS    Constitutional: Denies fevers, chills, weight loss  Cardiovascular: Denies chest pain, shortness of breath  Skin: Denies rashes, acute skin changes  Neurologic: Denies headache, loss of  consciousness  MSK: Right knee pain.  Left knee pain.      Objective   Vital Signs:   /83   Pulse 86   SpO2 93%     There is no height or weight on file to calculate BMI.    Physical Exam    General: Alert. No acute distress.   Right lower extremity: Full knee extension.  Knee flexion 110.  Knee extensor mechanism intact.  Knee stable to varus and valgus stress.  Calf soft, nontender.  Demonstrates active ankle plantarflexion and dorsiflexion.  Sensation intact over dorsal and plantar foot.  Palpable pedal pulses.    Left lower extremity: Nontender to the medial or lateral joint line.  5 degrees shy of full knee extension.  Knee flexion 120.  Knee extensor mechanism intact.  Knee stable to varus and valgus stress.  Patellar crepitus with motion.  Calf soft, nontender.  Demonstrates active ankle plantarflexion and dorsiflexion.  Sensation intact over dorsal and plantar foot.  Palpable pedal pulses.    Large Joint Arthrocentesis  Date/Time: 1/8/2025 2:34 PM  Consent given by: patient  Site marked: site marked  Timeout: Immediately prior to procedure a time out was called to verify the correct patient, procedure, equipment, support staff and site/side marked as required   Supporting Documentation  Indications: pain   Procedure Details  Location: -   Location: left knee.Needle gauge: 21 G.  Medications administered: 5 mL lidocaine 1 %; 40 mg triamcinolone acetonide 40 MG/ML  Patient tolerance: patient tolerated the procedure well with no immediate complications      This injection documentation was Scribed for Cheyanne Zhou PA-C by Bethany Mackey MA.  01/08/25   14:34 EST    Imaging Results (Most Recent)       Procedure Component Value Units Date/Time    XR Knee 3 View Right [[id number]] Resulted: 01/08/25 1451     Updated: 01/08/25 1451    Narrative:      Indications: Follow-up right total knee arthroplasty    Views: AP, lateral, sunrise view right knee    Findings: Cemented, cruciate retaining right total  knee arthroplasty   implants in place with stable positioning.  No hardware loosening or   complications.  No periprosthetic fractures.  Patella tracks centrally on   sunrise view.    Comparative Data: Comparative data found and reviewed today.                   Assessment and Plan    Diagnoses and all orders for this visit:    1. Arthritis of left knee (Primary)  -     Large Joint Arthrocentesis    2. Left knee pain, unspecified chronicity    3. Right knee pain, unspecified chronicity  -     XR Knee 3 View Right    4. Aftercare following right knee joint replacement surgery        Carolina Ayers presents today to follow-up with her left knee arthritis that we are treating conservatively and is status post right total knee arthroplasty with Vidya robot performed on 8/24/2023.  X-rays reviewed with the patient today.  Patient instructed to continue with home exercises for both knees.  We discussed antibiotic prophylaxis for dental procedures. We discussed the risks and benefits with the patient regarding left knee steroid injection. Patient understood and elected to proceed. Patient tolerated injection well with no complications.         Patient will follow up as needed.      Call or return if symptoms worsen or patient has any concerns.       Follow Up   Return if symptoms worsen or fail to improve.  Patient was given instructions and counseling regarding her condition or for health maintenance advice. Please see specific information pulled into the AVS if appropriate.     Cheyanne Zhou PA-C  01/10/25  09:43 EST

## 2025-01-10 RX ORDER — LIDOCAINE HYDROCHLORIDE 10 MG/ML
5 INJECTION, SOLUTION INFILTRATION; PERINEURAL
Status: COMPLETED | OUTPATIENT
Start: 2025-01-08 | End: 2025-01-08

## 2025-01-10 RX ORDER — TRIAMCINOLONE ACETONIDE 40 MG/ML
40 INJECTION, SUSPENSION INTRA-ARTICULAR; INTRAMUSCULAR
Status: COMPLETED | OUTPATIENT
Start: 2025-01-08 | End: 2025-01-08

## 2025-02-12 ENCOUNTER — TELEPHONE (OUTPATIENT)
Dept: FAMILY MEDICINE CLINIC | Age: 70
End: 2025-02-12
Payer: MEDICARE

## 2025-02-12 NOTE — TELEPHONE ENCOUNTER
Carolina sent over a log of her blood pressures.  They seem to be running just above goal.  Schedule a follow-up visit for us to review her care.  Thanks.

## 2025-02-24 ENCOUNTER — OFFICE VISIT (OUTPATIENT)
Dept: FAMILY MEDICINE CLINIC | Age: 70
End: 2025-02-24
Payer: MEDICARE

## 2025-02-24 ENCOUNTER — PATIENT MESSAGE (OUTPATIENT)
Dept: FAMILY MEDICINE CLINIC | Age: 70
End: 2025-02-24

## 2025-02-24 ENCOUNTER — HOSPITAL ENCOUNTER (OUTPATIENT)
Dept: GENERAL RADIOLOGY | Facility: HOSPITAL | Age: 70
Discharge: HOME | End: 2025-02-24
Admitting: FAMILY MEDICINE
Payer: MEDICARE

## 2025-02-24 VITALS
DIASTOLIC BLOOD PRESSURE: 61 MMHG | TEMPERATURE: 97.6 F | HEIGHT: 62 IN | HEART RATE: 72 BPM | WEIGHT: 190.8 LBS | OXYGEN SATURATION: 98 % | BODY MASS INDEX: 35.11 KG/M2 | SYSTOLIC BLOOD PRESSURE: 145 MMHG

## 2025-02-24 DIAGNOSIS — M25.511 CHRONIC RIGHT SHOULDER PAIN: ICD-10-CM

## 2025-02-24 DIAGNOSIS — G89.29 CHRONIC RIGHT SHOULDER PAIN: ICD-10-CM

## 2025-02-24 DIAGNOSIS — I10 ESSENTIAL HYPERTENSION: Primary | ICD-10-CM

## 2025-02-24 PROCEDURE — 99214 OFFICE O/P EST MOD 30 MIN: CPT | Performed by: FAMILY MEDICINE

## 2025-02-24 PROCEDURE — 73030 X-RAY EXAM OF SHOULDER: CPT

## 2025-02-24 PROCEDURE — 1159F MED LIST DOCD IN RCRD: CPT | Performed by: FAMILY MEDICINE

## 2025-02-24 PROCEDURE — 3077F SYST BP >= 140 MM HG: CPT | Performed by: FAMILY MEDICINE

## 2025-02-24 PROCEDURE — G2211 COMPLEX E/M VISIT ADD ON: HCPCS | Performed by: FAMILY MEDICINE

## 2025-02-24 PROCEDURE — 1160F RVW MEDS BY RX/DR IN RCRD: CPT | Performed by: FAMILY MEDICINE

## 2025-02-24 PROCEDURE — 3078F DIAST BP <80 MM HG: CPT | Performed by: FAMILY MEDICINE

## 2025-02-24 PROCEDURE — 1125F AMNT PAIN NOTED PAIN PRSNT: CPT | Performed by: FAMILY MEDICINE

## 2025-02-24 RX ORDER — NIFEDIPINE 30 MG
60 TABLET, EXTENDED RELEASE ORAL NIGHTLY
Start: 2025-02-24

## 2025-02-24 NOTE — PROGRESS NOTES
Carolina Ayers presents to [facility].    Chief Complaint:  Blood pressure    Subjective   History of Present Illness:  Carolina is being seen today for follow-up on her blood pressure.  She is on 3 medications for it currently including losartan, furosemide, and nifedipine.  She brought in a log of her blood pressures recently, and her blood pressures have been running above goal for the most part.  This is especially true over the last month or so.  She is unaware of obvious side effects from the medications.  She is not aware of her blood pressure running high.    Carolina is also having issues with her right shoulder.  She has had pain for the last 3 months or so.  She did feel something happen when she was working on her hair at that time.  She may want to consider moving ahead with an injection.  It is of note that she may have had rotator cuff surgery many years ago with Dr. Serrano here in [place].  He is no longer in practice.    Review of Systems:  Review of Systems   Constitutional:  Negative for chills and fever.   Eyes:  Negative for blurred vision.   Respiratory:  Negative for cough and shortness of breath.    Cardiovascular:  Negative for chest pain and palpitations.   Gastrointestinal:  Negative for abdominal pain, nausea and vomiting.      Objective   Medical History:  Past Medical History:    Anesthesia    Pt HARD TO WAKE DURING RECOVERY PER FAMILY REPORT    Anxiety and depression    Arthritis    Embolism and thrombosis    LEFT LOWER EXTREMITY    Essential hypertension    History of chest pain    SEEN DR SIM/NO  ISSUES SINCE    History of lumbar fusion    History of orthostatic hypotension    SEEN BY DR. PRETTY-PT STATES NO RECENT ISSUES    History of skin cancer    REMOVED    HL (hearing loss)    Hyperlipidemia    Hypokalemia    Impaired functional mobility, balance, gait, and endurance    Localized edema    LEGS/ANKLES-TAKES DIURETIC    Lumbar herniated disc    Obesity     Orthostatic hypotension    PONV (postoperative nausea and vomiting)    Restrictive lung disease    DR. ONEAL    Thyroid nodule    MONITORED    Tinnitus    Varicose veins of both lower extremities     Past Surgical History:    ADENOIDECTOMY    BREAST LUMPECTOMY    BENIGN    CARPAL TUNNEL RELEASE    CHOLECYSTECTOMY    COLONOSCOPY    Hyperplastic polyp, otherwise normal    EXTERNAL EAR SURGERY    cyst removed from ear    EYE SURGERY    HERNIA REPAIR    HYSTERECTOMY    partial    JOINT REPLACEMENT    KNEE SURGERY    FRACTURE REPAIR    LUMBAR DISCECTOMY FUSION INSTRUMENTATION    Procedure: LUMBAR FUSION DECOMPRESSON WITH PEDICLE SCREWS Lumbar 4-5,  posterolateral fusion;  Surgeon: Jarrett Vasques IV, MD;  Location: [facility] MAIN OR;  Service: Neurosurgery    SHOULDER SURGERY    RCR    SPINE SURGERY    SUBTOTAL HYSTERECTOMY    TONSILLECTOMY    TOTAL KNEE ARTHROPLASTY    Procedure: RIGHT TOTAL KNEE ARTHROPLASTY WITH ESTHER ROBOT.;  Surgeon: Shabbir Wood MD;  Location: [facility] MAIN OR;  Service: Robotics - Ortho;  Laterality: Right;    TUBAL ABDOMINAL LIGATION    US GUIDED FINE NEEDLE ASPIRATION    WRIST SURGERY    Cyst removed      Family History   Problem Relation Age of Onset    Macular degeneration Mother     Vision loss Mother         Macular degeneration    Coronary artery disease Father     Heart disease Father         Heart Attack    Malig Hyperthermia Neg Hx      Social History     Tobacco Use    Smoking status: Former     Current packs/day: 0.00     Average packs/day: 1 pack/day for 30.0 years (30.0 ttl pk-yrs)     Types: Cigarettes     Start date: 1980     Quit date: 2010     Years since quittin.4    Smokeless tobacco: Never    Tobacco comments:     NO CAFFEINE USE   Substance Use Topics    Alcohol use: Not Currently     Comment: rarely- 2-3 times a year       Health Maintenance Due   Topic Date Due    PAP SMEAR  10/01/2024    DXA SCAN  2024    ANNUAL WELLNESS VISIT  2024    LIPID  PANEL  03/12/2025        Immunization History   Administered Date(s) Administered    Arexvy (RSV, Adults 60+ yrs) 06/10/2024    COVID-19 (MODERNA) 12YRS+ (SPIKEVAX) 11/06/2023, 10/10/2024    COVID-19 (MODERNA) 1st,2nd,3rd Dose Monovalent 03/03/2021, 03/31/2021, 10/26/2021    COVID-19 (MODERNA) BIVALENT 12+YRS 11/28/2022    COVID-19 (MODERNA) Monovalent Original Booster 05/19/2022    Fluzone High-Dose 65+YRS 10/10/2024    Fluzone High-Dose 65+yrs 10/12/2021, 10/21/2022, 11/06/2023    Hepatitis A 11/28/2018    Influenza Seasonal Injectable 10/01/2018, 09/01/2019, 10/01/2020    Influenza, Unspecified 09/20/2019    Pneumococcal Conjugate 13-Valent (PCV13) 10/21/2020    Pneumococcal Polysaccharide (PPSV23) 11/09/2009, 10/26/2021    Tdap 10/29/2010, 06/10/2024       Allergies   Allergen Reactions    Lisinopril Cough    Penicillins Hives        Medications:    Current Outpatient Medications:     albuterol sulfate  (90 Base) MCG/ACT inhaler, , Disp: , Rfl:     aspirin 81 MG EC tablet, Take 1 tablet by mouth Daily., Disp: , Rfl:     baclofen (LIORESAL) 10 MG tablet, Take 1 tablet by mouth 2 (Two) Times a Day As Needed for Muscle Spasms., Disp: 30 tablet, Rfl: 0    docusate sodium (COLACE) 100 MG capsule, Take 1 capsule by mouth Daily., Disp: , Rfl:     furosemide (LASIX) 40 MG tablet, Take 1 tablet by mouth Daily., Disp: 90 tablet, Rfl: 3    losartan (COZAAR) 100 MG tablet, Take 1 tablet by mouth Daily., Disp: 90 tablet, Rfl: 3    meloxicam (MOBIC) 15 MG tablet, Take 1 tablet by mouth Daily. Take with food., Disp: 90 tablet, Rfl: 3    NIFEdipine CC (Adalat CC) 30 MG 24 hr tablet, Take 2 tablets by mouth Every Night., Disp: , Rfl:     simvastatin (ZOCOR) 20 MG tablet, Take 1 tablet by mouth Every Night., Disp: 90 tablet, Rfl: 3    venlafaxine XR (EFFEXOR-XR) 150 MG 24 hr capsule, Take 1 capsule by mouth Daily., Disp: 90 capsule, Rfl: 1    Vital Signs:   Vitals:    02/24/25 1132 02/24/25 1213   BP: 149/71 145/61   BP  "Location: Right arm    Patient Position: Sitting    Pulse: 81 72   Temp: 97.6 °F (36.4 °C)    TempSrc: Oral    SpO2: 98%    Weight: 86.5 kg (190 lb 12.8 oz)    Height: 157.5 cm (62.01\")    Body mass index is 34.89 kg/m².    Physical Exam:  Physical Exam  Vitals reviewed.   Constitutional:       General: She is not in acute distress.     Appearance: She is obese. She is not ill-appearing.   Eyes:      Pupils: Pupils are equal, round, and reactive to light.   Neck:      Comments: No thyromegaly  Cardiovascular:      Rate and Rhythm: Normal rate and regular rhythm.   Pulmonary:      Effort: Pulmonary effort is normal.      Breath sounds: Normal breath sounds.   Abdominal:      General: There is no distension.      Palpations: Abdomen is soft.      Tenderness: There is no abdominal tenderness.   Musculoskeletal:         General: Tenderness (With range of motion.  There is a mild decrease in range of motion as well.) present.      Cervical back: Neck supple.   Lymphadenopathy:      Cervical: No cervical adenopathy.   Skin:     Findings: No lesion or rash.   Neurological:      Mental Status: She is alert.     Result Review   The following data was reviewed by Momo Renteria MD on 02/24/2025.  Lab Results   Component Value Date    WBC 5.91 11/05/2024    HGB 12.8 11/05/2024    HCT 39.7 11/05/2024    MCV 96.8 11/05/2024     11/05/2024     Lab Results   Component Value Date    GLUCOSE 88 11/05/2024    BUN 17 11/05/2024    CREATININE 1.00 11/05/2024     11/05/2024    K 4.2 11/05/2024    CL 96 (L) 11/05/2024    CALCIUM 10.2 11/05/2024    PROTEINTOT 7.5 11/05/2024    ALBUMIN 4.2 11/05/2024    ALT 15 11/05/2024    AST 24 11/05/2024    ALKPHOS 87 11/05/2024    BILITOT 0.4 11/05/2024    GLOB 3.3 11/05/2024    AGRATIO 1.3 11/05/2024    BCR 17.0 11/05/2024    ANIONGAP 12.1 11/05/2024    EGFR 61.1 11/05/2024     Lab Results   Component Value Date    CHOL 149 03/12/2024    CHLPL 157 08/24/2020    TRIG 185 (H) " 03/12/2024    HDL 51 03/12/2024    LDL 67 03/12/2024     Lab Results   Component Value Date    TSH 1.320 11/05/2024     Lab Results   Component Value Date    HGBA1C 5.60 11/05/2024          Assessment and Plan:   Today, we have reviewed her care as noted above.  Carolina's blood pressure is running above goal most of the time.  We discussed options and we will move ahead with increasing the dose of nifedipine to 60 mg at nighttime.  I will send her a [employer] message in about 10 days to see how her blood pressures are doing.  If her pressures do not come closer to goal range, then we may need to consider additional medication.  Regarding her shoulder, this has been bothering her for several months now.  We will move forward with x-rays of the shoulder.  I did encourage her to continue gentle range of motion exercises at home.  She has a scheduled appointment with orthopedics in April, and she will review this with Dr. Wood as well.  I will go ahead and place a referral order for this to be discussed at that appointment.    Diagnoses and all orders for this visit:    1. Essential hypertension (Primary)  -     NIFEdipine CC (Adalat CC) 30 MG 24 hr tablet; Take 2 tablets by mouth Every Night.    2. Chronic right shoulder pain  -     XR Shoulder 2+ View Right; Future  -     Ambulatory Referral to Orthopedic Surgery    Follow Up  Return in about 4 months (around 6/26/2025) for Recheck, Next scheduled follow up.  Patient was given instructions and counseling regarding her condition or for health maintenance advice. Please see specific information pulled into the AVS if appropriate.

## 2025-02-26 DIAGNOSIS — G89.29 CHRONIC RIGHT SHOULDER PAIN: Primary | ICD-10-CM

## 2025-02-26 DIAGNOSIS — M19.011 OSTEOARTHRITIS OF RIGHT SHOULDER, UNSPECIFIED OSTEOARTHRITIS TYPE: ICD-10-CM

## 2025-02-26 DIAGNOSIS — S46.001A ROTATOR CUFF INJURY, RIGHT, INITIAL ENCOUNTER: ICD-10-CM

## 2025-02-26 DIAGNOSIS — M25.511 CHRONIC RIGHT SHOULDER PAIN: Primary | ICD-10-CM

## 2025-02-26 DIAGNOSIS — Z98.890 HX OF SHOULDER SURGERY: ICD-10-CM

## 2025-03-14 ENCOUNTER — HOSPITAL ENCOUNTER (OUTPATIENT)
Dept: MRI IMAGING | Facility: HOSPITAL | Age: 70
Discharge: HOME | End: 2025-03-14
Payer: MEDICARE

## 2025-03-14 DIAGNOSIS — S46.001A ROTATOR CUFF INJURY, RIGHT, INITIAL ENCOUNTER: ICD-10-CM

## 2025-03-14 DIAGNOSIS — M25.511 CHRONIC RIGHT SHOULDER PAIN: ICD-10-CM

## 2025-03-14 DIAGNOSIS — M19.011 OSTEOARTHRITIS OF RIGHT SHOULDER, UNSPECIFIED OSTEOARTHRITIS TYPE: ICD-10-CM

## 2025-03-14 DIAGNOSIS — G89.29 CHRONIC RIGHT SHOULDER PAIN: ICD-10-CM

## 2025-03-14 DIAGNOSIS — Z98.890 HX OF SHOULDER SURGERY: ICD-10-CM

## 2025-03-14 PROCEDURE — 73221 MRI JOINT UPR EXTREM W/O DYE: CPT

## 2025-03-19 ENCOUNTER — OFFICE VISIT (OUTPATIENT)
Dept: ORTHOPEDIC SURGERY | Facility: CLINIC | Age: 70
End: 2025-03-19
Payer: MEDICARE

## 2025-03-19 VITALS
OXYGEN SATURATION: 91 % | DIASTOLIC BLOOD PRESSURE: 84 MMHG | BODY MASS INDEX: 34.96 KG/M2 | SYSTOLIC BLOOD PRESSURE: 184 MMHG | WEIGHT: 190 LBS | HEART RATE: 73 BPM | HEIGHT: 62 IN

## 2025-03-19 DIAGNOSIS — M12.811 ROTATOR CUFF ARTHROPATHY OF RIGHT SHOULDER: Primary | ICD-10-CM

## 2025-03-19 DIAGNOSIS — M70.62 TROCHANTERIC BURSITIS OF LEFT HIP: ICD-10-CM

## 2025-03-19 RX ORDER — LIDOCAINE HYDROCHLORIDE 10 MG/ML
5 INJECTION, SOLUTION INFILTRATION; PERINEURAL
Status: COMPLETED | OUTPATIENT
Start: 2025-03-19 | End: 2025-03-19

## 2025-03-19 RX ORDER — TRIAMCINOLONE ACETONIDE 40 MG/ML
40 INJECTION, SUSPENSION INTRA-ARTICULAR; INTRAMUSCULAR
Status: COMPLETED | OUTPATIENT
Start: 2025-03-19 | End: 2025-03-19

## 2025-03-19 RX ADMIN — TRIAMCINOLONE ACETONIDE 40 MG: 40 INJECTION, SUSPENSION INTRA-ARTICULAR; INTRAMUSCULAR at 13:25

## 2025-03-19 RX ADMIN — LIDOCAINE HYDROCHLORIDE 5 ML: 10 INJECTION, SOLUTION INFILTRATION; PERINEURAL at 13:25

## 2025-03-19 RX ADMIN — LIDOCAINE HYDROCHLORIDE 5 ML: 10 INJECTION, SOLUTION INFILTRATION; PERINEURAL at 13:24

## 2025-03-19 RX ADMIN — TRIAMCINOLONE ACETONIDE 40 MG: 40 INJECTION, SUSPENSION INTRA-ARTICULAR; INTRAMUSCULAR at 13:24

## 2025-03-19 NOTE — PROGRESS NOTES
"Chief Complaint  No chief complaint on file.    Subjective      HPI: Carolina Ayers is a 70 y.o. female with residual, possibly recurrent varicose veins of the left lower extremity with aching and throbbing pain of moderate intensity on most days, with difficulty walking.  Since her initial visit, she is not having any new or different symptoms.  She emphasizes that she does not have cosmetic concerns about her weight but just wants her leg to stop hurting.    Objective   Vital Signs:  /80 (BP Location: Right arm)   Pulse 72   Wt 86.2 kg (190 lb)   BMI 34.74 kg/m²   Estimated body mass index is 34.74 kg/m² as calculated from the following:    Height as of 3/19/25: 157.5 cm (62.01\").    Weight as of this encounter: 86.2 kg (190 lb).        Carolina Ayers  reports that she quit smoking about 14 years ago. Her smoking use included cigarettes. She started smoking about 45 years ago. She has a 30 pack-year smoking history. She has been exposed to tobacco smoke. She has quit using smokeless tobacco..     Personal review of data: Images and tracings of left lower extremity venous duplex scan performed today       Assessment and Plan     Diagnoses and all orders for this visit:    1. Varicose veins of left lower extremity with pain (Primary)  -     Chemical Venous Ablation Unilateral (Varithena); Future  -     Duplex Venous Lower Extremity - Left CAR; Future    Summary: 70-year-old woman with painful residual or recurrent varicose veins of the left lower extremity post laser ablation of the left great and anterior saphenous veins May, 2023.  She is having ongoing aching and throbbing pain with difficulty walking refractory to compression.  Duplex demonstrates varicose veins in the distribution of the anterior saphenous vein and associated ASV tributaries.  There may also be a pelvic source.  From an anatomic perspective I think she is a candidate either for saphenofemoral junction ligation and phlebectomy or " Varithena sclerotherapy.  I discussed the duplex findings, management options and the available procedure.  I described the benefits, risks and alternatives and answered all questions.  She agrees with my recommendation for Varithena sclerotherapy and wishes to proceed.  Informed consent.    Follow Up     Return for Follow-up after test.  Patient was given instructions and counseling regarding her condition or for health maintenance advice. Please see specific information pulled into the AVS if appropriate.

## 2025-03-19 NOTE — PROGRESS NOTES
Chief Complaint  Initial Evaluation of the Right Shoulder and Follow-up of the Left Hip    Subjective          Carolina Ayers presents to [facility] for an evaluation  of her right shoulder.     History of Present Illness    The patient presents here today for an evaluation  of her right shoulder. She had prior surgery on her right shoulder years ago. She states several months ago she was curling her hair when she felt a pulling and ripping sensation to her right shoulder. She has noticed decrease in strength to her shoulder. She recently went to the family doctor where she had x-rays and an MRI done on her right shoulder. She has been treating her left hip bursitis conservatively. She was last seen in the office on 23 where she had a left hip steroid injection. She states this gave her great relief but has noticed increase in pain to her left hip.     Allergies   Allergen Reactions    Lisinopril Cough    Penicillins Hives        Social History     Socioeconomic History    Marital status:      Spouse name: Ismael    Number of children: 2   Tobacco Use    Smoking status: Former     Current packs/day: 0.00     Average packs/day: 1 pack/day for 30.0 years (30.0 ttl pk-yrs)     Types: Cigarettes     Start date: 1980     Quit date: 2010     Years since quittin.5    Smokeless tobacco: Never    Tobacco comments:     NO CAFFEINE USE   Vaping Use    Vaping status: Never Used   Substance and Sexual Activity    Alcohol use: Not Currently     Comment: rarely- 2-3 times a year    Drug use: Never    Sexual activity: Not Currently     Partners: Male     Birth control/protection: Tubal ligation, Hysterectomy        I reviewed the patient's chief complaint, history of present illness, review of systems, past medical history, surgical history, family history, social history, medications, and allergy list.     REVIEW OF SYSTEMS    Constitutional: Denies fevers, chills, weight  "loss  Cardiovascular: Denies chest pain, shortness of breath  Skin: Denies rashes, acute skin changes  Neurologic: Denies headache, loss of consciousness  MSK: right shoulder pain       Objective   Vital Signs:   BP (!) 184/84   Pulse 73   Ht 157.5 cm (62.01\")   Wt 86.2 kg (190 lb)   SpO2 91%   BMI 34.74 kg/m²     Body mass index is 34.74 kg/m².    Physical Exam    General: Alert. No acute distress.   Right upper extremity: well healed surgicial incision, forward elevation  to 160 degrees, external rotation  at the side to 60 degrees, internal rotation to low lumbar, 3/5 rotator cuff strength, mild elbow drop with subscap, positive  impingement, neurovascularly intact, sensation intact to the medial, radial and ulnar nerve, positive  pulses.       Left lower extremity: Point tender over the trochanteric bursa.  No pain with hip flexion.  Internal rotation 20 degrees, external rotation to 45 degrees.  Smooth hip motion.  Calf soft.  Distal neurovascular intact.    Large Joint Arthrocentesis: L greater trochanteric bursa  Date/Time: 3/19/2025 1:24 PM  Consent given by: patient  Site marked: site marked  Timeout: Immediately prior to procedure a time out was called to verify the correct patient, procedure, equipment, support staff and site/side marked as required   Supporting Documentation  Indications: pain   Procedure Details  Location: hip - L greater trochanteric bursa  Preparation: Patient was prepped and draped in the usual sterile fashion  Needle gauge: 21 G.  Medications administered: 5 mL lidocaine 1 %; 40 mg triamcinolone acetonide 40 MG/ML  Patient tolerance: patient tolerated the procedure well with no immediate complications      Large Joint Arthrocentesis: R subacromial bursa  Date/Time: 3/19/2025 1:25 PM  Consent given by: patient  Site marked: site marked  Timeout: Immediately prior to procedure a time out was called to verify the correct patient, procedure, equipment, support staff and site/side " marked as required   Supporting Documentation  Indications: pain   Procedure Details  Location: shoulder - R subacromial bursa  Preparation: Patient was prepped and draped in the usual sterile fashion  Needle gauge: 21 G.  Medications administered: 5 mL lidocaine 1 %; 40 mg triamcinolone acetonide 40 MG/ML  Patient tolerance: patient tolerated the procedure well with no immediate complications    This injection documentation was Scribed for Shabbir Wood MD by NIMISHA Serrano.  03/19/25   13:25 EDT    Imaging Results (Most Recent)       None                     Assessment and Plan        MRI Shoulder Right Without Contrast  Result Date: 3/18/2025  Narrative: MRI SHOULDER RIGHT WO CONTRAST Date of Exam: 3/14/2025 3:05 PM EDT Indication: chronic right shoulder pain, prior right shoulder surgery, degenerative joint disease right shoulder, rotator cuff injury..  Comparison: 2/24/2025 radiographs Technique:  Routine multiplanar/multisequence images of the right shoulder were obtained without contrast administration.  Findings: Sequela of chronic rotator cuff pathology with subsequent repair involving the supraspinatus and anterior infraspinatus. There is associated irregularity and attenuation of the distal tendon fibers. There is evidence of intermediate grade partial-thickness bursal sided tear/retear of the distal infraspinatus fibers at the footprint as seen on series 401 image 14. A delaminating component extends along the proximal infraspinatus tendon. Distal subscapularis tendinopathy without evidence of discrete tear. The teres minor is intact. There is severe supraspinatus and infraspinatus fatty muscle atrophy. The long head biceps tendon is torn at the biceps labral anchor and retracted distally. The glenoid labrum appears intact without evidence of paralabral cyst formation. There is thinning of the superior humeral head articular cartilage. Trace glenohumeral joint fluid is present. Surgical change of  distal clavicular resection and probable subacromial decompression. There is small volume fluid underlying the acromion. There is no evidence of acute fracture or suspicious bone lesion. The remaining visualized soft tissues are within normal limits.     Impression: Impression: 1.Sequela of chronic rotator cuff pathology with subsequent repair involving the supraspinatus and anterior infraspinatus. There is associated irregularity and attenuation of the distal tendon fibers with evidence of intermediate grade partial-thickness bursal sided tear/retear of the distal infraspinatus fibers at the footprint. A delaminating component extends along the proximal infraspinatus tendon. There is severe supraspinatus and infraspinatus fatty muscle atrophy. 2.The long head biceps tendon is torn at the biceps labral anchor and retracted distally. 3.Surgical change of distal clavicular resection and probable subacromial decompression. Electronically Signed: Chino White MD  3/18/2025 2:00 PM EDT  Workstation ID: [id number]    XR Shoulder 2+ View Right  Result Date: 2/25/2025  Narrative: XR SHOULDER 2+ VW RIGHT Date of Exam: 2/24/2025 12:25 PM EST Indication: Right shoulder pain Comparison: None available Findings: There are postsurgical changes of prior right shoulder surgery with bone anchors within the humeral head. There is evidence of prior distal clavicle resection likely related to surgery. There is degenerative joint space narrowing at the glenohumeral joint and superior subluxation of the humeral head with narrowing of the acromiohumeral interval which can be seen with underlying rotator cuff pathology. Visualized portions of the right chest wall appear normal.     Impression: Impression: 1. Postsurgical changes of prior right shoulder surgery with distal clavicular resection. 2. Degenerative joint disease at the glenohumeral joint with superior subluxation of the humeral head and narrowing of the acromiohumeral interval  which can be seen with underlying rotator cuff pathology. Electronically Signed: Marvin Trejo  2/25/2025 9:57 PM EST  Workstation ID: [id number]       Diagnoses and all orders for this visit:    1. Rotator cuff arthropathy of right shoulder (Primary)    2. Trochanteric bursitis of left hip        The patient presents here today for an evaluation  of her right shoulder. MRI results was discussed and reviewed with the patient today in the office that was obtained by her family doctor.     Discussed the treatment plan with the patient, operative vs non-operative.     Patient wishes to proceed with conservative treatment options.     Discussed the risks and benefits of a right shoulder and a left hip steroid injection. Patient expressed understanding and wishes to proceed. Patient tolerted the injection well and without any complications.     Home exercises given today and will continue over the counter medications for pain control.     Call or return if worsening symptoms.    Scribed for Shabbir Wood MD by Reyna Farr  03/19/2025   13:10 EDT         Follow Up     PRN     Patient was given instructions and counseling regarding her condition or for health maintenance advice. Please see specific information pulled into the AVS if appropriate.     I have personally performed the services described in this document as scribed by the above individual and it is both accurate and complete. Shabbir Wood MD 03/19/25 13:42 EDT

## 2025-03-20 ENCOUNTER — OFFICE VISIT (OUTPATIENT)
Age: 70
End: 2025-03-20
Payer: MEDICARE

## 2025-03-20 ENCOUNTER — HOSPITAL ENCOUNTER (OUTPATIENT)
Facility: HOSPITAL | Age: 70
Discharge: HOME | End: 2025-03-20
Admitting: SURGERY
Payer: MEDICARE

## 2025-03-20 VITALS
WEIGHT: 190 LBS | BODY MASS INDEX: 34.74 KG/M2 | SYSTOLIC BLOOD PRESSURE: 179 MMHG | HEART RATE: 72 BPM | DIASTOLIC BLOOD PRESSURE: 80 MMHG

## 2025-03-20 DIAGNOSIS — I83.812 VARICOSE VEINS OF LEFT LOWER EXTREMITY WITH PAIN: Primary | ICD-10-CM

## 2025-03-20 DIAGNOSIS — I83.812 VARICOSE VEINS OF LEFT LOWER EXTREMITY WITH PAIN: ICD-10-CM

## 2025-03-20 LAB
BH CV LEFT LOWER VAS AA GSV REFLUX TIME: 4.36 SEC
BH CV LEFT LOWER VAS AA GSV TRANS DIAMETER: 0.5 CM
BH CV LEFT LOWER VAS COMMON FEMORAL REFLUX TIME: 2.11 SEC
BH CV LEFT LOWER VAS EXT ILIAC REFLUX COLOR FLOW TIME: 3.3 SEC
BH CV LEFT LOWER VAS PROX FV REFLUX COLOR FLOW TIME: 2.52 SEC
BH CV LEFT LOWER VAS SAPHENOFEMORAL JUNCTION REFLUX TIME: 4.73 SEC
BH CV LEFT LOWER VAS SAPHENOFEMORAL JUNCTION TRANSVERSE DIAMETER: 0.7 CM
BH CV LEFT LOWER VAS SSV PROX CALF TRANS DIAMETER: 0.2 CM
BH CV LEFT LOWER VAS VARICOSITY AK REFLUX TIME: 1.47 SEC
BH CV LEFT LOWER VAS VARICOSITY AK TRANS DIAMETER: 0.6 CM
BH CV LEFT LOWER VAS VARICOSITY BK REFLUX TIME: 5.2 SEC
BH CV LEFT LOWER VAS VARICOSITY BK TRANS DIAMETER: 0.7 CM
BH CV LOW VAS LEFT EXTERNAL ILIAC AUGMENT: NORMAL
BH CV LOW VAS LEFT EXTERNAL ILIAC COMPRESS: NORMAL
BH CV LOWER VAS LEFT GSV MID CALF COMPRESSIBILTY: NORMAL
BH CV LOWER VASCULAR LEFT AA GSV COMPETENT: NORMAL
BH CV LOWER VASCULAR LEFT AA GSV COMPRESS: NORMAL
BH CV LOWER VASCULAR LEFT COMMON FEMORAL AUGMENT: NORMAL
BH CV LOWER VASCULAR LEFT COMMON FEMORAL COMPETENT: NORMAL
BH CV LOWER VASCULAR LEFT COMMON FEMORAL COMPRESS: NORMAL
BH CV LOWER VASCULAR LEFT COMMON FEMORAL PHASIC: NORMAL
BH CV LOWER VASCULAR LEFT COMMON FEMORAL SPONT: NORMAL
BH CV LOWER VASCULAR LEFT DISTAL FEMORAL COMPRESS: NORMAL
BH CV LOWER VASCULAR LEFT EXTERNAL ILIAC COMPETENT: NORMAL
BH CV LOWER VASCULAR LEFT EXTERNAL ILIAC PHASIC: NORMAL
BH CV LOWER VASCULAR LEFT EXTERNAL ILIAC SPONT: NORMAL
BH CV LOWER VASCULAR LEFT GASTRONEMIUS COMPRESS: NORMAL
BH CV LOWER VASCULAR LEFT GREATER SAPH BK COMPRESS: NORMAL
BH CV LOWER VASCULAR LEFT LESSER SAPH COMPETENT: NORMAL
BH CV LOWER VASCULAR LEFT LESSER SAPH COMPRESS: NORMAL
BH CV LOWER VASCULAR LEFT MID FEMORAL AUGMENT: NORMAL
BH CV LOWER VASCULAR LEFT MID FEMORAL COMPETENT: NORMAL
BH CV LOWER VASCULAR LEFT MID FEMORAL COMPRESS: NORMAL
BH CV LOWER VASCULAR LEFT PERONEAL AUGMENT: NORMAL
BH CV LOWER VASCULAR LEFT PERONEAL COMPRESS: NORMAL
BH CV LOWER VASCULAR LEFT POPLITEAL AUGMENT: NORMAL
BH CV LOWER VASCULAR LEFT POPLITEAL COMPETENT: NORMAL
BH CV LOWER VASCULAR LEFT POPLITEAL COMPRESS: NORMAL
BH CV LOWER VASCULAR LEFT POSTERIOR TIBIAL AUGMENT: NORMAL
BH CV LOWER VASCULAR LEFT POSTERIOR TIBIAL COMPRESS: NORMAL
BH CV LOWER VASCULAR LEFT PROFUNDA FEMORAL AUGMENT: NORMAL
BH CV LOWER VASCULAR LEFT PROFUNDA FEMORAL COMPETENT: NORMAL
BH CV LOWER VASCULAR LEFT PROFUNDA FEMORAL COMPRESS: NORMAL
BH CV LOWER VASCULAR LEFT PROFUNDA FEMORAL PHASIC: NORMAL
BH CV LOWER VASCULAR LEFT PROFUNDA FEMORAL SPONT: NORMAL
BH CV LOWER VASCULAR LEFT PROXIMAL FEMORAL AUGMENT: NORMAL
BH CV LOWER VASCULAR LEFT PROXIMAL FEMORAL COMPETENT: NORMAL
BH CV LOWER VASCULAR LEFT PROXIMAL FEMORAL COMPRESS: NORMAL
BH CV LOWER VASCULAR LEFT PROXIMAL FEMORAL PHASIC: NORMAL
BH CV LOWER VASCULAR LEFT PROXIMAL FEMORAL SPONT: NORMAL
BH CV LOWER VASCULAR LEFT SAPHENOFEMORAL JUNCTION AUGMENT: NORMAL
BH CV LOWER VASCULAR LEFT SAPHENOFEMORAL JUNCTION COMPETENT: NORMAL
BH CV LOWER VASCULAR LEFT SAPHENOFEMORAL JUNCTION COMPRESS: NORMAL
BH CV LOWER VASCULAR LEFT SAPHENOFEMORAL JUNCTION PHASIC: NORMAL
BH CV LOWER VASCULAR LEFT SAPHENOFEMORAL JUNCTION SPONT: NORMAL
BH CV LOWER VASCULAR LEFT SAPHENOPOP JX PHASIC: NORMAL
BH CV LOWER VASCULAR LEFT SOLEAL COMPRESS: NORMAL
BH CV LOWER VASCULAR LEFT SSV MID CALF COMPETENT: NORMAL
BH CV LOWER VASCULAR LEFT SSV MID CALF COMPRESS: NORMAL
BH CV LOWER VASCULAR LEFT VARICOSITY AK COMPETENT: NORMAL
BH CV LOWER VASCULAR LEFT VARICOSITY AK COMPRESS: NORMAL
BH CV LOWER VASCULAR LEFT VARICOSITY BK COMPETENT: NORMAL
BH CV LOWER VASCULAR LEFT VARICOSITY BK COMPRESS: NORMAL
BH CV LOWER VASCULAR RIGHT COMMON FEMORAL AUGMENT: NORMAL
BH CV LOWER VASCULAR RIGHT COMMON FEMORAL COMPRESS: NORMAL
BH CV LOWER VASCULAR RIGHT COMMON FEMORAL PHASIC: NORMAL
BH CV LOWER VASCULAR RIGHT COMMON FEMORAL SPONT: NORMAL

## 2025-03-20 PROCEDURE — 93971 EXTREMITY STUDY: CPT

## 2025-04-05 DIAGNOSIS — N95.1 POST MENOPAUSAL SYNDROME: ICD-10-CM

## 2025-04-07 RX ORDER — VENLAFAXINE HYDROCHLORIDE 150 MG/1
150 CAPSULE, EXTENDED RELEASE ORAL DAILY
Qty: 90 CAPSULE | Refills: 1 | Status: SHIPPED | OUTPATIENT
Start: 2025-04-07

## 2025-04-08 NOTE — PROGRESS NOTES
Chief Complaint  Follow-up of the Left Knee    Subjective          Carolina Ayers presents to [facility]   History of Present Illness    Carolina Ayers presents today for a follow-up of her left knee.  Patient has left knee arthritis that we are treating conservatively.  Today, she reports with good and bad days to her left knee.  She explains that she has a varicose vein to her left lower leg that she is having surgery on next week so she is also experiencing some pain from this.  She denies any new injuries to her left knee.      Allergies   Allergen Reactions    Lisinopril Cough    Penicillins Hives        Social History     Socioeconomic History    Marital status:      Spouse name: Ismael    Number of children: 2   Tobacco Use    Smoking status: Former     Current packs/day: 0.00     Average packs/day: 1 pack/day for 30.0 years (30.0 ttl pk-yrs)     Types: Cigarettes     Start date: 1980     Quit date: 2010     Years since quittin.6     Passive exposure: Past    Smokeless tobacco: Former    Tobacco comments:     NO CAFFEINE USE   Vaping Use    Vaping status: Former   Substance and Sexual Activity    Alcohol use: Not Currently     Comment: rarely- 2-3 times a year    Drug use: Never    Sexual activity: Not Currently     Partners: Male     Birth control/protection: Tubal ligation, Hysterectomy        I reviewed the patient's chief complaint, history of present illness, review of systems, past medical history, surgical history, family history, social history, medications, and allergy list.     REVIEW OF SYSTEMS    Constitutional: Denies fevers, chills, weight loss  Cardiovascular: Denies chest pain, shortness of breath  Skin: Denies rashes, acute skin changes  Neurologic: Denies headache, loss of consciousness  MSK: Left knee pain      Objective   Vital Signs:   /84   Pulse 79   SpO2 96%     There is no height or weight on file to calculate BMI.    Physical  Exam    General: Alert. No acute distress.   Left lower extremity: Nontender to the medial or lateral joint lines.  5 degrees shy of full knee extension.  Knee flexion 120.  Knee extensor mechanism intact.  Knee stable to varus and valgus stress.  Patellar crepitus with motion.  Calf soft, nontender.  Demonstrates active ankle plantarflexion and dorsiflexion.  Sensation intact over dorsal and plantar foot.  Palpable pedal pulses.     Large Joint Arthrocentesis  Date/Time: 4/9/2025 10:46 AM  Consent given by: patient  Site marked: site marked  Timeout: Immediately prior to procedure a time out was called to verify the correct patient, procedure, equipment, support staff and site/side marked as required   Supporting Documentation  Indications: pain   Procedure Details  Location: -   Location: left knee.Needle gauge: 21 G.  Medications administered: 5 mL lidocaine 1 %; 40 mg triamcinolone acetonide 40 MG/ML  Patient tolerance: patient tolerated the procedure well with no immediate complications      This injection documentation was Scribed for Cheyanne Zhou PA-C by Bethany Mackey MA.  04/09/25   10:47 EDT    Imaging Results (Most Recent)       None                   Assessment and Plan    Diagnoses and all orders for this visit:    1. Arthritis of left knee (Primary)    2. Primary osteoarthritis of right knee    Other orders  -     Large Joint Arthrocentesis        Carolina Ayers presents today to follow-up with her left knee arthritis that we are treating conservatively.  Patient instructed to continue with home exercises.  Continue with activities as tolerated.We discussed the risks and benefits with the patient regarding left knee steroid injection. Patient understood and elected to proceed. Patient tolerated injection well with no complications.         Patient will follow up in 3 months for reevaluation.      Call or return if symptoms worsen or patient has any concerns.       Follow Up   Return in about 3  months (around 7/9/2025).  Patient was given instructions and counseling regarding her condition or for health maintenance advice. Please see specific information pulled into the AVS if appropriate.     Cheyanne Zhou PA-C  04/09/25  11:25 EDT

## 2025-04-09 ENCOUNTER — OFFICE VISIT (OUTPATIENT)
Dept: ORTHOPEDIC SURGERY | Facility: CLINIC | Age: 70
End: 2025-04-09
Payer: MEDICARE

## 2025-04-09 VITALS — OXYGEN SATURATION: 96 % | DIASTOLIC BLOOD PRESSURE: 84 MMHG | SYSTOLIC BLOOD PRESSURE: 145 MMHG | HEART RATE: 79 BPM

## 2025-04-09 DIAGNOSIS — M17.11 PRIMARY OSTEOARTHRITIS OF RIGHT KNEE: ICD-10-CM

## 2025-04-09 DIAGNOSIS — M17.12 ARTHRITIS OF LEFT KNEE: Primary | ICD-10-CM

## 2025-04-09 RX ORDER — LIDOCAINE HYDROCHLORIDE 10 MG/ML
5 INJECTION, SOLUTION INFILTRATION; PERINEURAL
Status: COMPLETED | OUTPATIENT
Start: 2025-04-09 | End: 2025-04-09

## 2025-04-09 RX ORDER — TRIAMCINOLONE ACETONIDE 40 MG/ML
40 INJECTION, SUSPENSION INTRA-ARTICULAR; INTRAMUSCULAR
Status: COMPLETED | OUTPATIENT
Start: 2025-04-09 | End: 2025-04-09

## 2025-04-09 RX ADMIN — LIDOCAINE HYDROCHLORIDE 5 ML: 10 INJECTION, SOLUTION INFILTRATION; PERINEURAL at 10:46

## 2025-04-09 RX ADMIN — TRIAMCINOLONE ACETONIDE 40 MG: 40 INJECTION, SUSPENSION INTRA-ARTICULAR; INTRAMUSCULAR at 10:46

## 2025-04-21 ENCOUNTER — HOSPITAL ENCOUNTER (OUTPATIENT)
Facility: HOSPITAL | Age: 70
Discharge: HOME | End: 2025-04-21
Admitting: SURGERY
Payer: MEDICARE

## 2025-04-21 ENCOUNTER — OFFICE VISIT (OUTPATIENT)
Age: 70
End: 2025-04-21
Payer: MEDICARE

## 2025-04-21 VITALS
DIASTOLIC BLOOD PRESSURE: 82 MMHG | HEIGHT: 62 IN | SYSTOLIC BLOOD PRESSURE: 145 MMHG | RESPIRATION RATE: 16 BRPM | BODY MASS INDEX: 34.96 KG/M2 | WEIGHT: 190 LBS

## 2025-04-21 DIAGNOSIS — I83.812 VARICOSE VEINS OF LEFT LOWER EXTREMITY WITH PAIN: Primary | ICD-10-CM

## 2025-04-21 DIAGNOSIS — I83.812 VARICOSE VEINS OF LEFT LOWER EXTREMITY WITH PAIN: ICD-10-CM

## 2025-04-21 LAB
BH CV LOW VAS LEFT EXTERNAL ILIAC COMPRESS: NORMAL
BH CV LOW VAS LEFT GREATER SAPH AK VESSEL: 1
BH CV LOW VAS LEFT VARICOSITY AK VESSEL: 1
BH CV LOW VAS LEFT VARICOSITY BK VESSEL: 1
BH CV LOWER VASCULAR LEFT ANTERIOR SAPH COMPRESS: NORMAL
BH CV LOWER VASCULAR LEFT ANTERIOR SAPH VESSEL SCRIPT: 1
BH CV LOWER VASCULAR LEFT COMMON FEMORAL COMPRESS: NORMAL
BH CV LOWER VASCULAR LEFT DISTAL FEMORAL COMPRESS: NORMAL
BH CV LOWER VASCULAR LEFT GASTRONEMIUS COMPRESS: NORMAL
BH CV LOWER VASCULAR LEFT GREATER SAPH AK COMPRESS: NORMAL
BH CV LOWER VASCULAR LEFT LESSER SAPH COMPRESS: NORMAL
BH CV LOWER VASCULAR LEFT MID FEMORAL COMPRESS: NORMAL
BH CV LOWER VASCULAR LEFT PERONEAL COMPRESS: NORMAL
BH CV LOWER VASCULAR LEFT POPLITEAL COMPRESS: NORMAL
BH CV LOWER VASCULAR LEFT POSTERIOR TIBIAL COMPRESS: NORMAL
BH CV LOWER VASCULAR LEFT PROFUNDA FEMORAL COMPRESS: NORMAL
BH CV LOWER VASCULAR LEFT PROXIMAL FEMORAL COMPRESS: NORMAL
BH CV LOWER VASCULAR LEFT SAPHENOFEMORAL JUNCTION COMPRESS: NORMAL
BH CV LOWER VASCULAR LEFT VARICOSITY AK COMPRESS: NORMAL
BH CV LOWER VASCULAR LEFT VARICOSITY BK COMPRESS: NORMAL

## 2025-04-21 PROCEDURE — 99213 OFFICE O/P EST LOW 20 MIN: CPT | Performed by: NURSE PRACTITIONER

## 2025-04-21 PROCEDURE — 93971 EXTREMITY STUDY: CPT | Performed by: SURGERY

## 2025-04-21 PROCEDURE — 3079F DIAST BP 80-89 MM HG: CPT | Performed by: NURSE PRACTITIONER

## 2025-04-21 PROCEDURE — 1160F RVW MEDS BY RX/DR IN RCRD: CPT | Performed by: NURSE PRACTITIONER

## 2025-04-21 PROCEDURE — 3077F SYST BP >= 140 MM HG: CPT | Performed by: NURSE PRACTITIONER

## 2025-04-21 PROCEDURE — 1159F MED LIST DOCD IN RCRD: CPT | Performed by: NURSE PRACTITIONER

## 2025-04-21 PROCEDURE — 93971 EXTREMITY STUDY: CPT

## 2025-04-21 NOTE — PROGRESS NOTES
Chief Complaint  Post op (Ablasion)    Subjective        Carolina Ayers presents to [facility]  HPI   Carolina Ayers is a 70 y.o. female that has been followed in our office for venous insufficiency and varicose veins. She under went a left  leg Varithena ablation on 4/16/2024 with Dr Sung. She returns today in follow up along with a spot check. She has been doing well since the procedure with minimal pain. She has been compliant with compression stockings.    Carolina Ayers  reports that she quit smoking about 14 years ago. Her smoking use included cigarettes. She started smoking about 45 years ago. She has a 30 pack-year smoking history. She has been exposed to tobacco smoke. She has quit using smokeless tobacco..        Objective   Vital Signs:  Vitals:    04/21/25 1226   BP: 145/82   Resp: 16      Body mass index is 34.74 kg/m².           Physical Exam  Vitals reviewed.   Constitutional:       Appearance: Normal appearance.   HENT:      Head: Normocephalic.   Cardiovascular:      Rate and Rhythm: Normal rate and regular rhythm.      Pulses: Normal pulses.           Dorsalis pedis pulses are 3+ on the right side and 3+ on the left side.        Posterior tibial pulses are 3+ on the right side and 3+ on the left side.   Pulmonary:      Effort: Pulmonary effort is normal.   Skin:     General: Skin is warm.   Neurological:      General: No focal deficit present.      Mental Status: She is alert and oriented to person, place, and time.   Psychiatric:         Mood and Affect: Mood normal.          Result Review :      Spot Check: Duplex Venous Lower Extremity - Left CAR (04/21/2025 12:08)                    Assessment and Plan     Diagnoses and all orders for this visit:    1. Varicose veins of left lower extremity with pain (Primary)  -     Duplex Venous Lower Extremity - Left CAR; Future             Patient is doing well status post Varithena ablation.  Today, her spotcheck  shows a successful ablation with no DVT.  We discussed that it will take time for these veins to decompress.  We discussed postoperative care instructions including wearing stockings for a total of 2 weeks. Shemay resume exercise in 2 weeks.  We will plan to follow-up in 12 weeks with a repeat venous duplex and to see the surgeon.    Follow Up     Return in about 3 months (around 7/21/2025) for LEV; see BARBARA.      Candy Rdz, MARI

## 2025-05-21 ENCOUNTER — TELEPHONE (OUTPATIENT)
Dept: ORTHOPEDIC SURGERY | Facility: CLINIC | Age: 70
End: 2025-05-21
Payer: MEDICARE

## 2025-05-21 NOTE — TELEPHONE ENCOUNTER
CALLED PATIENT AND LVM OFFERING PATIENT TO SCHEDULE WITH [facility] IN [place] SINCE THAT IS WHO SHE HAS BEEN SEEING AND PATIENT LIVES IN [place].     OKAY FOR HUB TO RESCHEDULE 6/25/25 AND 7/17/25 APPOINTMENTS TO [facility] [place] SCHEDULE ON 6/24/25  OR AFTER AND 7/17/25

## 2025-06-26 ENCOUNTER — OFFICE VISIT (OUTPATIENT)
Age: 70
End: 2025-06-26
Payer: MEDICARE

## 2025-06-26 VITALS
SYSTOLIC BLOOD PRESSURE: 155 MMHG | HEART RATE: 75 BPM | DIASTOLIC BLOOD PRESSURE: 91 MMHG | WEIGHT: 190 LBS | HEIGHT: 62 IN | BODY MASS INDEX: 34.96 KG/M2 | OXYGEN SATURATION: 98 %

## 2025-06-26 DIAGNOSIS — M12.811 ROTATOR CUFF ARTHROPATHY OF RIGHT SHOULDER: Primary | ICD-10-CM

## 2025-06-26 DIAGNOSIS — M70.62 TROCHANTERIC BURSITIS OF LEFT HIP: ICD-10-CM

## 2025-06-26 RX ORDER — LIDOCAINE HYDROCHLORIDE 10 MG/ML
5 INJECTION, SOLUTION INFILTRATION; PERINEURAL
Status: COMPLETED | OUTPATIENT
Start: 2025-06-26 | End: 2025-06-26

## 2025-06-26 RX ORDER — TRIAMCINOLONE ACETONIDE 40 MG/ML
40 INJECTION, SUSPENSION INTRA-ARTICULAR; INTRAMUSCULAR
Status: COMPLETED | OUTPATIENT
Start: 2025-06-26 | End: 2025-06-26

## 2025-06-26 RX ADMIN — LIDOCAINE HYDROCHLORIDE 5 ML: 10 INJECTION, SOLUTION INFILTRATION; PERINEURAL at 09:39

## 2025-06-26 RX ADMIN — TRIAMCINOLONE ACETONIDE 40 MG: 40 INJECTION, SUSPENSION INTRA-ARTICULAR; INTRAMUSCULAR at 09:39

## 2025-06-26 NOTE — PROGRESS NOTES
Chief Complaint  Follow-up of the Right Shoulder and Follow-up of the Left Hip    Subjective          Carolina Ayers presents to [facility]   History of Present Illness    Carolina Ayers presents today to follow-up with her right shoulder rotator cuff arthropathy and left hip trochanteric bursitis that we are managing conservatively.    History of Present Illness  The patient presents for evaluation of right shoulder and left hip pain.    She reports an improvement in her right shoulder condition following an injection administered approximately 3 months ago. However, she continues to experience difficulties with her range of motion. She has not sustained any new injuries since her last visit 3 months ago. She does not report any numbness or tingling sensations in her hand. Her left hip discomfort is less severe, which she attributes to a reduction in her walking speed.  Patient has been performing exercises for both her shoulder and hip.        Allergies   Allergen Reactions    Lisinopril Cough    Penicillins Hives        Social History     Socioeconomic History    Marital status:      Spouse name: Ismael    Number of children: 2   Tobacco Use    Smoking status: Former     Current packs/day: 0.00     Average packs/day: 1 pack/day for 30.0 years (30.0 ttl pk-yrs)     Types: Cigarettes     Start date: 1980     Quit date: 2010     Years since quittin.8     Passive exposure: Past    Smokeless tobacco: Former    Tobacco comments:     NO CAFFEINE USE   Vaping Use    Vaping status: Former   Substance and Sexual Activity    Alcohol use: Not Currently     Comment: rarely- 2-3 times a year    Drug use: Never    Sexual activity: Not Currently     Partners: Male     Birth control/protection: Tubal ligation, Hysterectomy        I reviewed the patient's chief complaint, history of present illness, review of systems, past medical history, surgical history, family history, social  "history, medications, and allergy list.     REVIEW OF SYSTEMS    Constitutional: Denies fevers, chills, weight loss  Cardiovascular: Denies chest pain, shortness of breath  Skin: Denies rashes, acute skin changes  Neurologic: Denies headache, loss of consciousness  MSK: Left hip pain.  Right shoulder pain.      Objective   Vital Signs:   /91 (BP Location: Left arm, Patient Position: Sitting, Cuff Size: Adult)   Pulse 75   Ht 157.5 cm (62.01\")   Wt 86.2 kg (190 lb)   SpO2 98%   BMI 34.74 kg/m²     Body mass index is 34.74 kg/m².    Physical Exam    General: Alert. No acute distress.   Right upper extremity: Tenderness palpation of the AC joint.  Nontender to the bicipital groove.  Active forward elevation 170.  External rotation 45.  Internal rotation to the lower lumbar spine.  3 out of 5 supraspinatus testing.  5 out of 5 subscapularis and infraspinatus testing.  Elbow range of motion intact.  Forearm soft.  Active finger and wrist range of motion.  Thumb opposition intact.  Palmar abduction of thumb intact.  Sensation intact to the axillary, median, radial, and ulnar nerve distributions.  Palpable radial pulse.    Left lower extremity: Tenderness palpation of the greater trochanteric bursa.  Hip flexion intact.  5 out of 5 hip abduction.  No groin pain with passive logroll the hip.  No groin pain with passive hip range of motion.  Knee extensor mechanism intact.  Calf soft, nontender.  Demonstrates active ankle plantarflexion and dorsiflexion.  Sensation intact over dorsal and plantar foot.  Palpable pedal pulses.      Large Joint: R subacromial bursa  Date/Time: 6/26/2025 9:39 AM  Consent given by: patient  Site marked: site marked  Timeout: Immediately prior to procedure a time out was called to verify the correct patient, procedure, equipment, support staff and site/side marked as required   Supporting Documentation  Indications: pain   Procedure Details  Location: shoulder - R subacromial " bursa  Preparation: Patient was prepped and draped in the usual sterile fashion  Needle gauge: 21G.  Medications administered: 5 mL lidocaine 1 %; 40 mg triamcinolone acetonide 40 MG/ML  Patient tolerance: patient tolerated the procedure well with no immediate complications      Large Joint: L greater trochanteric bursa  Date/Time: 6/26/2025 9:39 AM  Consent given by: patient  Site marked: site marked  Timeout: Immediately prior to procedure a time out was called to verify the correct patient, procedure, equipment, support staff and site/side marked as required   Supporting Documentation  Indications: pain   Procedure Details  Location: hip - L greater trochanteric bursa  Preparation: Patient was prepped and draped in the usual sterile fashion  Needle gauge: 21G.  Medications administered: 5 mL lidocaine 1 %; 40 mg triamcinolone acetonide 40 MG/ML  Patient tolerance: patient tolerated the procedure well with no immediate complications      This injection documentation was Scribed for Cheyanne Zhou PA-C by Hanane Aceves CMA.  06/26/25   09:40 EDT   Imaging Results (Most Recent)       None                   Assessment and Plan    Diagnoses and all orders for this visit:    1. Rotator cuff arthropathy of right shoulder (Primary)    2. Trochanteric bursitis of left hip    Other orders  -     Large Joint: R subacromial bursa  -     Large Joint: L greater trochanteric bursa        Carolina Ayers presents today for follow-up of her right shoulder rotator cuff arthropathy and left hip trochanteric bursitis that we are managing conservatively.    Assessment & Plan  1. Right shoulder pain:  Continued shoulder exercises were advised to prevent stiffness and potential freezing of the joint.  Continue with activity as tolerated. The importance of maintaining activity and exercises for the shoulder was emphasized. We discussed the risks and benefits with the patient regarding right shoulder steroid injection. Patient  understood and elected to proceed. Patient tolerated injection well with no complications.       2. Left hip pain:  Patient to continue with home exercises.  Continue with hip range of motion as well as strengthening exercises. We discussed the risks and benefits with the patient regarding left hip trochanteric bursitis steroid injection. Patient understood and elected to proceed. Patient tolerated injection well with no complications.         Patient will follow up in 3 months for reevaluation.      Call or return if symptoms worsen or patient has any concerns.       Follow Up   Return in about 3 months (around 9/26/2025).  Patient was given instructions and counseling regarding her condition or for health maintenance advice. Please see specific information pulled into the AVS if appropriate.     Patient or patient representative verbalized consent for the use of Ambient Listening during the visit with  Cheyanne Zhou PA-C for chart documentation. 6/26/2025  09:53 EDT    Cheyanne Zhou PA-C  06/26/25  09:53 EDT

## 2025-07-01 ENCOUNTER — OFFICE VISIT (OUTPATIENT)
Dept: FAMILY MEDICINE CLINIC | Age: 70
End: 2025-07-01
Payer: MEDICARE

## 2025-07-01 ENCOUNTER — LAB (OUTPATIENT)
Dept: LAB | Facility: HOSPITAL | Age: 70
End: 2025-07-01
Payer: MEDICARE

## 2025-07-01 VITALS
DIASTOLIC BLOOD PRESSURE: 89 MMHG | BODY MASS INDEX: 33.75 KG/M2 | TEMPERATURE: 97.8 F | WEIGHT: 183.4 LBS | HEART RATE: 69 BPM | HEIGHT: 62 IN | SYSTOLIC BLOOD PRESSURE: 178 MMHG | OXYGEN SATURATION: 96 %

## 2025-07-01 DIAGNOSIS — Z78.0 POSTMENOPAUSAL: ICD-10-CM

## 2025-07-01 DIAGNOSIS — I10 ESSENTIAL HYPERTENSION: ICD-10-CM

## 2025-07-01 DIAGNOSIS — E78.2 MIXED HYPERLIPIDEMIA: ICD-10-CM

## 2025-07-01 DIAGNOSIS — Z23 IMMUNIZATION DUE: ICD-10-CM

## 2025-07-01 DIAGNOSIS — Z00.00 MEDICARE ANNUAL WELLNESS VISIT, SUBSEQUENT: Primary | ICD-10-CM

## 2025-07-01 LAB
ALBUMIN SERPL-MCNC: 4.6 G/DL (ref 3.5–5.2)
ALBUMIN/GLOB SERPL: 1.5 G/DL
ALP SERPL-CCNC: 81 U/L (ref 39–117)
ALT SERPL W P-5'-P-CCNC: 16 U/L (ref 1–33)
ANION GAP SERPL CALCULATED.3IONS-SCNC: 8.8 MMOL/L (ref 5–15)
AST SERPL-CCNC: 23 U/L (ref 1–32)
BILIRUB SERPL-MCNC: 0.4 MG/DL (ref 0–1.2)
BUN SERPL-MCNC: 21 MG/DL (ref 8–23)
BUN/CREAT SERPL: 21.4 (ref 7–25)
CALCIUM SPEC-SCNC: 10.1 MG/DL (ref 8.6–10.5)
CHLORIDE SERPL-SCNC: 104 MMOL/L (ref 98–107)
CHOLEST SERPL-MCNC: 186 MG/DL (ref 0–200)
CO2 SERPL-SCNC: 25.2 MMOL/L (ref 22–29)
CREAT SERPL-MCNC: 0.98 MG/DL (ref 0.57–1)
DEPRECATED RDW RBC AUTO: 45.3 FL (ref 37–54)
EGFRCR SERPLBLD CKD-EPI 2021: 62.2 ML/MIN/1.73
ERYTHROCYTE [DISTWIDTH] IN BLOOD BY AUTOMATED COUNT: 12.4 % (ref 12.3–15.4)
GLOBULIN UR ELPH-MCNC: 3 GM/DL
GLUCOSE SERPL-MCNC: 82 MG/DL (ref 65–99)
HCT VFR BLD AUTO: 42 % (ref 34–46.6)
HDLC SERPL-MCNC: 59 MG/DL (ref 40–60)
HGB BLD-MCNC: 13.5 G/DL (ref 12–15.9)
LDLC SERPL CALC-MCNC: 104 MG/DL (ref 0–100)
LDLC/HDLC SERPL: 1.72 {RATIO}
MCH RBC QN AUTO: 31.5 PG (ref 26.6–33)
MCHC RBC AUTO-ENTMCNC: 32.1 G/DL (ref 31.5–35.7)
MCV RBC AUTO: 97.9 FL (ref 79–97)
PLATELET # BLD AUTO: 351 10*3/MM3 (ref 140–450)
PMV BLD AUTO: 9.3 FL (ref 6–12)
POTASSIUM SERPL-SCNC: 4.9 MMOL/L (ref 3.5–5.2)
PROT SERPL-MCNC: 7.6 G/DL (ref 6–8.5)
RBC # BLD AUTO: 4.29 10*6/MM3 (ref 3.77–5.28)
SODIUM SERPL-SCNC: 138 MMOL/L (ref 136–145)
TRIGL SERPL-MCNC: 128 MG/DL (ref 0–150)
VLDLC SERPL-MCNC: 23 MG/DL (ref 5–40)
WBC NRBC COR # BLD AUTO: 7.93 10*3/MM3 (ref 3.4–10.8)

## 2025-07-01 PROCEDURE — 36415 COLL VENOUS BLD VENIPUNCTURE: CPT

## 2025-07-01 PROCEDURE — 80053 COMPREHEN METABOLIC PANEL: CPT

## 2025-07-01 PROCEDURE — 85027 COMPLETE CBC AUTOMATED: CPT

## 2025-07-01 PROCEDURE — 80061 LIPID PANEL: CPT

## 2025-07-01 NOTE — ASSESSMENT & PLAN NOTE
Checking labs  Orders:    Lipid panel; Future    Comprehensive metabolic panel; Future    CBC (No Diff); Future

## 2025-07-01 NOTE — ASSESSMENT & PLAN NOTE
Blood pressure slightly elevated on initial check, will repeat before she leaves office.  Checking labs today.  May need to follow-up with PCP if blood pressure remains elevated.  Orders:    Lipid panel; Future    Comprehensive metabolic panel; Future    CBC (No Diff); Future

## 2025-07-01 NOTE — PROGRESS NOTES
Subjective   The ABCs of the Annual Wellness Visit  Medicare Wellness Visit    Carolina Ayers is a 70 y.o. patient who presents for a Medicare Wellness Visit.    The following portions of the patient's history were reviewed and   updated as appropriate: allergies, current medications, past family history, past medical history, past social history, past surgical history, and problem list.    Compared to one year ago, the patient's physical health is the same.  Compared to one year ago, the patient's mental health is the same.    Patient is up-to-date on her colonoscopy, last completed in March 2017.  Per patient, repeat recommended in 10 years.  Mammogram last completed in November 2024.  Will be due again in November 2025.  Low-dose CT scan to screen for lung cancer was completed November 2024 and repeat recommended in 1 year.  Patient is a former smoker.  She is up-to-date on her Tdap, Shingrix and pneumococcal vaccine.  Due for COVID booster.  She is due for her DEXA scan.  Up-to-date on dental and eye exams.    Recent Hospitalizations:  She was not admitted to the hospital during the last year.     Current Medical Providers:  Patient Care Team:  Momo Renteria MD as PCP - General (Family Medicine)  Franki Sung MD as Surgeon (Vascular Surgery)  Shabbir Wood MD as Consulting Physician (Orthopedic Surgery)  Cheyanne Zhou PA-C as Physician Assistant (Orthopedic Surgery)    Outpatient Medications Prior to Visit   Medication Sig Dispense Refill    albuterol sulfate  (90 Base) MCG/ACT inhaler       aspirin 81 MG EC tablet Take 1 tablet by mouth Daily.      baclofen (LIORESAL) 10 MG tablet Take 1 tablet by mouth 2 (Two) Times a Day As Needed for Muscle Spasms. 30 tablet 0    docusate sodium (COLACE) 100 MG capsule Take 1 capsule by mouth Daily. (Patient taking differently: Take 1 capsule by mouth As Needed.)      furosemide (LASIX) 40 MG tablet Take 1 tablet by mouth Daily. 90 tablet 3     losartan (COZAAR) 100 MG tablet Take 1 tablet by mouth Daily. 90 tablet 3    meloxicam (MOBIC) 15 MG tablet Take 1 tablet by mouth Daily. Take with food. (Patient taking differently: Take 1 tablet by mouth Every Other Day. Take with food.) 90 tablet 3    NIFEdipine CC (Adalat CC) 30 MG 24 hr tablet Take 2 tablets by mouth Every Night.      simvastatin (ZOCOR) 20 MG tablet Take 1 tablet by mouth Every Night. 90 tablet 3    venlafaxine XR (EFFEXOR-XR) 150 MG 24 hr capsule TAKE 1 CAPSULE BY MOUTH DAILY 90 capsule 1     No facility-administered medications prior to visit.     No opioid medication identified on active medication list. I have reviewed chart for other potential  high risk medication/s and harmful drug interactions in the elderly.      Aspirin is on active medication list. Aspirin use is indicated based on review of current medical condition/s. Pros and cons of this therapy have been discussed today. Benefits of this medication outweigh potential harm.  Patient has been encouraged to continue taking this medication.  .      Patient Active Problem List   Diagnosis    Acute bilateral low back pain without sciatica    Acquired spondylolisthesis    Hypokalemia    Abnormal EKG    Preop cardiovascular exam    Essential hypertension    Localized edema    Orthostatic hypotension    Impaired functional mobility, balance, gait, and endurance    Post-operative state    Bilateral lower extremity edema    Precordial pain    Mixed hyperlipidemia    Multiple thyroid nodules    Thyroid nodule    Sensorineural hearing loss (SNHL) of both ears    Tinnitus of both ears    Post menopausal syndrome    Degenerative arthritis of knee, bilateral    Varicose veins of left lower extremity with pain    Asymptomatic varicose veins of right lower extremity    Spider veins     Advance Care Planning Advance Directive is not on file.  ACP discussion was held with the patient during this visit. Patient has an advance directive (not in  "EMR), copy requested.      Objective   Vitals:    25 1106 25 1134   BP: 150/82 178/89   Pulse: 69    Temp: 97.8 °F (36.6 °C)    TempSrc: Oral    SpO2: 96%  Comment: room air    Weight: 83.2 kg (183 lb 6.4 oz)    Height: 157.5 cm (62.01\")    PainSc: 4     PainLoc: Knee  Comment: (L)        Estimated body mass index is 33.54 kg/m² as calculated from the following:    Height as of this encounter: 157.5 cm (62.01\").    Weight as of this encounter: 83.2 kg (183 lb 6.4 oz).    BMI is >= 30 and <35. (Class 1 Obesity). The following options were offered after discussion;: exercise counseling/recommendations and nutrition counseling/recommendations           Does the patient have evidence of cognitive impairment? No  Lab Results   Component Value Date    TRIG 128 2025    HDL 59 2025     (H) 2025    VLDL 23 2025                                                                                                Health  Risk Assessment    Smoking Status:  Social History     Tobacco Use   Smoking Status Former    Current packs/day: 0.00    Average packs/day: 1 pack/day for 30.0 years (30.0 ttl pk-yrs)    Types: Cigarettes    Start date: 1980    Quit date: 2010    Years since quittin.8    Passive exposure: Past   Smokeless Tobacco Former   Tobacco Comments    NO CAFFEINE USE     Alcohol Consumption:  Social History     Substance and Sexual Activity   Alcohol Use Not Currently    Comment: rarely- 2-3 times a year       Fall Risk Screen  STEADI Fall Risk Assessment was completed, and patient is at MODERATE risk for falls. Assessment completed on:2025    Depression Screening   Little interest or pleasure in doing things? Not at all   Feeling down, depressed, or hopeless? Not at all   PHQ-2 Total Score 0      Health Habits and Functional and Cognitive Screenin/1/2025    11:10 AM   Functional & Cognitive Status   Do you have difficulty preparing food and eating? No   Do " you have difficulty bathing yourself, getting dressed or grooming yourself? No   Do you have difficulty using the toilet? No   Do you have difficulty moving around from place to place? Yes   Do you have trouble with steps or getting out of a bed or a chair? Yes   Current Diet Well Balanced Diet   Dental Exam Up to date   Eye Exam Up to date   Exercise (times per week) 2 times per week   Current Exercises Include Walking   Do you need help using the phone?  No   Are you deaf or do you have serious difficulty hearing?  No   Do you need help to go to places out of walking distance? No   Do you need help shopping? No   Do you need help preparing meals?  No   Do you need help with housework?  No   Do you need help with laundry? No   Do you need help taking your medications? No   Do you need help managing money? No   Do you ever drive or ride in a car without wearing a seat belt? No   Have you felt unusual fatigue (could be tiredness), stress, anger or loneliness in the last month? No   Who do you live with? Spouse   If you need help, do you have trouble finding someone available to you? No   Have you been bothered in the last four weeks by sexual problems? No   Do you have difficulty concentrating, remembering or making decisions? No           Age-appropriate Screening Schedule:  Refer to the list below for future screening recommendations based on patient's age, sex and/or medical conditions. Orders for these recommended tests are listed in the plan section. The patient has been provided with a written plan.    Health Maintenance List  Health Maintenance   Topic Date Due    DXA SCAN  11/07/2024    COVID-19 Vaccine (8 - 2024-25 season) 04/10/2025    INFLUENZA VACCINE  07/15/2025 (Originally 7/1/2025)    LUNG CANCER SCREENING  11/25/2025    ANNUAL WELLNESS VISIT  07/01/2026    LIPID PANEL  07/01/2026    MAMMOGRAM  11/19/2026    COLORECTAL CANCER SCREENING  03/07/2027    TDAP/TD VACCINES (3 - Td or Tdap) 06/10/2034     "HEPATITIS C SCREENING  Completed    Pneumococcal Vaccine 50+  Completed    ZOSTER VACCINE  Discontinued                                                                                                                                                CMS Preventative Services Quick Reference  Risk Factors Identified During Encounter  Immunizations Discussed/Encouraged: COVID19    The above risks/problems have been discussed with the patient.  Pertinent information has been shared with the patient in the After Visit Summary.  An After Visit Summary and PPPS were made available to the patient.    Follow Up:   Next Medicare Wellness visit to be scheduled in 1 year.         Additional E&M Note during same encounter follows:  Patient has additional, significant, and separately identifiable condition(s)/problem(s) that require work above and beyond the Medicare Wellness Visit     Chief Complaint  Medicare Wellness-subsequent    Subjective   HPI  Carolina is also being seen today for additional medical problem/s.    Patient has hypertension.  Blood pressures are running slightly elevated at home at times.  She is currently on Lasix 40 mg daily, losartan 100 mg daily and nifedipine 60 mg nightly.  She is tolerating her medications well.    Patient has hyperlipidemia.  She is on simvastatin.  No side effects noted.    Objective   Vital Signs:  /89   Pulse 69   Temp 97.8 °F (36.6 °C) (Oral)   Ht 157.5 cm (62.01\")   Wt 83.2 kg (183 lb 6.4 oz)   SpO2 96% Comment: room air  BMI 33.54 kg/m²   Physical Exam  Vitals and nursing note reviewed.   Constitutional:       General: She is not in acute distress.     Appearance: Normal appearance. She is not ill-appearing.   HENT:      Head: Normocephalic.      Mouth/Throat:      Lips: Pink.   Eyes:      Extraocular Movements: Extraocular movements intact.   Neck:      Vascular: No carotid bruit.   Cardiovascular:      Rate and Rhythm: Normal rate and regular rhythm.      Heart sounds: " Normal heart sounds. No murmur heard.  Pulmonary:      Effort: Pulmonary effort is normal. No accessory muscle usage or respiratory distress.      Breath sounds: Normal breath sounds. No wheezing or rhonchi.   Musculoskeletal:      Cervical back: Normal range of motion.   Skin:     General: Skin is warm and dry.      Capillary Refill: Capillary refill takes less than 2 seconds.   Neurological:      General: No focal deficit present.      Mental Status: She is alert and oriented to person, place, and time.      Cranial Nerves: No facial asymmetry.   Psychiatric:         Mood and Affect: Mood and affect normal.         Behavior: Behavior normal.            Assessment and Plan      Medicare annual wellness visit, subsequent  Medicare annual wellness visit completed today.  Recommended health maintenance topics were discussed and reviewed with patient.  Recommend healthy diet, routine exercise.  Recommend routine dental and eye exams.  Patient is at a moderate risk for falls.  PHQ 2 is 0.       Immunization due  Declines COVID-vaccine.       Postmenopausal  DEXA scan ordered today.  Orders:    DEXA Bone Density Axial    Essential hypertension  Blood pressure slightly elevated on initial check, will repeat before she leaves office.  Checking labs today.  May need to follow-up with PCP if blood pressure remains elevated.  Orders:    Lipid panel; Future    Comprehensive metabolic panel; Future    CBC (No Diff); Future    Mixed hyperlipidemia  Checking labs  Orders:    Lipid panel; Future    Comprehensive metabolic panel; Future    CBC (No Diff); Future       Addendum: Blood pressure is higher on repeat. Will request patient keep a BP log and we will schedule her a close follow up with PCP to review and discuss.       Follow Up   Return in about 1 year (around 7/1/2026) for Medicare Wellness with PCP.  Patient was given instructions and counseling regarding her condition or for health maintenance advice. Please see specific  information pulled into the AVS if appropriate.

## 2025-07-02 ENCOUNTER — RESULTS FOLLOW-UP (OUTPATIENT)
Dept: FAMILY MEDICINE CLINIC | Age: 70
End: 2025-07-02
Payer: MEDICARE

## 2025-07-02 ENCOUNTER — TELEPHONE (OUTPATIENT)
Age: 70
End: 2025-07-02
Payer: MEDICARE

## 2025-07-02 NOTE — TELEPHONE ENCOUNTER
Caller: Carolina Ayers    Relationship to patient: Self    Best call back number: [phone]     Chief complaint: LATER TIME     Type of visit: FOLLOW UP WITH TESTING     Requested date: 7/24/25 AFTER 12      If rescheduling, when is the original appointment: 7/24/25     Additional notes:PATIENT WOULD LIKE A CALL BACK.

## 2025-07-03 ENCOUNTER — TELEPHONE (OUTPATIENT)
Age: 70
End: 2025-07-03
Payer: MEDICARE

## 2025-07-03 NOTE — TELEPHONE ENCOUNTER
7/3/25 - Pt called and requested that her appointment on 7/24/25 be moved to after 12 that day. Explained to pt that Dr. Sung had no further openings that day and that I would have to move her scan and f/u further out to meet her timing needs. Pt is keeping original appointments.

## 2025-07-16 ENCOUNTER — HOSPITAL ENCOUNTER (OUTPATIENT)
Dept: GENERAL RADIOLOGY | Facility: HOSPITAL | Age: 70
Discharge: HOME | End: 2025-07-16
Admitting: PHYSICIAN ASSISTANT
Payer: MEDICARE

## 2025-07-16 DIAGNOSIS — M17.12 ARTHRITIS OF LEFT KNEE: ICD-10-CM

## 2025-07-16 PROCEDURE — 73562 X-RAY EXAM OF KNEE 3: CPT

## 2025-07-17 ENCOUNTER — OFFICE VISIT (OUTPATIENT)
Age: 70
End: 2025-07-17
Payer: MEDICARE

## 2025-07-17 VITALS
WEIGHT: 185 LBS | HEART RATE: 70 BPM | DIASTOLIC BLOOD PRESSURE: 94 MMHG | BODY MASS INDEX: 34.04 KG/M2 | OXYGEN SATURATION: 100 % | SYSTOLIC BLOOD PRESSURE: 163 MMHG | HEIGHT: 62 IN

## 2025-07-17 DIAGNOSIS — M17.12 ARTHRITIS OF LEFT KNEE: Primary | ICD-10-CM

## 2025-07-17 DIAGNOSIS — M25.562 LEFT KNEE PAIN, UNSPECIFIED CHRONICITY: ICD-10-CM

## 2025-07-17 RX ADMIN — LIDOCAINE HYDROCHLORIDE 5 ML: 10 INJECTION, SOLUTION EPIDURAL; INFILTRATION; INTRACAUDAL; PERINEURAL at 10:19

## 2025-07-17 NOTE — PROGRESS NOTES
"Chief Complaint  Follow-up of the Left Knee    Subjective      Carolina Ayers presents to [facility] for follow-up of left knee.  Patient has left knee arthritis that has been treated conservatively.  Last office visit was on 4/9/2025 patient received a steroid injection to the left knee.  Additionally during this office visit patient reported that she is scheduled for varicose vein treatment to her left lower leg in the month of April.    History of Present Illness  The patient is a 70-year-old female here for a follow-up on her left knee. She has been receiving conservative treatment with gel and steroid injections. Today, she is seeking an additional repeat steroid injection. After discussing various options, she has chosen to proceed with Zilretta. She has been informed about the risks and benefits of this treatment and advised to monitor her blood glucose levels. She should contact the office if she notices any signs of infection or worsening symptoms.  She reports that her right knee is in good condition but expresses concern about the increased pressure it may be under due to her reliance on it.  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  She has not previously received Zilretta injections. She reports no new falls or injuries to her left knee. Her pain is primarily located on the inside of her knee, and she also experiences pressure and pain behind her kneecap.     PAST SURGICAL HISTORY:  Knee replacement in 2023.        Allergies   Allergen Reactions    Lisinopril Cough    Penicillins Hives       Objective     Vital Signs:   Vitals:    07/17/25 1012   BP: 163/94   BP Location: Left arm   Patient Position: Sitting   Cuff Size: Adult   Pulse: 70   SpO2: 100%   Weight: 83.9 kg (185 lb)   Height: 157.5 cm (62.01\")     Body mass index is 33.83 kg/m².    I reviewed the patient's chief complaint, history of present illness, review of systems, past medical " history, surgical history, family history, social history, medications, and allergy list.     Ortho Exam  General: Alert, no acute distress      Left lower extremity: Mild knee effusion.  Positive tenderness to the medial joint line.  Denies tenderness to the lateral joint line. 120 degrees of flexion.  -8 degrees extension. Knee extensor mechanism intact. Knee stable to varus and valgus stress. Calf soft, nontender. Demonstrates active ankle plantarflexion and dorsiflexion. Sensation intact over the dorsal and plantar foot. Palpable pedal pulses.    Physical Exam  Musculoskeletal:  Left knee: Osteophyte formation noted; tenderness in medial compartment; pressure behind kneecap      Large Joint: L knee  Date/Time: 7/17/2025 10:19 AM  Consent given by: patient  Site marked: site marked  Timeout: Immediately prior to procedure a time out was called to verify the correct patient, procedure, equipment, support staff and site/side marked as required   Supporting Documentation  Indications: pain   Procedure Details  Location: knee - L knee  Preparation: Patient was prepped and draped in the usual sterile fashion  Needle gauge: 21 G.  Medications administered: 32 mg Triamcinolone Acetonide 32 MG; 5 mL lidocaine PF 1% 1 %  Patient tolerance: patient tolerated the procedure well with no immediate complications      This injection documentation was Scribed for MARI Luna by Payal Dunn MA.  07/17/25   10:20 EDT     Imaging Results (Most Recent)       None             Results           Assessment and Plan   Diagnoses and all orders for this visit:    1. Left knee pain, unspecified chronicity (Primary)    2. Arthritis of left knee    Other orders  -     Large Joint: L knee         Assessment & Plan   Patient presents to [facility] for steroid injection of the left knee.  Patient denies any new injuries falls or trauma to the left knee.  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 The patient has been experiencing left knee pain and has previously been treated with gel and steroid injections. Today, opted for Zilretta, a long-acting steroid injection. Education was provided on the risks and benefits, including monitoring for blood glucose issues and signs of infection.  Updating imaging was obtained prior to this office visit; reviewed with patient. She was advised to use ice and work on range of motion exercises if the pain persists after a week.    2. Left shoulder pain:  The patient reports pain in the left shoulder, especially when raising her arm. She was educated on conservative managements.  Unfortunately we do not have imaging of the left shoulder to provide guidance to the patient.  An x-ray of the left shoulder will be ordered prior to initial office visit.      Follow-up: 3-month follow-up for the left knee and new evaluation of the left shoulder.    PROCEDURE  Steroid injection was administered into the left knee today.        Tobacco Use: Medium Risk (7/18/2025)    Patient History     Smoking Tobacco Use: Former     Smokeless Tobacco Use: Former     Passive Exposure: Past     Patient reports they have a history of tobacco use; encouraged continued tobacco cessation for further health benefits.            Follow Up   Return in about 3 months (around 10/17/2025).  There are no Patient Instructions on file for this visit.    Patient was given instructions and counseling regarding her condition or for health maintenance advice. Please see specific information pulled into the AVS if appropriate.       Patient or patient representative verbalized consent for the use of Ambient Listening during the visit with  MARI Luna for chart documentation. 7/18/2025  14:17 EDT    MARI Luna   07/17/2025  08:11 EDT    Dictated Utilizing Dragon Dictation. Please note that portions of this note were completed  with a voice recognition program. Part of this note may be an electronic transcription/translation of spoken language to printed text using the Dragon Dictation System.

## 2025-07-18 RX ORDER — LIDOCAINE HYDROCHLORIDE 10 MG/ML
5 INJECTION, SOLUTION EPIDURAL; INFILTRATION; INTRACAUDAL; PERINEURAL
Status: COMPLETED | OUTPATIENT
Start: 2025-07-17 | End: 2025-07-17

## 2025-07-20 DIAGNOSIS — I10 ESSENTIAL HYPERTENSION: ICD-10-CM

## 2025-07-20 DIAGNOSIS — R60.0 LOCALIZED EDEMA: ICD-10-CM

## 2025-07-21 RX ORDER — FUROSEMIDE 40 MG/1
40 TABLET ORAL DAILY
Qty: 90 TABLET | Refills: 0 | Status: SHIPPED | OUTPATIENT
Start: 2025-07-21 | End: 2025-07-22 | Stop reason: SDUPTHER

## 2025-07-22 ENCOUNTER — HOSPITAL ENCOUNTER (OUTPATIENT)
Dept: GENERAL RADIOLOGY | Facility: HOSPITAL | Age: 70
Discharge: HOME | End: 2025-07-22
Admitting: FAMILY MEDICINE
Payer: MEDICARE

## 2025-07-22 ENCOUNTER — OFFICE VISIT (OUTPATIENT)
Dept: FAMILY MEDICINE CLINIC | Age: 70
End: 2025-07-22
Payer: MEDICARE

## 2025-07-22 VITALS
HEIGHT: 62 IN | HEART RATE: 68 BPM | DIASTOLIC BLOOD PRESSURE: 70 MMHG | OXYGEN SATURATION: 97 % | BODY MASS INDEX: 33.75 KG/M2 | SYSTOLIC BLOOD PRESSURE: 143 MMHG | WEIGHT: 183.4 LBS | TEMPERATURE: 98 F

## 2025-07-22 DIAGNOSIS — R60.0 LOCALIZED EDEMA: ICD-10-CM

## 2025-07-22 DIAGNOSIS — M17.11 PRIMARY OSTEOARTHRITIS OF RIGHT KNEE: ICD-10-CM

## 2025-07-22 DIAGNOSIS — I25.10 CORONARY ARTERY CALCIFICATION: ICD-10-CM

## 2025-07-22 DIAGNOSIS — E78.2 MIXED HYPERLIPIDEMIA: ICD-10-CM

## 2025-07-22 DIAGNOSIS — M25.512 ACUTE PAIN OF LEFT SHOULDER: ICD-10-CM

## 2025-07-22 DIAGNOSIS — I10 ESSENTIAL HYPERTENSION: Primary | ICD-10-CM

## 2025-07-22 DIAGNOSIS — N95.1 POST MENOPAUSAL SYNDROME: ICD-10-CM

## 2025-07-22 PROCEDURE — 73030 X-RAY EXAM OF SHOULDER: CPT

## 2025-07-22 RX ORDER — VENLAFAXINE HYDROCHLORIDE 150 MG/1
150 CAPSULE, EXTENDED RELEASE ORAL DAILY
Qty: 90 CAPSULE | Refills: 3 | Status: SHIPPED | OUTPATIENT
Start: 2025-07-22

## 2025-07-22 RX ORDER — FUROSEMIDE 40 MG/1
40 TABLET ORAL DAILY
Qty: 90 TABLET | Refills: 3 | Status: SHIPPED | OUTPATIENT
Start: 2025-07-22

## 2025-07-22 RX ORDER — NIFEDIPINE 30 MG
30 TABLET, EXTENDED RELEASE ORAL 2 TIMES DAILY
Qty: 180 TABLET | Refills: 3 | Status: SHIPPED | OUTPATIENT
Start: 2025-07-22

## 2025-07-22 RX ORDER — SIMVASTATIN 20 MG
20 TABLET ORAL NIGHTLY
Qty: 90 TABLET | Refills: 3 | Status: SHIPPED | OUTPATIENT
Start: 2025-07-22

## 2025-07-22 RX ORDER — LOSARTAN POTASSIUM 100 MG/1
100 TABLET ORAL DAILY
Qty: 90 TABLET | Refills: 3 | Status: SHIPPED | OUTPATIENT
Start: 2025-07-22

## 2025-07-22 RX ORDER — MELOXICAM 15 MG/1
15 TABLET ORAL EVERY OTHER DAY
Qty: 45 TABLET | Refills: 1 | Status: SHIPPED | OUTPATIENT
Start: 2025-07-22

## 2025-07-22 RX ORDER — ACETAMINOPHEN AND CODEINE PHOSPHATE 300; 30 MG/1; MG/1
1 TABLET ORAL EVERY 4 HOURS PRN
Qty: 18 TABLET | Refills: 0 | Status: SHIPPED | OUTPATIENT
Start: 2025-07-22

## 2025-07-22 NOTE — PROGRESS NOTES
Carolina Ayers presents to [facility].    Chief Complaint:  Blood pressure, cholesterol    Subjective   History of Present Illness:  Carolina is being seen today for follow-up on her care.  She has hypertension for which she remains on multiple treatments including furosemide, losartan, and nifedipine.  Her blood pressure today is just above goal.  Her blood pressures at home have also been above goal generally.  She is unaware of obvious side effects from the medications.  At some point we may have recommended she increase nifedipine to 2 tablets nightly, but she has been taking 1 tablet nightly most recently.    She also has elevated cholesterol remains on statin therapy.  She is unaware of any significant difficulty with it.    She has had ongoing stress.  Her mother has had health issues, and Carolina is finding it more challenging from a physical standpoint to continue to care for her.  There is some uncertainty about how to move forward.  She is having pain with her left shoulder and would like to move ahead with x-ray of it.  Her pain level has been significant recently, and she would like to have some pain medication to help her rest in particular if possible.  She has previously taken Tylenol #3 without difficulty.    Review of Systems:  Review of Systems   Constitutional:  Negative for chills and fever.   Respiratory:  Negative for cough and shortness of breath.    Cardiovascular:  Negative for chest pain and palpitations.   Gastrointestinal:  Negative for abdominal pain, nausea and vomiting.      Objective   Medical History:  Past Medical History:    Anesthesia    Pt HARD TO WAKE DURING RECOVERY PER FAMILY REPORT    Anxiety and depression    Arthritis    Embolism and thrombosis    LEFT LOWER EXTREMITY    Essential hypertension    History of chest pain    SEEN DR SIM/NO  ISSUES SINCE    History of lumbar fusion    History of orthostatic hypotension    SEEN BY DR. PRETTY-PT STATES  NO RECENT ISSUES    History of skin cancer    REMOVED    HL (hearing loss)    Hyperlipidemia    Hypokalemia    Impaired functional mobility, balance, gait, and endurance    Localized edema    LEGS/ANKLES-TAKES DIURETIC    Lumbar herniated disc    Obesity    Orthostatic hypotension    PONV (postoperative nausea and vomiting)    Restrictive lung disease    DR. ONEAL    Thyroid nodule    MONITORED    Tinnitus    Varicose veins of both lower extremities     Past Surgical History:    ADENOIDECTOMY    BREAST LUMPECTOMY    BENIGN    CARPAL TUNNEL RELEASE    CHOLECYSTECTOMY    COLONOSCOPY    Hyperplastic polyp, otherwise normal    EXTERNAL EAR SURGERY    cyst removed from ear    EYE SURGERY    HERNIA REPAIR    HYSTERECTOMY    partial    JOINT REPLACEMENT    KNEE SURGERY    FRACTURE REPAIR    LUMBAR DISCECTOMY FUSION INSTRUMENTATION    Procedure: LUMBAR FUSION DECOMPRESSON WITH PEDICLE SCREWS Lumbar 4-5,  posterolateral fusion;  Surgeon: Jarrett Vasques IV, MD;  Location: [facility] MAIN OR;  Service: Neurosurgery    SHOULDER SURGERY    RCR    SPINE SURGERY    SUBTOTAL HYSTERECTOMY    TONSILLECTOMY    TOTAL KNEE ARTHROPLASTY    Procedure: RIGHT TOTAL KNEE ARTHROPLASTY WITH ESTHER ROBOT.;  Surgeon: Shabbir Wood MD;  Location: [facility] MAIN OR;  Service: Robotics - Ortho;  Laterality: Right;    TUBAL ABDOMINAL LIGATION    US GUIDED FINE NEEDLE ASPIRATION    VARICOSE VEIN SURGERY    Left GSV EVLA    VENOUS ABLATION CHEMICAL    WRIST SURGERY    Cyst removed      Family History   Problem Relation Age of Onset    Macular degeneration Mother     Vision loss Mother         Macular degeneration    Coronary artery disease Father     Heart disease Father         Heart Attack    Malig Hyperthermia Neg Hx      Social History     Tobacco Use    Smoking status: Former     Current packs/day: 0.00     Average packs/day: 1 pack/day for 30.0 years (30.0 ttl pk-yrs)     Types: Cigarettes     Start date: 1/1/1980     Quit date: 9/1/2010     Years  since quittin.8     Passive exposure: Past    Smokeless tobacco: Former    Tobacco comments:     NO CAFFEINE USE   Substance Use Topics    Alcohol use: Not Currently     Comment: rarely- 2-3 times a year       Health Maintenance Due   Topic Date Due    DXA SCAN  2024    COVID-19 Vaccine ( season) 04/10/2025        Immunization History   Administered Date(s) Administered    Arexvy (RSV, Adults 60+ yrs) 06/10/2024    COVID-19 (MODERNA) 12YRS+ (SPIKEVAX) 2023, 10/10/2024    COVID-19 (MODERNA) 1st,2nd,3rd Dose Monovalent 2021, 2021, 10/26/2021    COVID-19 (MODERNA) BIVALENT 12+YRS 2022    COVID-19 (MODERNA) Monovalent Original Booster 2022    Fluzone High-Dose 65+YRS 10/10/2024    Fluzone High-Dose 65+yrs 10/12/2021, 10/21/2022, 2023    Hepatitis A 2018    Influenza Seasonal Injectable 10/01/2018, 2019, 10/01/2020    Influenza, Unspecified 2019    Pneumococcal Conjugate 13-Valent (PCV13) 10/21/2020    Pneumococcal Polysaccharide (PPSV23) 2009, 10/26/2021    Tdap 10/29/2010, 06/10/2024       Allergies   Allergen Reactions    Lisinopril Cough    Penicillins Hives        Medications:    Current Outpatient Medications:     albuterol sulfate  (90 Base) MCG/ACT inhaler, , Disp: , Rfl:     aspirin 81 MG EC tablet, Take 1 tablet by mouth Daily., Disp: , Rfl:     baclofen (LIORESAL) 10 MG tablet, Take 1 tablet by mouth 2 (Two) Times a Day As Needed for Muscle Spasms., Disp: 30 tablet, Rfl: 0    docusate sodium (COLACE) 100 MG capsule, Take 1 capsule by mouth Daily. (Patient taking differently: Take 1 capsule by mouth As Needed.), Disp: , Rfl:     furosemide (LASIX) 40 MG tablet, Take 1 tablet by mouth Daily., Disp: 90 tablet, Rfl: 3    losartan (COZAAR) 100 MG tablet, Take 1 tablet by mouth Daily., Disp: 90 tablet, Rfl: 3    meloxicam (MOBIC) 15 MG tablet, Take 1 tablet by mouth Every Other Day. Take with food., Disp: 45 tablet, Rfl: 1     "NIFEdipine CC (Adalat CC) 30 MG 24 hr tablet, Take 1 tablet by mouth 2 (Two) Times a Day., Disp: 180 tablet, Rfl: 3    simvastatin (ZOCOR) 20 MG tablet, Take 1 tablet by mouth Every Night., Disp: 90 tablet, Rfl: 3    venlafaxine XR (EFFEXOR-XR) 150 MG 24 hr capsule, Take 1 capsule by mouth Daily., Disp: 90 capsule, Rfl: 3    acetaminophen-codeine (TYLENOL with CODEINE #3) 300-30 MG per tablet, Take 1 tablet by mouth Every 4 (Four) Hours As Needed for Moderate Pain., Disp: 18 tablet, Rfl: 0    Vital Signs:   Vitals:    07/22/25 1103 07/22/25 1142   BP: 145/76 143/70   BP Location: Left arm Left arm   Patient Position: Sitting Sitting   Pulse: 71 68   Temp: 98 °F (36.7 °C)    TempSrc: Oral    SpO2: 97%    Weight: 83.2 kg (183 lb 6.4 oz)    Height: 157.5 cm (62.01\")    Body mass index is 33.54 kg/m².    Physical Exam:  Physical Exam  Vitals reviewed.   Constitutional:       General: She is not in acute distress.     Appearance: She is not ill-appearing.   Eyes:      Pupils: Pupils are equal, round, and reactive to light.   Neck:      Comments: No thyromegaly  Cardiovascular:      Rate and Rhythm: Normal rate and regular rhythm.   Pulmonary:      Effort: Pulmonary effort is normal.      Breath sounds: Normal breath sounds.   Abdominal:      General: There is no distension.      Palpations: Abdomen is soft.      Tenderness: There is no abdominal tenderness.   Musculoskeletal:      Cervical back: Neck supple.      Comments: She has pain with ROM involving both shoulders   Lymphadenopathy:      Cervical: No cervical adenopathy.   Skin:     Findings: No lesion or rash.   Neurological:      General: No focal deficit present.      Mental Status: She is alert.      Cranial Nerves: No cranial nerve deficit.   Psychiatric:         Mood and Affect: Affect is tearful.     Result Review   The following data was reviewed by Momo Renteria MD on 07/22/2025.  Lab Results   Component Value Date    WBC 7.93 07/01/2025    HGB " 13.5 07/01/2025    HCT 42.0 07/01/2025    MCV 97.9 (H) 07/01/2025     07/01/2025     Lab Results   Component Value Date    GLUCOSE 82 07/01/2025    BUN 21.0 07/01/2025    CREATININE 0.98 07/01/2025     07/01/2025    K 4.9 07/01/2025     07/01/2025    CALCIUM 10.1 07/01/2025    PROTEINTOT 7.6 07/01/2025    ALBUMIN 4.6 07/01/2025    ALT 16 07/01/2025    AST 23 07/01/2025    ALKPHOS 81 07/01/2025    BILITOT 0.4 07/01/2025    GLOB 3.0 07/01/2025    AGRATIO 1.5 07/01/2025    BCR 21.4 07/01/2025    ANIONGAP 8.8 07/01/2025    EGFR 62.2 07/01/2025     Lab Results   Component Value Date    CHOL 186 07/01/2025    CHLPL 157 08/24/2020    TRIG 128 07/01/2025    HDL 59 07/01/2025     (H) 07/01/2025     Lab Results   Component Value Date    TSH 1.320 11/05/2024     Lab Results   Component Value Date    HGBA1C 5.60 11/05/2024          Assessment and Plan:   Today, we have reviewed her care.  Carolina is continuing to have elevations of her blood pressure.  We will recommend a trial of nifedipine 30 mg twice daily for the near term and see how she does with that.  We discussed that the goal readings for the blood pressure would be in the 120s or low 130s most of the time.  Regarding arthritis and left shoulder pain, we will move forward with x-rays of the left shoulder today.  Her pain level has been severe enough, that she would be interested in a mild narcotic if possible to help with pain.  Munir is reviewed and consistent.  We will move forward with a limited prescription of Tylenol #3 for now.  Risks have been reviewed, and we will obtain her written consent for it.    Diagnoses and all orders for this visit:    1. Essential hypertension (Primary)  -     furosemide (LASIX) 40 MG tablet; Take 1 tablet by mouth Daily.  Dispense: 90 tablet; Refill: 3  -     losartan (COZAAR) 100 MG tablet; Take 1 tablet by mouth Daily.  Dispense: 90 tablet; Refill: 3  -     NIFEdipine CC (Adalat CC) 30 MG 24 hr tablet;  Take 1 tablet by mouth 2 (Two) Times a Day.  Dispense: 180 tablet; Refill: 3    2. Localized edema  -     furosemide (LASIX) 40 MG tablet; Take 1 tablet by mouth Daily.  Dispense: 90 tablet; Refill: 3    3. Primary osteoarthritis of right knee  -     meloxicam (MOBIC) 15 MG tablet; Take 1 tablet by mouth Every Other Day. Take with food.  Dispense: 45 tablet; Refill: 1    4. Coronary artery calcification  -     simvastatin (ZOCOR) 20 MG tablet; Take 1 tablet by mouth Every Night.  Dispense: 90 tablet; Refill: 3    5. Mixed hyperlipidemia  -     simvastatin (ZOCOR) 20 MG tablet; Take 1 tablet by mouth Every Night.  Dispense: 90 tablet; Refill: 3    6. Post menopausal syndrome  -     venlafaxine XR (EFFEXOR-XR) 150 MG 24 hr capsule; Take 1 capsule by mouth Daily.  Dispense: 90 capsule; Refill: 3    7. Acute pain of left shoulder  -     XR Shoulder 2+ View Left; Future  -     acetaminophen-codeine (TYLENOL with CODEINE #3) 300-30 MG per tablet; Take 1 tablet by mouth Every 4 (Four) Hours As Needed for Moderate Pain.  Dispense: 18 tablet; Refill: 0    Follow Up  Return in about 1 year (around 7/6/2026) for Recheck, Medicare Wellness.  Patient was given instructions and counseling regarding her condition or for health maintenance advice. Please see specific information pulled into the AVS if appropriate.

## 2025-07-23 DIAGNOSIS — M25.512 LEFT SHOULDER PAIN, UNSPECIFIED CHRONICITY: Primary | ICD-10-CM

## 2025-07-23 RX ORDER — DOCUSATE SODIUM 100 MG/1
100 CAPSULE, LIQUID FILLED ORAL DAILY PRN
Status: SHIPPED
Start: 2025-07-23

## 2025-07-23 NOTE — PROGRESS NOTES
"Chief Complaint  Varicose Veins    Subjective      HPI: Carolina Ayers is a 70 y.o. female returns for follow-up of painful recurrent varicose veins of the left lower extremity. Still having some pain in the leg, which she relates to OA of the L knee. Knows she has bone on bone arthritis, and needs TKA, but cannot do because of family obligations.  But pain related to varicose veins appears to have resolved.  Does not does not find it necessary or helpful to use compression.    Objective   Vital Signs:  /81 (BP Location: Right arm, Patient Position: Sitting, Cuff Size: Adult)   Pulse 67   Resp 17   Ht 157 cm (61.81\")   Wt 81.6 kg (180 lb)   BMI 33.12 kg/m²   Estimated body mass index is 33.12 kg/m² as calculated from the following:    Height as of this encounter: 157 cm (61.81\").    Weight as of this encounter: 81.6 kg (180 lb).        Carolina Ayers  reports that she quit smoking about 14 years ago. Her smoking use included cigarettes. She started smoking about 45 years ago. She has a 30 pack-year smoking history. She has been exposed to tobacco smoke. She has quit using smokeless tobacco..     Exam: Nontender, and compressible varicose veins in the anteromedial distal left thigh and in the medial left calf.  Calves soft and nontender.  No apparent residual varicose veins.  No swelling or pitting edema.    Personal review of data: Images and tracings of left lower extremity venous duplex scan performed today.  Result Review :       Assessment and Plan     Diagnoses and all orders for this visit:    1. Varicose veins of left lower extremity with pain (Primary)    Other orders  -     docusate sodium (COLACE) 100 MG capsule; Take 1 capsule by mouth Daily As Needed for Constipation.    Summary:  70-year-old woman with painful residual or recurrent varicose veins of the left lower extremity post laser ablations of left GSV/ASV 2023.  Underwent Varithena sclerotherapy of the left anterior saphenous vein and " associated ASV tributaries in the thigh and calf 4/16/2025.  She returns today doing well, having experienced improvement in left lower extremity symptoms.  She believes that residual left lower extremity pain relates to osteoarthritis in the knee.  Does not find it necessary helpful to wear compression stockings.  Lower extremity venous duplex scan today shows no DVT.  There has been successful ablation of treated tributary vein varicosities.  There are no residual varicose veins identified.  She has done well following her procedure and I have a good prognosis for her.  Activity ad rosalva.  Use compression as desired.  Return again on request.    Follow Up     Return if symptoms worsen or fail to improve.  Patient was given instructions and counseling regarding her condition or for health maintenance advice. Please see specific information pulled into the AVS if appropriate.

## 2025-07-24 ENCOUNTER — OFFICE VISIT (OUTPATIENT)
Age: 70
End: 2025-07-24
Payer: MEDICARE

## 2025-07-24 ENCOUNTER — HOSPITAL ENCOUNTER (OUTPATIENT)
Facility: HOSPITAL | Age: 70
Discharge: HOME | End: 2025-07-24
Admitting: NURSE PRACTITIONER
Payer: MEDICARE

## 2025-07-24 VITALS
SYSTOLIC BLOOD PRESSURE: 150 MMHG | WEIGHT: 180 LBS | DIASTOLIC BLOOD PRESSURE: 81 MMHG | HEIGHT: 62 IN | BODY MASS INDEX: 33.13 KG/M2 | RESPIRATION RATE: 17 BRPM | HEART RATE: 67 BPM

## 2025-07-24 DIAGNOSIS — I83.812 VARICOSE VEINS OF LEFT LOWER EXTREMITY WITH PAIN: Primary | ICD-10-CM

## 2025-07-24 DIAGNOSIS — I83.812 VARICOSE VEINS OF LEFT LOWER EXTREMITY WITH PAIN: ICD-10-CM

## 2025-07-24 LAB
BH CV LOW VAS LEFT EXTERNAL ILIAC AUGMENT: NORMAL
BH CV LOW VAS LEFT EXTERNAL ILIAC COMPRESS: NORMAL
BH CV LOW VAS LEFT GREATER SAPH AK VESSEL: 1
BH CV LOW VAS LEFT GREATER SAPH BK VESSEL: 1
BH CV LOW VAS LEFT LESSER SAPH VESSEL: 1
BH CV LOW VAS LEFT VARICOSITY AK VESSEL: 1
BH CV LOW VAS LEFT VARICOSITY BK VESSEL: 1
BH CV LOWER VASCULAR LEFT COMMON FEMORAL AUGMENT: NORMAL
BH CV LOWER VASCULAR LEFT COMMON FEMORAL COMPRESS: NORMAL
BH CV LOWER VASCULAR LEFT COMMON FEMORAL PHASIC: NORMAL
BH CV LOWER VASCULAR LEFT COMMON FEMORAL SPONT: NORMAL
BH CV LOWER VASCULAR LEFT DISTAL FEMORAL COMPRESS: NORMAL
BH CV LOWER VASCULAR LEFT EXTERNAL ILIAC PHASIC: NORMAL
BH CV LOWER VASCULAR LEFT EXTERNAL ILIAC SPONT: NORMAL
BH CV LOWER VASCULAR LEFT GASTRONEMIUS COMPRESS: NORMAL
BH CV LOWER VASCULAR LEFT GREATER SAPH AK COMPRESS: NORMAL
BH CV LOWER VASCULAR LEFT GREATER SAPH BK COMPRESS: NORMAL
BH CV LOWER VASCULAR LEFT LESSER SAPH COMPRESS: NORMAL
BH CV LOWER VASCULAR LEFT LESSER SAPH THROMBUS: NORMAL
BH CV LOWER VASCULAR LEFT MID FEMORAL COMPRESS: NORMAL
BH CV LOWER VASCULAR LEFT PERONEAL AUGMENT: NORMAL
BH CV LOWER VASCULAR LEFT PERONEAL COMPRESS: NORMAL
BH CV LOWER VASCULAR LEFT POPLITEAL AUGMENT: NORMAL
BH CV LOWER VASCULAR LEFT POPLITEAL COMPRESS: NORMAL
BH CV LOWER VASCULAR LEFT POSTERIOR TIBIAL AUGMENT: NORMAL
BH CV LOWER VASCULAR LEFT POSTERIOR TIBIAL COMPRESS: NORMAL
BH CV LOWER VASCULAR LEFT PROFUNDA FEMORAL COMPRESS: NORMAL
BH CV LOWER VASCULAR LEFT PROXIMAL FEMORAL AUGMENT: NORMAL
BH CV LOWER VASCULAR LEFT PROXIMAL FEMORAL COMPRESS: NORMAL
BH CV LOWER VASCULAR LEFT PROXIMAL FEMORAL PHASIC: NORMAL
BH CV LOWER VASCULAR LEFT PROXIMAL FEMORAL SPONT: NORMAL
BH CV LOWER VASCULAR LEFT SAPHENOFEMORAL JUNCTION AUGMENT: NORMAL
BH CV LOWER VASCULAR LEFT SAPHENOFEMORAL JUNCTION COMPRESS: NORMAL
BH CV LOWER VASCULAR LEFT VARICOSITY AK COMPRESS: NORMAL
BH CV LOWER VASCULAR LEFT VARICOSITY BK COMPRESS: NORMAL
BH CV LOWER VASCULAR RIGHT COMMON FEMORAL AUGMENT: NORMAL
BH CV LOWER VASCULAR RIGHT COMMON FEMORAL COMPRESS: NORMAL
BH CV LOWER VASCULAR RIGHT COMMON FEMORAL PHASIC: NORMAL
BH CV LOWER VASCULAR RIGHT COMMON FEMORAL SPONT: NORMAL

## 2025-07-24 PROCEDURE — 93971 EXTREMITY STUDY: CPT

## 2025-07-25 ENCOUNTER — OFFICE VISIT (OUTPATIENT)
Dept: ORTHOPEDIC SURGERY | Facility: CLINIC | Age: 70
End: 2025-07-25
Payer: MEDICARE

## 2025-07-25 VITALS
OXYGEN SATURATION: 93 % | DIASTOLIC BLOOD PRESSURE: 72 MMHG | SYSTOLIC BLOOD PRESSURE: 151 MMHG | HEART RATE: 81 BPM | BODY MASS INDEX: 34.23 KG/M2 | WEIGHT: 186 LBS | HEIGHT: 62 IN

## 2025-07-25 DIAGNOSIS — M75.42 IMPINGEMENT SYNDROME OF LEFT SHOULDER: Primary | ICD-10-CM

## 2025-07-25 RX ORDER — LIDOCAINE HYDROCHLORIDE 10 MG/ML
5 INJECTION, SOLUTION EPIDURAL; INFILTRATION; INTRACAUDAL; PERINEURAL
Status: COMPLETED | OUTPATIENT
Start: 2025-07-25 | End: 2025-07-25

## 2025-07-25 RX ORDER — TRIAMCINOLONE ACETONIDE 40 MG/ML
40 INJECTION, SUSPENSION INTRA-ARTICULAR; INTRAMUSCULAR
Status: COMPLETED | OUTPATIENT
Start: 2025-07-25 | End: 2025-07-25

## 2025-07-25 RX ADMIN — LIDOCAINE HYDROCHLORIDE 5 ML: 10 INJECTION, SOLUTION EPIDURAL; INFILTRATION; INTRACAUDAL; PERINEURAL at 14:04

## 2025-07-25 RX ADMIN — TRIAMCINOLONE ACETONIDE 40 MG: 40 INJECTION, SUSPENSION INTRA-ARTICULAR; INTRAMUSCULAR at 14:04

## 2025-07-25 NOTE — PROGRESS NOTES
Chief Complaint  Initial Evaluation of the Left Shoulder    Subjective          Carolina Ayers presents to [facility] for   History of Present Illness    History of Present Illness  The patient presents for left shoulder pain.    She has been experiencing discomfort in her left shoulder for the past few weeks, which she attributes to physical strain from assisting her mother. The pain is localized to the front of the shoulder and is exacerbated when she attempts to lift or raise her arm. She reports no previous issues with this shoulder.         Allergies   Allergen Reactions    Lisinopril Cough    Penicillins Hives        Social History     Socioeconomic History    Marital status:      Spouse name: Ismael    Number of children: 2   Tobacco Use    Smoking status: Former     Current packs/day: 0.00     Average packs/day: 1 pack/day for 30.0 years (30.0 ttl pk-yrs)     Types: Cigarettes     Start date: 1980     Quit date: 2010     Years since quittin.9     Passive exposure: Past    Smokeless tobacco: Former    Tobacco comments:     NO CAFFEINE USE   Vaping Use    Vaping status: Former   Substance and Sexual Activity    Alcohol use: Not Currently     Comment: rarely- 2-3 times a year    Drug use: Never    Sexual activity: Not Currently     Partners: Male     Birth control/protection: Tubal ligation, Hysterectomy        I reviewed the patient's chief complaint, history of present illness, review of systems, past medical history, surgical history, family history, social history, medications, and allergy list.     REVIEW OF SYSTEMS    Constitutional: Denies fevers, chills, weight loss  Cardiovascular: Denies chest pain, shortness of breath  Skin: Denies rashes, acute skin changes  Neurologic: Denies headache, loss of consciousness  MSK: Left shoulder pain      Objective   Vital Signs:   /72 (BP Location: Left arm, Patient Position: Sitting, Cuff Size: Adult)   Pulse  "81   Ht 157 cm (61.81\")   Wt 84.4 kg (186 lb)   SpO2 93%   BMI 34.23 kg/m²     Body mass index is 34.23 kg/m².    Physical Exam    General: Alert. No acute distress.       Physical Exam  Musculoskeletal:  Left shoulder: 120 degrees elevation, 30 degrees external rotation, internal rotation to the lower lumbar, 5 out of 5 supraspinatus and subscapularis muscles, pain with impingement, neurovascular intact      Large Joint: L subacromial bursa  Date/Time: 7/25/2025 2:04 PM  Consent given by: patient  Site marked: site marked  Timeout: Immediately prior to procedure a time out was called to verify the correct patient, procedure, equipment, support staff and site/side marked as required   Supporting Documentation  Indications: pain   Procedure Details  Location: shoulder - L subacromial bursa  Preparation: Patient was prepped and draped in the usual sterile fashion  Needle gauge: 21 G.  Medications administered: 40 mg triamcinolone acetonide 40 MG/ML; 5 mL lidocaine PF 1% 1 %  Patient tolerance: patient tolerated the procedure well with no immediate complications      This injection documentation was Scribed for Shabbir Wood MD by Payal Dunn MA.  07/25/25   14:06 EDT   Imaging Results (Most Recent)       None                     Assessment and Plan        Duplex Venous Lower Extremity - Left CAR  Result Date: 7/24/2025  Narrative:   There is no deep vein thrombosis in the left lower extremity. Surgical absence of the great saphenous vein. Chronic superficial vein thrombosis in the mid and distal segments of the left small saphenous vein. Successful Varithena sclerotherapy of the GSV remnant in the calf and associated GSV tributaries in the thigh and calf.     XR Shoulder 2+ View Left  Result Date: 7/22/2025  Narrative: XR SHOULDER 2+ VW LEFT Date of Exam: 7/22/2025 11:50 AM EDT Indication: Left shoulder pain Comparison: None available. FINDINGS:  No fracture is identified. Mineralization and alignment appear " within normal limits.  Soft tissues appear unremarkable. Moderate degenerative changes at the acromioclavicular joint. Mild glenohumeral osteoarthritis. Enthesopathy at the greater tuberosity.     Impression: 1.Moderate degenerative changes at the acromioclavicular joint and mild glenohumeral osteoarthritis. 2.Enthesopathy at the greater tuberosity, which could be associated with rotator cuff pathology. Electronically Signed: Miguelangel Oneal  7/22/2025 3:18 PM EDT  Workstation ID: [id number]    XR Knee 3 View Left  Result Date: 7/18/2025  Narrative: XR KNEE 3 VW LEFT Date of Exam: 7/16/2025 4:50 PM EDT Indication: left knee pain Comparison: 7/11/2024 Findings: Tricompartmental osteoarthritis. There appears to be severe medial tibiofemoral joint space narrowing with subchondral sclerosis. There is a moderate suprapatellar knee effusion. No definite fracture or acute malalignment.     Impression: Impression: 1.Tricompartmental osteoarthritis with severe medial tibiofemoral joint space narrowing. 2.Moderate knee effusion. Electronically Signed: Miguelangel Oneal  7/18/2025 2:54 PM EDT  Workstation ID: [id number]       Diagnoses and all orders for this visit:    1. Impingement syndrome of left shoulder (Primary)    Other orders  -     Large Joint: L subacromial bursa        Assessment & Plan  1. Left shoulder pain:  We discussed the risk, benefits, indications, alternatives to a left shoulder subacromial injection.  She elected proceed and tolerated the injection well.    Stretches were provided to help improve the condition. The patient was educated on the importance of performing these stretches regularly to reduce inflammation and prevent further impingement.  We may consider advanced imaging if not improving.        Call or return if worsening symptoms.    Scribed for Shabbir Wood MD by Pyaal uDnn MA  07/25/2025   14:04 EDT         Follow Up       As needed    Patient was given instructions and counseling regarding her  condition or for health maintenance advice. Please see specific information pulled into the AVS if appropriate.       Patient or patient representative verbalized consent for the use of Ambient Listening during the visit with  Shabbir Wood MD for chart documentation. 7/25/2025  14:45 EDT

## 2025-08-19 ENCOUNTER — TELEPHONE (OUTPATIENT)
Dept: FAMILY MEDICINE CLINIC | Age: 70
End: 2025-08-19
Payer: MEDICARE

## 2025-08-22 ENCOUNTER — OFFICE VISIT (OUTPATIENT)
Dept: FAMILY MEDICINE CLINIC | Age: 70
End: 2025-08-22
Payer: MEDICARE

## 2025-08-22 ENCOUNTER — RESULTS FOLLOW-UP (OUTPATIENT)
Dept: FAMILY MEDICINE CLINIC | Age: 70
End: 2025-08-22

## 2025-08-22 ENCOUNTER — HOSPITAL ENCOUNTER (OUTPATIENT)
Dept: GENERAL RADIOLOGY | Facility: HOSPITAL | Age: 70
Discharge: HOME | End: 2025-08-22
Payer: MEDICARE

## 2025-08-22 VITALS
TEMPERATURE: 97.5 F | SYSTOLIC BLOOD PRESSURE: 157 MMHG | DIASTOLIC BLOOD PRESSURE: 88 MMHG | HEART RATE: 88 BPM | BODY MASS INDEX: 34.01 KG/M2 | WEIGHT: 184.8 LBS | HEIGHT: 62 IN | OXYGEN SATURATION: 96 %

## 2025-08-22 DIAGNOSIS — I10 ESSENTIAL HYPERTENSION: ICD-10-CM

## 2025-08-22 DIAGNOSIS — M25.531 RIGHT WRIST PAIN: ICD-10-CM

## 2025-08-22 DIAGNOSIS — M25.531 RIGHT WRIST PAIN: Primary | ICD-10-CM

## 2025-08-22 PROCEDURE — 99213 OFFICE O/P EST LOW 20 MIN: CPT

## 2025-08-22 PROCEDURE — 3079F DIAST BP 80-89 MM HG: CPT

## 2025-08-22 PROCEDURE — 1160F RVW MEDS BY RX/DR IN RCRD: CPT

## 2025-08-22 PROCEDURE — 3077F SYST BP >= 140 MM HG: CPT

## 2025-08-22 PROCEDURE — 73110 X-RAY EXAM OF WRIST: CPT

## 2025-08-22 PROCEDURE — 1159F MED LIST DOCD IN RCRD: CPT

## 2025-08-22 PROCEDURE — 1125F AMNT PAIN NOTED PAIN PRSNT: CPT

## (undated) DEVICE — GLV SURG SENSICARE PI ORTHO SZ8 LF STRL

## (undated) DEVICE — CODMAN® SURGICAL PATTIES 3/4" X 3/4" (1.91CM X 1.91CM): Brand: CODMAN®

## (undated) DEVICE — SUT ETHIB 1 X538H ETX538H

## (undated) DEVICE — PULLOVER TOGA, 2X LARGE: Brand: FLYTE, SURGICOOL

## (undated) DEVICE — SUT PROLN 6/0 BV1 D/A 30IN 8709H

## (undated) DEVICE — DRSNG SURESITE WNDW 4X4.5

## (undated) DEVICE — 3 BONE CEMENT MIXER: Brand: MIXEVAC

## (undated) DEVICE — GLV SURG SENSICARE PI LF PF 7.5 GRN STRL

## (undated) DEVICE — DISPOSABLE BIPOLAR CABLE 12FT. (3.6M): Brand: KIRWAN

## (undated) DEVICE — SOL IRR NACL 0.9PCT 3000ML

## (undated) DEVICE — CONN TBG Y 5 IN 1 LF STRL

## (undated) DEVICE — APPL CHLORAPREP W/TINT 26ML ORNG

## (undated) DEVICE — TOWEL,OR,DSP,ST,WHITE,DLX,XR,4/PK,20PK/C: Brand: MEDLINE

## (undated) DEVICE — ELECTRD BLD EDGE/INSUL1P 2.4X5.1MM STRL

## (undated) DEVICE — DISPOSABLE 9450003 ELECTRO TWST PAIR 8CH

## (undated) DEVICE — TOTAL TRAY, 16FR 10ML SIL FOLEY, URN: Brand: MEDLINE

## (undated) DEVICE — GLV SURG BIOGEL LTX PF 7 1/2

## (undated) DEVICE — NDL SPINE 22G 31/2IN BLK

## (undated) DEVICE — SUT ETHLN 3/0 PS1 18IN 1663H

## (undated) DEVICE — COVER,LIGHT HANDLE,FLX,2/PK: Brand: MEDLINE INDUSTRIES, INC.

## (undated) DEVICE — STRYKER PERFORMANCE SERIES SAGITTAL BLADE: Brand: STRYKER PERFORMANCE SERIES

## (undated) DEVICE — 3M™ STERI-DRAPE™ INSTRUMENT POUCH 1018: Brand: STERI-DRAPE™

## (undated) DEVICE — Device: Brand: PULSAVAC®

## (undated) DEVICE — PK NEURO SPINE 40

## (undated) DEVICE — SMOKE EVACUATION TUBING WITH 7/8 IN TO 1/4 IN REDUCER: Brand: BUFFALO FILTER

## (undated) DEVICE — SOL ISO/ALC RUB 70PCT 4OZ

## (undated) DEVICE — UNDYED BRAIDED (POLYGLACTIN 910), SYNTHETIC ABSORBABLE SUTURE: Brand: COATED VICRYL

## (undated) DEVICE — DISPOSABLE TOURNIQUET CUFF 30"X4", 1-LINE, WHITE, STERILE, 1EA/PK, 10PK/CS: Brand: ASP MEDICAL

## (undated) DEVICE — TOTAL KNEE-LF: Brand: MEDLINE INDUSTRIES, INC.

## (undated) DEVICE — SHLD ANGIO 2LAYR CIR FEN

## (undated) DEVICE — SPONGE,LAP,12"X12",XR,ST,5/PK,40PK/CS: Brand: MEDLINE

## (undated) DEVICE — GAUZE,SPONGE,4"X4",16PLY,STRL,LF,10/TRAY: Brand: MEDLINE

## (undated) DEVICE — SLV SCD KN/LEN ADJ EXPRSS BLENDED MD 1P/U

## (undated) DEVICE — GLV SURG SENSICARE SLT PF LF 6 STRL

## (undated) DEVICE — ANTIBACTERIAL UNDYED BRAIDED (POLYGLACTIN 910), SYNTHETIC ABSORBABLE SUTURE: Brand: COATED VICRYL

## (undated) DEVICE — GAUZE,SPONGE,4"X4",16PLY,XRAY,STRL,LF: Brand: MEDLINE

## (undated) DEVICE — NO-SCRATCH ™ SMALL WHITNEY CURETTE ™ IS A SINGLE-USE, PLASTIC CURETTE FOR QUICKLY APPLYING, MANIPULATING AND REMOVING BONE CEMENT DURING HIP AND KNEE REPLACEMENT SURGERY. THE PLASTIC IS SOFTER THAN STEEL INSTRUMENTS, REDUCING THE RISK OF DAMAGING THE PROSTHESIS WITH METAL INSTRUMENTS.  THE CURETTE’S 6MM TIP REMOVES EXCESS CEMENT FROM REPLACEMENT HIPS AND KNEES. EASY-TO-MANEUVER, THE SMALL BLUE CURETTE LETS YOU REMOVE CEMENT FROM ALL EDGES OF THE PROSTHESIS.NO-SCRATCH WHITNEY SMALL CURETTE FEATURES:SAFER THAN STEEL- MADE OF PLASTIC - STURDY YET SOFTER THAN SURGICAL STEEL.HANDIER- EACH TOOL HAS A MOLDED-IN THUMB INDENTATION INSTANTLY ORIENTING THE TOOL.- EASIER TO MANEUVER IN HARD TO SEE PLACES.- COLOR-CODED FOR EASY IDENTIFICATION.FASTER- COMES INDIVIDUALLY PACKAGED IN STERILE, PEEL OPEN POUCH, READY TO GO.- APPLIES, MANIPULATES, OR REMOVES CEMENT WITH FINGERTIP PRECISION.ECONOMICAL- THE COST OF A SINGLE REVISION DWARFS THE COST OF A SINGLE-USE CURETTE. - DISPOSABLE – THERE’S NO NEED TO WASTE TIME REMOVING HARDENED CEMENT OR RE-STERILIZING TOOLS.- LESS EXPENSIVE TO BUY AND INVENTORY - ORDER ONLY THE TOOL YOU USE.- PACKAGED 25 INDIVIDUALLY WRAPPED TOOLS TO A CARTON FOR CONVENIENT SHELF STORAGE.: Brand: WHITNEY NO-SCRATCH CURETTE (SMALL)

## (undated) DEVICE — ADHS SKIN DERMABOND TOP ADVANCED

## (undated) DEVICE — ELECTRD BLD EDGE COAT 3IN

## (undated) DEVICE — DRAPE,REIN 53X77,STERILE: Brand: MEDLINE

## (undated) DEVICE — STERILE POLYISOPRENE POWDER-FREE SURGICAL GLOVES WITH EMOLLIENT COATING: Brand: PROTEXIS

## (undated) DEVICE — SUT VIC 0 CT1 36IN J946H

## (undated) DEVICE — CVR LEG BOOTLEG F/R NOSKID 33IN

## (undated) DEVICE — PROXIMATE RH ROTATING HEAD SKIN STAPLERS (35 WIDE) CONTAINS 35 STAINLESS STEEL STAPLES: Brand: PROXIMATE

## (undated) DEVICE — UNDERCAST PADDING: Brand: DEROYAL

## (undated) DEVICE — MEDI-VAC YANKAUER SUCTION HANDLE W/BULBOUS TIP: Brand: CARDINAL HEALTH

## (undated) DEVICE — SUT ETHLN 3/0 FS1 663G

## (undated) DEVICE — ELECTRD BLD EXT EDGE/INSUL 6IN

## (undated) DEVICE — GLV SURG SENSICARE SLT PF LF 8 STRL

## (undated) DEVICE — MAT FLR ABS W/BLU/LINER 56X72IN WHT

## (undated) DEVICE — BASIC SINGLE BASIN-LF: Brand: MEDLINE INDUSTRIES, INC.

## (undated) DEVICE — DRP C/ARM 41X74IN

## (undated) DEVICE — NDL SPINE 20G 3 1/2 YEL STRL 1P/U

## (undated) DEVICE — GOWN,PREVENTION PLUS,XLARGE,STERILE: Brand: MEDLINE

## (undated) DEVICE — 450 ML BOTTLE OF 0.05% CHLORHEXIDINE GLUCONATE IN 99.95% STERILE WATER FOR IRRIGATION, USP AND APPLICATOR.: Brand: IRRISEPT ANTIMICROBIAL WOUND LAVAGE

## (undated) DEVICE — BNDG ELAS MATRX V/CLS 6INX10YD LF

## (undated) DEVICE — DRSNG PAD ABD 8X10IN STRL

## (undated) DEVICE — TOWEL,OR,DSP,ST,BLUE,STD,4/PK,20PK/CS: Brand: MEDLINE

## (undated) DEVICE — Device

## (undated) DEVICE — GOWN,SLEEVE,STERILE,W/CSR WRAP,1/P: Brand: MEDLINE

## (undated) DEVICE — SHEET,DRAPE,70X85,STERILE: Brand: MEDLINE

## (undated) DEVICE — APPL CHLORAPREP HI/LITE 26ML ORNG

## (undated) DEVICE — 4.0MM PRECISION ROUND

## (undated) DEVICE — STERILE PATIENT PROTECTIVE PAD FOR IMP® KNEE POSITIONERS & COHESIVE WRAP (10 / CASE): Brand: DE MAYO KNEE POSITIONER®

## (undated) DEVICE — SPNG GZ WOVN 4X4IN 12PLY 10/BX STRL

## (undated) DEVICE — INTENDED FOR TISSUE SEPARATION, AND OTHER PROCEDURES THAT REQUIRE A SHARP SURGICAL BLADE TO PUNCTURE OR CUT.: Brand: BARD-PARKER ® CARBON RIB-BACK BLADES

## (undated) DEVICE — GLV SURG TRIUMPH CLASSIC PF LTX 8.5 STRL

## (undated) DEVICE — DRP SLUSH WARMR MACH 52X66IN OM-ORS-301

## (undated) DEVICE — GLV SURG BIOGEL LTX PF 8 1/2

## (undated) DEVICE — DRP ROBOTIC ROSA BX/20

## (undated) DEVICE — DRP MICROSCP LEICA VARILENS2 132X406CM

## (undated) DEVICE — MARKR SKIN W/RULR AND LBL

## (undated) DEVICE — CODMAN® SURGICAL PATTIES 1/2" X 1/2" (1.27CM X 1.27CM): Brand: CODMAN®

## (undated) DEVICE — SCRW HEX PERSONA FML 2.5X25MM PK/2
Type: IMPLANTABLE DEVICE | Site: KNEE | Status: NON-FUNCTIONAL
Removed: 2023-08-24

## (undated) DEVICE — GLV SURG BIOGEL LTX PF 8

## (undated) DEVICE — DRSNG WND GZ PAD BORDERED 4X8IN STRL

## (undated) DEVICE — CABLE 2344000 POWEREASE NIM: Brand: POWEREASE™ INSTRUMENTS

## (undated) DEVICE — SYR LUERLOK 30CC

## (undated) DEVICE — PK ATS CUST W CARDIOTOMY RESEVOIR

## (undated) DEVICE — TUBING, SUCTION, 1/4" X 20', STRAIGHT: Brand: MEDLINE INDUSTRIES, INC.

## (undated) DEVICE — NDL HYPO ECLPS SFTY 18G 1 1/2IN

## (undated) DEVICE — NDL HYPO PRECISIONGLIDE REG 25G 1 1/2

## (undated) DEVICE — DRSNG GZ PETROLTM XEROFORM CURAD 1X8IN STRL

## (undated) DEVICE — 1842 FOAM BLOCK NEEDLE COUNTER: Brand: DEVON

## (undated) DEVICE — PENCL E/S SMOKEEVAC W/TELESCP CANN